# Patient Record
Sex: MALE | Race: BLACK OR AFRICAN AMERICAN | NOT HISPANIC OR LATINO | Employment: OTHER | URBAN - METROPOLITAN AREA
[De-identification: names, ages, dates, MRNs, and addresses within clinical notes are randomized per-mention and may not be internally consistent; named-entity substitution may affect disease eponyms.]

---

## 2019-09-06 RX ORDER — SOLIFENACIN SUCCINATE 5 MG/1
TABLET, FILM COATED ORAL
Qty: 30 TABLET | Refills: 0 | Status: SHIPPED | OUTPATIENT
Start: 2019-09-06 | End: 2020-05-11

## 2019-09-06 RX ORDER — AMLODIPINE BESYLATE 10 MG/1
TABLET ORAL
Qty: 30 TABLET | Refills: 0 | Status: SHIPPED | OUTPATIENT
Start: 2019-09-06 | End: 2020-05-11

## 2019-09-06 RX ORDER — MONTELUKAST SODIUM 10 MG/1
TABLET ORAL
Qty: 30 TABLET | Refills: 0 | Status: SHIPPED | OUTPATIENT
Start: 2019-09-06 | End: 2020-05-11

## 2019-10-02 RX ORDER — LORATADINE 10 MG/1
TABLET ORAL
Qty: 30 TABLET | OUTPATIENT
Start: 2019-10-02

## 2019-10-02 RX ORDER — OMEPRAZOLE 20 MG/1
CAPSULE, DELAYED RELEASE ORAL
Qty: 30 CAPSULE | OUTPATIENT
Start: 2019-10-02

## 2019-10-15 ENCOUNTER — OFFICE VISIT (OUTPATIENT)
Dept: FAMILY MEDICINE CLINIC | Facility: CLINIC | Age: 34
End: 2019-10-15

## 2019-10-15 VITALS
OXYGEN SATURATION: 96 % | SYSTOLIC BLOOD PRESSURE: 110 MMHG | HEART RATE: 77 BPM | DIASTOLIC BLOOD PRESSURE: 86 MMHG | TEMPERATURE: 97.3 F | HEIGHT: 66 IN | WEIGHT: 189.8 LBS | BODY MASS INDEX: 30.5 KG/M2

## 2019-10-15 DIAGNOSIS — Z23 NEED FOR IMMUNIZATION AGAINST INFLUENZA: ICD-10-CM

## 2019-10-15 DIAGNOSIS — I10 ESSENTIAL HYPERTENSION: ICD-10-CM

## 2019-10-15 DIAGNOSIS — F79 INTELLECTUAL DISABILITY: Primary | ICD-10-CM

## 2019-10-15 PROBLEM — J30.2 SEASONAL ALLERGIES: Status: ACTIVE | Noted: 2019-10-15

## 2019-10-15 PROBLEM — G40.909 SEIZURE DISORDER (HCC): Status: ACTIVE | Noted: 2019-10-15

## 2019-10-15 PROBLEM — E78.5 HYPERLIPIDEMIA: Status: ACTIVE | Noted: 2019-10-15

## 2019-10-15 PROBLEM — N18.30 CHRONIC RENAL DISEASE, STAGE 3, MODERATELY DECREASED GLOMERULAR FILTRATION RATE (GFR) BETWEEN 30-59 ML/MIN/1.73 SQUARE METER: Status: ACTIVE | Noted: 2019-10-15

## 2019-10-15 PROCEDURE — 99213 OFFICE O/P EST LOW 20 MIN: CPT | Performed by: INTERNAL MEDICINE

## 2019-10-15 PROCEDURE — 90686 IIV4 VACC NO PRSV 0.5 ML IM: CPT | Performed by: INTERNAL MEDICINE

## 2019-10-15 PROCEDURE — 90471 IMMUNIZATION ADMIN: CPT | Performed by: INTERNAL MEDICINE

## 2019-10-15 RX ORDER — QUETIAPINE FUMARATE 300 MG/1
300 TABLET, FILM COATED ORAL NIGHTLY
COMMUNITY
End: 2020-05-11

## 2019-10-15 RX ORDER — FLUTICASONE PROPIONATE 50 MCG
2 SPRAY, SUSPENSION (ML) NASAL DAILY
COMMUNITY
End: 2019-11-07 | Stop reason: SDUPTHER

## 2019-10-15 RX ORDER — AMMONIUM LACTATE 120 MG/G
CREAM TOPICAL AS NEEDED
COMMUNITY
End: 2020-08-24

## 2019-10-15 RX ORDER — FUROSEMIDE 40 MG/1
40 TABLET ORAL DAILY
COMMUNITY
End: 2021-09-28 | Stop reason: SDUPTHER

## 2019-10-15 RX ORDER — METOPROLOL TARTRATE 100 MG/1
100 TABLET ORAL 2 TIMES DAILY
COMMUNITY
End: 2020-05-11

## 2019-10-15 RX ORDER — BUDESONIDE 1 MG/2ML
1 INHALANT ORAL
COMMUNITY
End: 2020-07-20

## 2019-10-15 RX ORDER — DIVALPROEX SODIUM 500 MG/1
1000 TABLET, DELAYED RELEASE ORAL
COMMUNITY
End: 2021-09-12 | Stop reason: HOSPADM

## 2019-10-15 RX ORDER — OLANZAPINE 20 MG/1
20 TABLET ORAL 2 TIMES DAILY
COMMUNITY
End: 2020-01-27

## 2019-10-15 RX ORDER — HYDRALAZINE HYDROCHLORIDE 100 MG/1
100 TABLET, FILM COATED ORAL 3 TIMES DAILY
COMMUNITY
End: 2020-09-01 | Stop reason: SDUPTHER

## 2019-10-15 RX ORDER — OMEPRAZOLE 20 MG/1
20 CAPSULE, DELAYED RELEASE ORAL DAILY
COMMUNITY
End: 2020-05-11

## 2019-10-15 RX ORDER — ALBUTEROL SULFATE 90 UG/1
2 AEROSOL, METERED RESPIRATORY (INHALATION) EVERY 4 HOURS PRN
COMMUNITY
End: 2020-05-12

## 2019-10-15 RX ORDER — POLYETHYLENE GLYCOL 3350 17 G/17G
17 POWDER, FOR SOLUTION ORAL DAILY
COMMUNITY
End: 2020-06-15

## 2019-10-15 RX ORDER — ALLOPURINOL 100 MG/1
100 TABLET ORAL DAILY
COMMUNITY
End: 2020-08-03

## 2019-10-15 RX ORDER — CLONIDINE HYDROCHLORIDE 0.3 MG/1
0.3 TABLET ORAL 3 TIMES DAILY
COMMUNITY
End: 2020-08-03

## 2019-10-15 RX ORDER — LORATADINE 10 MG/1
10 TABLET ORAL DAILY
COMMUNITY
End: 2020-05-11

## 2019-10-15 RX ORDER — FERROUS SULFATE 325(65) MG
325 TABLET ORAL 2 TIMES DAILY
COMMUNITY
End: 2020-05-11

## 2019-10-15 NOTE — PROGRESS NOTES
Juanita Mccarthy is a 33 y.o. male.     Chief Complaint   Patient presents with   • Hypertension       History of Present Illness   Caregiver was present during the history-taking and subsequent discussion (and for part of the physical exam) with this patient.  Patient agrees to the presence of the individual during this visit.  Follow-up for hypertension.  Currently has been feeling well and asymptomatic without any headaches,vision changes, cough, chest pain, shortness of breath, swelling, focal neurologic deficit, memory loss or syncope.  Has been taking the medications regularly and adherent with the regimen, Denies medication side effects and no significant interval events.  Home blood pressure has been normal and no problems in the home.    Follow up for seizures.  Still no seizures for a long time.  Over 1 year seizure free and no problems with the medications.  Needs protocols for hypertension and seizures for the group home.    The following portions of the patient's history were reviewed and updated as appropriate: allergies, current medications, past family history, past medical history, past social history, past surgical history and problem list.    Past Medical History:   Diagnosis Date   • Asthma    • Constipation    • GERD (gastroesophageal reflux disease)    • Hypertension    • Hypothyroidism    • Mood disorder (CMS/HCC)    • Seizure disorder (CMS/HCC)        History reviewed. No pertinent surgical history.    History reviewed. No pertinent family history.    Social History     Socioeconomic History   • Marital status: Single     Spouse name: Not on file   • Number of children: Not on file   • Years of education: Not on file   • Highest education level: Not on file   Tobacco Use   • Smoking status: Never Smoker   • Smokeless tobacco: Never Used   Substance and Sexual Activity   • Alcohol use: No     Frequency: Never       Review of Systems   Constitutional: Negative for activity change,  appetite change, fatigue, fever, unexpected weight gain and unexpected weight loss.   HENT: Negative for nosebleeds, rhinorrhea, trouble swallowing and voice change.    Eyes: Negative for visual disturbance.   Respiratory: Negative for cough, chest tightness, shortness of breath and wheezing.    Cardiovascular: Negative for chest pain, palpitations and leg swelling.   Gastrointestinal: Negative for abdominal pain, blood in stool, constipation, diarrhea, nausea, vomiting, GERD and indigestion.   Genitourinary: Negative for dysuria, frequency and hematuria.   Musculoskeletal: Negative for arthralgias, back pain and myalgias.   Skin: Negative for rash and bruise.   Neurological: Negative for dizziness, tremors, weakness, light-headedness, numbness, headache and memory problem.   Hematological: Negative for adenopathy. Does not bruise/bleed easily.   Psychiatric/Behavioral: Negative for sleep disturbance and depressed mood. The patient is not nervous/anxious.        Objective   Vitals:    10/15/19 1335   BP: 110/86   Pulse: 77   Temp: 97.3 °F (36.3 °C)   SpO2: 96%     Body mass index is 30.63 kg/m².  Physical Exam   Constitutional: He is oriented to person, place, and time. He appears well-developed and well-nourished. No distress.   HENT:   Head: Normocephalic and atraumatic.   Right Ear: External ear normal.   Left Ear: External ear normal.   Nose: Nose normal.   Mouth/Throat: Oropharynx is clear and moist.   Eyes: Conjunctivae and EOM are normal. Pupils are equal, round, and reactive to light.   Right exotropia   Neck: Normal range of motion. Neck supple. No tracheal deviation present. No thyromegaly present.   Cardiovascular: Normal rate, regular rhythm, normal heart sounds and intact distal pulses. Exam reveals no gallop and no friction rub.   No murmur heard.  Pulmonary/Chest: Effort normal and breath sounds normal. No respiratory distress.   Abdominal: Soft. Bowel sounds are normal. He exhibits no mass. There is  no tenderness. There is no guarding.   Musculoskeletal: Normal range of motion. He exhibits no edema.   Lymphadenopathy:     He has no cervical adenopathy.   Neurological: He is alert and oriented to person, place, and time. He displays normal reflexes. He exhibits normal muscle tone.   Skin: Skin is warm and dry. Capillary refill takes less than 2 seconds. No rash noted. He is not diaphoretic.   Psychiatric: He has a normal mood and affect. His behavior is normal. Judgment and thought content normal.   Nursing note and vitals reviewed.      No results found for this or any previous visit (from the past 2016 hour(s)).  Assessment/Plan   Jim was seen today for hypertension.    Diagnoses and all orders for this visit:    Intellectual disability    Essential hypertension    Need for immunization against influenza  -     Fluarix/Fluzone/Afluria Quad>6 Months; Standing  -     Fluarix/Fluzone/Afluria Quad>6 Months    Old record obtained and reviewed.  Continue the current medications and flu shot today.  Follow up with nephrology as scheduled tomorrow.   Hypertension and seizure protocols were printed and given to staff of [facility].

## 2019-11-07 RX ORDER — FLUTICASONE PROPIONATE 50 MCG
SPRAY, SUSPENSION (ML) NASAL
Qty: 16 G | Refills: 1 | Status: SHIPPED | OUTPATIENT
Start: 2019-11-07 | End: 2020-02-25

## 2020-01-22 ENCOUNTER — OFFICE VISIT (OUTPATIENT)
Dept: FAMILY MEDICINE CLINIC | Facility: CLINIC | Age: 35
End: 2020-01-22

## 2020-01-22 VITALS
DIASTOLIC BLOOD PRESSURE: 86 MMHG | HEIGHT: 66 IN | WEIGHT: 196 LBS | OXYGEN SATURATION: 96 % | HEART RATE: 76 BPM | TEMPERATURE: 97.8 F | BODY MASS INDEX: 31.5 KG/M2 | SYSTOLIC BLOOD PRESSURE: 118 MMHG

## 2020-01-22 DIAGNOSIS — J45.20 MILD INTERMITTENT ASTHMA WITHOUT COMPLICATION: ICD-10-CM

## 2020-01-22 DIAGNOSIS — N18.30 CHRONIC RENAL DISEASE, STAGE 3, MODERATELY DECREASED GLOMERULAR FILTRATION RATE (GFR) BETWEEN 30-59 ML/MIN/1.73 SQUARE METER (HCC): ICD-10-CM

## 2020-01-22 DIAGNOSIS — Z00.00 ANNUAL PHYSICAL EXAM: Primary | ICD-10-CM

## 2020-01-22 DIAGNOSIS — F79 INTELLECTUAL DISABILITY: ICD-10-CM

## 2020-01-22 DIAGNOSIS — E78.2 MIXED HYPERLIPIDEMIA: ICD-10-CM

## 2020-01-22 DIAGNOSIS — G40.909 SEIZURE DISORDER (HCC): ICD-10-CM

## 2020-01-22 DIAGNOSIS — E03.9 ACQUIRED HYPOTHYROIDISM: ICD-10-CM

## 2020-01-22 DIAGNOSIS — K21.9 GASTROESOPHAGEAL REFLUX DISEASE WITHOUT ESOPHAGITIS: ICD-10-CM

## 2020-01-22 DIAGNOSIS — I10 ESSENTIAL HYPERTENSION: ICD-10-CM

## 2020-01-22 PROBLEM — J45.909 ASTHMA: Status: ACTIVE | Noted: 2020-01-22

## 2020-01-22 PROCEDURE — 99395 PREV VISIT EST AGE 18-39: CPT | Performed by: INTERNAL MEDICINE

## 2020-01-22 RX ORDER — CHOLECALCIFEROL (VITAMIN D3) 25 MCG
1000 TABLET ORAL EVERY OTHER DAY
COMMUNITY
Start: 2020-01-02 | End: 2020-08-03

## 2020-01-22 NOTE — PROGRESS NOTES
Chief Complaint   Patient presents with   • Annual Exam       HPI:  Jim Mccarthy, -1985, is a 34 y.o. male who presents for an annual physical.  Caregiver was present during the history-taking and subsequent discussion (and for part of the physical exam) with this patient.  Patient agrees to the presence of the individual during this visit.  Follow-up for hypertension.  Currently has been feeling well and asymptomatic without any headaches,vision changes, cough, chest pain, shortness of breath, swelling, focal neurologic deficit, memory loss or syncope.  Has been taking the medications regularly and adherent with the regimen, Denies medication side effects and no significant interval events.  Home blood pressure has been normal and no problems in the home.    Follow up for seizures.  Still no seizures for a long time.  Over 1 year seizure free and no problems with the medications.  State did  Recent Hospitalizations:  No hospitalization(s) within the last year..    Current Medical Providers:  Patient Care Team:  Mikael Vasquez MD as PCP - General (Internal Medicine)    Compared to one year ago, the patient feels his physical health is the same and his mental health is the same.    Depression Screen: Patient with intellectual disability and poor historian  PHQ-2/PHQ-9 Depression Screening 2020   Little interest or pleasure in doing things 0   Feeling down, depressed, or hopeless 0   Trouble falling or staying asleep, or sleeping too much 0   Feeling tired or having little energy 0   Poor appetite or overeating 0   Feeling bad about yourself - or that you are a failure or have let yourself or your family down 0   Trouble concentrating on things, such as reading the newspaper or watching television 0   Moving or speaking so slowly that other people could have noticed. Or the opposite - being so fidgety or restless that you have been moving around a lot more than usual 0   Thoughts that you would be  better off dead, or of hurting yourself in some way 0   Total Score 0       Past Medical/Family/Social History:  The following portions of the patient's history were reviewed and updated as appropriate: allergies, current medications, past family history, past medical history, past social history, past surgical history and problem list.    No Known Allergies      Current Outpatient Medications:   •  albuterol sulfate HFA (PROAIR HFA) 108 (90 Base) MCG/ACT inhaler, Inhale 2 puffs Every 4 (Four) Hours As Needed for Wheezing., Disp: , Rfl:   •  allopurinol (ZYLOPRIM) 100 MG tablet, Take 100 mg by mouth Daily., Disp: , Rfl:   •  amLODIPine (NORVASC) 10 MG tablet, TAKE ONE TABLET DAILY, Disp: 30 tablet, Rfl: 0  •  ammonium lactate (AMLACTIN) 12 % cream, Apply  topically to the appropriate area as directed As Needed for Dry Skin., Disp: , Rfl:   •  budesonide (PULMICORT) 1 MG/2ML nebulizer solution, Take 1 mg by nebulization Daily., Disp: , Rfl:   •  cloNIDine (CATAPRES) 0.3 MG tablet, Take 0.3 mg by mouth 3 (Three) Times a Day., Disp: , Rfl:   •  divalproex (DEPAKOTE) 500 MG DR tablet, Take 500 mg by mouth. 1 tab a.m. And 2 tabs p.m., Disp: , Rfl:   •  ferrous sulfate 325 (65 FE) MG tablet, Take 325 mg by mouth 2 (Two) Times a Day., Disp: , Rfl:   •  fluticasone (FLONASE) 50 MCG/ACT nasal spray, USE 2 SPRAYS IN EACH NOSTRIL DAILY, Disp: 16 g, Rfl: 1  •  furosemide (LASIX) 40 MG tablet, Take 40 mg by mouth Daily., Disp: , Rfl:   •  hydrALAZINE (APRESOLINE) 100 MG tablet, Take 100 mg by mouth 3 (Three) Times a Day., Disp: , Rfl:   •  LEVOTHYROXINE SODIUM PO, Take 0.075 mcg by mouth Daily., Disp: , Rfl:   •  loratadine (CLARITIN) 10 MG tablet, Take 10 mg by mouth Daily., Disp: , Rfl:   •  metoprolol tartrate (LOPRESSOR) 100 MG tablet, Take 100 mg by mouth 2 (Two) Times a Day., Disp: , Rfl:   •  montelukast (SINGULAIR) 10 MG tablet, TAKE ONE TABLET BY MOUTH AT BEDTIME, Disp: 30 tablet, Rfl: 0  •  O2 (OXYGEN), Inhale 2 L/min  Every Night., Disp: , Rfl:   •  OLANZapine (ZYPREXA) 20 MG tablet, Take 20 mg by mouth 2 (Two) Times a Day., Disp: , Rfl:   •  omeprazole (priLOSEC) 20 MG capsule, Take 20 mg by mouth Daily., Disp: , Rfl:   •  polyethylene glycol (MIRALAX) packet, Take 17 g by mouth Daily., Disp: , Rfl:   •  QUEtiapine (SEROquel) 300 MG tablet, Take 300 mg by mouth Every Night. 2 tab nightly, Disp: , Rfl:   •  solifenacin (VESICARE) 5 MG tablet, TAKE ONE TABLET BY MOUTH DAILY, Disp: 30 tablet, Rfl: 0  •  D 1000 25 MCG (1000 UT) tablet, Take 1,000 Units by mouth Every Other Day., Disp: , Rfl:     Current medication list contains no high risk medications.  No harmful drug interactions have been identified.     History reviewed. No pertinent family history.    Social History     Tobacco Use   • Smoking status: Never Smoker   • Smokeless tobacco: Never Used   Substance Use Topics   • Alcohol use: No     Frequency: Never       History reviewed. No pertinent surgical history.    Patient Active Problem List   Diagnosis   • Intellectual disability   • Seasonal allergies   • Hypertension   • Seizure disorder (CMS/[facility])   • Chronic renal disease, stage 3, moderately decreased glomerular filtration rate (GFR) between 30-59 mL/min/1.73 square meter (CMS/[facility])   • Hyperlipidemia   • Annual physical exam   • Hypothyroidism   • GERD (gastroesophageal reflux disease)   • Asthma       Review of Systems   Constitutional: Negative for activity change, appetite change, fatigue, fever, unexpected weight gain and unexpected weight loss.   HENT: Negative for nosebleeds, rhinorrhea, trouble swallowing and voice change.    Eyes: Negative for visual disturbance.   Respiratory: Negative for cough, chest tightness, shortness of breath and wheezing.    Cardiovascular: Negative for chest pain, palpitations and leg swelling.   Gastrointestinal: Negative for abdominal pain, blood in stool, constipation, diarrhea, nausea, vomiting, GERD and indigestion.  "  Genitourinary: Negative for dysuria, frequency and hematuria.   Musculoskeletal: Negative for arthralgias, back pain and myalgias.   Skin: Negative for rash and bruise.   Neurological: Negative for dizziness, tremors, weakness, light-headedness, numbness, headache and memory problem.   Hematological: Negative for adenopathy. Does not bruise/bleed easily.   Psychiatric/Behavioral: Negative for sleep disturbance and depressed mood. The patient is not nervous/anxious.        Objective     Vitals:    01/22/20 1305   BP: 118/86   BP Location: Left arm   Patient Position: Sitting   Cuff Size: Adult   Pulse: 76   Temp: 97.8 °F (36.6 °C)   SpO2: 96%   Weight: 88.9 kg (196 lb)   Height: 167.6 cm (65.98\")       Patient's Body mass index is 31.65 kg/m². BMI is above normal parameters. Recommendations include: exercise counseling and nutrition counseling.      No exam data present    Physical Exam   Constitutional: He is oriented to person, place, and time. He appears well-developed and well-nourished. No distress.   HENT:   Head: Normocephalic and atraumatic.   Right Ear: External ear normal.   Left Ear: External ear normal.   Nose: Nose normal.   Mouth/Throat: Oropharynx is clear and moist.   Right exotropia    Eyes: Pupils are equal, round, and reactive to light. Conjunctivae and EOM are normal.   Neck: Normal range of motion. Neck supple. No tracheal deviation present. No thyromegaly present.   Cardiovascular: Normal rate, regular rhythm, normal heart sounds and intact distal pulses. Exam reveals no gallop and no friction rub.   No murmur heard.  Pulmonary/Chest: Effort normal and breath sounds normal. No respiratory distress.   Abdominal: Soft. Bowel sounds are normal. He exhibits no mass. There is no tenderness. There is no guarding.   Musculoskeletal: Normal range of motion. He exhibits no edema.   Lymphadenopathy:     He has no cervical adenopathy.   Neurological: He is alert and oriented to person, place, and time. " He displays normal reflexes. He exhibits normal muscle tone.   Skin: Skin is warm and dry. Capillary refill takes less than 2 seconds. No rash noted. He is not diaphoretic.   Left hand with abrasion over the distal hand dorsum 1 inch x 1/2 inch   Psychiatric: He has a normal mood and affect. His behavior is normal. Judgment and thought content normal.   Nursing note and vitals reviewed.      Recent Lab Results:          Assessment/Plan   Age-appropriate Screening Schedule:  Refer to the list below for future screening recommendations based on patient's age, sex and/or medical conditions.      Health Maintenance   Topic Date Due   • TDAP/TD VACCINES (1 - Tdap) 12/01/1998   • PNEUMOCOCCAL VACCINE (19-64 HIGHEST RISK) (1 of 3 - PCV13) 12/01/2006   • LIPID PANEL  10/15/2019   • INFLUENZA VACCINE  Completed       Diagnoses and all orders for this visit:    1. Annual physical exam (Primary)    2. Essential hypertension  -     Comprehensive Metabolic Panel  -     Lipid Panel    3. Mixed hyperlipidemia  -     Comprehensive Metabolic Panel  -     Lipid Panel    4. Acquired hypothyroidism  -     TSH  -     T4, Free    5. Seizure disorder (CMS/HCC)  -     CBC & Differential    6. Chronic renal disease, stage 3, moderately decreased glomerular filtration rate (GFR) between 30-59 mL/min/1.73 square meter (CMS/HCC)  -     Comprehensive Metabolic Panel  -     CBC & Differential    7. Gastroesophageal reflux disease without esophagitis    8. Mild intermittent asthma without complication    9. Intellectual disability        Annual wellness visit reviewed with patient.  All past history, medications, social history, and problem list were reviewed.  Patient currently resides in group home and has POA for decision making.  Will check the labs as ordered above to evaluate the blood sugars, kidney, liver, cholesterol for screening.  Discussed flu shot recommended to get the influenza vaccine annually in the fall. Encouraged follow-up  with the eye doctor on annual basis.  Discussed weight and encouraged exercise as tolerated while following a healthy diet.       An After Visit Summary with all of these plans were given to the patient.        Follow Up:  Return in about 6 months (around 7/22/2020) for Next scheduled follow up.

## 2020-01-22 NOTE — PATIENT INSTRUCTIONS

## 2020-01-23 LAB
ALBUMIN SERPL-MCNC: 4.2 G/DL (ref 3.5–5.2)
ALBUMIN/GLOB SERPL: 2 G/DL
ALP SERPL-CCNC: 130 U/L (ref 39–117)
ALT SERPL-CCNC: 37 U/L (ref 1–41)
AST SERPL-CCNC: 31 U/L (ref 1–40)
BASOPHILS # BLD AUTO: 0.03 10*3/MM3 (ref 0–0.2)
BASOPHILS NFR BLD AUTO: 0.5 % (ref 0–1.5)
BILIRUB SERPL-MCNC: 0.2 MG/DL (ref 0.2–1.2)
BUN SERPL-MCNC: 23 MG/DL (ref 6–20)
BUN/CREAT SERPL: 8.6 (ref 7–25)
CALCIUM SERPL-MCNC: 9.6 MG/DL (ref 8.6–10.5)
CHLORIDE SERPL-SCNC: 98 MMOL/L (ref 98–107)
CHOLEST SERPL-MCNC: 192 MG/DL (ref 0–200)
CO2 SERPL-SCNC: 31.7 MMOL/L (ref 22–29)
CREAT SERPL-MCNC: 2.68 MG/DL (ref 0.76–1.27)
EOSINOPHIL # BLD AUTO: 0.02 10*3/MM3 (ref 0–0.4)
EOSINOPHIL NFR BLD AUTO: 0.3 % (ref 0.3–6.2)
ERYTHROCYTE [DISTWIDTH] IN BLOOD BY AUTOMATED COUNT: 16.8 % (ref 12.3–15.4)
GLOBULIN SER CALC-MCNC: 2.1 GM/DL
GLUCOSE SERPL-MCNC: 113 MG/DL (ref 65–99)
HCT VFR BLD AUTO: 39.6 % (ref 37.5–51)
HDLC SERPL-MCNC: 24 MG/DL (ref 40–60)
HGB BLD-MCNC: 12.6 G/DL (ref 13–17.7)
IMM GRANULOCYTES # BLD AUTO: 0.04 10*3/MM3 (ref 0–0.05)
IMM GRANULOCYTES NFR BLD AUTO: 0.6 % (ref 0–0.5)
LDLC SERPL CALC-MCNC: ABNORMAL MG/DL
LYMPHOCYTES # BLD AUTO: 3.01 10*3/MM3 (ref 0.7–3.1)
LYMPHOCYTES NFR BLD AUTO: 48.6 % (ref 19.6–45.3)
MCH RBC QN AUTO: 23.9 PG (ref 26.6–33)
MCHC RBC AUTO-ENTMCNC: 31.8 G/DL (ref 31.5–35.7)
MCV RBC AUTO: 75.1 FL (ref 79–97)
MONOCYTES # BLD AUTO: 0.74 10*3/MM3 (ref 0.1–0.9)
MONOCYTES NFR BLD AUTO: 12 % (ref 5–12)
NEUTROPHILS # BLD AUTO: 2.35 10*3/MM3 (ref 1.7–7)
NEUTROPHILS NFR BLD AUTO: 38 % (ref 42.7–76)
NRBC BLD AUTO-RTO: 0.2 /100 WBC (ref 0–0.2)
PLATELET # BLD AUTO: 197 10*3/MM3 (ref 140–450)
POTASSIUM SERPL-SCNC: 4.4 MMOL/L (ref 3.5–5.2)
PROT SERPL-MCNC: 6.3 G/DL (ref 6–8.5)
RBC # BLD AUTO: 5.27 10*6/MM3 (ref 4.14–5.8)
SODIUM SERPL-SCNC: 141 MMOL/L (ref 136–145)
T4 FREE SERPL-MCNC: 1.05 NG/DL (ref 0.93–1.7)
TRIGL SERPL-MCNC: 529 MG/DL (ref 0–150)
TSH SERPL DL<=0.005 MIU/L-ACNC: 1.24 UIU/ML (ref 0.27–4.2)
VLDLC SERPL CALC-MCNC: ABNORMAL MG/DL
WBC # BLD AUTO: 6.19 10*3/MM3 (ref 3.4–10.8)

## 2020-01-24 PROBLEM — K59.00 CONSTIPATION: Status: ACTIVE | Noted: 2020-01-24

## 2020-01-24 PROBLEM — E78.1 HYPERTRIGLYCERIDEMIA: Status: ACTIVE | Noted: 2020-01-24

## 2020-01-27 RX ORDER — OLANZAPINE 20 MG/1
TABLET ORAL
Qty: 60 TABLET | Refills: 0 | Status: SHIPPED | OUTPATIENT
Start: 2020-01-27 | End: 2020-05-11

## 2020-02-25 RX ORDER — FLUTICASONE PROPIONATE 50 MCG
SPRAY, SUSPENSION (ML) NASAL
Qty: 16 G | Refills: 1 | Status: SHIPPED | OUTPATIENT
Start: 2020-02-25 | End: 2020-06-08

## 2020-02-28 ENCOUNTER — TELEPHONE (OUTPATIENT)
Dept: FAMILY MEDICINE CLINIC | Facility: CLINIC | Age: 35
End: 2020-02-28

## 2020-05-11 RX ORDER — METOPROLOL TARTRATE 100 MG/1
TABLET ORAL
Qty: 120 TABLET | Refills: 11 | Status: SHIPPED | OUTPATIENT
Start: 2020-05-11 | End: 2020-09-25

## 2020-05-11 RX ORDER — LORATADINE 10 MG/1
TABLET ORAL
Qty: 30 TABLET | Refills: 4 | Status: SHIPPED | OUTPATIENT
Start: 2020-05-11 | End: 2020-09-25

## 2020-05-11 RX ORDER — FERROUS SULFATE 325(65) MG
TABLET ORAL
Qty: 60 TABLET | Refills: 8 | Status: SHIPPED | OUTPATIENT
Start: 2020-05-11 | End: 2020-09-25

## 2020-05-11 RX ORDER — MONTELUKAST SODIUM 10 MG/1
TABLET ORAL
Qty: 30 TABLET | Refills: 0 | Status: SHIPPED | OUTPATIENT
Start: 2020-05-11 | End: 2020-06-08

## 2020-05-11 RX ORDER — AMLODIPINE BESYLATE 10 MG/1
TABLET ORAL
Qty: 30 TABLET | Refills: 0 | Status: SHIPPED | OUTPATIENT
Start: 2020-05-11 | End: 2020-06-08

## 2020-05-11 RX ORDER — OMEPRAZOLE 20 MG/1
CAPSULE, DELAYED RELEASE ORAL
Qty: 30 CAPSULE | Refills: 8 | Status: SHIPPED | OUTPATIENT
Start: 2020-05-11 | End: 2020-09-25

## 2020-05-11 RX ORDER — OLANZAPINE 20 MG/1
TABLET ORAL
Qty: 60 TABLET | Refills: 0 | Status: SHIPPED | OUTPATIENT
Start: 2020-05-11 | End: 2020-06-08

## 2020-05-11 RX ORDER — SOLIFENACIN SUCCINATE 5 MG/1
TABLET, FILM COATED ORAL
Qty: 30 TABLET | Refills: 0 | Status: SHIPPED | OUTPATIENT
Start: 2020-05-11 | End: 2020-06-08

## 2020-05-11 RX ORDER — LEVOTHYROXINE SODIUM 0.07 MG/1
TABLET ORAL
Qty: 30 TABLET | Refills: 2 | Status: SHIPPED | OUTPATIENT
Start: 2020-05-11 | End: 2020-07-06

## 2020-05-11 RX ORDER — QUETIAPINE FUMARATE 300 MG/1
TABLET, FILM COATED ORAL
Qty: 60 TABLET | Refills: 2 | Status: SHIPPED | OUTPATIENT
Start: 2020-05-11 | End: 2020-07-06

## 2020-06-08 RX ORDER — SOLIFENACIN SUCCINATE 5 MG/1
TABLET, FILM COATED ORAL
Qty: 30 TABLET | Refills: 0 | Status: SHIPPED | OUTPATIENT
Start: 2020-06-08 | End: 2020-07-06

## 2020-06-08 RX ORDER — FLUTICASONE PROPIONATE 50 MCG
SPRAY, SUSPENSION (ML) NASAL
Qty: 16 G | Refills: 1 | Status: SHIPPED | OUTPATIENT
Start: 2020-06-08 | End: 2020-08-30

## 2020-06-08 RX ORDER — AMLODIPINE BESYLATE 10 MG/1
TABLET ORAL
Qty: 30 TABLET | Refills: 0 | Status: SHIPPED | OUTPATIENT
Start: 2020-06-08 | End: 2020-07-06

## 2020-06-08 RX ORDER — MONTELUKAST SODIUM 10 MG/1
TABLET ORAL
Qty: 30 TABLET | Refills: 0 | Status: SHIPPED | OUTPATIENT
Start: 2020-06-08 | End: 2020-07-06

## 2020-06-08 RX ORDER — OLANZAPINE 20 MG/1
TABLET ORAL
Qty: 60 TABLET | Refills: 0 | Status: SHIPPED | OUTPATIENT
Start: 2020-06-08 | End: 2020-07-06

## 2020-06-15 RX ORDER — POLYETHYLENE GLYCOL 3350 17 G/17G
POWDER, FOR SOLUTION ORAL
Qty: 510 G | Refills: 12 | Status: SHIPPED | OUTPATIENT
Start: 2020-06-15 | End: 2021-07-01

## 2020-07-06 RX ORDER — MONTELUKAST SODIUM 10 MG/1
TABLET ORAL
Qty: 30 TABLET | Refills: 0 | Status: SHIPPED | OUTPATIENT
Start: 2020-07-06 | End: 2020-08-03

## 2020-07-06 RX ORDER — OLANZAPINE 20 MG/1
TABLET ORAL
Qty: 60 TABLET | Refills: 0 | Status: SHIPPED | OUTPATIENT
Start: 2020-07-06 | End: 2020-08-03

## 2020-07-06 RX ORDER — QUETIAPINE FUMARATE 300 MG/1
TABLET, FILM COATED ORAL
Qty: 60 TABLET | Refills: 2 | Status: SHIPPED | OUTPATIENT
Start: 2020-07-06 | End: 2020-09-25

## 2020-07-06 RX ORDER — LEVOTHYROXINE SODIUM 0.07 MG/1
TABLET ORAL
Qty: 30 TABLET | Refills: 2 | Status: SHIPPED | OUTPATIENT
Start: 2020-07-06 | End: 2020-09-25

## 2020-07-06 RX ORDER — SOLIFENACIN SUCCINATE 5 MG/1
TABLET, FILM COATED ORAL
Qty: 30 TABLET | Refills: 0 | Status: SHIPPED | OUTPATIENT
Start: 2020-07-06 | End: 2020-08-03

## 2020-07-06 RX ORDER — AMLODIPINE BESYLATE 10 MG/1
TABLET ORAL
Qty: 30 TABLET | Refills: 0 | Status: SHIPPED | OUTPATIENT
Start: 2020-07-06 | End: 2020-08-03

## 2020-07-20 RX ORDER — BUDESONIDE 1 MG/2ML
INHALANT ORAL
Qty: 60 ML | Refills: 2 | Status: SHIPPED | OUTPATIENT
Start: 2020-07-20 | End: 2020-11-16

## 2020-07-29 ENCOUNTER — OFFICE VISIT (OUTPATIENT)
Dept: FAMILY MEDICINE CLINIC | Facility: CLINIC | Age: 35
End: 2020-07-29

## 2020-07-29 VITALS
DIASTOLIC BLOOD PRESSURE: 88 MMHG | WEIGHT: 203 LBS | OXYGEN SATURATION: 96 % | HEART RATE: 78 BPM | TEMPERATURE: 98.3 F | BODY MASS INDEX: 32.62 KG/M2 | HEIGHT: 66 IN | SYSTOLIC BLOOD PRESSURE: 124 MMHG

## 2020-07-29 DIAGNOSIS — N18.30 CHRONIC RENAL DISEASE, STAGE 3, MODERATELY DECREASED GLOMERULAR FILTRATION RATE (GFR) BETWEEN 30-59 ML/MIN/1.73 SQUARE METER (HCC): ICD-10-CM

## 2020-07-29 DIAGNOSIS — G40.909 SEIZURE DISORDER (HCC): ICD-10-CM

## 2020-07-29 DIAGNOSIS — I10 ESSENTIAL HYPERTENSION: Primary | ICD-10-CM

## 2020-07-29 DIAGNOSIS — E03.9 ACQUIRED HYPOTHYROIDISM: ICD-10-CM

## 2020-07-29 PROCEDURE — 99214 OFFICE O/P EST MOD 30 MIN: CPT | Performed by: INTERNAL MEDICINE

## 2020-07-29 NOTE — PROGRESS NOTES
Juanita Mccarthy is a 34 y.o. male.     Chief Complaint   Patient presents with   • Hypertension       History of Present Illness   Caregiver Arnold was present during the history-taking and subsequent discussion (and for part of the physical exam) with this patient.  Patient agrees to the presence of the individual during this visit.    Follow-up for hypertension.  Currently has been feeling well and asymptomatic without any headaches,vision changes, cough, chest pain, shortness of breath, swelling, focal neurologic deficit, or syncope.  Has been taking the medications regularly and adherent with the regimen of amlodipine 10 mg, clonidine 0.3 mg TID, furosemide 40 mg QD, metoprolol tartrate 100 mg BID and hydralazine 100 mg TID.  Denies medication side effects and no significant interval events.  Home blood pressure has been normal and no problems in the home.      Follow up for seizures.  Still no seizures for a long time.  Over 1 year seizure free and no problems with the medications.    Follow-up for thyroid.  Denies fatigue, weakness, constipation/diarrhea, hair/skin changes, weight gain/loss, depression/anxiety, rashes, palpitations, swelling, chest pain, shortness of breath or other issues.  Has been compliant with taking the medication with no recent changes.  Denies any difficulty with the current medication of levothyroxine 75 mcg daily.  Is due for labs.      The following portions of the patient's history were reviewed and updated as appropriate: allergies, current medications, past family history, past medical history, past social history, past surgical history and problem list.      Past Medical History:   Diagnosis Date   • Asthma    • Constipation    • GERD (gastroesophageal reflux disease)    • Hypertension    • Hypothyroidism    • Mood disorder (CMS/HCC)    • Seizure disorder (CMS/HCC)        History reviewed. No pertinent surgical history.    Family History   Problem Relation Age of Onset   •  Down syndrome Brother        Social History     Socioeconomic History   • Marital status: Single     Spouse name: Not on file   • Number of children: Not on file   • Years of education: Not on file   • Highest education level: Not on file   Tobacco Use   • Smoking status: Never Smoker   • Smokeless tobacco: Never Used   Substance and Sexual Activity   • Alcohol use: No     Frequency: Never       Current Outpatient Medications   Medication Sig Dispense Refill   • allopurinol (ZYLOPRIM) 100 MG tablet Take 100 mg by mouth Daily.     • amLODIPine (NORVASC) 10 MG tablet TAKE ONE TABLET BY MOUTH DAILY 30 tablet 0   • ammonium lactate (AMLACTIN) 12 % cream Apply  topically to the appropriate area as directed As Needed for Dry Skin.     • budesonide (PULMICORT) 1 MG/2ML nebulizer solution INHALE THE CONTENTS OF ONE VIAL VIA NEBULIZE DAILY 60 mL 2   • cloNIDine (CATAPRES) 0.3 MG tablet Take 0.3 mg by mouth 3 (Three) Times a Day.     • D 1000 25 MCG (1000 UT) tablet Take 1,000 Units by mouth Every Other Day.     • divalproex (DEPAKOTE) 500 MG DR tablet Take 500 mg by mouth. 1 tab a.m. And 2 tabs p.m.     • ferrous sulfate 325 (65 FE) MG tablet TAKE ONE TABLET TWICE A DAY MORNING AND EVENING 60 tablet 8   • fluticasone (FLONASE) 50 MCG/ACT nasal spray USE 2 SPRAYS IN EACH NOSTRIL DAILY 16 g 1   • furosemide (LASIX) 40 MG tablet Take 40 mg by mouth Daily.     • hydrALAZINE (APRESOLINE) 100 MG tablet Take 100 mg by mouth 3 (Three) Times a Day.     • levothyroxine (SYNTHROID, LEVOTHROID) 75 MCG tablet TAKE ONE TABLET BY MOUTH DAILY 30 tablet 2   • loratadine (CLARITIN) 10 MG tablet TAKE ONE TABLET DAILY 30 tablet 4   • metoprolol tartrate (LOPRESSOR) 100 MG tablet TAKE TWO TABLETS TWICE A DAY MORNING AND EVENING 120 tablet 11   • montelukast (SINGULAIR) 10 MG tablet TAKE ONE TABLET BY MOUTH AT BEDTIME 30 tablet 0   • O2 (OXYGEN) Inhale 2 L/min Every Night.     • OLANZapine (zyPREXA) 20 MG tablet TAKE ONE TABLET BY MOUTH TWICE A  "DAY MORNING AND EVENING 60 tablet 0   • omeprazole (priLOSEC) 20 MG capsule TAKE ONE CAPSULE DAILY 30 capsule 8   • polyethylene glycol (MIRALAX) 17 GM/SCOOP powder MIX 17 GRAMS (ONE CAPFUL) IN LIQUID AND DRINK DAILY 510 g 12   • PROAIR  (90 Base) MCG/ACT inhaler INHALE TWO PUFFS BY MOUTH EVERY 6 HOURS AS NEEDED AND 20 MINUTES BEFORE EXERCISE.. 8.5 g 11   • QUEtiapine (SEROquel) 300 MG tablet TAKE TWO TABLETS BY MOUTH AT BEDTIME 60 tablet 2   • solifenacin (VESICARE) 5 MG tablet TAKE ONE TABLET BY MOUTH DAILY 30 tablet 0     No current facility-administered medications for this visit.        Review of Systems   Constitutional: Negative for activity change, appetite change, fatigue, fever, unexpected weight gain and unexpected weight loss.   HENT: Negative for nosebleeds, rhinorrhea, trouble swallowing and voice change.         Right exotropia   Eyes: Negative for visual disturbance.   Respiratory: Negative for cough, chest tightness, shortness of breath and wheezing.    Cardiovascular: Negative for chest pain, palpitations and leg swelling.   Gastrointestinal: Negative for abdominal pain, blood in stool, constipation, diarrhea, nausea, vomiting, GERD and indigestion.   Genitourinary: Negative for dysuria, frequency and hematuria.   Musculoskeletal: Negative for arthralgias, back pain and myalgias.   Skin: Negative for rash and bruise.   Neurological: Negative for dizziness, tremors, weakness, light-headedness, numbness, headache and memory problem.   Hematological: Negative for adenopathy. Does not bruise/bleed easily.   Psychiatric/Behavioral: Negative for sleep disturbance and depressed mood. The patient is not nervous/anxious.        Objective   /88 (BP Location: Left arm, Patient Position: Sitting, Cuff Size: Large Adult)   Pulse 78   Temp 98.3 °F (36.8 °C) (Temporal)   Ht 167.6 cm (65.98\")   Wt 92.1 kg (203 lb)   SpO2 96%   BMI 32.78 kg/m²     Physical Exam   Constitutional: He is oriented to " person, place, and time. He appears well-developed and well-nourished. No distress.   HENT:   Head: Normocephalic and atraumatic.   Right Ear: External ear normal.   Left Ear: External ear normal.   Nose: Nose normal.   Mouth/Throat: Oropharynx is clear and moist.   Eyes: Pupils are equal, round, and reactive to light. Conjunctivae and EOM are normal.   Right exotropia   Neck: Normal range of motion. Neck supple. No tracheal deviation present. No thyromegaly present.   Cardiovascular: Normal rate, regular rhythm, normal heart sounds and intact distal pulses. Exam reveals no gallop and no friction rub.   No murmur heard.  Pulmonary/Chest: Effort normal and breath sounds normal. No respiratory distress.   Abdominal: Soft. Bowel sounds are normal. He exhibits no mass. There is no tenderness. There is no guarding.   Musculoskeletal: Normal range of motion. He exhibits no edema.   Lymphadenopathy:     He has no cervical adenopathy.   Neurological: He is alert and oriented to person, place, and time. He displays normal reflexes. He exhibits normal muscle tone.   Skin: Skin is warm and dry. Capillary refill takes less than 2 seconds. No rash noted. He is not diaphoretic.   Psychiatric: He has a normal mood and affect. His behavior is normal. Judgment and thought content normal.   Nursing note and vitals reviewed.      No results found for this or any previous visit (from the past 2016 hour(s)).  Assessment/Plan   Jim was seen today for hypertension.    Diagnoses and all orders for this visit:    Essential hypertension  -     CBC & Differential    Acquired hypothyroidism  -     TSH Rfx On Abnormal To Free T4    Chronic renal disease, stage 3, moderately decreased glomerular filtration rate (GFR) between 30-59 mL/min/1.73 square meter (CMS/HCC)  -     Comprehensive Metabolic Panel  -     CBC & Differential  -     PTH, Intact  -     Vitamin D 25 Hydroxy    Seizure disorder (CMS/HCC)  -     Valproic Acid Level,  Free    Hypertension has been fluctuating within the facility.  However, noted today that his blood pressure initially was high but then did come down and I believe that is likely secondary to him and his flexing of muscles while he is having his blood pressure checked.  Discussed with caregiver on proper blood pressure checking and try to get them as relaxed as possible when he has his pressure checked.  He is to follow-up with nephrology and we will go into the labs today as noted above.  Thyroid appears to be stable continue with his thyroid levels being checked today.  He has had some weight gain but is somewhat concerning but it may also be because he has been eating more of his extras within the group home.

## 2020-07-31 LAB
25(OH)D3+25(OH)D2 SERPL-MCNC: 33 NG/ML (ref 30–100)
ALBUMIN SERPL-MCNC: 4.5 G/DL (ref 3.5–5.2)
ALBUMIN/GLOB SERPL: 2 G/DL
ALP SERPL-CCNC: 177 U/L (ref 39–117)
ALT SERPL-CCNC: 30 U/L (ref 1–41)
AST SERPL-CCNC: 21 U/L (ref 1–40)
BASOPHILS # BLD AUTO: ABNORMAL 10*3/UL
BASOPHILS # BLD MANUAL: 0.06 10*3/MM3 (ref 0–0.2)
BASOPHILS NFR BLD MANUAL: 1 % (ref 0–1.5)
BILIRUB SERPL-MCNC: 0.2 MG/DL (ref 0–1.2)
BUN SERPL-MCNC: 19 MG/DL (ref 6–20)
BUN/CREAT SERPL: 9 (ref 7–25)
CALCIUM SERPL-MCNC: 9.9 MG/DL (ref 8.6–10.5)
CHLORIDE SERPL-SCNC: 102 MMOL/L (ref 98–107)
CO2 SERPL-SCNC: 25.5 MMOL/L (ref 22–29)
CREAT SERPL-MCNC: 2.11 MG/DL (ref 0.76–1.27)
DIFFERENTIAL COMMENT: NORMAL
EOSINOPHIL # BLD AUTO: ABNORMAL 10*3/UL
EOSINOPHIL NFR BLD AUTO: ABNORMAL %
ERYTHROCYTE [DISTWIDTH] IN BLOOD BY AUTOMATED COUNT: 15.8 % (ref 12.3–15.4)
GLOBULIN SER CALC-MCNC: 2.3 GM/DL
GLUCOSE SERPL-MCNC: 133 MG/DL (ref 65–99)
HCT VFR BLD AUTO: 44.5 % (ref 37.5–51)
HGB BLD-MCNC: 14 G/DL (ref 13–17.7)
LYMPHOCYTES # BLD AUTO: ABNORMAL 10*3/UL
LYMPHOCYTES # BLD MANUAL: 1.72 10*3/MM3 (ref 0.7–3.1)
LYMPHOCYTES NFR BLD AUTO: ABNORMAL %
LYMPHOCYTES NFR BLD MANUAL: 28 % (ref 19.6–45.3)
MCH RBC QN AUTO: 23.5 PG (ref 26.6–33)
MCHC RBC AUTO-ENTMCNC: 31.5 G/DL (ref 31.5–35.7)
MCV RBC AUTO: 74.7 FL (ref 79–97)
MONOCYTES # BLD MANUAL: 0.49 10*3/MM3 (ref 0.1–0.9)
MONOCYTES NFR BLD AUTO: ABNORMAL %
MONOCYTES NFR BLD MANUAL: 8 % (ref 5–12)
NEUTROPHILS # BLD MANUAL: 3.86 10*3/MM3 (ref 1.7–7)
NEUTROPHILS NFR BLD AUTO: ABNORMAL %
NEUTROPHILS NFR BLD MANUAL: 63 % (ref 42.7–76)
PLATELET # BLD AUTO: 209 10*3/MM3 (ref 140–450)
PLATELET BLD QL SMEAR: NORMAL
POTASSIUM SERPL-SCNC: 4.4 MMOL/L (ref 3.5–5.2)
PROT SERPL-MCNC: 6.8 G/DL (ref 6–8.5)
PTH-INTACT SERPL-MCNC: 74 PG/ML (ref 15–65)
RBC # BLD AUTO: 5.96 10*6/MM3 (ref 4.14–5.8)
RBC MORPH BLD: NORMAL
SODIUM SERPL-SCNC: 141 MMOL/L (ref 136–145)
TSH SERPL DL<=0.005 MIU/L-ACNC: 1.36 UIU/ML (ref 0.27–4.2)
VALPROATE FREE SERPL-MCNC: 11.1 UG/ML (ref 6–22)
WBC # BLD AUTO: 6.13 10*3/MM3 (ref 3.4–10.8)

## 2020-08-03 RX ORDER — ALLOPURINOL 100 MG/1
TABLET ORAL
Qty: 30 TABLET | Refills: 2 | Status: SHIPPED | OUTPATIENT
Start: 2020-08-03 | End: 2020-09-25

## 2020-08-03 RX ORDER — AMLODIPINE BESYLATE 10 MG/1
TABLET ORAL
Qty: 30 TABLET | Refills: 0 | Status: SHIPPED | OUTPATIENT
Start: 2020-08-03 | End: 2020-08-30

## 2020-08-03 RX ORDER — SOLIFENACIN SUCCINATE 5 MG/1
TABLET, FILM COATED ORAL
Qty: 30 TABLET | Refills: 0 | Status: SHIPPED | OUTPATIENT
Start: 2020-08-03 | End: 2020-08-30

## 2020-08-03 RX ORDER — MELATONIN
Qty: 15 TABLET | Refills: 3 | Status: SHIPPED | OUTPATIENT
Start: 2020-08-03 | End: 2021-09-05

## 2020-08-03 RX ORDER — CLONIDINE HYDROCHLORIDE 0.3 MG/1
TABLET ORAL
Qty: 90 TABLET | Refills: 1 | Status: SHIPPED | OUTPATIENT
Start: 2020-08-03 | End: 2020-08-30

## 2020-08-03 RX ORDER — OLANZAPINE 20 MG/1
TABLET ORAL
Qty: 60 TABLET | Refills: 0 | Status: SHIPPED | OUTPATIENT
Start: 2020-08-03 | End: 2020-08-30

## 2020-08-03 RX ORDER — MONTELUKAST SODIUM 10 MG/1
TABLET ORAL
Qty: 30 TABLET | Refills: 0 | Status: SHIPPED | OUTPATIENT
Start: 2020-08-03 | End: 2020-08-30

## 2020-08-24 RX ORDER — AMMONIUM LACTATE 12 G/100G
LOTION TOPICAL
Qty: 400 G | Refills: 11 | Status: SHIPPED | OUTPATIENT
Start: 2020-08-24 | End: 2021-09-05

## 2020-08-30 RX ORDER — SOLIFENACIN SUCCINATE 5 MG/1
TABLET, FILM COATED ORAL
Qty: 30 TABLET | Refills: 11 | Status: SHIPPED | OUTPATIENT
Start: 2020-08-30 | End: 2021-07-06

## 2020-08-30 RX ORDER — AMLODIPINE BESYLATE 10 MG/1
TABLET ORAL
Qty: 30 TABLET | Refills: 11 | Status: SHIPPED | OUTPATIENT
Start: 2020-08-30 | End: 2021-07-06

## 2020-08-30 RX ORDER — MONTELUKAST SODIUM 10 MG/1
TABLET ORAL
Qty: 30 TABLET | Refills: 11 | Status: SHIPPED | OUTPATIENT
Start: 2020-08-30 | End: 2021-07-06

## 2020-08-30 RX ORDER — FLUTICASONE PROPIONATE 50 MCG
SPRAY, SUSPENSION (ML) NASAL
Qty: 16 G | Refills: 11 | Status: SHIPPED | OUTPATIENT
Start: 2020-08-30 | End: 2021-07-06

## 2020-08-30 RX ORDER — OLANZAPINE 20 MG/1
TABLET ORAL
Qty: 60 TABLET | Refills: 11 | Status: SHIPPED | OUTPATIENT
Start: 2020-08-30 | End: 2021-07-06

## 2020-08-30 RX ORDER — CLONIDINE HYDROCHLORIDE 0.3 MG/1
TABLET ORAL
Qty: 90 TABLET | Refills: 11 | Status: SHIPPED | OUTPATIENT
Start: 2020-08-30 | End: 2021-08-02

## 2020-09-01 ENCOUNTER — TELEPHONE (OUTPATIENT)
Dept: FAMILY MEDICINE CLINIC | Facility: CLINIC | Age: 35
End: 2020-09-01

## 2020-09-01 RX ORDER — HYDRALAZINE HYDROCHLORIDE 100 MG/1
100 TABLET, FILM COATED ORAL 3 TIMES DAILY
Qty: 270 TABLET | Refills: 0 | Status: SHIPPED | OUTPATIENT
Start: 2020-09-01 | End: 2020-12-10

## 2020-09-01 NOTE — TELEPHONE ENCOUNTER
Pt is requesting a refill on the following medication.  Pharmacy Gen Packaging      hydrALAZINE (APRESOLINE) 100 MG tablet [[id number]]     Order Details   Dose: 100 mg Route: Oral Frequency: 3 Times Daily   Dispense Quantity: -- Refills: -- Fills remaining: --           Sig: Take 100 mg by mouth 3 (Three) Times a Day.

## 2020-09-25 RX ORDER — OMEPRAZOLE 20 MG/1
CAPSULE, DELAYED RELEASE ORAL
Qty: 186 CAPSULE | Refills: 1 | Status: SHIPPED | OUTPATIENT
Start: 2020-09-25 | End: 2020-11-18

## 2020-09-25 RX ORDER — METOPROLOL TARTRATE 100 MG/1
TABLET ORAL
Qty: 180 TABLET | Refills: 0 | Status: SHIPPED | OUTPATIENT
Start: 2020-09-25 | End: 2020-11-18

## 2020-09-25 RX ORDER — ALLOPURINOL 100 MG/1
TABLET ORAL
Qty: 30 TABLET | Refills: 2 | Status: SHIPPED | OUTPATIENT
Start: 2020-09-25 | End: 2020-12-17

## 2020-09-25 RX ORDER — QUETIAPINE FUMARATE 300 MG/1
TABLET, FILM COATED ORAL
Qty: 60 TABLET | Refills: 2 | Status: SHIPPED | OUTPATIENT
Start: 2020-09-25 | End: 2020-12-17

## 2020-09-25 RX ORDER — FERROUS SULFATE 325(65) MG
TABLET ORAL
Qty: 372 TABLET | Refills: 1 | Status: SHIPPED | OUTPATIENT
Start: 2020-09-25 | End: 2020-11-18

## 2020-09-25 RX ORDER — LEVOTHYROXINE SODIUM 0.07 MG/1
TABLET ORAL
Qty: 30 TABLET | Refills: 2 | Status: SHIPPED | OUTPATIENT
Start: 2020-09-25 | End: 2020-12-17

## 2020-09-25 RX ORDER — LORATADINE 10 MG/1
TABLET ORAL
Qty: 66 TABLET | Refills: 0 | Status: SHIPPED | OUTPATIENT
Start: 2020-09-25 | End: 2020-11-18

## 2020-10-21 ENCOUNTER — OFFICE VISIT (OUTPATIENT)
Dept: FAMILY MEDICINE CLINIC | Facility: CLINIC | Age: 35
End: 2020-10-21

## 2020-10-21 VITALS
OXYGEN SATURATION: 97 % | WEIGHT: 206 LBS | TEMPERATURE: 98.2 F | SYSTOLIC BLOOD PRESSURE: 138 MMHG | HEART RATE: 74 BPM | HEIGHT: 66 IN | BODY MASS INDEX: 33.11 KG/M2 | DIASTOLIC BLOOD PRESSURE: 88 MMHG

## 2020-10-21 DIAGNOSIS — J45.20 MILD INTERMITTENT ASTHMA WITHOUT COMPLICATION: ICD-10-CM

## 2020-10-21 DIAGNOSIS — G40.909 SEIZURE DISORDER (HCC): ICD-10-CM

## 2020-10-21 DIAGNOSIS — I10 ESSENTIAL HYPERTENSION: Primary | ICD-10-CM

## 2020-10-21 DIAGNOSIS — E03.9 ACQUIRED HYPOTHYROIDISM: ICD-10-CM

## 2020-10-21 PROCEDURE — 99213 OFFICE O/P EST LOW 20 MIN: CPT | Performed by: INTERNAL MEDICINE

## 2020-10-21 RX ORDER — NEBULIZER ACCESSORIES
1 KIT MISCELLANEOUS 4 TIMES DAILY PRN
Qty: 1 EACH | Refills: 0 | Status: SHIPPED | OUTPATIENT
Start: 2020-10-21 | End: 2021-09-05

## 2020-10-21 NOTE — PROGRESS NOTES
Juanita Mccarthy is a 34 y.o. male.     Chief Complaint   Patient presents with   • Hypertension   • Seizures     protocol       History of Present Illness   Follow-up for hypertension.  Currently has been feeling well and asymptomatic without any headaches,vision changes, cough, chest pain, shortness of breath, swelling, focal neurologic deficit, or syncope.  Has been taking the medications regularly and adherent with the regimen of amlodipine 10 mg, clonidine 0.3 mg TID, furosemide 40 mg QD, metoprolol tartrate 100 mg BID and hydralazine 100 mg TID.  Denies medication side effects and no significant interval events.  Home blood pressure has been normal and no problems in the home.       Follow up for seizures.  Still no seizures for a long time.  Over 1 year seizure free and no problems with the medications.     Follow-up for thyroid.  Denies fatigue, weakness, constipation/diarrhea, hair/skin changes, weight gain/loss, depression/anxiety, rashes, palpitations, swelling, chest pain, shortness of breath or other issues.  Has been compliant with taking the medication with no recent changes. Denies any difficulty with the current medication of levothyroxine 75 mcg daily.  labs in July were normal for the thyroid.    Was seen by nephrology last week and had the vitamin D increased to every day.  No other changes.    The following portions of the patient's history were reviewed and updated as appropriate: allergies, current medications, past family history, past medical history, past social history, past surgical history and problem list.    Depression Screen:  PHQ-2/PHQ-9 Depression Screening 1/22/2020   Little interest or pleasure in doing things 0   Feeling down, depressed, or hopeless 0   Trouble falling or staying asleep, or sleeping too much 0   Feeling tired or having little energy 0   Poor appetite or overeating 0   Feeling bad about yourself - or that you are a failure or have let yourself or your  family down 0   Trouble concentrating on things, such as reading the newspaper or watching television 0   Moving or speaking so slowly that other people could have noticed. Or the opposite - being so fidgety or restless that you have been moving around a lot more than usual 0   Thoughts that you would be better off dead, or of hurting yourself in some way 0   Total Score 0       Past Medical History:   Diagnosis Date   • Acquired intellectual disability    • Allergic rhinitis    • Asthma    • Chronic kidney disease (CKD)    • Chronic renal disease, stage 3, moderately decreased glomerular filtration rate between 30-59 mL/min/1.73 square meter    • Constipation    • Epilepsy, generalized, convulsive (CMS/HCC)    • Episode of altered consciousness    • Essential hypertension     History of Essential Hypertension    • GERD (gastroesophageal reflux disease)    • Hematuria    • Hyperkalemia    • Hyperlipidemia    • Hypertension    • Hypothyroidism    • Immunization due    • Metabolic encephalopathy    • Moderate intellectual disabilities    • Mood disorder (CMS/HCC)    • Nocturia    • Physical exam, annual    • Seizure (CMS/HCC)    • Seizure disorder (CMS/HCC)        History reviewed. No pertinent surgical history.    Family History   Problem Relation Age of Onset   • Down syndrome Brother    • No Known Problems Other        Social History     Socioeconomic History   • Marital status: Single     Spouse name: Not on file   • Number of children: Not on file   • Years of education: Not on file   • Highest education level: Not on file   Tobacco Use   • Smoking status: Never Smoker   • Smokeless tobacco: Never Used   Substance and Sexual Activity   • Alcohol use: No     Frequency: Never   • Drug use: Defer   Social History Narrative    ** Merged History Encounter **            Current Outpatient Medications   Medication Sig Dispense Refill   • allopurinol (ZYLOPRIM) 100 MG tablet TAKE ONE TABLET BY MOUTH DAILY 30 tablet 2   •  amLODIPine (NORVASC) 10 MG tablet TAKE ONE TABLET BY MOUTH DAILY 30 tablet 11   • ammonium lactate (LAC-HYDRIN) 12 % lotion APPLY TO FEET AND LEGS TWICE DAILY AS DIRECTED. 400 g 11   • budesonide (PULMICORT) 1 MG/2ML nebulizer solution INHALE THE CONTENTS OF ONE VIAL VIA NEBULIZE DAILY 60 mL 2   • cholecalciferol (VITAMIN D3) 25 MCG (1000 UT) tablet TAKE ONE TABLET BY MOUTH EVERY OTHER DAY (Patient taking differently: Daily.) 15 tablet 3   • cloNIDine (CATAPRES) 0.3 MG tablet TAKE ONE TABLET BY MOUTH THREE TIMES DAILY (MORNING, NOON, EVENING) 90 tablet 11   • divalproex (DEPAKOTE) 500 MG DR tablet Take 500 mg by mouth. 1 tab a.m. And 2 tabs p.m.     • ferrous sulfate 325 (65 FE) MG tablet TAKE ONE TABLET BY MOUTH TWICE A DAY MORNING AND EVENING 372 tablet 1   • fluticasone (FLONASE) 50 MCG/ACT nasal spray USE TWO SPRAYS IN EACH NOSTRIL DAILY 16 g 11   • furosemide (LASIX) 40 MG tablet Take 40 mg by mouth Daily.     • hydrALAZINE (APRESOLINE) 100 MG tablet Take 1 tablet by mouth 3 (Three) Times a Day. 270 tablet 0   • levothyroxine (SYNTHROID, LEVOTHROID) 75 MCG tablet TAKE ONE TABLET BY MOUTH DAILY 30 tablet 2   • loratadine (CLARITIN) 10 MG tablet TAKE ONE TABLET BY MOUTH DAILY 66 tablet 0   • metoprolol tartrate (LOPRESSOR) 100 MG tablet TAKE TWO TABLETS BY MOUTH TWICE A DAY MORNING AND EVENING 180 tablet 0   • montelukast (SINGULAIR) 10 MG tablet TAKE ONE TABLET BY MOUTH AT BEDTIME 30 tablet 11   • O2 (OXYGEN) Inhale 2 L/min Every Night.     • OLANZapine (zyPREXA) 20 MG tablet TAKE ONE TABLET BY MOUTH TWICE A DAY MORNING AND EVENING 60 tablet 11   • omeprazole (priLOSEC) 20 MG capsule TAKE ONE CAPSULE BY MOUTH DAILY 186 capsule 1   • polyethylene glycol (MIRALAX) 17 GM/SCOOP powder MIX 17 GRAMS (ONE CAPFUL) IN LIQUID AND DRINK DAILY 510 g 12   • PROAIR  (90 Base) MCG/ACT inhaler INHALE TWO PUFFS BY MOUTH EVERY 6 HOURS AS NEEDED AND 20 MINUTES BEFORE EXERCISE.. 8.5 g 11   • QUEtiapine (SEROquel) 300 MG  "tablet TAKE TWO TABLETS BY MOUTH AT BEDTIME 60 tablet 2   • solifenacin (VESICARE) 5 MG tablet TAKE ONE TABLET BY MOUTH DAILY 30 tablet 11   • Respiratory Therapy Supplies (Nebulizer/Tubing/Mouthpiece) kit 1 Product 4 (Four) Times a Day As Needed (for wheezing/short of breath). Tubing with adult size mask 1 each 0   • Spacer/Aero-Holding Chambers device 1 Device 4 (Four) Times a Day As Needed (for wheezing with the albuterol inhaler). 1 each 0     No current facility-administered medications for this visit.        Review of Systems   Constitutional: Negative for activity change, appetite change, fatigue, fever, unexpected weight gain and unexpected weight loss.   HENT: Negative for nosebleeds, rhinorrhea, trouble swallowing and voice change.    Eyes: Negative for visual disturbance.   Respiratory: Negative for cough, chest tightness, shortness of breath and wheezing.    Cardiovascular: Negative for chest pain, palpitations and leg swelling.   Gastrointestinal: Negative for abdominal pain, blood in stool, constipation, diarrhea, nausea, vomiting, GERD and indigestion.   Genitourinary: Negative for dysuria, frequency and hematuria.   Musculoskeletal: Negative for arthralgias, back pain and myalgias.   Skin: Negative for rash and bruise.   Neurological: Negative for dizziness, tremors, weakness, light-headedness, numbness, headache and memory problem.   Hematological: Negative for adenopathy. Does not bruise/bleed easily.   Psychiatric/Behavioral: Negative for sleep disturbance and depressed mood. The patient is not nervous/anxious.        Objective   /88 (BP Location: Right arm, Patient Position: Sitting, Cuff Size: Adult)   Pulse 74   Temp 98.2 °F (36.8 °C) (Temporal)   Ht 167.6 cm (65.98\")   Wt 93.4 kg (206 lb)   SpO2 97%   BMI 33.27 kg/m²     Physical Exam  Vitals signs and nursing note reviewed.   Constitutional:       General: He is not in acute distress.     Appearance: He is well-developed. He is " not diaphoretic.   HENT:      Head: Normocephalic and atraumatic.      Right Ear: External ear normal.      Left Ear: External ear normal.      Nose: Nose normal.   Eyes:      Conjunctiva/sclera: Conjunctivae normal.      Pupils: Pupils are equal, round, and reactive to light.      Comments: Right exotropia   Neck:      Musculoskeletal: Normal range of motion and neck supple.      Thyroid: No thyromegaly.      Trachea: No tracheal deviation.   Cardiovascular:      Rate and Rhythm: Normal rate and regular rhythm.      Heart sounds: Normal heart sounds. No murmur. No friction rub. No gallop.    Pulmonary:      Effort: Pulmonary effort is normal. No respiratory distress.      Breath sounds: Normal breath sounds.   Abdominal:      General: Bowel sounds are normal.      Palpations: Abdomen is soft. There is no mass.      Tenderness: There is no abdominal tenderness. There is no guarding.   Musculoskeletal: Normal range of motion.   Lymphadenopathy:      Cervical: No cervical adenopathy.   Skin:     General: Skin is warm and dry.      Capillary Refill: Capillary refill takes less than 2 seconds.      Findings: No rash.   Neurological:      Mental Status: He is alert and oriented to person, place, and time.      Motor: No abnormal muscle tone.      Deep Tendon Reflexes: Reflexes normal.   Psychiatric:         Behavior: Behavior normal.         Thought Content: Thought content normal.         Judgment: Judgment normal.      Comments: Downs characteristics and developmentally delayed.         No results found for this or any previous visit (from the past 2016 hour(s)).  Assessment/Plan   Diagnoses and all orders for this visit:    1. Essential hypertension (Primary)    2. Acquired hypothyroidism    3. Seizure disorder (CMS/HCC)    4. Mild intermittent asthma without complication  -     Respiratory Therapy Supplies (Nebulizer/Tubing/Mouthpiece) kit; 1 Product 4 (Four) Times a Day As Needed (for wheezing/short of breath).  Tubing with adult size mask  Dispense: 1 each; Refill: 0  -     Spacer/Aero-Holding Chambers device; 1 Device 4 (Four) Times a Day As Needed (for wheezing with the albuterol inhaler).  Dispense: 1 each; Refill: 0

## 2020-11-16 RX ORDER — BUDESONIDE 1 MG/2ML
INHALANT ORAL
Qty: 60 ML | Refills: 2 | Status: SHIPPED | OUTPATIENT
Start: 2020-11-16 | End: 2021-03-05

## 2020-11-18 RX ORDER — METOPROLOL TARTRATE 100 MG/1
TABLET ORAL
Qty: 120 TABLET | Refills: 11 | Status: SHIPPED | OUTPATIENT
Start: 2020-11-18 | End: 2021-09-05

## 2020-11-18 RX ORDER — OMEPRAZOLE 20 MG/1
CAPSULE, DELAYED RELEASE ORAL
Qty: 30 CAPSULE | Refills: 11 | Status: SHIPPED | OUTPATIENT
Start: 2020-11-18 | End: 2021-09-05

## 2020-11-18 RX ORDER — LORATADINE 10 MG/1
TABLET ORAL
Qty: 30 TABLET | Refills: 11 | Status: SHIPPED | OUTPATIENT
Start: 2020-11-18 | End: 2021-09-05

## 2020-11-18 RX ORDER — FERROUS SULFATE 325(65) MG
TABLET ORAL
Qty: 60 TABLET | Refills: 11 | Status: SHIPPED | OUTPATIENT
Start: 2020-11-18 | End: 2021-09-05

## 2020-11-24 ENCOUNTER — OFFICE VISIT (OUTPATIENT)
Dept: FAMILY MEDICINE CLINIC | Facility: CLINIC | Age: 35
End: 2020-11-24

## 2020-11-24 VITALS
OXYGEN SATURATION: 99 % | TEMPERATURE: 98.6 F | DIASTOLIC BLOOD PRESSURE: 80 MMHG | BODY MASS INDEX: 34.07 KG/M2 | SYSTOLIC BLOOD PRESSURE: 128 MMHG | HEART RATE: 84 BPM | WEIGHT: 212 LBS | HEIGHT: 66 IN

## 2020-11-24 DIAGNOSIS — H66.90 ACUTE OTITIS MEDIA, UNSPECIFIED OTITIS MEDIA TYPE: Primary | ICD-10-CM

## 2020-11-24 PROCEDURE — 99213 OFFICE O/P EST LOW 20 MIN: CPT | Performed by: NURSE PRACTITIONER

## 2020-11-24 RX ORDER — AMOXICILLIN 875 MG/1
875 TABLET, COATED ORAL 2 TIMES DAILY
Qty: 20 TABLET | Refills: 0 | Status: SHIPPED | OUTPATIENT
Start: 2020-11-24 | End: 2020-12-04

## 2020-11-24 NOTE — PROGRESS NOTES
Juanita Mccarthy is a 34 y.o. male.     Chief Complaint   Patient presents with   • Earache     This is my first time seeing this patient. History obtained from patient and caregiver.   Earache   There is pain in the left ear. This is a new problem. The current episode started in the past 7 days. There has been no fever. Pertinent negatives include no coughing, ear discharge, hearing loss, rhinorrhea or sore throat. He has tried acetaminophen for the symptoms. The treatment provided no relief.          The following portions of the patient's history were reviewed and updated as appropriate: allergies, current medications, past family history, past medical history, past social history, past surgical history and problem list.    Past Medical History:   Diagnosis Date   • Acquired intellectual disability    • Allergic rhinitis    • Asthma    • Chronic kidney disease (CKD)    • Chronic renal disease, stage 3, moderately decreased glomerular filtration rate between 30-59 mL/min/1.73 square meter    • Constipation    • Epilepsy, generalized, convulsive (CMS/HCC)    • Episode of altered consciousness    • Essential hypertension     History of Essential Hypertension    • GERD (gastroesophageal reflux disease)    • Hematuria    • Hyperkalemia    • Hyperlipidemia    • Hypertension    • Hypothyroidism    • Immunization due    • Metabolic encephalopathy    • Moderate intellectual disabilities    • Mood disorder (CMS/HCC)    • Nocturia    • Physical exam, annual    • Seizure (CMS/HCC)    • Seizure disorder (CMS/HCC)        No past surgical history on file.    Family History   Problem Relation Age of Onset   • Down syndrome Brother    • No Known Problems Other        Social History     Socioeconomic History   • Marital status: Single     Spouse name: Not on file   • Number of children: Not on file   • Years of education: Not on file   • Highest education level: Not on file   Tobacco Use   • Smoking status: Never  "Smoker   • Smokeless tobacco: Never Used   Substance and Sexual Activity   • Alcohol use: No     Frequency: Never   • Drug use: Defer   Social History Narrative    ** Merged History Encounter **            Review of Systems   HENT: Positive for ear pain. Negative for ear discharge, hearing loss, rhinorrhea and sore throat.    Respiratory: Negative for cough.        Objective   Vitals:    11/24/20 1119   BP: 128/80   BP Location: Right arm   Patient Position: Sitting   Pulse: 84   Temp: 98.6 °F (37 °C)   SpO2: 99%   Weight: 96.2 kg (212 lb)   Height: 167.6 cm (65.98\")      Body mass index is 34.23 kg/m².  Physical Exam  Vitals signs and nursing note reviewed.   Constitutional:       Appearance: Normal appearance.   HENT:      Right Ear: Tympanic membrane and ear canal normal.      Left Ear: Ear canal normal. Tympanic membrane is erythematous.   Cardiovascular:      Rate and Rhythm: Normal rate and regular rhythm.   Pulmonary:      Effort: Pulmonary effort is normal.      Breath sounds: Normal breath sounds.   Neurological:      Mental Status: He is alert.   Psychiatric:         Mood and Affect: Mood normal.           Assessment/Plan   Diagnoses and all orders for this visit:    1. Acute otitis media, unspecified otitis media type (Primary)  -     amoxicillin (AMOXIL) 875 MG tablet; Take 1 tablet by mouth 2 (Two) Times a Day for 10 days.  Dispense: 20 tablet; Refill: 0               "

## 2020-12-10 ENCOUNTER — OFFICE VISIT (OUTPATIENT)
Dept: FAMILY MEDICINE CLINIC | Facility: CLINIC | Age: 35
End: 2020-12-10

## 2020-12-10 VITALS
HEART RATE: 76 BPM | HEIGHT: 66 IN | OXYGEN SATURATION: 94 % | WEIGHT: 212 LBS | TEMPERATURE: 98.4 F | SYSTOLIC BLOOD PRESSURE: 116 MMHG | BODY MASS INDEX: 34.07 KG/M2 | DIASTOLIC BLOOD PRESSURE: 72 MMHG

## 2020-12-10 DIAGNOSIS — Z23 NEED FOR INFLUENZA VACCINATION: ICD-10-CM

## 2020-12-10 DIAGNOSIS — I10 ESSENTIAL HYPERTENSION: Primary | ICD-10-CM

## 2020-12-10 PROCEDURE — 99212 OFFICE O/P EST SF 10 MIN: CPT | Performed by: NURSE PRACTITIONER

## 2020-12-10 PROCEDURE — 90471 IMMUNIZATION ADMIN: CPT | Performed by: NURSE PRACTITIONER

## 2020-12-10 PROCEDURE — 90686 IIV4 VACC NO PRSV 0.5 ML IM: CPT | Performed by: NURSE PRACTITIONER

## 2020-12-10 RX ORDER — HYDRALAZINE HYDROCHLORIDE 25 MG/1
25 TABLET, FILM COATED ORAL 3 TIMES DAILY
COMMUNITY
Start: 2020-11-17 | End: 2021-09-05

## 2020-12-10 RX ORDER — NEBULIZER AND COMPRESSOR
EACH MISCELLANEOUS
COMMUNITY
Start: 2020-10-22 | End: 2021-09-05

## 2020-12-10 NOTE — PROGRESS NOTES
Juanita Mccarthy is a 35 y.o. male.     Chief Complaint   Patient presents with   • Hypertension     History obtained from patient and caregiver.   History of Present Illness     Hypertension: Patient here for follow-up of essential hypertension. Blood pressure is well controlled at home.He is exercising and is adherent to low salt diet. Home monitoring: The patient is checking blood pressure at home. Symptoms: none. Medications: The patient is adherent with their medication regimen. Medication(s): amlodipine, clonidine, furosemide, hydralazine and metoprolol . The patient is due for nothing at this time.    The following portions of the patient's history were reviewed and updated as appropriate: allergies, current medications, past family history, past medical history, past social history, past surgical history and problem list.    Past Medical History:   Diagnosis Date   • Acquired intellectual disability    • Allergic rhinitis    • Asthma    • Chronic kidney disease (CKD)    • Chronic renal disease, stage 3, moderately decreased glomerular filtration rate between 30-59 mL/min/1.73 square meter    • Constipation    • Epilepsy, generalized, convulsive (CMS/HCC)    • Episode of altered consciousness    • Essential hypertension     History of Essential Hypertension    • GERD (gastroesophageal reflux disease)    • Hematuria    • Hyperkalemia    • Hyperlipidemia    • Hypertension    • Hypothyroidism    • Immunization due    • Metabolic encephalopathy    • Moderate intellectual disabilities    • Mood disorder (CMS/HCC)    • Nocturia    • Physical exam, annual    • Seizure (CMS/HCC)    • Seizure disorder (CMS/HCC)        History reviewed. No pertinent surgical history.    Family History   Problem Relation Age of Onset   • Down syndrome Brother    • No Known Problems Other        Social History     Socioeconomic History   • Marital status: Single     Spouse name: Not on file   • Number of children: Not on  "file   • Years of education: Not on file   • Highest education level: Not on file   Tobacco Use   • Smoking status: Never Smoker   • Smokeless tobacco: Never Used   Substance and Sexual Activity   • Alcohol use: No     Frequency: Never   • Drug use: Defer   Social History Narrative    ** Merged History Encounter **            Review of Systems   Eyes: Negative for visual disturbance.   Cardiovascular: Negative for chest pain and leg swelling.       Objective   Vitals:    12/10/20 1113   BP: 116/72   Pulse: 76   Temp: 98.4 °F (36.9 °C)   SpO2: 94%   Weight: 96.2 kg (212 lb)   Height: 167.6 cm (65.99\")      Body mass index is 34.23 kg/m².  Physical Exam  Vitals signs and nursing note reviewed.   Constitutional:       General: He is not in acute distress.  Cardiovascular:      Rate and Rhythm: Normal rate and regular rhythm.      Heart sounds: Normal heart sounds.   Pulmonary:      Effort: Pulmonary effort is normal.      Breath sounds: Normal breath sounds.   Musculoskeletal:      Right lower leg: No edema.      Left lower leg: No edema.   Psychiatric:         Behavior: Behavior normal.           Assessment/Plan   Diagnoses and all orders for this visit:    1. Essential hypertension (Primary)  Comments:  - Continue current treatment regimen.     2. Need for influenza vaccination  -     Fluarix Quad >6 Months ([id number])               "

## 2020-12-17 RX ORDER — LEVOTHYROXINE SODIUM 0.07 MG/1
TABLET ORAL
Qty: 30 TABLET | Refills: 6 | Status: SHIPPED | OUTPATIENT
Start: 2020-12-17 | End: 2021-07-06

## 2020-12-17 RX ORDER — QUETIAPINE FUMARATE 300 MG/1
TABLET, FILM COATED ORAL
Qty: 60 TABLET | Refills: 6 | Status: SHIPPED | OUTPATIENT
Start: 2020-12-17 | End: 2021-07-06

## 2020-12-17 RX ORDER — ALLOPURINOL 100 MG/1
TABLET ORAL
Qty: 30 TABLET | Refills: 6 | Status: SHIPPED | OUTPATIENT
Start: 2020-12-17 | End: 2021-07-06

## 2021-01-27 ENCOUNTER — OFFICE VISIT (OUTPATIENT)
Dept: FAMILY MEDICINE CLINIC | Facility: CLINIC | Age: 36
End: 2021-01-27

## 2021-01-27 VITALS
HEIGHT: 66 IN | HEART RATE: 81 BPM | OXYGEN SATURATION: 98 % | BODY MASS INDEX: 34.55 KG/M2 | WEIGHT: 215 LBS | DIASTOLIC BLOOD PRESSURE: 80 MMHG | SYSTOLIC BLOOD PRESSURE: 128 MMHG | TEMPERATURE: 97.7 F

## 2021-01-27 DIAGNOSIS — E78.2 MIXED HYPERLIPIDEMIA: ICD-10-CM

## 2021-01-27 DIAGNOSIS — N18.30 STAGE 3 CHRONIC KIDNEY DISEASE, UNSPECIFIED WHETHER STAGE 3A OR 3B CKD (HCC): ICD-10-CM

## 2021-01-27 DIAGNOSIS — E78.1 HYPERTRIGLYCERIDEMIA: ICD-10-CM

## 2021-01-27 DIAGNOSIS — I10 ESSENTIAL HYPERTENSION: ICD-10-CM

## 2021-01-27 DIAGNOSIS — J45.20 MILD INTERMITTENT ASTHMA WITHOUT COMPLICATION: Primary | ICD-10-CM

## 2021-01-27 DIAGNOSIS — E03.9 ACQUIRED HYPOTHYROIDISM: ICD-10-CM

## 2021-01-27 PROCEDURE — 99395 PREV VISIT EST AGE 18-39: CPT | Performed by: INTERNAL MEDICINE

## 2021-01-27 RX ORDER — MINOXIDIL 2.5 MG/1
2.5 TABLET ORAL 2 TIMES DAILY
COMMUNITY
Start: 2021-01-25 | End: 2021-09-12 | Stop reason: HOSPADM

## 2021-01-27 NOTE — PROGRESS NOTES
Chief Complaint   Patient presents with   • Annual Exam   • Hypertension       HPI:  Arthur Mccarthy, -1985, is a 35 y.o. male who presents for an annual physical.  Arnold the caregiver was present during the history-taking and subsequent discussion (and for part of the physical exam) with this patient.  Patient agrees to the presence of the individual during this visit.    Follow-up for hypertension.  Currently has been feeling well and asymptomatic without any headaches,vision changes, cough, chest pain, shortness of breath, swelling, focal neurologic deficit, or syncope.  Has been taking the medications regularly and adherent with the regimen of amlodipine 10 mg, clonidine 0.3 mg TID, furosemide 40 mg QD, metoprolol tartrate 100 mg BID and hydralazine 100 mg TID.  Denies medication side effects and no significant interval events.  Home blood pressure has been normal and no problems in the home.       Follow up for seizures.  Still no seizures for a long time.  Over 1 year seizure free and no problems with the medications.     Follow-up for thyroid.  Denies fatigue, weakness, constipation/diarrhea, hair/skin changes, weight gain/loss, depression/anxiety, rashes, palpitations, swelling, chest pain, shortness of breath or other issues.  Has been compliant with taking the medication with no recent changes. Denies any difficulty with the current medication of levothyroxine 75 mcg daily.  labs in July were normal for the thyroid.  Recent Hospitalizations:  No hospitalization(s) within the last year..    Current Medical Providers:  Patient Care Team:  Mikael Vasquez MD as PCP - General (Internal Medicine)  Arthur Phillips MD as Consulting Physician (Nephrology)    Compared to one year ago, the patient feels his physical health is the same and his mental health is the same.    Depression Screen:  PHQ-2/PHQ-9 Depression Screening 2020   Little interest or pleasure in doing things 0   Feeling  down, depressed, or hopeless 0   Trouble falling or staying asleep, or sleeping too much 0   Feeling tired or having little energy 0   Poor appetite or overeating 0   Feeling bad about yourself - or that you are a failure or have let yourself or your family down 0   Trouble concentrating on things, such as reading the newspaper or watching television 0   Moving or speaking so slowly that other people could have noticed. Or the opposite - being so fidgety or restless that you have been moving around a lot more than usual 0   Thoughts that you would be better off dead, or of hurting yourself in some way 0   Total Score 0       Past Medical/Family/Social History:  The following portions of the patient's history were reviewed and updated as appropriate: allergies, current medications, past family history, past medical history, past social history, past surgical history and problem list.    No Known Allergies      Current Outpatient Medications:   •  allopurinol (ZYLOPRIM) 100 MG tablet, TAKE ONE TABLET BY MOUTH DAILY, Disp: 30 tablet, Rfl: 6  •  amLODIPine (NORVASC) 10 MG tablet, TAKE ONE TABLET BY MOUTH DAILY, Disp: 30 tablet, Rfl: 11  •  ammonium lactate (LAC-HYDRIN) 12 % lotion, APPLY TO FEET AND LEGS TWICE DAILY AS DIRECTED., Disp: 400 g, Rfl: 11  •  budesonide (PULMICORT) 1 MG/2ML nebulizer solution, INHALE THE CONTENTS OF ONE VIAL VIA NEBULIZE DAILY, Disp: 60 mL, Rfl: 2  •  cholecalciferol (VITAMIN D3) 25 MCG (1000 UT) tablet, TAKE ONE TABLET BY MOUTH EVERY OTHER DAY (Patient taking differently: Daily.), Disp: 15 tablet, Rfl: 3  •  cloNIDine (CATAPRES) 0.3 MG tablet, TAKE ONE TABLET BY MOUTH THREE TIMES DAILY (MORNING, NOON, EVENING), Disp: 90 tablet, Rfl: 11  •  divalproex (DEPAKOTE) 500 MG DR tablet, Take 500 mg by mouth. 1 tab a.m. And 2 tabs p.m., Disp: , Rfl:   •  ferrous sulfate 325 (65 FE) MG tablet, TAKE ONE TABLET BY MOUTH TWICE A DAY MORNING AND EVENING, Disp: 60 tablet, Rfl: 11  •  fluticasone (FLONASE)  50 MCG/ACT nasal spray, USE TWO SPRAYS IN EACH NOSTRIL DAILY, Disp: 16 g, Rfl: 11  •  furosemide (LASIX) 40 MG tablet, Take 40 mg by mouth Daily., Disp: , Rfl:   •  hydrALAZINE (APRESOLINE) 25 MG tablet, Take 25 mg by mouth 3 (Three) Times a Day., Disp: , Rfl:   •  levothyroxine (SYNTHROID, LEVOTHROID) 75 MCG tablet, TAKE ONE TABLET BY MOUTH DAILY, Disp: 30 tablet, Rfl: 6  •  loratadine (CLARITIN) 10 MG tablet, TAKE ONE TABLET BY MOUTH DAILY, Disp: 30 tablet, Rfl: 11  •  metoprolol tartrate (LOPRESSOR) 100 MG tablet, TAKE TWO TABLETS BY MOUTH TWICE A DAY MORNING AND EVENING, Disp: 120 tablet, Rfl: 11  •  montelukast (SINGULAIR) 10 MG tablet, TAKE ONE TABLET BY MOUTH AT BEDTIME, Disp: 30 tablet, Rfl: 11  •  Nebulizers (PulmoNeb LT) misc, USE AS DIRECTED FOUR TIMES DAILY AS NEEDED SHORTNESS OF BREATH, WHEEZE, Disp: , Rfl:   •  O2 (OXYGEN), Inhale 2 L/min Every Night., Disp: , Rfl:   •  OLANZapine (zyPREXA) 20 MG tablet, TAKE ONE TABLET BY MOUTH TWICE A DAY MORNING AND EVENING, Disp: 60 tablet, Rfl: 11  •  omeprazole (priLOSEC) 20 MG capsule, TAKE ONE CAPSULE BY MOUTH DAILY, Disp: 30 capsule, Rfl: 11  •  polyethylene glycol (MIRALAX) 17 GM/SCOOP powder, MIX 17 GRAMS (ONE CAPFUL) IN LIQUID AND DRINK DAILY, Disp: 510 g, Rfl: 12  •  PROAIR  (90 Base) MCG/ACT inhaler, INHALE TWO PUFFS BY MOUTH EVERY 6 HOURS AS NEEDED AND 20 MINUTES BEFORE EXERCISE.., Disp: 8.5 g, Rfl: 11  •  QUEtiapine (SEROquel) 300 MG tablet, TAKE TWO TABLETS BY MOUTH AT BEDTIME, Disp: 60 tablet, Rfl: 6  •  Respiratory Therapy Supplies (Nebulizer/Tubing/Mouthpiece) kit, 1 Product 4 (Four) Times a Day As Needed (for wheezing/short of breath). Tubing with adult size mask, Disp: 1 each, Rfl: 0  •  solifenacin (VESICARE) 5 MG tablet, TAKE ONE TABLET BY MOUTH DAILY, Disp: 30 tablet, Rfl: 11  •  Spacer/Aero-Holding Chambers device, 1 Device 4 (Four) Times a Day As Needed (for wheezing with the albuterol inhaler)., Disp: 1 each, Rfl: 0  •  minoxidil  (LONITEN) 2.5 MG tablet, Take 2.5 mg by mouth 2 (Two) Times a Day., Disp: , Rfl:     Current medication list contains no high risk medications.  No harmful drug interactions have been identified.     Family History   Problem Relation Age of Onset   • Down syndrome Brother    • No Known Problems Other        Social History     Tobacco Use   • Smoking status: Never Smoker   • Smokeless tobacco: Never Used   Substance Use Topics   • Alcohol use: No     Frequency: Never       History reviewed. No pertinent surgical history.    Patient Active Problem List   Diagnosis   • Intellectual disability   • Seasonal allergies   • Hypertension   • Seizure disorder (CMS/Formerly [facility])   • Chronic renal disease, stage 3, moderately decreased glomerular filtration rate (GFR) between 30-59 mL/min/1.73 square meter (CMS/Formerly [facility])   • Hyperlipidemia   • Annual physical exam   • Hypothyroidism   • GERD (gastroesophageal reflux disease)   • Asthma   • Constipation   • Hypertriglyceridemia       Review of Systems   Constitutional: Negative for activity change, appetite change, fatigue, fever, unexpected weight gain and unexpected weight loss.   HENT: Negative for nosebleeds, rhinorrhea, trouble swallowing and voice change.    Eyes: Negative for visual disturbance.   Respiratory: Negative for cough, chest tightness, shortness of breath and wheezing.    Cardiovascular: Negative for chest pain, palpitations and leg swelling.   Gastrointestinal: Negative for abdominal pain, blood in stool, constipation, diarrhea, nausea, vomiting, GERD and indigestion.   Genitourinary: Negative for dysuria, frequency and hematuria.   Musculoskeletal: Negative for arthralgias, back pain and myalgias.   Skin: Negative for rash and bruise.   Neurological: Negative for dizziness, tremors, weakness, light-headedness, numbness, headache and memory problem.   Hematological: Negative for adenopathy. Does not bruise/bleed easily.   Psychiatric/Behavioral: Negative for sleep  "disturbance and depressed mood. The patient is not nervous/anxious.        Objective     Vitals:    01/27/21 1414   BP: 128/80   BP Location: Right arm   Patient Position: Sitting   Cuff Size: Large Adult   Pulse: 81   Temp: 97.7 °F (36.5 °C)   TempSrc: Temporal   SpO2: 98%   Weight: 97.5 kg (215 lb)   Height: 167.6 cm (65.98\")       Patient's Body mass index is 34.72 kg/m². BMI is above normal parameters. Recommendations include: exercise counseling and nutrition counseling.      No exam data present    Physical Exam  Vitals signs and nursing note reviewed.   Constitutional:       General: He is not in acute distress.     Appearance: He is well-developed. He is not diaphoretic.      Comments: Down's syndrome characteristics and developmental delay.   HENT:      Head: Normocephalic and atraumatic.      Right Ear: External ear normal.      Left Ear: External ear normal.      Nose: Nose normal.   Eyes:      Conjunctiva/sclera: Conjunctivae normal.      Pupils: Pupils are equal, round, and reactive to light.      Comments: Right eye exotropia   Neck:      Musculoskeletal: Normal range of motion and neck supple.      Thyroid: No thyromegaly.      Trachea: No tracheal deviation.   Cardiovascular:      Rate and Rhythm: Normal rate and regular rhythm.      Heart sounds: Normal heart sounds. No murmur. No friction rub. No gallop.    Pulmonary:      Effort: Pulmonary effort is normal. No respiratory distress.      Breath sounds: Normal breath sounds.   Abdominal:      General: Bowel sounds are normal.      Palpations: Abdomen is soft. There is no mass.      Tenderness: There is no abdominal tenderness. There is no guarding.   Musculoskeletal: Normal range of motion.   Lymphadenopathy:      Cervical: No cervical adenopathy.   Skin:     General: Skin is warm and dry.      Capillary Refill: Capillary refill takes less than 2 seconds.      Findings: No rash.   Neurological:      Mental Status: He is alert and oriented to person, " place, and time.      Motor: No abnormal muscle tone.      Deep Tendon Reflexes: Reflexes normal.   Psychiatric:         Behavior: Behavior normal.         Thought Content: Thought content normal.         Judgment: Judgment normal.         Recent Lab Results:  Lab Results   Component Value Date     (H) 07/29/2020     Lab Results   Component Value Date    TRIG 529 (H) 01/22/2020    HDL 24 (L) 01/22/2020    VLDL CANCELED 01/22/2020       Assessment/Plan   Age-appropriate Screening Schedule:  Refer to the list below for future screening recommendations based on patient's age, sex and/or medical conditions.      Health Maintenance   Topic Date Due   • TDAP/TD VACCINES (1 - Tdap) 12/01/2006   • LIPID PANEL  01/22/2021   • INFLUENZA VACCINE  Completed       Diagnoses and all orders for this visit:    1. Mild intermittent asthma without complication (Primary)  -     Ambulatory Referral to Pulmonology    2. Essential hypertension  -     Comprehensive Metabolic Panel    3. Mixed hyperlipidemia  -     Comprehensive Metabolic Panel  -     Lipid Panel    4. Hypertriglyceridemia  -     Comprehensive Metabolic Panel  -     Lipid Panel    5. Acquired hypothyroidism  -     TSH  -     T4, Free    6. Stage 3 chronic kidney disease, unspecified whether stage 3a or 3b CKD (CMS/HCC)  -     Comprehensive Metabolic Panel    Annual wellness visit reviewed with patient.  All past history, medications, social history, and problem list were reviewed.  Will check the labs as ordered above to evaluate the blood sugars, kidney, liver, cholesterol for screening.  Discussed flu shot recommended to get the influenza vaccine annually in the fall.  Encouraged follow-up with the eye doctor on annual basis.  Discussed weight and encouraged exercise as tolerated while following a healthy diet.  Reviewed sexual health and safe sex practices.  Follow up with current specialists as needed.   Concern for possible shortness of breath and a upper  airway noise by the caregiver.  I will refer to pulmonology for further evaluation possible pulmonary function test if tolerated.  But I think that he may be having some upper airway issues.  He may end up having to have an ear nose throat evaluation.  An After Visit Summary with all of these plans were given to the patient.        Follow Up:  Return in about 6 months (around 7/27/2021) for Next scheduled follow up.

## 2021-01-28 DIAGNOSIS — E78.1 HYPERTRIGLYCERIDEMIA: Primary | ICD-10-CM

## 2021-01-28 DIAGNOSIS — E78.2 MIXED HYPERLIPIDEMIA: ICD-10-CM

## 2021-01-28 LAB
ALBUMIN SERPL-MCNC: 4.3 G/DL (ref 3.5–5.2)
ALBUMIN/GLOB SERPL: 1.7 G/DL
ALP SERPL-CCNC: 184 U/L (ref 39–117)
ALT SERPL-CCNC: 45 U/L (ref 1–41)
AST SERPL-CCNC: 29 U/L (ref 1–40)
BILIRUB SERPL-MCNC: 0.3 MG/DL (ref 0–1.2)
BUN SERPL-MCNC: 26 MG/DL (ref 6–20)
BUN/CREAT SERPL: 10.8 (ref 7–25)
CALCIUM SERPL-MCNC: 9.9 MG/DL (ref 8.6–10.5)
CHLORIDE SERPL-SCNC: 98 MMOL/L (ref 98–107)
CHOLEST SERPL-MCNC: 264 MG/DL (ref 0–200)
CO2 SERPL-SCNC: 31.3 MMOL/L (ref 22–29)
CREAT SERPL-MCNC: 2.41 MG/DL (ref 0.76–1.27)
GLOBULIN SER CALC-MCNC: 2.5 GM/DL
GLUCOSE SERPL-MCNC: 139 MG/DL (ref 65–99)
HDLC SERPL-MCNC: 23 MG/DL (ref 40–60)
LDLC SERPL CALC-MCNC: ABNORMAL MG/DL
POTASSIUM SERPL-SCNC: 4 MMOL/L (ref 3.5–5.2)
PROT SERPL-MCNC: 6.8 G/DL (ref 6–8.5)
SODIUM SERPL-SCNC: 141 MMOL/L (ref 136–145)
T4 FREE SERPL-MCNC: 0.96 NG/DL (ref 0.93–1.7)
TRIGL SERPL-MCNC: 979 MG/DL (ref 0–150)
TSH SERPL DL<=0.005 MIU/L-ACNC: 1.55 UIU/ML (ref 0.27–4.2)
VLDLC SERPL CALC-MCNC: ABNORMAL MG/DL

## 2021-01-28 RX ORDER — ICOSAPENT ETHYL 1000 MG/1
2 CAPSULE ORAL 2 TIMES DAILY WITH MEALS
Qty: 120 CAPSULE | Refills: 11 | Status: SHIPPED | OUTPATIENT
Start: 2021-01-28 | End: 2021-09-05

## 2021-01-29 ENCOUNTER — TELEPHONE (OUTPATIENT)
Dept: FAMILY MEDICINE CLINIC | Facility: CLINIC | Age: 36
End: 2021-01-29

## 2021-02-25 ENCOUNTER — TELEPHONE (OUTPATIENT)
Dept: FAMILY MEDICINE CLINIC | Facility: CLINIC | Age: 36
End: 2021-02-25

## 2021-02-25 NOTE — TELEPHONE ENCOUNTER
Caller: [facility]    Relationship to patient: Other    Best call back number: [phone]    Patient is needing: NEEDING PA FOR OLANZAPINE 20 MG DUE TO THE DIRECTIONS BEING 2 TIMES A DAY.

## 2021-03-08 RX ORDER — BUDESONIDE 1 MG/2ML
INHALANT ORAL
Qty: 60 ML | Refills: 5 | Status: SHIPPED | OUTPATIENT
Start: 2021-03-08 | End: 2021-09-05

## 2021-07-01 RX ORDER — POLYETHYLENE GLYCOL 3350 17 G/17G
POWDER ORAL
Qty: 510 G | Refills: 11 | Status: SHIPPED | OUTPATIENT
Start: 2021-07-01 | End: 2021-09-05

## 2021-07-06 RX ORDER — FLUTICASONE PROPIONATE 50 MCG
SPRAY, SUSPENSION (ML) NASAL
Qty: 16 G | Refills: 11 | Status: SHIPPED | OUTPATIENT
Start: 2021-07-06 | End: 2021-09-05

## 2021-07-06 RX ORDER — ALLOPURINOL 100 MG/1
TABLET ORAL
Qty: 30 TABLET | Refills: 6 | Status: SHIPPED | OUTPATIENT
Start: 2021-07-06 | End: 2021-09-05

## 2021-07-06 RX ORDER — LEVOTHYROXINE SODIUM 0.07 MG/1
TABLET ORAL
Qty: 30 TABLET | Refills: 6 | Status: SHIPPED | OUTPATIENT
Start: 2021-07-06 | End: 2021-09-05

## 2021-07-06 RX ORDER — QUETIAPINE FUMARATE 300 MG/1
TABLET, FILM COATED ORAL
Qty: 60 TABLET | Refills: 6 | Status: SHIPPED | OUTPATIENT
Start: 2021-07-06 | End: 2021-09-05

## 2021-07-06 RX ORDER — OLANZAPINE 20 MG/1
TABLET ORAL
Qty: 60 TABLET | Refills: 11 | Status: SHIPPED | OUTPATIENT
Start: 2021-07-06 | End: 2021-09-05

## 2021-07-06 RX ORDER — AMLODIPINE BESYLATE 10 MG/1
TABLET ORAL
Qty: 30 TABLET | Refills: 11 | Status: SHIPPED | OUTPATIENT
Start: 2021-07-06 | End: 2021-09-05

## 2021-07-06 RX ORDER — SOLIFENACIN SUCCINATE 5 MG/1
TABLET, FILM COATED ORAL
Qty: 30 TABLET | Refills: 11 | Status: SHIPPED | OUTPATIENT
Start: 2021-07-06 | End: 2021-09-05

## 2021-07-06 RX ORDER — MONTELUKAST SODIUM 10 MG/1
TABLET ORAL
Qty: 30 TABLET | Refills: 11 | Status: SHIPPED | OUTPATIENT
Start: 2021-07-06 | End: 2021-09-05

## 2021-07-20 ENCOUNTER — TELEPHONE (OUTPATIENT)
Dept: FAMILY MEDICINE CLINIC | Facility: CLINIC | Age: 36
End: 2021-07-20

## 2021-07-20 NOTE — TELEPHONE ENCOUNTER
Caller: MICHELLE PEARCE    Relationship: RESIDENTAL COORDINATOR    Best call back number: [phone]      Which medication are you concerned about: budesonide (PULMICORT) 1 MG/2ML nebulizer solution    Who prescribed you this medication: DR OLGUIN    What are your concerns: THEY ARE CALLING TO FIND OUT THE STATUS OF THE PRIOR AUTHORIZATION FOR THIS MEDICATION.  SHE STATES THE PATIENT IS OUT OF THE MEDICATION.

## 2021-07-27 ENCOUNTER — OFFICE VISIT (OUTPATIENT)
Dept: FAMILY MEDICINE CLINIC | Facility: CLINIC | Age: 36
End: 2021-07-27

## 2021-07-27 VITALS
WEIGHT: 212 LBS | DIASTOLIC BLOOD PRESSURE: 88 MMHG | BODY MASS INDEX: 34.07 KG/M2 | TEMPERATURE: 97.8 F | HEART RATE: 78 BPM | OXYGEN SATURATION: 94 % | SYSTOLIC BLOOD PRESSURE: 122 MMHG | HEIGHT: 66 IN

## 2021-07-27 DIAGNOSIS — I10 ESSENTIAL HYPERTENSION: Primary | ICD-10-CM

## 2021-07-27 PROCEDURE — 99213 OFFICE O/P EST LOW 20 MIN: CPT | Performed by: INTERNAL MEDICINE

## 2021-07-27 NOTE — PROGRESS NOTES
Juanita Mccarthy is a 35 y.o. male.     Chief Complaint   Patient presents with   • Hypertension       History of Present Illness   Arnold the caregiver was present during the history-taking and subsequent discussion (and for part of the physical exam) with this patient.  Patient agrees to the presence of the individual during this visit.     Follow-up for hypertension.  Currently has been feeling well and asymptomatic without any headaches,vision changes, cough, chest pain, shortness of breath, swelling, focal neurologic deficit, or syncope.  Has been taking the medications regularly and adherent with the regimen of amlodipine 10 mg, clonidine 0.3 mg TID, furosemide 40 mg QD, metoprolol tartrate 100 mg BID and hydralazine 100 mg TID.  Denies medication side effects and no significant interval events.  Home blood pressure has been normal and no problems in the home.       Follow up for seizures.  Still no seizures for a long time.  Over 1 year seizure free and no problems with the medications.     Follow-up for thyroid.  Denies fatigue, weakness, constipation/diarrhea, hair/skin changes, weight gain/loss, depression/anxiety, rashes, palpitations, swelling, chest pain, shortness of breath or other issues.  Has been compliant with taking the medication with no recent changes. Denies any difficulty with the current medication of levothyroxine 75 mcg daily. Last labs 1/27/21.    The following portions of the patient's history were reviewed and updated as appropriate: allergies, current medications, past family history, past medical history, past social history, past surgical history and problem list.    Depression Screen:  PHQ-2/PHQ-9 Depression Screening 1/22/2020   Little interest or pleasure in doing things 0   Feeling down, depressed, or hopeless 0   Trouble falling or staying asleep, or sleeping too much 0   Feeling tired or having little energy 0   Poor appetite or overeating 0   Feeling bad about  yourself - or that you are a failure or have let yourself or your family down 0   Trouble concentrating on things, such as reading the newspaper or watching television 0   Moving or speaking so slowly that other people could have noticed. Or the opposite - being so fidgety or restless that you have been moving around a lot more than usual 0   Thoughts that you would be better off dead, or of hurting yourself in some way 0   Total Score 0       Past Medical History:   Diagnosis Date   • Acquired intellectual disability    • Allergic rhinitis    • Asthma    • Chronic kidney disease (CKD)    • Chronic renal disease, stage 3, moderately decreased glomerular filtration rate between 30-59 mL/min/1.73 square meter (CMS/HCC)    • Constipation    • Epilepsy, generalized, convulsive (CMS/HCC)    • Episode of altered consciousness    • Essential hypertension     History of Essential Hypertension    • GERD (gastroesophageal reflux disease)    • Hematuria    • Hyperkalemia    • Hyperlipidemia    • Hypertension    • Hypothyroidism    • Immunization due    • Metabolic encephalopathy    • Moderate intellectual disabilities    • Mood disorder (CMS/HCC)    • Nocturia    • Physical exam, annual    • Seizure (CMS/HCC)    • Seizure disorder (CMS/HCC)        History reviewed. No pertinent surgical history.    Family History   Problem Relation Age of Onset   • Down syndrome Brother    • No Known Problems Other        Social History     Socioeconomic History   • Marital status: Single     Spouse name: Not on file   • Number of children: Not on file   • Years of education: Not on file   • Highest education level: Not on file   Tobacco Use   • Smoking status: Never Smoker   • Smokeless tobacco: Never Used   Substance and Sexual Activity   • Alcohol use: No   • Drug use: Defer       Current Outpatient Medications   Medication Sig Dispense Refill   • allopurinol (ZYLOPRIM) 100 MG tablet TAKE ONE TABLET BY MOUTH DAILY 30 tablet 6   • amLODIPine  (NORVASC) 10 MG tablet TAKE ONE TABLET BY MOUTH DAILY 30 tablet 11   • ammonium lactate (LAC-HYDRIN) 12 % lotion APPLY TO FEET AND LEGS TWICE DAILY AS DIRECTED. 400 g 11   • budesonide (PULMICORT) 1 MG/2ML nebulizer solution USE 1 VIAL PER NEBULIZER DAILY 60 mL 5   • cholecalciferol (VITAMIN D3) 25 MCG (1000 UT) tablet TAKE ONE TABLET BY MOUTH EVERY OTHER DAY (Patient taking differently: Daily.) 15 tablet 3   • cloNIDine (CATAPRES) 0.3 MG tablet TAKE ONE TABLET BY MOUTH THREE TIMES DAILY (MORNING, NOON, EVENING) 90 tablet 11   • divalproex (DEPAKOTE) 500 MG DR tablet Take 500 mg by mouth. 1 tab a.m. And 2 tabs p.m.     • ferrous sulfate 325 (65 FE) MG tablet TAKE ONE TABLET BY MOUTH TWICE A DAY MORNING AND EVENING 60 tablet 11   • fluticasone (FLONASE) 50 MCG/ACT nasal spray USE TWO SPRAYS IN EACH NOSTRIL DAILY 16 g 11   • furosemide (LASIX) 40 MG tablet Take 40 mg by mouth Daily.     • hydrALAZINE (APRESOLINE) 25 MG tablet Take 25 mg by mouth 3 (Three) Times a Day.     • icosapent ethyl (Vascepa) 1 g capsule capsule Take 2 g by mouth 2 (Two) Times a Day With Meals. 120 capsule 11   • levothyroxine (SYNTHROID, LEVOTHROID) 75 MCG tablet TAKE ONE TABLET BY MOUTH DAILY 30 tablet 6   • loratadine (CLARITIN) 10 MG tablet TAKE ONE TABLET BY MOUTH DAILY 30 tablet 11   • metoprolol tartrate (LOPRESSOR) 100 MG tablet TAKE TWO TABLETS BY MOUTH TWICE A DAY MORNING AND EVENING 120 tablet 11   • minoxidil (LONITEN) 2.5 MG tablet Take 2.5 mg by mouth 2 (Two) Times a Day.     • montelukast (SINGULAIR) 10 MG tablet TAKE ONE TABLET BY MOUTH AT BEDTIME 30 tablet 11   • Nebulizers (PulmoNeb LT) misc USE AS DIRECTED FOUR TIMES DAILY AS NEEDED SHORTNESS OF BREATH, WHEEZE     • O2 (OXYGEN) Inhale 2 L/min Every Night.     • OLANZapine (zyPREXA) 20 MG tablet TAKE ONE TABLET BY MOUTH TWICE A DAY MORNING AND EVENING 60 tablet 11   • omeprazole (priLOSEC) 20 MG capsule TAKE ONE CAPSULE BY MOUTH DAILY 30 capsule 11   • PEG 3350 17 GM/SCOOP  powder MIX 17 GRAMS (ONE CAPFUL) IN LIQUID AND DRINK DAILY 510 g 11   • PROAIR  (90 Base) MCG/ACT inhaler INHALE TWO PUFFS BY MOUTH EVERY 6 HOURS AS NEEDED AND 20 MINUTES BEFORE EXERCISE.. 8.5 g 11   • QUEtiapine (SEROquel) 300 MG tablet TAKE TWO TABLETS BY MOUTH AT BEDTIME 60 tablet 6   • Respiratory Therapy Supplies (Nebulizer/Tubing/Mouthpiece) kit 1 Product 4 (Four) Times a Day As Needed (for wheezing/short of breath). Tubing with adult size mask 1 each 0   • solifenacin (VESICARE) 5 MG tablet TAKE ONE TABLET BY MOUTH DAILY 30 tablet 11   • Spacer/Aero-Holding Chambers device 1 Device 4 (Four) Times a Day As Needed (for wheezing with the albuterol inhaler). 1 each 0     No current facility-administered medications for this visit.       Review of Systems   Constitutional: Negative for activity change, appetite change, fatigue, fever, unexpected weight gain and unexpected weight loss.   HENT: Negative for nosebleeds, rhinorrhea, trouble swallowing and voice change.    Eyes: Negative for visual disturbance.   Respiratory: Negative for cough, chest tightness, shortness of breath and wheezing.    Cardiovascular: Negative for chest pain, palpitations and leg swelling.   Gastrointestinal: Negative for abdominal pain, blood in stool, constipation, diarrhea, nausea, vomiting, GERD and indigestion.   Genitourinary: Negative for dysuria, frequency and hematuria.   Musculoskeletal: Negative for arthralgias, back pain and myalgias.   Skin: Negative for rash and bruise.   Neurological: Negative for dizziness, tremors, weakness, light-headedness, numbness, headache and memory problem.   Hematological: Negative for adenopathy. Does not bruise/bleed easily.   Psychiatric/Behavioral: Negative for sleep disturbance and depressed mood. The patient is not nervous/anxious.        Objective   /88 (BP Location: Left arm, Patient Position: Sitting, Cuff Size: Adult)   Pulse 78   Temp 97.8 °F (36.6 °C) (Temporal)   Ht  "167.6 cm (65.98\")   Wt 96.2 kg (212 lb)   SpO2 94%   BMI 34.23 kg/m²     Physical Exam  Vitals and nursing note reviewed.   Constitutional:       General: He is not in acute distress.     Appearance: He is well-developed. He is not diaphoretic.   HENT:      Head: Normocephalic and atraumatic.      Right Ear: External ear normal.      Left Ear: External ear normal.      Nose: Nose normal.   Eyes:      Conjunctiva/sclera: Conjunctivae normal.      Pupils: Pupils are equal, round, and reactive to light.      Comments: Left exotropia   Neck:      Thyroid: No thyromegaly.      Trachea: No tracheal deviation.   Cardiovascular:      Rate and Rhythm: Normal rate and regular rhythm.      Heart sounds: Normal heart sounds. No murmur heard.   No friction rub. No gallop.    Pulmonary:      Effort: Pulmonary effort is normal. No respiratory distress.      Breath sounds: Normal breath sounds.   Abdominal:      General: Bowel sounds are normal.      Palpations: Abdomen is soft. There is no mass.      Tenderness: There is no abdominal tenderness. There is no guarding.   Musculoskeletal:         General: Normal range of motion.      Cervical back: Normal range of motion and neck supple.   Lymphadenopathy:      Cervical: No cervical adenopathy.   Skin:     General: Skin is warm and dry.      Capillary Refill: Capillary refill takes less than 2 seconds.      Findings: No rash.   Neurological:      Mental Status: He is alert and oriented to person, place, and time.      Motor: No abnormal muscle tone.      Deep Tendon Reflexes: Reflexes normal.   Psychiatric:         Behavior: Behavior normal.         Thought Content: Thought content normal.         Judgment: Judgment normal.         No results found for this or any previous visit (from the past 2016 hour(s)).  Assessment/Plan   There are no diagnoses linked to this encounter.           · COVID-19 Precautions - Patient was compliant in wearing a mask. When I saw the patient, I used " appropriate personal protective equipment (PPE) including mask and eye shield (standard procedure).  Additionally, I used gown and gloves if indicated.  Hand hygiene was completed before and after seeing the patient.  · Dictated utilizing Dragon Dictation

## 2021-08-02 RX ORDER — CLONIDINE HYDROCHLORIDE 0.3 MG/1
TABLET ORAL
Qty: 90 TABLET | Refills: 11 | Status: SHIPPED | OUTPATIENT
Start: 2021-08-02 | End: 2021-09-05

## 2021-09-05 ENCOUNTER — HOSPITAL ENCOUNTER (INPATIENT)
Facility: HOSPITAL | Age: 36
LOS: 7 days | Discharge: HOME HEALTH | End: 2021-09-12
Attending: EMERGENCY MEDICINE | Admitting: INTERNAL MEDICINE

## 2021-09-05 ENCOUNTER — APPOINTMENT (OUTPATIENT)
Dept: GENERAL RADIOLOGY | Facility: HOSPITAL | Age: 36
End: 2021-09-05

## 2021-09-05 DIAGNOSIS — Z86.69 HISTORY OF SEIZURE DISORDER: ICD-10-CM

## 2021-09-05 DIAGNOSIS — N39.0 ACUTE URINARY TRACT INFECTION: Primary | ICD-10-CM

## 2021-09-05 DIAGNOSIS — R33.8 ACUTE URINARY RETENTION: ICD-10-CM

## 2021-09-05 DIAGNOSIS — F79 INTELLECTUAL DISABILITY: ICD-10-CM

## 2021-09-05 DIAGNOSIS — N17.9 ACUTE KIDNEY INJURY SUPERIMPOSED ON CHRONIC KIDNEY DISEASE (HCC): ICD-10-CM

## 2021-09-05 DIAGNOSIS — N18.9 ACUTE KIDNEY INJURY SUPERIMPOSED ON CHRONIC KIDNEY DISEASE (HCC): ICD-10-CM

## 2021-09-05 LAB
ALBUMIN SERPL-MCNC: 3.9 G/DL (ref 3.5–5.2)
ALBUMIN/GLOB SERPL: 1.1 G/DL
ALP SERPL-CCNC: 130 U/L (ref 39–117)
ALT SERPL W P-5'-P-CCNC: 33 U/L (ref 1–41)
ANION GAP SERPL CALCULATED.3IONS-SCNC: 17.2 MMOL/L (ref 5–15)
ANISOCYTOSIS BLD QL: ABNORMAL
AST SERPL-CCNC: 24 U/L (ref 1–40)
B PARAPERT DNA SPEC QL NAA+PROBE: NOT DETECTED
B PERT DNA SPEC QL NAA+PROBE: NOT DETECTED
BACTERIA UR QL AUTO: ABNORMAL /HPF
BILIRUB SERPL-MCNC: 0.4 MG/DL (ref 0–1.2)
BILIRUB UR QL STRIP: NEGATIVE
BUN SERPL-MCNC: 39 MG/DL (ref 6–20)
BUN/CREAT SERPL: 8.7 (ref 7–25)
C PNEUM DNA NPH QL NAA+NON-PROBE: NOT DETECTED
CALCIUM SPEC-SCNC: 9.4 MG/DL (ref 8.6–10.5)
CHLORIDE SERPL-SCNC: 98 MMOL/L (ref 98–107)
CLARITY UR: ABNORMAL
CO2 SERPL-SCNC: 26.8 MMOL/L (ref 22–29)
COLOR UR: YELLOW
CREAT SERPL-MCNC: 4.5 MG/DL (ref 0.76–1.27)
D-LACTATE SERPL-SCNC: 1.4 MMOL/L (ref 0.5–2)
DEPRECATED RDW RBC AUTO: 39.7 FL (ref 37–54)
ERYTHROCYTE [DISTWIDTH] IN BLOOD BY AUTOMATED COUNT: 16 % (ref 12.3–15.4)
FLUAV SUBTYP SPEC NAA+PROBE: NOT DETECTED
FLUBV RNA ISLT QL NAA+PROBE: NOT DETECTED
GFR SERPL CREATININE-BSD FRML MDRD: 18 ML/MIN/1.73
GLOBULIN UR ELPH-MCNC: 3.5 GM/DL
GLUCOSE SERPL-MCNC: 107 MG/DL (ref 65–99)
GLUCOSE UR STRIP-MCNC: NEGATIVE MG/DL
HADV DNA SPEC NAA+PROBE: NOT DETECTED
HCOV 229E RNA SPEC QL NAA+PROBE: NOT DETECTED
HCOV HKU1 RNA SPEC QL NAA+PROBE: NOT DETECTED
HCOV NL63 RNA SPEC QL NAA+PROBE: NOT DETECTED
HCOV OC43 RNA SPEC QL NAA+PROBE: NOT DETECTED
HCT VFR BLD AUTO: 35.2 % (ref 37.5–51)
HGB BLD-MCNC: 11.4 G/DL (ref 13–17.7)
HGB UR QL STRIP.AUTO: ABNORMAL
HMPV RNA NPH QL NAA+NON-PROBE: NOT DETECTED
HPIV1 RNA SPEC QL NAA+PROBE: NOT DETECTED
HPIV2 RNA SPEC QL NAA+PROBE: NOT DETECTED
HPIV3 RNA NPH QL NAA+PROBE: NOT DETECTED
HPIV4 P GENE NPH QL NAA+PROBE: NOT DETECTED
HYALINE CASTS UR QL AUTO: ABNORMAL /LPF
HYPOCHROMIA BLD QL: ABNORMAL
KETONES UR QL STRIP: NEGATIVE
LEUKOCYTE ESTERASE UR QL STRIP.AUTO: ABNORMAL
LYMPHOCYTES # BLD MANUAL: 4.28 10*3/MM3 (ref 0.7–3.1)
LYMPHOCYTES NFR BLD MANUAL: 30.3 % (ref 19.6–45.3)
LYMPHOCYTES NFR BLD MANUAL: 9.1 % (ref 5–12)
M PNEUMO IGG SER IA-ACNC: NOT DETECTED
MCH RBC QN AUTO: 22.9 PG (ref 26.6–33)
MCHC RBC AUTO-ENTMCNC: 32.4 G/DL (ref 31.5–35.7)
MCV RBC AUTO: 70.8 FL (ref 79–97)
MICROCYTES BLD QL: ABNORMAL
MONOCYTES # BLD AUTO: 1.28 10*3/MM3 (ref 0.1–0.9)
NEUTROPHILS # BLD AUTO: 8.56 10*3/MM3 (ref 1.7–7)
NEUTROPHILS NFR BLD MANUAL: 60.6 % (ref 42.7–76)
NITRITE UR QL STRIP: NEGATIVE
NRBC BLD AUTO-RTO: 0.1 /100 WBC (ref 0–0.2)
PH UR STRIP.AUTO: 6 [PH] (ref 5–8)
PLAT MORPH BLD: NORMAL
PLATELET # BLD AUTO: 209 10*3/MM3 (ref 140–450)
PMV BLD AUTO: 11 FL (ref 6–12)
POTASSIUM SERPL-SCNC: 4.4 MMOL/L (ref 3.5–5.2)
PROCALCITONIN SERPL-MCNC: 2.83 NG/ML (ref 0–0.25)
PROT SERPL-MCNC: 7.4 G/DL (ref 6–8.5)
PROT UR QL STRIP: ABNORMAL
RBC # BLD AUTO: 4.97 10*6/MM3 (ref 4.14–5.8)
RBC # UR: ABNORMAL /HPF
REF LAB TEST METHOD: ABNORMAL
RHINOVIRUS RNA SPEC NAA+PROBE: NOT DETECTED
RSV RNA NPH QL NAA+NON-PROBE: NOT DETECTED
SARS-COV-2 RNA NPH QL NAA+NON-PROBE: NOT DETECTED
SODIUM SERPL-SCNC: 142 MMOL/L (ref 136–145)
SP GR UR STRIP: 1.01 (ref 1–1.03)
SQUAMOUS #/AREA URNS HPF: ABNORMAL /HPF
UROBILINOGEN UR QL STRIP: ABNORMAL
VALPROATE SERPL-MCNC: 86 MCG/ML (ref 50–125)
WBC # BLD AUTO: 14.12 10*3/MM3 (ref 3.4–10.8)
WBC MORPH BLD: NORMAL
WBC UR QL AUTO: ABNORMAL /HPF

## 2021-09-05 PROCEDURE — 81001 URINALYSIS AUTO W/SCOPE: CPT | Performed by: EMERGENCY MEDICINE

## 2021-09-05 PROCEDURE — 25010000002 AZITHROMYCIN PER 500 MG: Performed by: EMERGENCY MEDICINE

## 2021-09-05 PROCEDURE — 0202U NFCT DS 22 TRGT SARS-COV-2: CPT | Performed by: EMERGENCY MEDICINE

## 2021-09-05 PROCEDURE — 84145 PROCALCITONIN (PCT): CPT | Performed by: EMERGENCY MEDICINE

## 2021-09-05 PROCEDURE — 87086 URINE CULTURE/COLONY COUNT: CPT | Performed by: EMERGENCY MEDICINE

## 2021-09-05 PROCEDURE — 87077 CULTURE AEROBIC IDENTIFY: CPT | Performed by: EMERGENCY MEDICINE

## 2021-09-05 PROCEDURE — 71045 X-RAY EXAM CHEST 1 VIEW: CPT

## 2021-09-05 PROCEDURE — 85007 BL SMEAR W/DIFF WBC COUNT: CPT | Performed by: EMERGENCY MEDICINE

## 2021-09-05 PROCEDURE — 94799 UNLISTED PULMONARY SVC/PX: CPT

## 2021-09-05 PROCEDURE — 94640 AIRWAY INHALATION TREATMENT: CPT

## 2021-09-05 PROCEDURE — 87147 CULTURE TYPE IMMUNOLOGIC: CPT | Performed by: EMERGENCY MEDICINE

## 2021-09-05 PROCEDURE — 36415 COLL VENOUS BLD VENIPUNCTURE: CPT

## 2021-09-05 PROCEDURE — 99285 EMERGENCY DEPT VISIT HI MDM: CPT

## 2021-09-05 PROCEDURE — 85025 COMPLETE CBC W/AUTO DIFF WBC: CPT | Performed by: EMERGENCY MEDICINE

## 2021-09-05 PROCEDURE — 87040 BLOOD CULTURE FOR BACTERIA: CPT | Performed by: EMERGENCY MEDICINE

## 2021-09-05 PROCEDURE — 87150 DNA/RNA AMPLIFIED PROBE: CPT | Performed by: EMERGENCY MEDICINE

## 2021-09-05 PROCEDURE — 25010000002 CEFTRIAXONE PER 250 MG: Performed by: EMERGENCY MEDICINE

## 2021-09-05 PROCEDURE — 80164 ASSAY DIPROPYLACETIC ACD TOT: CPT | Performed by: EMERGENCY MEDICINE

## 2021-09-05 PROCEDURE — 83605 ASSAY OF LACTIC ACID: CPT | Performed by: EMERGENCY MEDICINE

## 2021-09-05 PROCEDURE — 87186 SC STD MICRODIL/AGAR DIL: CPT | Performed by: EMERGENCY MEDICINE

## 2021-09-05 PROCEDURE — 80053 COMPREHEN METABOLIC PANEL: CPT | Performed by: EMERGENCY MEDICINE

## 2021-09-05 PROCEDURE — P9612 CATHETERIZE FOR URINE SPEC: HCPCS

## 2021-09-05 RX ORDER — POLYETHYLENE GLYCOL 3350 17 G/17G
17 POWDER, FOR SOLUTION ORAL DAILY
COMMUNITY
End: 2022-05-04

## 2021-09-05 RX ORDER — CLONIDINE HYDROCHLORIDE 0.3 MG/1
0.3 TABLET ORAL 3 TIMES DAILY
COMMUNITY
End: 2021-09-12 | Stop reason: HOSPADM

## 2021-09-05 RX ORDER — CLONIDINE HYDROCHLORIDE 0.1 MG/1
0.3 TABLET ORAL 3 TIMES DAILY
Status: DISCONTINUED | OUTPATIENT
Start: 2021-09-05 | End: 2021-09-10

## 2021-09-05 RX ORDER — METOPROLOL TARTRATE 50 MG/1
100 TABLET, FILM COATED ORAL 2 TIMES DAILY
Status: DISCONTINUED | OUTPATIENT
Start: 2021-09-05 | End: 2021-09-10

## 2021-09-05 RX ORDER — LEVOTHYROXINE SODIUM 0.07 MG/1
75 TABLET ORAL DAILY
COMMUNITY
End: 2022-01-11

## 2021-09-05 RX ORDER — IPRATROPIUM BROMIDE AND ALBUTEROL SULFATE 2.5; .5 MG/3ML; MG/3ML
3 SOLUTION RESPIRATORY (INHALATION) ONCE
Status: COMPLETED | OUTPATIENT
Start: 2021-09-05 | End: 2021-09-05

## 2021-09-05 RX ORDER — SODIUM CHLORIDE 0.9 % (FLUSH) 0.9 %
10 SYRINGE (ML) INJECTION EVERY 12 HOURS SCHEDULED
Status: DISCONTINUED | OUTPATIENT
Start: 2021-09-05 | End: 2021-09-12 | Stop reason: HOSPADM

## 2021-09-05 RX ORDER — IPRATROPIUM BROMIDE AND ALBUTEROL SULFATE 2.5; .5 MG/3ML; MG/3ML
3 SOLUTION RESPIRATORY (INHALATION) EVERY 4 HOURS PRN
Status: DISCONTINUED | OUTPATIENT
Start: 2021-09-05 | End: 2021-09-12 | Stop reason: HOSPADM

## 2021-09-05 RX ORDER — AMLODIPINE BESYLATE 10 MG/1
10 TABLET ORAL DAILY
Status: DISCONTINUED | OUTPATIENT
Start: 2021-09-06 | End: 2021-09-10

## 2021-09-05 RX ORDER — AMLODIPINE BESYLATE 10 MG/1
10 TABLET ORAL DAILY
COMMUNITY
End: 2021-09-12 | Stop reason: HOSPADM

## 2021-09-05 RX ORDER — ACETAMINOPHEN 650 MG/1
650 SUPPOSITORY RECTAL ONCE
Status: COMPLETED | OUTPATIENT
Start: 2021-09-05 | End: 2021-09-05

## 2021-09-05 RX ORDER — MINOXIDIL 2.5 MG/1
2.5 TABLET ORAL 2 TIMES DAILY
Status: DISCONTINUED | OUTPATIENT
Start: 2021-09-05 | End: 2021-09-07

## 2021-09-05 RX ORDER — FUROSEMIDE 40 MG/1
40 TABLET ORAL DAILY
Status: DISCONTINUED | OUTPATIENT
Start: 2021-09-06 | End: 2021-09-06

## 2021-09-05 RX ORDER — LORATADINE 10 MG/1
10 TABLET ORAL DAILY
COMMUNITY
End: 2021-09-24

## 2021-09-05 RX ORDER — METOPROLOL TARTRATE 100 MG/1
100 TABLET ORAL 2 TIMES DAILY
COMMUNITY
End: 2021-09-12 | Stop reason: HOSPADM

## 2021-09-05 RX ORDER — IBUPROFEN 800 MG/1
800 TABLET ORAL ONCE
Status: COMPLETED | OUTPATIENT
Start: 2021-09-05 | End: 2021-09-05

## 2021-09-05 RX ORDER — DIVALPROEX SODIUM 500 MG/1
500 TABLET, DELAYED RELEASE ORAL EVERY 12 HOURS SCHEDULED
Status: DISCONTINUED | OUTPATIENT
Start: 2021-09-05 | End: 2021-09-12 | Stop reason: HOSPADM

## 2021-09-05 RX ORDER — FLUTICASONE PROPIONATE 50 MCG
2 SPRAY, SUSPENSION (ML) NASAL DAILY
COMMUNITY
End: 2022-05-31

## 2021-09-05 RX ORDER — SODIUM CHLORIDE 0.9 % (FLUSH) 0.9 %
10 SYRINGE (ML) INJECTION AS NEEDED
Status: DISCONTINUED | OUTPATIENT
Start: 2021-09-05 | End: 2021-09-12 | Stop reason: HOSPADM

## 2021-09-05 RX ORDER — SODIUM CHLORIDE 9 MG/ML
125 INJECTION, SOLUTION INTRAVENOUS CONTINUOUS
Status: DISCONTINUED | OUTPATIENT
Start: 2021-09-05 | End: 2021-09-07

## 2021-09-05 RX ORDER — OLANZAPINE 10 MG/1
20 TABLET ORAL NIGHTLY
Status: DISCONTINUED | OUTPATIENT
Start: 2021-09-05 | End: 2021-09-12 | Stop reason: HOSPADM

## 2021-09-05 RX ORDER — ALLOPURINOL 100 MG/1
100 TABLET ORAL DAILY
COMMUNITY
End: 2021-09-29

## 2021-09-05 RX ORDER — PANTOPRAZOLE SODIUM 40 MG/1
40 TABLET, DELAYED RELEASE ORAL EVERY MORNING
Status: DISCONTINUED | OUTPATIENT
Start: 2021-09-06 | End: 2021-09-12 | Stop reason: HOSPADM

## 2021-09-05 RX ORDER — AMMONIUM LACTATE 120 MG/G
1 CREAM TOPICAL 2 TIMES DAILY
COMMUNITY
End: 2021-10-12

## 2021-09-05 RX ORDER — ALLOPURINOL 100 MG/1
100 TABLET ORAL DAILY
Status: DISCONTINUED | OUTPATIENT
Start: 2021-09-06 | End: 2021-09-12 | Stop reason: HOSPADM

## 2021-09-05 RX ORDER — ONDANSETRON 2 MG/ML
4 INJECTION INTRAMUSCULAR; INTRAVENOUS EVERY 6 HOURS PRN
Status: DISCONTINUED | OUTPATIENT
Start: 2021-09-05 | End: 2021-09-12 | Stop reason: HOSPADM

## 2021-09-05 RX ORDER — OMEPRAZOLE 20 MG/1
20 CAPSULE, DELAYED RELEASE ORAL DAILY
COMMUNITY
End: 2021-11-19

## 2021-09-05 RX ORDER — MONTELUKAST SODIUM 10 MG/1
10 TABLET ORAL
COMMUNITY
End: 2022-05-31

## 2021-09-05 RX ORDER — LEVOTHYROXINE SODIUM 0.07 MG/1
75 TABLET ORAL
Status: DISCONTINUED | OUTPATIENT
Start: 2021-09-06 | End: 2021-09-12 | Stop reason: HOSPADM

## 2021-09-05 RX ORDER — FERROUS SULFATE 325(65) MG
325 TABLET ORAL 2 TIMES DAILY
COMMUNITY
End: 2021-11-19

## 2021-09-05 RX ORDER — OLANZAPINE 20 MG/1
20 TABLET ORAL 2 TIMES DAILY
Status: ON HOLD | COMMUNITY
End: 2021-09-11 | Stop reason: SDUPTHER

## 2021-09-05 RX ADMIN — IPRATROPIUM BROMIDE AND ALBUTEROL SULFATE 3 ML: 2.5; .5 SOLUTION RESPIRATORY (INHALATION) at 15:46

## 2021-09-05 RX ADMIN — SODIUM CHLORIDE, PRESERVATIVE FREE 10 ML: 5 INJECTION INTRAVENOUS at 22:07

## 2021-09-05 RX ADMIN — IPRATROPIUM BROMIDE AND ALBUTEROL SULFATE 3 ML: 2.5; .5 SOLUTION RESPIRATORY (INHALATION) at 15:32

## 2021-09-05 RX ADMIN — AZITHROMYCIN DIHYDRATE 500 MG: 500 INJECTION, POWDER, LYOPHILIZED, FOR SOLUTION INTRAVENOUS at 16:24

## 2021-09-05 RX ADMIN — IBUPROFEN 800 MG: 800 TABLET, FILM COATED ORAL at 16:38

## 2021-09-05 RX ADMIN — DOXYCYCLINE 100 MG: 100 INJECTION, POWDER, LYOPHILIZED, FOR SOLUTION INTRAVENOUS at 22:27

## 2021-09-05 RX ADMIN — CEFTRIAXONE 1 G: 1 INJECTION, POWDER, FOR SOLUTION INTRAMUSCULAR; INTRAVENOUS at 15:21

## 2021-09-05 RX ADMIN — ACETAMINOPHEN 650 MG: 650 SUPPOSITORY RECTAL at 13:51

## 2021-09-05 RX ADMIN — SODIUM CHLORIDE 100 ML/HR: 9 INJECTION, SOLUTION INTRAVENOUS at 22:06

## 2021-09-05 RX ADMIN — SODIUM CHLORIDE 2721 ML: 9 INJECTION, SOLUTION INTRAVENOUS at 14:09

## 2021-09-05 RX ADMIN — MINOXIDIL 2.5 MG: 2.5 TABLET ORAL at 22:07

## 2021-09-05 RX ADMIN — CLONIDINE HYDROCHLORIDE 0.3 MG: 0.1 TABLET ORAL at 22:06

## 2021-09-05 RX ADMIN — OLANZAPINE 20 MG: 10 TABLET ORAL at 22:06

## 2021-09-05 RX ADMIN — METOPROLOL TARTRATE 100 MG: 50 TABLET, FILM COATED ORAL at 22:07

## 2021-09-05 NOTE — PROGRESS NOTES
Clinical Pharmacy Services: Medication History    Arthur Mccarthy is a 35 y.o. male presenting to [facility] for Acute urinary tract infection [N39.0]    He  has a past medical history of Acquired intellectual disability, Allergic rhinitis, Asthma, Chronic kidney disease (CKD), Chronic renal disease, stage 3, moderately decreased glomerular filtration rate between 30-59 mL/min/1.73 square meter (CMS/HCC), Constipation, Epilepsy, generalized, convulsive (CMS/HCC), Episode of altered consciousness, Essential hypertension, GERD (gastroesophageal reflux disease), Hematuria, Hyperkalemia, Hyperlipidemia, Hypertension, Hypothyroidism, Immunization due, Metabolic encephalopathy, Moderate intellectual disabilities, Mood disorder (CMS/HCC), Nocturia, Physical exam, annual, Seizure (CMS/HCC), and Seizure disorder (CMS/HCC).    Allergies as of 09/05/2021   • (No Known Allergies)       Medication information was obtained from: Medication list provided by [facility]   Pharmacy and Phone Number: [facility] - [place], [place] - [address] [facility] - [phone]  - [phone] FX        Prior to Admission Medications     Prescriptions Last Dose Informant Patient Reported? Taking?    allopurinol (ZYLOPRIM) 100 MG tablet  Nursing Home Yes Yes    Take 100 mg by mouth Daily.    amLODIPine (NORVASC) 10 MG tablet  Nursing Home Yes Yes    Take 10 mg by mouth Daily.    ammonium lactate (AMLACTIN) 12 % cream  Nursing Home Yes Yes    Apply 1 application topically to the appropriate area as directed 2 (two) times a day. Apply to legs and feet as directed    cloNIDine (CATAPRES) 0.3 MG tablet  Nursing Home Yes Yes    Take 0.3 mg by mouth 3 (Three) Times a Day.    ferrous sulfate 325 (65 FE) MG tablet  Nursing Home Yes Yes    Take 325 mg by mouth 2 (two) times a day.    fluticasone (FLONASE) 50 MCG/ACT nasal spray  Nursing Home Yes Yes    2 sprays into the nostril(s) as directed by provider Daily.     levothyroxine (SYNTHROID, LEVOTHROID) 75 MCG tablet  Nursing Home Yes Yes    Take 75 mcg by mouth Daily.    loratadine (CLARITIN) 10 MG tablet  Nursing Home Yes Yes    Take 10 mg by mouth Daily.    metoprolol tartrate (LOPRESSOR) 100 MG tablet  Nursing Home Yes Yes    Take 100 mg by mouth 2 (Two) Times a Day.    montelukast (SINGULAIR) 10 MG tablet  Nursing Home Yes Yes    Take 10 mg by mouth every night at bedtime.    OLANZapine (zyPREXA) 20 MG tablet  Nursing Home Yes Yes    Take 20 mg by mouth 2 (two) times a day.    omeprazole (priLOSEC) 20 MG capsule  Nursing Home Yes Yes    Take 20 mg by mouth Daily.    polyethylene glycol (MiraLax) 17 GM/SCOOP powder  Nursing Home Yes Yes    Take 17 g by mouth Daily.    divalproex (DEPAKOTE) 500 MG DR tablet  Nursing Home Yes No    Take 1,000 mg by mouth every night at bedtime. 1 tab a.m. And 2 tabs p.m.     furosemide (LASIX) 40 MG tablet  Nursing Home Yes No    Take 40 mg by mouth Daily.    minoxidil (LONITEN) 2.5 MG tablet  Nursing Home Yes No    Take 2.5 mg by mouth 2 (Two) Times a Day.            Medication notes:     This medication list is complete to the best of my knowledge as of 9/5/2021    Please call if questions.    Edu Fish [facility]  9/5/2021 15:58 EDT

## 2021-09-05 NOTE — ED NOTES
.Pt masked, RN wearing n95 mask, goggles, faceshield, hair covering, gown, and gloves during encounter.       Nino Beatty, RN  09/05/21 6076

## 2021-09-05 NOTE — ED TRIAGE NOTES
Pt has fever to 103.2. he has been lethargic.  He is soa.  His resp are 30.  He has had duo neb, 500 ml ns    Patient was placed in face mask during first look triage.  Patient was wearing a face mask throughout encounter.  I wore personal protective equipment throughout the encounter.  Hand hygiene was performed before and after patient encounter.

## 2021-09-05 NOTE — ED PROVIDER NOTES
EMERGENCY DEPARTMENT ENCOUNTER    Room Number:  26/26  Date of encounter:  9/5/2021  PCP: Mikael Vasquez MD  Historian: EMS and patient records      HPI:  Chief Complaint: Fever, lethargy  A complete HPI/ROS/PMH/PSH/SH/FH are unobtainable due to: Intellectual disability    Context: Arthur Mccarthy is a 35 y.o. male who presents to the ED with fever to 103.2, shortness of breath and increased lethargy.  He has a history of seizure disorder and intellectual disability and is unable to give any history.      The patient was placed in a mask in triage, hand hygiene was performed before and after my interaction with the patient.  I wore a mask, safety glasses and gloves during my entire interaction with the patient.    PAST MEDICAL HISTORY  Active Ambulatory Problems     Diagnosis Date Noted   • Intellectual disability 10/15/2019   • Seasonal allergies 10/15/2019   • Hypertension 10/15/2019   • Seizure disorder (CMS/HCC) 10/15/2019   • Chronic renal disease, stage 3, moderately decreased glomerular filtration rate (GFR) between 30-59 mL/min/1.73 square meter (CMS/HCC) 10/15/2019   • Hyperlipidemia 10/15/2019   • Annual physical exam 01/22/2020   • Hypothyroidism 01/22/2020   • GERD (gastroesophageal reflux disease) 01/22/2020   • Asthma 01/22/2020   • Constipation 01/24/2020   • Hypertriglyceridemia 01/24/2020     Resolved Ambulatory Problems     Diagnosis Date Noted   • No Resolved Ambulatory Problems     Past Medical History:   Diagnosis Date   • Acquired intellectual disability    • Allergic rhinitis    • Chronic kidney disease (CKD)    • Chronic renal disease, stage 3, moderately decreased glomerular filtration rate between 30-59 mL/min/1.73 square meter (CMS/HCC)    • Epilepsy, generalized, convulsive (CMS/HCC)    • Episode of altered consciousness    • Essential hypertension    • Hematuria    • Hyperkalemia    • Immunization due    • Metabolic encephalopathy    • Moderate intellectual disabilities    • Mood  disorder (CMS/HCC)    • Nocturia    • Physical exam, annual    • Seizure (CMS/HCC)          PAST SURGICAL HISTORY  History reviewed. No pertinent surgical history.      FAMILY HISTORY  Family History   Problem Relation Age of Onset   • Down syndrome Brother    • No Known Problems Other          SOCIAL HISTORY  Social History     Socioeconomic History   • Marital status: Single     Spouse name: Not on file   • Number of children: Not on file   • Years of education: Not on file   • Highest education level: Not on file   Tobacco Use   • Smoking status: Never Smoker   • Smokeless tobacco: Never Used   Substance and Sexual Activity   • Alcohol use: No   • Drug use: Defer         ALLERGIES  Patient has no known allergies.        REVIEW OF SYSTEMS  Review of Systems   Unable to perform ROS: Patient nonverbal       PHYSICAL EXAM    I have reviewed the triage vital signs and nursing notes.    ED Triage Vitals [09/05/21 1258]   Temp Heart Rate Resp BP SpO2   (!) 103.2 °F (39.6 °C) 115 27 117/54 100 %      Temp src Heart Rate Source Patient Position BP Location FiO2 (%)   Oral Monitor -- -- --       Physical Exam   Constitutional: Pt. is awake and alert.  He will smile and answer yes to all of my questions.  He is in no distress.   HENT: Normocephalic and atraumatic. Oropharynx moist/nonerythematous.  Neck: Normal range of motion. Neck supple. No JVD present.   Cardiovascular: Tachycardic rate, regular rhythm and normal heart sounds. Exam reveals no gallop and no friction rub.   No murmur heard.  Pulmonary/Chest: Effort normal and breath sounds normal. No stridor. No respiratory distress. No wheezes, no rales.  He has a few scattered rhonchi.  Abdominal: Soft. Bowel sounds are normal.  Mild distension. There is no tenderness. There is no rebound and no guarding.   Musculoskeletal: Normal range of motion. No edema, tenderness or deformity.   Neurological: Pt. is awake and alert.  He smiles and follows simple commands.  Skin:  Skin is warm and dry. No rash noted. Pt. is not diaphoretic. No erythema.   Psychiatric: Unable to assess.  Nursing note and vitals reviewed.        LAB RESULTS  Recent Results (from the past 24 hour(s))   Respiratory Panel PCR w/COVID-19(SARS-CoV-2) ALDA/ANI/NISREEN/PAD/COR/MAD/LIANNA In-House, NP Swab in UTM/VTM, 3-4 HR TAT - Swab, Nasopharynx    Collection Time: 09/05/21  1:39 PM    Specimen: Nasopharynx; Swab   Result Value Ref Range    ADENOVIRUS, PCR Not Detected Not Detected    Coronavirus 229E Not Detected Not Detected    Coronavirus HKU1 Not Detected Not Detected    Coronavirus NL63 Not Detected Not Detected    Coronavirus OC43 Not Detected Not Detected    COVID19 Not Detected Not Detected - Ref. Range    Human Metapneumovirus Not Detected Not Detected    Human Rhinovirus/Enterovirus Not Detected Not Detected    Influenza A PCR Not Detected Not Detected    Influenza B PCR Not Detected Not Detected    Parainfluenza Virus 1 Not Detected Not Detected    Parainfluenza Virus 2 Not Detected Not Detected    Parainfluenza Virus 3 Not Detected Not Detected    Parainfluenza Virus 4 Not Detected Not Detected    RSV, PCR Not Detected Not Detected    Bordetella pertussis pcr Not Detected Not Detected    Bordetella parapertussis PCR Not Detected Not Detected    Chlamydophila pneumoniae PCR Not Detected Not Detected    Mycoplasma pneumo by PCR Not Detected Not Detected   Comprehensive Metabolic Panel    Collection Time: 09/05/21  1:46 PM    Specimen: Blood   Result Value Ref Range    Glucose 107 (H) 65 - 99 mg/dL    BUN 39 (H) 6 - 20 mg/dL    Creatinine 4.50 (H) 0.76 - 1.27 mg/dL    Sodium 142 136 - 145 mmol/L    Potassium 4.4 3.5 - 5.2 mmol/L    Chloride 98 98 - 107 mmol/L    CO2 26.8 22.0 - 29.0 mmol/L    Calcium 9.4 8.6 - 10.5 mg/dL    Total Protein 7.4 6.0 - 8.5 g/dL    Albumin 3.90 3.50 - 5.20 g/dL    ALT (SGPT) 33 1 - 41 U/L    AST (SGOT) 24 1 - 40 U/L    Alkaline Phosphatase 130 (H) 39 - 117 U/L    Total Bilirubin 0.4 0.0  - 1.2 mg/dL    eGFR  African Amer 18 (L) >60 mL/min/1.73    Globulin 3.5 gm/dL    A/G Ratio 1.1 g/dL    BUN/Creatinine Ratio 8.7 7.0 - 25.0    Anion Gap 17.2 (H) 5.0 - 15.0 mmol/L   Procalcitonin    Collection Time: 09/05/21  1:46 PM    Specimen: Blood   Result Value Ref Range    Procalcitonin 2.83 (H) 0.00 - 0.25 ng/mL   Lactic Acid, Plasma    Collection Time: 09/05/21  1:46 PM    Specimen: Blood   Result Value Ref Range    Lactate 1.4 0.5 - 2.0 mmol/L   Valproic Acid Level, Total    Collection Time: 09/05/21  1:46 PM    Specimen: Blood   Result Value Ref Range    Valproic Acid 86.0 50.0 - 125.0 mcg/mL   CBC Auto Differential    Collection Time: 09/05/21  1:46 PM    Specimen: Blood   Result Value Ref Range    WBC 14.12 (H) 3.40 - 10.80 10*3/mm3    RBC 4.97 4.14 - 5.80 10*6/mm3    Hemoglobin 11.4 (L) 13.0 - 17.7 g/dL    Hematocrit 35.2 (L) 37.5 - 51.0 %    MCV 70.8 (L) 79.0 - 97.0 fL    MCH 22.9 (L) 26.6 - 33.0 pg    MCHC 32.4 31.5 - 35.7 g/dL    RDW 16.0 (H) 12.3 - 15.4 %    RDW-SD 39.7 37.0 - 54.0 fl    MPV 11.0 6.0 - 12.0 fL    Platelets 209 140 - 450 10*3/mm3    nRBC 0.1 0.0 - 0.2 /100 WBC   Manual Differential    Collection Time: 09/05/21  1:46 PM    Specimen: Blood   Result Value Ref Range    Neutrophil % 60.6 42.7 - 76.0 %    Lymphocyte % 30.3 19.6 - 45.3 %    Monocyte % 9.1 5.0 - 12.0 %    Neutrophils Absolute 8.56 (H) 1.70 - 7.00 10*3/mm3    Lymphocytes Absolute 4.28 (H) 0.70 - 3.10 10*3/mm3    Monocytes Absolute 1.28 (H) 0.10 - 0.90 10*3/mm3    Anisocytosis Slight/1+ None Seen    Hypochromia Slight/1+ None Seen    Microcytes Slight/1+ None Seen    WBC Morphology Normal Normal    Platelet Morphology Normal Normal   Urinalysis With Culture If Indicated - Urine, Catheter In/Out    Collection Time: 09/05/21  2:07 PM    Specimen: Urine, Catheter In/Out   Result Value Ref Range    Color, UA Yellow Yellow, Straw    Appearance, UA Cloudy (A) Clear    pH, UA 6.0 5.0 - 8.0    Specific Gravity, UA 1.010 1.005 - 1.030     Glucose, UA Negative Negative    Ketones, UA Negative Negative    Bilirubin, UA Negative Negative    Blood, UA Moderate (2+) (A) Negative    Protein,  mg/dL (2+) (A) Negative    Leuk Esterase, UA Moderate (2+) (A) Negative    Nitrite, UA Negative Negative    Urobilinogen, UA 1.0 E.U./dL 0.2 - 1.0 E.U./dL   Urinalysis, Microscopic Only - Urine, Catheter In/Out    Collection Time: 09/05/21  2:07 PM    Specimen: Urine, Catheter In/Out   Result Value Ref Range    RBC, UA 13-20 (A) None Seen, 0-2 /HPF    WBC, UA Too Numerous to Count (A) None Seen, 0-2 /HPF    Bacteria, UA 4+ (A) None Seen /HPF    Squamous Epithelial Cells, UA 0-2 None Seen, 0-2 /HPF    Hyaline Casts, UA 0-2 None Seen /LPF    Methodology Automated Microscopy        Ordered the above labs and independently reviewed the results.        RADIOLOGY  XR Chest AP    Result Date: 9/5/2021  PORTABLE CHEST 09/05/2021 AT 2:03 PM  CLINICAL HISTORY: Cough. Fever. COVID 19 evaluation.  The lungs are poorly inflated. There is ill-defined increased density within both hemithoraces that is likely due to the poor lung expansion in conjunction with the patient's large size. No definite acute infiltrates are identified. There are no pleural effusions. The heart is mildly enlarged.  This report was finalized on 9/5/2021 2:40 PM by Dr. Donn Hassan M.D.        I ordered the above noted radiological studies. Reviewed by me and discussed with radiologist.  See dictation for official radiology interpretation.      PROCEDURES    Procedures      MEDICATIONS GIVEN IN ER    Medications   sodium chloride 0.9 % flush 10 mL (has no administration in time range)   AZITHROMYCIN 500 MG/250 ML 0.9% NS IVPB (vial-mate) (500 mg Intravenous New Bag 9/5/21 1624)   sodium chloride 0.9 % bolus 2,721 mL (0 mL Intravenous Stopped 9/5/21 1545)   acetaminophen (TYLENOL) suppository 650 mg (650 mg Rectal Given 9/5/21 1351)   cefTRIAXone (ROCEPHIN) 1 g in sodium chloride 0.9 % 100 mL  IVPB-VTB (0 g Intravenous Stopped 9/5/21 1605)   ipratropium-albuterol (DUO-NEB) nebulizer solution 3 mL (3 mL Nebulization Given 9/5/21 1532)   ipratropium-albuterol (DUO-NEB) nebulizer solution 3 mL (3 mL Nebulization Given 9/5/21 1546)   ibuprofen (ADVIL,MOTRIN) tablet 800 mg (800 mg Oral Given 9/5/21 1638)         PROGRESS, DATA ANALYSIS, CONSULTS, AND MEDICAL DECISION MAKING    Any/all labs have been independently reviewed by me.  Any/all radiology studies have been reviewed by me and discussed with radiologist dictating the report.   EKG's independently viewed and interpreted by me.  Discussion below represents my analysis of pertinent findings related to patient's condition, differential diagnosis, treatment plan and final disposition.      ED Course as of Sep 05 1705   Sun Sep 05, 2021   1433 CBC shows leukocytosis with a white count of 14.2.  Mild anemia with a hemoglobin of 11.  Platelets are normal at 209.  Differential pending.    [WC]   1433 Catheterized urinalysis shows 4+ bacteria and too numerous to count white blood cells.  Moderate leukocyte Estrace is present.  IV Rocephin has been ordered.    [WC]   1447 Normal.   Lactate: 1.4 [WC]   1447 Chest x-ray shows no definite acute infiltrates.  See dictated report for official interpretation.    [WC]   1505 BUN(!): 39 [WC]   1505 Markedly elevated over baseline.  His nurse reports he had approximately 800 cc of urine in his bladder when she catheterized for urinalysis.   Creatinine(!): 4.50 [WC]   1509 Procalcitonin(!): 2.83 [WC]   1516 Nurse reports wheezing, patient takes breathing treatments at facility-DuoNeb    [WC]   1524 Case discussed with Dr. Jorgensen ([facility])-accepts patient for admission to a telemetry bed.    [WC]   1542 Reevaluation-the patient does have expiratory wheezing.  I have ordered an additional DuoNeb.  Azithromycin will be added to his regimen to cover for community-acquired pneumonia.  Sepsis bolus discontinued due to history of CKD  (he received approximately 1300 cc).    [WC]   1629 COVID19: Not Detected [WC]      ED Course User Index  [WC] Lyle Colby MD       AS OF 17:05 EDT VITALS:    BP - 161/92  HR - (!) 126  TEMP - (!) 103.3 °F (39.6 °C) (Oral)  02 SATS - 94%        DIAGNOSIS  Final diagnoses:   Acute urinary tract infection   Acute kidney injury superimposed on chronic kidney disease (CMS/HCC)   Acute urinary retention   Intellectual disability   History of seizure disorder         DISPOSITION  Admitted-telemetry           Lyle Colby MD  09/05/21 1525       Lyle Colby MD  09/05/21 1705

## 2021-09-06 ENCOUNTER — APPOINTMENT (OUTPATIENT)
Dept: GENERAL RADIOLOGY | Facility: HOSPITAL | Age: 36
End: 2021-09-06

## 2021-09-06 LAB
ALBUMIN SERPL-MCNC: 3.9 G/DL (ref 3.5–5.2)
ALBUMIN/GLOB SERPL: 1 G/DL
ALP SERPL-CCNC: 124 U/L (ref 39–117)
ALT SERPL W P-5'-P-CCNC: 38 U/L (ref 1–41)
ANION GAP SERPL CALCULATED.3IONS-SCNC: 14.4 MMOL/L (ref 5–15)
AST SERPL-CCNC: 27 U/L (ref 1–40)
BACTERIA BLD CULT: ABNORMAL
BILIRUB SERPL-MCNC: 0.3 MG/DL (ref 0–1.2)
BOTTLE TYPE: ABNORMAL
BUN SERPL-MCNC: 41 MG/DL (ref 6–20)
BUN/CREAT SERPL: 10.5 (ref 7–25)
CALCIUM SPEC-SCNC: 10.1 MG/DL (ref 8.6–10.5)
CHLORIDE SERPL-SCNC: 103 MMOL/L (ref 98–107)
CO2 SERPL-SCNC: 25.6 MMOL/L (ref 22–29)
CREAT SERPL-MCNC: 3.91 MG/DL (ref 0.76–1.27)
DEPRECATED RDW RBC AUTO: 40 FL (ref 37–54)
ERYTHROCYTE [DISTWIDTH] IN BLOOD BY AUTOMATED COUNT: 15.7 % (ref 12.3–15.4)
GFR SERPL CREATININE-BSD FRML MDRD: 22 ML/MIN/1.73
GIANT PLATELETS: ABNORMAL
GLOBULIN UR ELPH-MCNC: 4 GM/DL
GLUCOSE BLDC GLUCOMTR-MCNC: 107 MG/DL (ref 70–130)
GLUCOSE SERPL-MCNC: 138 MG/DL (ref 65–99)
HCT VFR BLD AUTO: 36.3 % (ref 37.5–51)
HGB BLD-MCNC: 11.4 G/DL (ref 13–17.7)
LYMPHOCYTES # BLD MANUAL: 1.1 10*3/MM3 (ref 0.7–3.1)
LYMPHOCYTES NFR BLD MANUAL: 4.2 % (ref 5–12)
LYMPHOCYTES NFR BLD MANUAL: 7.3 % (ref 19.6–45.3)
MCH RBC QN AUTO: 22.9 PG (ref 26.6–33)
MCHC RBC AUTO-ENTMCNC: 31.4 G/DL (ref 31.5–35.7)
MCV RBC AUTO: 72.9 FL (ref 79–97)
MONOCYTES # BLD AUTO: 0.63 10*3/MM3 (ref 0.1–0.9)
NEUTROPHILS # BLD AUTO: 13.31 10*3/MM3 (ref 1.7–7)
NEUTROPHILS NFR BLD MANUAL: 88.5 % (ref 42.7–76)
PLATELET # BLD AUTO: 211 10*3/MM3 (ref 140–450)
PMV BLD AUTO: 12.1 FL (ref 6–12)
POTASSIUM SERPL-SCNC: 4.6 MMOL/L (ref 3.5–5.2)
PROT SERPL-MCNC: 7.9 G/DL (ref 6–8.5)
RBC # BLD AUTO: 4.98 10*6/MM3 (ref 4.14–5.8)
RBC MORPH BLD: NORMAL
SODIUM SERPL-SCNC: 143 MMOL/L (ref 136–145)
WBC # BLD AUTO: 15.04 10*3/MM3 (ref 3.4–10.8)
WBC MORPH BLD: NORMAL

## 2021-09-06 PROCEDURE — 25010000002 METHYLPREDNISOLONE PER 125 MG: Performed by: NURSE PRACTITIONER

## 2021-09-06 PROCEDURE — 25010000002 DIPHENHYDRAMINE PER 50 MG

## 2021-09-06 PROCEDURE — 85025 COMPLETE CBC W/AUTO DIFF WBC: CPT | Performed by: INTERNAL MEDICINE

## 2021-09-06 PROCEDURE — 94799 UNLISTED PULMONARY SVC/PX: CPT

## 2021-09-06 PROCEDURE — 82962 GLUCOSE BLOOD TEST: CPT

## 2021-09-06 PROCEDURE — 94640 AIRWAY INHALATION TREATMENT: CPT

## 2021-09-06 PROCEDURE — 25010000002 CEFTRIAXONE PER 250 MG: Performed by: INTERNAL MEDICINE

## 2021-09-06 PROCEDURE — 71045 X-RAY EXAM CHEST 1 VIEW: CPT

## 2021-09-06 PROCEDURE — 85007 BL SMEAR W/DIFF WBC COUNT: CPT | Performed by: INTERNAL MEDICINE

## 2021-09-06 PROCEDURE — 92610 EVALUATE SWALLOWING FUNCTION: CPT | Performed by: SPEECH-LANGUAGE PATHOLOGIST

## 2021-09-06 PROCEDURE — 80053 COMPREHEN METABOLIC PANEL: CPT | Performed by: INTERNAL MEDICINE

## 2021-09-06 RX ORDER — SODIUM CHLORIDE FOR INHALATION 0.9 %
3 VIAL, NEBULIZER (ML) INHALATION
Status: DISCONTINUED | OUTPATIENT
Start: 2021-09-06 | End: 2021-09-06

## 2021-09-06 RX ORDER — METHYLPREDNISOLONE SODIUM SUCCINATE 125 MG/2ML
125 INJECTION, POWDER, LYOPHILIZED, FOR SOLUTION INTRAMUSCULAR; INTRAVENOUS ONCE
Status: COMPLETED | OUTPATIENT
Start: 2021-09-06 | End: 2021-09-06

## 2021-09-06 RX ORDER — DIPHENHYDRAMINE HYDROCHLORIDE 50 MG/ML
25 INJECTION INTRAMUSCULAR; INTRAVENOUS ONCE
Status: COMPLETED | OUTPATIENT
Start: 2021-09-06 | End: 2021-09-06

## 2021-09-06 RX ORDER — ACETAMINOPHEN 650 MG/1
650 SUPPOSITORY RECTAL EVERY 4 HOURS PRN
Status: DISCONTINUED | OUTPATIENT
Start: 2021-09-06 | End: 2021-09-12 | Stop reason: HOSPADM

## 2021-09-06 RX ORDER — DIPHENHYDRAMINE HYDROCHLORIDE 50 MG/ML
INJECTION INTRAMUSCULAR; INTRAVENOUS
Status: COMPLETED
Start: 2021-09-06 | End: 2021-09-06

## 2021-09-06 RX ORDER — TAMSULOSIN HYDROCHLORIDE 0.4 MG/1
0.4 CAPSULE ORAL DAILY
Status: DISCONTINUED | OUTPATIENT
Start: 2021-09-06 | End: 2021-09-08

## 2021-09-06 RX ADMIN — DIVALPROEX SODIUM 500 MG: 500 TABLET, DELAYED RELEASE ORAL at 20:07

## 2021-09-06 RX ADMIN — DIVALPROEX SODIUM 500 MG: 500 TABLET, DELAYED RELEASE ORAL at 00:04

## 2021-09-06 RX ADMIN — CLONIDINE HYDROCHLORIDE 0.3 MG: 0.1 TABLET ORAL at 20:06

## 2021-09-06 RX ADMIN — SODIUM CHLORIDE 1000 ML: 9 INJECTION, SOLUTION INTRAVENOUS at 00:52

## 2021-09-06 RX ADMIN — SODIUM CHLORIDE, PRESERVATIVE FREE 10 ML: 5 INJECTION INTRAVENOUS at 20:07

## 2021-09-06 RX ADMIN — AMLODIPINE BESYLATE 10 MG: 10 TABLET ORAL at 08:44

## 2021-09-06 RX ADMIN — METOPROLOL TARTRATE 100 MG: 50 TABLET, FILM COATED ORAL at 20:07

## 2021-09-06 RX ADMIN — MINOXIDIL 2.5 MG: 2.5 TABLET ORAL at 20:06

## 2021-09-06 RX ADMIN — ACETAMINOPHEN 650 MG: 650 SUPPOSITORY RECTAL at 00:51

## 2021-09-06 RX ADMIN — CLONIDINE HYDROCHLORIDE 0.3 MG: 0.1 TABLET ORAL at 15:50

## 2021-09-06 RX ADMIN — OLANZAPINE 20 MG: 10 TABLET ORAL at 20:06

## 2021-09-06 RX ADMIN — ALLOPURINOL 100 MG: 100 TABLET ORAL at 08:44

## 2021-09-06 RX ADMIN — MINOXIDIL 2.5 MG: 2.5 TABLET ORAL at 08:44

## 2021-09-06 RX ADMIN — DIVALPROEX SODIUM 500 MG: 500 TABLET, DELAYED RELEASE ORAL at 08:44

## 2021-09-06 RX ADMIN — METHYLPREDNISOLONE SODIUM SUCCINATE 125 MG: 125 INJECTION, POWDER, FOR SOLUTION INTRAMUSCULAR; INTRAVENOUS at 00:43

## 2021-09-06 RX ADMIN — PANTOPRAZOLE SODIUM 40 MG: 40 TABLET, DELAYED RELEASE ORAL at 06:44

## 2021-09-06 RX ADMIN — DIPHENHYDRAMINE HYDROCHLORIDE 25 MG: 50 INJECTION, SOLUTION INTRAMUSCULAR; INTRAVENOUS at 00:43

## 2021-09-06 RX ADMIN — ISODIUM CHLORIDE 3 ML: 0.03 SOLUTION RESPIRATORY (INHALATION) at 00:40

## 2021-09-06 RX ADMIN — RACEPINEPHRINE HYDROCHLORIDE 0.5 ML: 11.25 SOLUTION RESPIRATORY (INHALATION) at 00:40

## 2021-09-06 RX ADMIN — SODIUM CHLORIDE, PRESERVATIVE FREE 10 ML: 5 INJECTION INTRAVENOUS at 08:44

## 2021-09-06 RX ADMIN — METOPROLOL TARTRATE 100 MG: 50 TABLET, FILM COATED ORAL at 08:44

## 2021-09-06 RX ADMIN — DIPHENHYDRAMINE HYDROCHLORIDE 25 MG: 50 INJECTION INTRAMUSCULAR; INTRAVENOUS at 00:43

## 2021-09-06 RX ADMIN — IPRATROPIUM BROMIDE AND ALBUTEROL SULFATE 3 ML: 2.5; .5 SOLUTION RESPIRATORY (INHALATION) at 00:13

## 2021-09-06 RX ADMIN — TAMSULOSIN HYDROCHLORIDE 0.4 MG: 0.4 CAPSULE ORAL at 18:16

## 2021-09-06 RX ADMIN — CLONIDINE HYDROCHLORIDE 0.3 MG: 0.1 TABLET ORAL at 08:44

## 2021-09-06 RX ADMIN — LEVOTHYROXINE SODIUM 75 MCG: 0.07 TABLET ORAL at 06:44

## 2021-09-06 RX ADMIN — CEFTRIAXONE 1 G: 1 INJECTION, POWDER, FOR SOLUTION INTRAMUSCULAR; INTRAVENOUS at 14:27

## 2021-09-06 NOTE — THERAPY EVALUATION
Acute Care - Speech Language Pathology   Swallow Initial Evaluation [facility]     Patient Name: Arthur Mccarthy  : 1985  MRN: [id number]  Today's Date: 2021               Admit Date: 2021    Visit Dx:     ICD-10-CM ICD-9-CM   1. Acute urinary tract infection  N39.0 599.0   2. Acute kidney injury superimposed on chronic kidney disease (CMS/HCC)  N17.9 866.00    N18.9 585.9   3. Acute urinary retention  R33.8 788.29   4. Intellectual disability  F79 319   5. History of seizure disorder  Z86.69 V12.49     Patient Active Problem List   Diagnosis   • Intellectual disability   • Seasonal allergies   • Hypertension   • Seizure disorder (CMS/HCC)   • Chronic renal disease, stage 3, moderately decreased glomerular filtration rate (GFR) between 30-59 mL/min/1.73 square meter (CMS/HCC)   • Hyperlipidemia   • Annual physical exam   • Hypothyroidism   • GERD (gastroesophageal reflux disease)   • Asthma   • Constipation   • Hypertriglyceridemia   • Acute urinary tract infection   • JORDEN (acute kidney injury) (CMS/HCC)     Past Medical History:   Diagnosis Date   • Acquired intellectual disability    • Allergic rhinitis    • Asthma    • Chronic kidney disease (CKD)    • Chronic renal disease, stage 3, moderately decreased glomerular filtration rate between 30-59 mL/min/1.73 square meter (CMS/HCC)    • Constipation    • Epilepsy, generalized, convulsive (CMS/HCC)    • Episode of altered consciousness    • Essential hypertension     History of Essential Hypertension    • GERD (gastroesophageal reflux disease)    • Hematuria    • Hyperkalemia    • Hyperlipidemia    • Hypertension    • Hypothyroidism    • Immunization due    • Metabolic encephalopathy    • Moderate intellectual disabilities    • Mood disorder (CMS/HCC)    • Nocturia    • Physical exam, annual    • Seizure (CMS/HCC)    • Seizure disorder (CMS/HCC)      History reviewed. No pertinent surgical history.    SLP Recommendation and Plan  SLP Swallowing  Diagnosis: oral dysphagia, mild-moderate  SLP Diet Recommendation: puree, thin liquids  Recommended Precautions and Strategies: upright posture during/after eating, small bites of food and sips of liquid  SLP Rec. for Method of Medication Administration: meds whole, with pudding or applesauce, as tolerated, meds crushed     Monitor for Signs of Aspiration: yes, notify SLP if any concerns     Swallow Criteria for Skilled Therapeutic Interventions Met: demonstrates skilled criteria  Anticipated Discharge Disposition (SLP): long term acute care facility  Rehab Potential/Prognosis, Swallowing: good, to achieve stated therapy goals  Therapy Frequency (Swallow): PRN  Predicted Duration Therapy Intervention (Days): until discharge                         Plan of Care Reviewed With: patient  Outcome Summary: Clinical swallow evaluation completed recommend puree with thin liquids, meds whole or crushed with puree         SWALLOW EVALUATION (last 72 hours)      SLP Adult Swallow Evaluation     Row Name 09/06/21 1200                   Rehab Evaluation    Document Type  evaluation  -KA        Subjective Information  no complaints  -KA        Patient Observations  alert;cooperative  -KA        Care Plan Review  evaluation/treatment results reviewed  -KA        Patient Effort  good  -KA        Symptoms Noted During/After Treatment  none  -KA           General Information    Patient Profile Reviewed  yes  -KA        Pertinent History Of Current Problem  SLP consult due to patient choking on breakfast this AM, hx of intellectual delay admitted with dyspena, fevere, fatigue and UTI  -KA        Current Method of Nutrition  regular textures;thin liquids  -KA        Precautions/Limitations, Vision  WFL;for purposes of eval  -KA        Prior Level of Function-Communication  cognitive-linguistic impairment  -KA        Prior Level of Function-Swallowing  other (see comments) RN reports baseline diet unknown   -KA        Plans/Goals  Discussed with  patient  -        Barriers to Rehab  cognitive status  -KA        Patient's Goals for Discharge  patient could not state  -KA           Pain    Additional Documentation  Pain Scale: Numbers Pre/Post-Treatment (Group)  -KA           Pain Scale: Numbers Pre/Post-Treatment    Pretreatment Pain Rating  0/10 - no pain  -KA        Posttreatment Pain Rating  0/10 - no pain  -KA           Oral Motor Structure and Function    Dentition Assessment  edentulous  -KA        Secretion Management  WNL/WFL  -KA        Mucosal Quality  moist, healthy  -KA           Oral Musculature and Cranial Nerve Assessment    Oral Motor General Assessment  generalized oral motor weakness  -KA        Oral Motor, Comment  Forward lingual protrusion, r  -KA           Clinical Swallow Eval    Clinical Swallow Evaluation Summary  Patient demonstrated no overt s/s of pen/asp with thins and puree and mechanical soft trials. Patient demonstrated reduced mastication, non rotary mastication with no labial closure during mastication.   -KA           Clinical Impression    SLP Swallowing Diagnosis  oral dysphagia;mild-moderate  -KA        Functional Impact  risk of aspiration/pneumonia  -KA        Rehab Potential/Prognosis, Swallowing  good, to achieve stated therapy goals  -        Swallow Criteria for Skilled Therapeutic Interventions Met  demonstrates skilled criteria  -KA           Recommendations    Therapy Frequency (Swallow)  PRN  -KA        Predicted Duration Therapy Intervention (Days)  until discharge  -KA        SLP Diet Recommendation  puree;thin liquids  -KA        Recommended Precautions and Strategies  upright posture during/after eating;small bites of food and sips of liquid  -KA        Oral Care Recommendations  Oral Care BID/PRN  -KA        SLP Rec. for Method of Medication Administration  meds whole;with pudding or applesauce;as tolerated;meds crushed  -KA        Monitor for Signs of Aspiration  yes;notify SLP if any  concerns  -KA        Anticipated Discharge Disposition (SLP)  long term acute care [facility]  -WILLY           Swallow Goals (SLP)    Oral Nutrition/Hydration Goal Selection (SLP)  oral nutrition/hydration, SLP goal 1  -KA           Oral Nutrition/Hydration Goal 1 (SLP)    Oral Nutrition/Hydration Goal 1, SLP  Patient will tolerate least restrictive diet without overt s/s of pen/asp   -KA        Time Frame (Oral Nutrition/Hydration Goal 1, SLP)  short term goal (STG);by discharge  -WILLY          User Key  (r) = Recorded By, (t) = Taken By, (c) = Cosigned By    Initials Name Effective Dates    Sajan Nagel MA,[facility]-SLP 06/16/21 -           EDUCATION  The patient has been educated in the following areas:   Dysphagia (Swallowing Impairment).       SLP GOALS     Row Name 09/06/21 1200             Oral Nutrition/Hydration Goal 1 (SLP)    Oral Nutrition/Hydration Goal 1, SLP  Patient will tolerate least restrictive diet without overt s/s of pen/asp   -KA      Time Frame (Oral Nutrition/Hydration Goal 1, SLP)  short term goal (STG);by discharge  -WILLY        User Key  (r) = Recorded By, (t) = Taken By, (c) = Cosigned By    Initials Name Provider Type    Sajan Nagel MA,[facility]-SLP Speech and Language Pathologist           SLP Outcome Measures (last 72 hours)      SLP Outcome Measures     Row Name 09/06/21 1200             SLP Outcome Measures    Outcome Measure Used?  Adult NOMS  -KA         Adult FCM Scores    FCM Chosen  Swallowing  -WILLY      Swallowing FCM Score  4  -KA        User Key  (r) = Recorded By, (t) = Taken By, (c) = Cosigned By    Initials Name Effective Dates    Sajan Nagel MA,[facility]-SLP 06/16/21 -            Time Calculation:   Time Calculation- SLP     Row Name 09/06/21 1223             Time Calculation- SLP    SLP Start Time  1030  -KA      SLP Received On  09/06/21  -KA         Untimed Charges    SLP Eval/Re-eval   ST Eval Oral Pharyng Swallow - 72437  -WILLY      [id number] Eval Oral Pharyng Swallow Minutes   45  -KA         Total Minutes    Untimed Charges Total Minutes  45  -KA       Total Minutes  45  -KA        User Key  (r) = Recorded By, (t) = Taken By, (c) = Cosigned By    Initials Name Provider Type    Sajan Nagel MA,[facility]-SLP Speech and Language Pathologist          Therapy Charges for Today     Code Description Service Date Service Provider Modifiers Qty    [id number] HC ST EVAL ORAL PHARYNG SWALLOW 3 9/6/2021 Sajan Montague MA,[facility]-SLP GN 1               Sajan Montague MA,IRAJ-SLP  9/6/2021

## 2021-09-06 NOTE — PROGRESS NOTES
"DAILY PROGRESS NOTE  [facility]    Patient Identification:  Name: Arthur Mccarthy  Age: 35 y.o.  Sex: male  :  1985  MRN: [id number]         Primary Care Physician: Mikael Vasquez MD    Subjective:  Interval History:He is confused    Objective:    Scheduled Meds:allopurinol, 100 mg, Oral, Daily  amLODIPine, 10 mg, Oral, Daily  cefTRIAXone, 1 g, Intravenous, Q24H  cloNIDine, 0.3 mg, Oral, TID  divalproex, 500 mg, Oral, Q12H  [START ON 2021] influenza vaccine, 0.5 mL, Intramuscular, Once  levothyroxine, 75 mcg, Oral, Q AM  metoprolol tartrate, 100 mg, Oral, BID  minoxidil, 2.5 mg, Oral, BID  OLANZapine, 20 mg, Oral, Nightly  pantoprazole, 40 mg, Oral, QAM  sodium chloride, 10 mL, Intravenous, Q12H      Continuous Infusions:sodium chloride, 100 mL/hr, Last Rate: 100 mL/hr (21 2206)        Vital signs in last 24 hours:  Temp:  [98 °F (36.7 °C)-103.3 °F (39.6 °C)] 98 °F (36.7 °C)  Heart Rate:  [] 87  Resp:  [20-45] 22  BP: (113-161)/(63-95) 113/82    Intake/Output:    Intake/Output Summary (Last 24 hours) at 2021 1343  Last data filed at 2021 0052  Gross per 24 hour   Intake 2650 ml   Output 800 ml   Net 1850 ml       Exam:  /82 (BP Location: Left arm, Patient Position: Lying)   Pulse 87   Temp 98 °F (36.7 °C) (Oral)   Resp 22   Ht 182.9 cm (72\")   Wt 93.8 kg (206 lb 14.4 oz)   SpO2 96%   BMI 28.06 kg/m²     General Appearance:    confused, no distress   Head:    Normocephalic, without obvious abnormality, atraumatic   Eyes:       Throat:   Lips, tongue, gums normal   Neck:   Supple, symmetrical, trachea midline, no JVD   Lungs:     Clear to auscultation bilaterally, respirations unlabored   Chest Wall:    No tenderness or deformity    Heart:    Regular rate and rhythm, S1 and S2 normal, no murmur,no  Rub or gallop   Abdomen:     Soft, nontender, bowel sounds active, no masses, no organomegaly    Extremities:   Extremities normal, atraumatic, no cyanosis " or edema   Pulses:      Skin:   Skin is warm and dry,  no rashes or palpable lesions   Neurologic:   Weak and confused      Lab Results (last 72 hours)     Procedure Component Value Units Date/Time    Urine Culture - Urine, Urine, Catheter In/Out [[id number]]  (Abnormal) Collected: 09/05/21 1407    Specimen: Urine, Catheter In/Out Updated: 09/06/21 1157     Urine Culture >100,000 CFU/mL Gram Negative Bacilli    Blood Culture ID, PCR - Blood, Arm, Left [[id number]] Collected: 09/05/21 1408    Specimen: Blood from Arm, Left Updated: 09/06/21 1152    Blood Culture - Blood, Arm, Left [[id number]]  (Abnormal) Collected: 09/05/21 1408    Specimen: Blood from Arm, Left Updated: 09/06/21 1152     Blood Culture Abnormal Stain     Gram Stain Aerobic Bottle Gram positive cocci in clusters    Manual Differential [[id number]]  (Abnormal) Collected: 09/06/21 0644    Specimen: Blood Updated: 09/06/21 0904     Neutrophil % 88.5 %      Lymphocyte % 7.3 %      Monocyte % 4.2 %      Neutrophils Absolute 13.31 10*3/mm3      Lymphocytes Absolute 1.10 10*3/mm3      Monocytes Absolute 0.63 10*3/mm3      RBC Morphology Normal     WBC Morphology Normal     Giant Platelets Mod/2+    CBC Auto Differential [[id number]]  (Abnormal) Collected: 09/06/21 0644    Specimen: Blood Updated: 09/06/21 0904     WBC 15.04 10*3/mm3      RBC 4.98 10*6/mm3      Hemoglobin 11.4 g/dL      Hematocrit 36.3 %      MCV 72.9 fL      MCH 22.9 pg      MCHC 31.4 g/dL      RDW 15.7 %      RDW-SD 40.0 fl      MPV 12.1 fL      Platelets 211 10*3/mm3     Comprehensive Metabolic Panel [[id number]]  (Abnormal) Collected: 09/06/21 0802    Specimen: Blood Updated: 09/06/21 0849     Glucose 138 mg/dL      BUN 41 mg/dL      Creatinine 3.91 mg/dL      Sodium 143 mmol/L      Potassium 4.6 mmol/L      Chloride 103 mmol/L      CO2 25.6 mmol/L      Calcium 10.1 mg/dL      Total Protein 7.9 g/dL      Albumin 3.90 g/dL      ALT (SGPT) 38 U/L      AST (SGOT) 27 U/L      Alkaline Phosphatase  124 U/L      Total Bilirubin 0.3 mg/dL      eGFR  African Amer 22 mL/min/1.73      Globulin 4.0 gm/dL      A/G Ratio 1.0 g/dL      BUN/Creatinine Ratio 10.5     Anion Gap 14.4 mmol/L     Narrative:      GFR Normal >60  Chronic Kidney Disease <60  Kidney Failure <15      POC Glucose Once [[id number]]  (Normal) Collected: 09/06/21 0042    Specimen: Blood Updated: 09/06/21 0044     Glucose 107 mg/dL      Comment: Meter: [id number] : [id number] Anna Orellana RN       Respiratory Panel PCR w/COVID-19(SARS-CoV-2) ALDA/ANI/NISREEN/PAD/COR/MAD/LIANNA In-House, NP Swab in UTM/VTM, 3-4 HR TAT - Swab, Nasopharynx [[id number]]  (Normal) Collected: 09/05/21 1339    Specimen: Swab from Nasopharynx Updated: 09/05/21 1625     ADENOVIRUS, PCR Not Detected     Coronavirus 229E Not Detected     Coronavirus HKU1 Not Detected     Coronavirus NL63 Not Detected     Coronavirus OC43 Not Detected     COVID19 Not Detected     Human Metapneumovirus Not Detected     Human Rhinovirus/Enterovirus Not Detected     Influenza A PCR Not Detected     Influenza B PCR Not Detected     Parainfluenza Virus 1 Not Detected     Parainfluenza Virus 2 Not Detected     Parainfluenza Virus 3 Not Detected     Parainfluenza Virus 4 Not Detected     RSV, PCR Not Detected     Bordetella pertussis pcr Not Detected     Bordetella parapertussis PCR Not Detected     Chlamydophila pneumoniae PCR Not Detected     Mycoplasma pneumo by PCR Not Detected    Narrative:      In the setting of a positive respiratory panel with a viral infection PLUS a negative procalcitonin without other underlying concern for bacterial infection, consider observing off antibiotics or discontinuation of antibiotics and continue supportive care. If the respiratory panel is positive for atypical bacterial infection (Bordetella pertussis, Chlamydophila pneumoniae, or Mycoplasma pneumoniae), consider antibiotic de-escalation to target atypical bacterial infection.    Procalcitonin [[id number]]   "(Abnormal) Collected: 09/05/21 1346    Specimen: Blood Updated: 09/05/21 1509     Procalcitonin 2.83 ng/mL     Narrative:      As a Marker for Sepsis (Non-Neonates):     1. <0.5 ng/mL represents a low risk of severe sepsis and/or septic shock.  2. >2 ng/mL represents a high risk of severe sepsis and/or septic shock.    As a Marker for Lower Respiratory Tract Infections that require antibiotic therapy:  PCT on Admission     Antibiotic Therapy             6-12 Hrs later  >0.5                          Strongly Recommended            >0.25 - <0.5             Recommended  0.1 - 0.25                  Discouraged                       Remeasure/reassess PCT  <0.1                         Strongly Discouraged         Remeasure/reassess PCT      As 28 day mortality risk marker: \"Change in Procalcitonin Result\" (>80% or <=80%) if Day 0 (or Day 1) and Day 4 values are available. Refer to [email or web address]    Change in PCT <=80 %   A decrease of PCT levels below or equal to 80% defines a positive change in PCT test result representing a higher risk for 28-day all-cause mortality of patients diagnosed with severe sepsis or septic shock.    Change in PCT >80 %   A decrease of PCT levels of more than 80% defines a negative change in PCT result representing a lower risk for 28-day all-cause mortality of patients diagnosed with severe sepsis or septic shock.              Results may be falsely decreased if patient taking Biotin.     Valproic Acid Level, Total [[id number]]  (Normal) Collected: 09/05/21 1346    Specimen: Blood Updated: 09/05/21 1506     Valproic Acid 86.0 mcg/mL     Narrative:      Therapeutic Ranges for Valproic Acid    Epilepsy:       mcg/ml  Bipolar/Katalina  up to 125 mcg/ml      Comprehensive Metabolic Panel [[id number]]  (Abnormal) Collected: 09/05/21 1346    Specimen: Blood Updated: 09/05/21 1504     Glucose 107 mg/dL      BUN 39 mg/dL      Creatinine 4.50 mg/dL      Sodium 142 mmol/L      " Potassium 4.4 mmol/L      Chloride 98 mmol/L      CO2 26.8 mmol/L      Calcium 9.4 mg/dL      Total Protein 7.4 g/dL      Albumin 3.90 g/dL      ALT (SGPT) 33 U/L      AST (SGOT) 24 U/L      Alkaline Phosphatase 130 U/L      Total Bilirubin 0.4 mg/dL      eGFR  African Amer 18 mL/min/1.73      Globulin 3.5 gm/dL      A/G Ratio 1.1 g/dL      BUN/Creatinine Ratio 8.7     Anion Gap 17.2 mmol/L     Narrative:      GFR Normal >60  Chronic Kidney Disease <60  Kidney Failure <15      Manual Differential [[id number]]  (Abnormal) Collected: 09/05/21 1346    Specimen: Blood Updated: 09/05/21 1456     Neutrophil % 60.6 %      Lymphocyte % 30.3 %      Monocyte % 9.1 %      Neutrophils Absolute 8.56 10*3/mm3      Lymphocytes Absolute 4.28 10*3/mm3      Monocytes Absolute 1.28 10*3/mm3      Anisocytosis Slight/1+     Hypochromia Slight/1+     Microcytes Slight/1+     WBC Morphology Normal     Platelet Morphology Normal    Lactic Acid, Plasma [[id number]]  (Normal) Collected: 09/05/21 1346    Specimen: Blood Updated: 09/05/21 1444     Lactate 1.4 mmol/L     Urinalysis, Microscopic Only - Urine, Catheter In/Out [[id number]]  (Abnormal) Collected: 09/05/21 1407    Specimen: Urine, Catheter In/Out Updated: 09/05/21 1426     RBC, UA 13-20 /HPF      WBC, UA Too Numerous to Count /HPF      Bacteria, UA 4+ /HPF      Squamous Epithelial Cells, UA 0-2 /HPF      Hyaline Casts, UA 0-2 /LPF      Methodology Automated Microscopy    CBC & Differential [[id number]]  (Abnormal) Collected: 09/05/21 1346    Specimen: Blood Updated: 09/05/21 1426    Narrative:      The following orders were created for panel order CBC & Differential.  Procedure                               Abnormality         Status                     ---------                               -----------         ------                     CBC Auto Differential[[id number]]        Abnormal            Final result                 Please view results for these tests on the individual orders.     CBC Auto Differential [[id number]]  (Abnormal) Collected: 09/05/21 1346    Specimen: Blood Updated: 09/05/21 1426     WBC 14.12 10*3/mm3      RBC 4.97 10*6/mm3      Hemoglobin 11.4 g/dL      Hematocrit 35.2 %      MCV 70.8 fL      MCH 22.9 pg      MCHC 32.4 g/dL      RDW 16.0 %      RDW-SD 39.7 fl      MPV 11.0 fL      Platelets 209 10*3/mm3      nRBC 0.1 /100 WBC     Urinalysis With Culture If Indicated - Urine, Catheter In/Out [[id number]]  (Abnormal) Collected: 09/05/21 1407    Specimen: Urine, Catheter In/Out Updated: 09/05/21 1425     Color, UA Yellow     Appearance, UA Cloudy     pH, UA 6.0     Specific Gravity, UA 1.010     Glucose, UA Negative     Ketones, UA Negative     Bilirubin, UA Negative     Blood, UA Moderate (2+)     Protein,  mg/dL (2+)     Leuk Esterase, UA Moderate (2+)     Nitrite, UA Negative     Urobilinogen, UA 1.0 E.U./dL    Blood Culture - Blood, Hand, Right [[id number]] Collected: 09/05/21 1346    Specimen: Blood from Hand, Right Updated: 09/05/21 1418        Data Review:  Results from last 7 days   Lab Units 09/06/21  0802 09/05/21  1346 09/05/21  1346   SODIUM mmol/L 143  --  142   POTASSIUM mmol/L 4.6  --  4.4   CHLORIDE mmol/L 103  --  98   CO2 mmol/L 25.6  --  26.8   BUN mg/dL 41*  --  39*   CREATININE mg/dL 3.91*  --  4.50*   GLUCOSE mg/dL 138*   < > 107*   CALCIUM mg/dL 10.1  --  9.4    < > = values in this interval not displayed.     Results from last 7 days   Lab Units 09/06/21  0644 09/05/21  1346   WBC 10*3/mm3 15.04* 14.12*   HEMOGLOBIN g/dL 11.4* 11.4*   HEMATOCRIT % 36.3* 35.2*   PLATELETS 10*3/mm3 211 209             No results found for: TROPONINT      Results from last 7 days   Lab Units 09/06/21  0802 09/05/21  1346   ALK PHOS U/L 124* 130*   BILIRUBIN mg/dL 0.3 0.4   ALT (SGPT) U/L 38 33   AST (SGOT) U/L 27 24             Glucose   Date/Time Value Ref Range Status   09/06/2021 0042 107 70 - 130 mg/dL Final     Comment:     Meter: [id number] : [id number] Anna  Reyna YOON           Past Medical History:   Diagnosis Date   • Acquired intellectual disability    • Allergic rhinitis    • Asthma    • Chronic kidney disease (CKD)    • Chronic renal disease, stage 3, moderately decreased glomerular filtration rate between 30-59 mL/min/1.73 square meter (CMS/HCC)    • Constipation    • Epilepsy, generalized, convulsive (CMS/[facility])    • Episode of altered consciousness    • Essential hypertension     History of Essential Hypertension    • GERD (gastroesophageal reflux disease)    • Hematuria    • Hyperkalemia    • Hyperlipidemia    • Hypertension    • Hypothyroidism    • Immunization due    • Metabolic encephalopathy    • Moderate intellectual disabilities    • Mood disorder (CMS/[facility])    • Nocturia    • Physical exam, annual    • Seizure (CMS/[facility])    • Seizure disorder (CMS/[facility])        Assessment:  Active Hospital Problems    Diagnosis  POA   • **Acute urinary tract infection [N39.0]  Yes   • JORDEN (acute kidney injury) (CMS/[facility]) [N17.9]  Yes   • Asthma [J45.909]  Yes   • GERD (gastroesophageal reflux disease) [K21.9]  Yes   • Hypothyroidism [E03.9]  Yes   • Chronic renal disease, stage 3, moderately decreased glomerular filtration rate (GFR) between 30-59 mL/min/1.73 square meter (CMS/[facility]) [N18.30]  Yes   • Hyperlipidemia [E78.5]  Yes   • Hypertension [I10]  Yes   • Intellectual disability [F79]  Yes   • Seizure disorder (CMS/[facility]) [G40.909]  Yes      Resolved Hospital Problems   No resolved problems to display.       Plan:  Continue with antibiotics and await cultures.  Follow lab.    Rafi Hoover MD  9/6/2021  13:43 EDT

## 2021-09-06 NOTE — CODE DOCUMENTATION
Patient Name:  Arthur Mccarthy  YOB: 1985  MRN:  [id number]  Admit Date:  9/5/2021    Visit Diagnoses:     ICD-10-CM ICD-9-CM   1. Acute urinary tract infection  N39.0 599.0   2. Acute kidney injury superimposed on chronic kidney disease (CMS/HCC)  N17.9 866.00    N18.9 585.9   3. Acute urinary retention  R33.8 788.29   4. Intellectual disability  F79 319   5. History of seizure disorder  Z86.69 V12.49       Reason For Rapid:   Resp distress    RN Communicated With:  Dr Nobles, Salome Ely APRN, Sari Izaguirre primary RN    Rapid Outcome:  meds as charted, breathing much beter @ completion of rapid    Communication From Rapid Team: Sari YOON to monitor    Most Recent Vital Signs  Temp:  [98.1 °F (36.7 °C)-103.3 °F (39.6 °C)] 98.5 °F (36.9 °C)  Heart Rate:  [] 129  Resp:  [20-45] 26  BP: (115-161)/(54-95) 150/93  SpO2:  [91 %-100 %] 94 %  on  Flow (L/min):  [2] 2;   Device (Oxygen Therapy): nasal cannula    Labs:  Results from last 7 days   Lab Units 09/05/21  1339   COVID19  Not Detected     Glucose   Date/Time Value Ref Range Status   09/06/2021 0042 107 70 - 130 mg/dL Final     Comment:     Meter: [id number] : [id number] Anna Orellana RN     No results found for: SITE, ALLENTEST, PHART, JUP1ZSV, PO2ART, NDS2IVS, BASEEXCESS, J2LKMYAC, HGBBG, HCTABG, OXYHEMOGLOBI, METHHGBN, CARBOXYHGB, CO2CT, BAROMETRIC, MODALITY, FIO2  Results from last 7 days   Lab Units 09/05/21  1346   WBC 10*3/mm3 14.12*   HEMOGLOBIN g/dL 11.4*   PLATELETS 10*3/mm3 209     Results from last 7 days   Lab Units 09/05/21  1346   SODIUM mmol/L 142   POTASSIUM mmol/L 4.4   CHLORIDE mmol/L 98   CO2 mmol/L 26.8   BUN mg/dL 39*   CREATININE mg/dL 4.50*   GLUCOSE mg/dL 107*   ALBUMIN g/dL 3.90   BILIRUBIN mg/dL 0.4   ALK PHOS U/L 130*   AST (SGOT) U/L 24   ALT (SGPT) U/L 33   Estimated Creatinine Clearance: 30.5 mL/min (A) (by C-G formula based on SCr of 4.5 mg/dL (H)).      Results from last 7 days   Lab Units  09/05/21  1346   PROCALCITONIN ng/mL 2.83*   LACTATE mmol/L 1.4     No results found for: STREPPNEUAG, LEGANTIGENUR    Results from last 7 days   Lab Units 09/05/21  1339   ADENOVIRUS DETECTION BY PCR  Not Detected   CORONAVIRUS 229E  Not Detected   CORONAVIRUS HKU1  Not Detected   CORONAVIRUS NL63  Not Detected   CORONAVIRUS OC43  Not Detected   HUMAN METAPNEUMOVIRUS  Not Detected   HUMAN RHINOVIRUS/ENTEROVIRUS  Not Detected   INFLUENZA B PCR  Not Detected   PARAINFLUENZA 1  Not Detected   PARAINFLUENZA VIRUS 2  Not Detected   PARAINFLUENZA VIRUS 3  Not Detected   PARAINFLUENZA VIRUS 4  Not Detected   BORDETELLA PERTUSSIS PCR  Not Detected   CHLAMYDOPHILA PNEUMONIAE PCR  Not Detected   MYCOPLAMA PNEUMO PCR  Not Detected   INFLUENZA A PCR  Not Detected   RSV, PCR  Not Detected       NIH Stroke Scale:                                                         Please refer to full rapid documentation on summary page under Index / Code Timeline

## 2021-09-06 NOTE — PROGRESS NOTES
Stat paged on patient regarding audible wheezing and respiratory distress.  Patient was receiving a Duoneb for his symptoms at the time and the nurse was calling to ask for additional orders.  Stat chest x-ray, racemic epinephrine, and IV Solumedrol were ordered.  Paged again soon after by an ICU nurse who was there due to a rapid response that was called on the patient.  The ICU nurse states she spoke with the intensivist on call who ordered Bendaryl.  She states the patient had swelling of the neck and the intensivist thought this might be due to an allergic reaction to the antibiotics he recently received for UTI.  Patient received Doxycyline at 2227, will discontinue.    CHERI Chapa  [facility]

## 2021-09-06 NOTE — H&P
Patient Name:  Arthur Mccarthy  YOB: 1985  MRN:  [id number]  Admit Date:  9/5/2021  Patient Care Team:  Mikael Vasquez MD as PCP - General (Internal Medicine)  Arthur Phillips MD as Consulting Physician (Nephrology)      Subjective   History Present Illness     Chief Complaint   Patient presents with   • Shortness of Breath   • Fever   • Fatigue            History of Present Illness   35-year-old male, with history of intellectual disability, chronic kidney disease, epilepsy, GERD, hyperlipidemia, hypertension, hypothyroidism, mood disorder, presented to the hospital, with main complaints of shortness of breath, fever and fatigue.  Patient's temperature was checked in the emergency room, it was found to be above 103.  Patient was wheezing at the time of my evaluation the emergency room.  Chest x-ray is suspicious for pneumonia.  Findings clinically upon urinalysis also consistent with UTI.  Patient was given IV antibiotics in the emergency room.  Patient will be admitted to hospitalist service for further work-up of symptoms.      Review of Systems   Unobtainable because of intellectual disability.      Personal History     Past Medical History:   Diagnosis Date   • Acquired intellectual disability    • Allergic rhinitis    • Asthma    • Chronic kidney disease (CKD)    • Chronic renal disease, stage 3, moderately decreased glomerular filtration rate between 30-59 mL/min/1.73 square meter (CMS/HCC)    • Constipation    • Epilepsy, generalized, convulsive (CMS/HCC)    • Episode of altered consciousness    • Essential hypertension     History of Essential Hypertension    • GERD (gastroesophageal reflux disease)    • Hematuria    • Hyperkalemia    • Hyperlipidemia    • Hypertension    • Hypothyroidism    • Immunization due    • Metabolic encephalopathy    • Moderate intellectual disabilities    • Mood disorder (CMS/HCC)    • Nocturia    • Physical exam, annual    • Seizure  (CMS/[facility])    • Seizure disorder (CMS/[facility])      History reviewed. No pertinent surgical history.  Family History   Problem Relation Age of Onset   • Down syndrome Brother    • No Known Problems Other      Social History     Tobacco Use   • Smoking status: Never Smoker   • Smokeless tobacco: Never Used   Substance Use Topics   • Alcohol use: No   • Drug use: Defer     No current facility-administered medications on file prior to encounter.     Current Outpatient Medications on File Prior to Encounter   Medication Sig Dispense Refill   • allopurinol (ZYLOPRIM) 100 MG tablet Take 100 mg by mouth Daily.     • amLODIPine (NORVASC) 10 MG tablet Take 10 mg by mouth Daily.     • ammonium lactate (AMLACTIN) 12 % cream Apply 1 application topically to the appropriate area as directed 2 (two) times a day. Apply to legs and feet as directed     • cloNIDine (CATAPRES) 0.3 MG tablet Take 0.3 mg by mouth 3 (Three) Times a Day.     • ferrous sulfate 325 (65 FE) MG tablet Take 325 mg by mouth 2 (two) times a day.     • fluticasone (FLONASE) 50 MCG/ACT nasal spray 2 sprays into the nostril(s) as directed by provider Daily.     • levothyroxine (SYNTHROID, LEVOTHROID) 75 MCG tablet Take 75 mcg by mouth Daily.     • loratadine (CLARITIN) 10 MG tablet Take 10 mg by mouth Daily.     • metoprolol tartrate (LOPRESSOR) 100 MG tablet Take 100 mg by mouth 2 (Two) Times a Day.     • montelukast (SINGULAIR) 10 MG tablet Take 10 mg by mouth every night at bedtime.     • OLANZapine (zyPREXA) 20 MG tablet Take 20 mg by mouth 2 (two) times a day.     • omeprazole (priLOSEC) 20 MG capsule Take 20 mg by mouth Daily.     • polyethylene glycol (MiraLax) 17 GM/SCOOP powder Take 17 g by mouth Daily.     • divalproex (DEPAKOTE) 500 MG DR tablet Take 1,000 mg by mouth every night at bedtime. 1 tab a.m. And 2 tabs p.m.      • furosemide (LASIX) 40 MG tablet Take 40 mg by mouth Daily.     • minoxidil (LONITEN) 2.5 MG tablet Take 2.5 mg by mouth 2 (Two) Times a  Day.     • [DISCONTINUED] allopurinol (ZYLOPRIM) 100 MG tablet TAKE ONE TABLET BY MOUTH DAILY 30 tablet 6   • [DISCONTINUED] amLODIPine (NORVASC) 10 MG tablet TAKE ONE TABLET BY MOUTH DAILY 30 tablet 11   • [DISCONTINUED] ammonium lactate (LAC-HYDRIN) 12 % lotion APPLY TO FEET AND LEGS TWICE DAILY AS DIRECTED. 400 g 11   • [DISCONTINUED] budesonide (PULMICORT) 1 MG/2ML nebulizer solution USE 1 VIAL PER NEBULIZER DAILY 60 mL 5   • [DISCONTINUED] cholecalciferol (VITAMIN D3) 25 MCG (1000 UT) tablet TAKE ONE TABLET BY MOUTH EVERY OTHER DAY (Patient taking differently: Daily.) 15 tablet 3   • [DISCONTINUED] cloNIDine (CATAPRES) 0.3 MG tablet TAKE ONE TABLET BY MOUTH THREE TIMES DAILY (MORNING, NOON, EVENING) 90 tablet 11   • [DISCONTINUED] ferrous sulfate 325 (65 FE) MG tablet TAKE ONE TABLET BY MOUTH TWICE A DAY MORNING AND EVENING 60 tablet 11   • [DISCONTINUED] fluticasone (FLONASE) 50 MCG/ACT nasal spray USE TWO SPRAYS IN EACH NOSTRIL DAILY 16 g 11   • [DISCONTINUED] hydrALAZINE (APRESOLINE) 25 MG tablet Take 25 mg by mouth 3 (Three) Times a Day.     • [DISCONTINUED] icosapent ethyl (Vascepa) 1 g capsule capsule Take 2 g by mouth 2 (Two) Times a Day With Meals. 120 capsule 11   • [DISCONTINUED] levothyroxine (SYNTHROID, LEVOTHROID) 75 MCG tablet TAKE ONE TABLET BY MOUTH DAILY 30 tablet 6   • [DISCONTINUED] loratadine (CLARITIN) 10 MG tablet TAKE ONE TABLET BY MOUTH DAILY 30 tablet 11   • [DISCONTINUED] metoprolol tartrate (LOPRESSOR) 100 MG tablet TAKE TWO TABLETS BY MOUTH TWICE A DAY MORNING AND EVENING 120 tablet 11   • [DISCONTINUED] montelukast (SINGULAIR) 10 MG tablet TAKE ONE TABLET BY MOUTH AT BEDTIME 30 tablet 11   • [DISCONTINUED] Nebulizers (PulmoNeb LT) misc USE AS DIRECTED FOUR TIMES DAILY AS NEEDED SHORTNESS OF BREATH, WHEEZE     • [DISCONTINUED] O2 (OXYGEN) Inhale 2 L/min Every Night.     • [DISCONTINUED] OLANZapine (zyPREXA) 20 MG tablet TAKE ONE TABLET BY MOUTH TWICE A DAY MORNING AND EVENING 60  tablet 11   • [DISCONTINUED] omeprazole (priLOSEC) 20 MG capsule TAKE ONE CAPSULE BY MOUTH DAILY 30 capsule 11   • [DISCONTINUED] PEG 3350 17 GM/SCOOP powder MIX 17 GRAMS (ONE CAPFUL) IN LIQUID AND DRINK DAILY 510 g 11   • [DISCONTINUED] PROAIR  (90 Base) MCG/ACT inhaler INHALE TWO PUFFS BY MOUTH EVERY 6 HOURS AS NEEDED AND 20 MINUTES BEFORE EXERCISE.. 8.5 g 11   • [DISCONTINUED] QUEtiapine (SEROquel) 300 MG tablet TAKE TWO TABLETS BY MOUTH AT BEDTIME 60 tablet 6   • [DISCONTINUED] Respiratory Therapy Supplies (Nebulizer/Tubing/Mouthpiece) kit 1 Product 4 (Four) Times a Day As Needed (for wheezing/short of breath). Tubing with adult size mask 1 each 0   • [DISCONTINUED] solifenacin (VESICARE) 5 MG tablet TAKE ONE TABLET BY MOUTH DAILY 30 tablet 11   • [DISCONTINUED] Spacer/Aero-Holding Chambers device 1 Device 4 (Four) Times a Day As Needed (for wheezing with the albuterol inhaler). 1 each 0     No Known Allergies    Objective    Objective     Vital Signs  Temp:  [98.1 °F (36.7 °C)-103.3 °F (39.6 °C)] 98.1 °F (36.7 °C)  Heart Rate:  [] 102  Resp:  [20-36] 20  BP: (115-161)/(54-93) 150/93  SpO2:  [91 %-100 %] 96 %  on  Flow (L/min):  [2] 2;   Device (Oxygen Therapy): nasal cannula  Body mass index is 27.59 kg/m².    Physical Exam  General, awake, has baseline intellectual disability, appears sick on chronic basis.  Head and ENT, normocephalic and atraumatic.  Lungs, symmetric expansion, bilateral wheezing.  Heart, regular rate and rhythm.  Abdomen, soft and nontender.  Extremities, no clubbing or cyanosis.  Neuro, unable to fully evaluate  Skin: Warm and no rash.  Psych, unable to fully evaluate.  Has baseline intellectual disability.  Musculoskeletal, joint examination is grossly normal.     Results Review:  I reviewed the patient's new clinical results.  I reviewed the patient's new imaging results and agree with the interpretation.  I reviewed the patient's other test results and agree with the  interpretation  I personally viewed and interpreted the patient's EKG/Telemetry data  Discussed with ED provider.    Lab Results (last 24 hours)     Procedure Component Value Units Date/Time    Respiratory Panel PCR w/COVID-19(SARS-CoV-2) ALDA/ANI/NISREEN/PAD/COR/MAD/LIANNA In-House, NP Swab in UTM/VTM, 3-4 HR TAT - Swab, Nasopharynx [[id number]]  (Normal) Collected: 09/05/21 1339    Specimen: Swab from Nasopharynx Updated: 09/05/21 1625     ADENOVIRUS, PCR Not Detected     Coronavirus 229E Not Detected     Coronavirus HKU1 Not Detected     Coronavirus NL63 Not Detected     Coronavirus OC43 Not Detected     COVID19 Not Detected     Human Metapneumovirus Not Detected     Human Rhinovirus/Enterovirus Not Detected     Influenza A PCR Not Detected     Influenza B PCR Not Detected     Parainfluenza Virus 1 Not Detected     Parainfluenza Virus 2 Not Detected     Parainfluenza Virus 3 Not Detected     Parainfluenza Virus 4 Not Detected     RSV, PCR Not Detected     Bordetella pertussis pcr Not Detected     Bordetella parapertussis PCR Not Detected     Chlamydophila pneumoniae PCR Not Detected     Mycoplasma pneumo by PCR Not Detected    Narrative:      In the setting of a positive respiratory panel with a viral infection PLUS a negative procalcitonin without other underlying concern for bacterial infection, consider observing off antibiotics or discontinuation of antibiotics and continue supportive care. If the respiratory panel is positive for atypical bacterial infection (Bordetella pertussis, Chlamydophila pneumoniae, or Mycoplasma pneumoniae), consider antibiotic de-escalation to target atypical bacterial infection.    CBC & Differential [[id number]]  (Abnormal) Collected: 09/05/21 1346    Specimen: Blood Updated: 09/05/21 1426    Narrative:      The following orders were created for panel order CBC & Differential.  Procedure                               Abnormality         Status                     ---------                    "            -----------         ------                     CBC Auto Differential[[id number]]        Abnormal            Final result                 Please view results for these tests on the individual orders.    Comprehensive Metabolic Panel [[id number]]  (Abnormal) Collected: 09/05/21 1346    Specimen: Blood Updated: 09/05/21 1504     Glucose 107 mg/dL      BUN 39 mg/dL      Creatinine 4.50 mg/dL      Sodium 142 mmol/L      Potassium 4.4 mmol/L      Chloride 98 mmol/L      CO2 26.8 mmol/L      Calcium 9.4 mg/dL      Total Protein 7.4 g/dL      Albumin 3.90 g/dL      ALT (SGPT) 33 U/L      AST (SGOT) 24 U/L      Alkaline Phosphatase 130 U/L      Total Bilirubin 0.4 mg/dL      eGFR  African Amer 18 mL/min/1.73      Globulin 3.5 gm/dL      A/G Ratio 1.1 g/dL      BUN/Creatinine Ratio 8.7     Anion Gap 17.2 mmol/L     Narrative:      GFR Normal >60  Chronic Kidney Disease <60  Kidney Failure <15      Blood Culture - Blood, Hand, Right [[id number]] Collected: 09/05/21 1346    Specimen: Blood from Hand, Right Updated: 09/05/21 1418    Procalcitonin [[id number]]  (Abnormal) Collected: 09/05/21 1346    Specimen: Blood Updated: 09/05/21 1509     Procalcitonin 2.83 ng/mL     Narrative:      As a Marker for Sepsis (Non-Neonates):     1. <0.5 ng/mL represents a low risk of severe sepsis and/or septic shock.  2. >2 ng/mL represents a high risk of severe sepsis and/or septic shock.    As a Marker for Lower Respiratory Tract Infections that require antibiotic therapy:  PCT on Admission     Antibiotic Therapy             6-12 Hrs later  >0.5                          Strongly Recommended            >0.25 - <0.5             Recommended  0.1 - 0.25                  Discouraged                       Remeasure/reassess PCT  <0.1                         Strongly Discouraged         Remeasure/reassess PCT      As 28 day mortality risk marker: \"Change in Procalcitonin Result\" (>80% or <=80%) if Day 0 (or Day 1) and Day 4 values are " available. Refer to http://www.[facility]-pct-calculator.com/    Change in PCT <=80 %   A decrease of PCT levels below or equal to 80% defines a positive change in PCT test result representing a higher risk for 28-day all-cause mortality of patients diagnosed with severe sepsis or septic shock.    Change in PCT >80 %   A decrease of PCT levels of more than 80% defines a negative change in PCT result representing a lower risk for 28-day all-cause mortality of patients diagnosed with severe sepsis or septic shock.              Results may be falsely decreased if patient taking Biotin.     Lactic Acid, Plasma [[id number]]  (Normal) Collected: 09/05/21 1346    Specimen: Blood Updated: 09/05/21 1444     Lactate 1.4 mmol/L     Valproic Acid Level, Total [[id number]]  (Normal) Collected: 09/05/21 1346    Specimen: Blood Updated: 09/05/21 1506     Valproic Acid 86.0 mcg/mL     Narrative:      Therapeutic Ranges for Valproic Acid    Epilepsy:       mcg/ml  Bipolar/Katalina  up to 125 mcg/ml      CBC Auto Differential [[id number]]  (Abnormal) Collected: 09/05/21 1346    Specimen: Blood Updated: 09/05/21 1426     WBC 14.12 10*3/mm3      RBC 4.97 10*6/mm3      Hemoglobin 11.4 g/dL      Hematocrit 35.2 %      MCV 70.8 fL      MCH 22.9 pg      MCHC 32.4 g/dL      RDW 16.0 %      RDW-SD 39.7 fl      MPV 11.0 fL      Platelets 209 10*3/mm3      nRBC 0.1 /100 WBC     Manual Differential [[id number]]  (Abnormal) Collected: 09/05/21 1346    Specimen: Blood Updated: 09/05/21 1456     Neutrophil % 60.6 %      Lymphocyte % 30.3 %      Monocyte % 9.1 %      Neutrophils Absolute 8.56 10*3/mm3      Lymphocytes Absolute 4.28 10*3/mm3      Monocytes Absolute 1.28 10*3/mm3      Anisocytosis Slight/1+     Hypochromia Slight/1+     Microcytes Slight/1+     WBC Morphology Normal     Platelet Morphology Normal    Urinalysis With Culture If Indicated - Urine, Catheter In/Out [[id number]]  (Abnormal) Collected: 09/05/21 1407    Specimen: Urine, Catheter  In/Out Updated: 09/05/21 1425     Color, UA Yellow     Appearance, UA Cloudy     pH, UA 6.0     Specific Gravity, UA 1.010     Glucose, UA Negative     Ketones, UA Negative     Bilirubin, UA Negative     Blood, UA Moderate (2+)     Protein,  mg/dL (2+)     Leuk Esterase, UA Moderate (2+)     Nitrite, UA Negative     Urobilinogen, UA 1.0 E.U./dL    Urinalysis, Microscopic Only - Urine, Catheter In/Out [[id number]]  (Abnormal) Collected: 09/05/21 1407    Specimen: Urine, Catheter In/Out Updated: 09/05/21 1426     RBC, UA 13-20 /HPF      WBC, UA Too Numerous to Count /HPF      Bacteria, UA 4+ /HPF      Squamous Epithelial Cells, UA 0-2 /HPF      Hyaline Casts, UA 0-2 /LPF      Methodology Automated Microscopy    Urine Culture - Urine, Urine, Catheter In/Out [[id number]] Collected: 09/05/21 1407    Specimen: Urine, Catheter In/Out Updated: 09/05/21 1426    Blood Culture - Blood, Arm, Left [[id number]] Collected: 09/05/21 1408    Specimen: Blood from Arm, Left Updated: 09/05/21 1415          Imaging Results (Last 24 Hours)     Procedure Component Value Units Date/Time    XR Chest AP [[id number]] Collected: 09/05/21 1439     Updated: 09/05/21 1444    Narrative:      PORTABLE CHEST 09/05/2021 AT 2:03 PM     CLINICAL HISTORY: Cough. Fever. COVID 19 evaluation.     The lungs are poorly inflated. There is ill-defined increased density  within both hemithoraces that is likely due to the poor lung expansion  in conjunction with the patient's large size. No definite acute  infiltrates are identified. There are no pleural effusions. The heart is  mildly enlarged.     This report was finalized on 9/5/2021 2:40 PM by Dr. Donn Hassan M.D.                 No orders to display        Assessment/Plan     Active Hospital Problems    Diagnosis  POA   • **Acute urinary tract infection [N39.0]  Yes   • JORDEN (acute kidney injury) (CMS/HCC) [N17.9]  Yes   • Asthma [J45.909]  Yes   • GERD (gastroesophageal reflux disease) [K21.9]  Yes   •  Hypothyroidism [E03.9]  Yes   • Chronic renal disease, stage 3, moderately decreased glomerular filtration rate (GFR) between 30-59 mL/min/1.73 square meter (CMS/HCC) [N18.30]  Yes   • Hyperlipidemia [E78.5]  Yes   • Hypertension [I10]  Yes   • Intellectual disability [F79]  Yes   • Seizure disorder (CMS/[facility]) [G40.909]  Yes      Resolved Hospital Problems   No resolved problems to display.     Assessment and plan:  1.  Sepsis due to UTI and underlying pneumonia.  Start patient on IV Rocephin and doxycycline.  Follow blood and urine cultures which have been collected in the emergency room.    2.  Acute kidney injury, initiate IV fluids.  Avoid nephrotoxic medications.  Consult nephrology.    3.  Essential hypertension, resume home antihypertensive medications.    4.  Hypothyroidism, continue Synthroid.    5.  Gastroesophageal reflux disease, continue Protonix.    6.  Intellectual disability, supportive management and resume home medications.    7.  CODE STATUS is full code.  Further plans based hospital course.    Madi Jorgensen MD  [facility]  09/05/21  21:28 EDT

## 2021-09-06 NOTE — CONSULTS
Referring Provider: Dr Jorgensen   Reason for Consultation: JORDEN CKD3    Subjective     Chief complaint   Chief Complaint   Patient presents with   • Shortness of Breath   • Fever   • Fatigue       History of present illness:  34 yo AAM with congenital cognitive impairment, CKD stage 3, HTN, seizure disorder who presented from nursing home with dyspnea, fever, fatigue.  T-max 103.2.  UA suggestive of UTI.  Questionable PNA on CXR.  Found to have JORDEN with Cr 4.5.  Baseline Cr low to mid 2's, saw Bianca Brantley NP in our office recently.  Cr was 2.4 on 1/27/21.  He is nonverbal.  Takes lasix.  No mention of CHF in chart nor echos on file.  With IVF, Cr down slightly 3.9 today.  He has been started on rocephin & azithromycin.          Past Medical History:   Diagnosis Date   • Acquired intellectual disability    • Allergic rhinitis    • Asthma    • Chronic kidney disease (CKD)    • Chronic renal disease, stage 3, moderately decreased glomerular filtration rate between 30-59 mL/min/1.73 square meter (CMS/HCC)    • Constipation    • Epilepsy, generalized, convulsive (CMS/HCC)    • Episode of altered consciousness    • Essential hypertension     History of Essential Hypertension    • GERD (gastroesophageal reflux disease)    • Hematuria    • Hyperkalemia    • Hyperlipidemia    • Hypertension    • Hypothyroidism    • Immunization due    • Metabolic encephalopathy    • Moderate intellectual disabilities    • Mood disorder (CMS/HCC)    • Nocturia    • Physical exam, annual    • Seizure (CMS/HCC)    • Seizure disorder (CMS/HCC)      History reviewed. No pertinent surgical history.  Family History   Problem Relation Age of Onset   • Down syndrome Brother    • No Known Problems Other        Social History     Tobacco Use   • Smoking status: Never Smoker   • Smokeless tobacco: Never Used   Substance Use Topics   • Alcohol use: No   • Drug use: Defer     Medications Prior to Admission   Medication Sig Dispense Refill Last Dose   •  "allopurinol (ZYLOPRIM) 100 MG tablet Take 100 mg by mouth Daily.      • amLODIPine (NORVASC) 10 MG tablet Take 10 mg by mouth Daily.      • ammonium lactate (AMLACTIN) 12 % cream Apply 1 application topically to the appropriate area as directed 2 (two) times a day. Apply to legs and feet as directed      • cloNIDine (CATAPRES) 0.3 MG tablet Take 0.3 mg by mouth 3 (Three) Times a Day.      • ferrous sulfate 325 (65 FE) MG tablet Take 325 mg by mouth 2 (two) times a day.      • fluticasone (FLONASE) 50 MCG/ACT nasal spray 2 sprays into the nostril(s) as directed by provider Daily.      • levothyroxine (SYNTHROID, LEVOTHROID) 75 MCG tablet Take 75 mcg by mouth Daily.      • loratadine (CLARITIN) 10 MG tablet Take 10 mg by mouth Daily.      • metoprolol tartrate (LOPRESSOR) 100 MG tablet Take 100 mg by mouth 2 (Two) Times a Day.      • montelukast (SINGULAIR) 10 MG tablet Take 10 mg by mouth every night at bedtime.      • OLANZapine (zyPREXA) 20 MG tablet Take 20 mg by mouth 2 (two) times a day.      • omeprazole (priLOSEC) 20 MG capsule Take 20 mg by mouth Daily.      • polyethylene glycol (MiraLax) 17 GM/SCOOP powder Take 17 g by mouth Daily.      • divalproex (DEPAKOTE) 500 MG DR tablet Take 1,000 mg by mouth every night at bedtime. 1 tab a.m. And 2 tabs p.m.       • furosemide (LASIX) 40 MG tablet Take 40 mg by mouth Daily.      • minoxidil (LONITEN) 2.5 MG tablet Take 2.5 mg by mouth 2 (Two) Times a Day.        Allergies:  Patient has no known allergies.    Review of Systems  Unable to obtain as nonverbal     Objective     Vital Signs  Temp:  [98 °F (36.7 °C)-103.3 °F (39.6 °C)] 98 °F (36.7 °C)  Heart Rate:  [] 107  Resp:  [20-45] 24  BP: (115-161)/(54-95) 129/86    Flowsheet Rows      First Filed Value   Admission Height  167.6 cm (66\") Documented at 09/05/2021 1337   Admission Weight  90.7 kg (200 lb) Documented at 09/05/2021 1337           No intake/output data recorded.  I/O last 3 completed shifts:  In: " 3150 [I.V.:500; IV Piggyback:2650]  Out: 800 [Urine:800]    Intake/Output Summary (Last 24 hours) at 9/6/2021 0742  Last data filed at 9/6/2021 0052  Gross per 24 hour   Intake 3150 ml   Output 800 ml   Net 2350 ml       Physical Exam:  GEN - obese pleasant AAM w cognitive impairment, nonverbal, no distress  HEENT NC/AT OP clear  Neck supple no JVD  Lungs coarse BS bilat  CV RRR, no M/G  abd soft NT/ND +BS   no suprapubic fullness/TTP  vasc no pedal/ankle edema, 2+ radial pulses  MS no joint warmth or erythema  Derm normal turgor, no rash  Neuro nonverbal  Results Review:  Results for orders placed or performed during the hospital encounter of 09/05/21   Respiratory Panel PCR w/COVID-19(SARS-CoV-2) ALDA/ANI/NISREEN/PAD/COR/MAD/LIANNA In-House, NP Swab in UTM/VTM, 3-4 HR TAT - Swab, Nasopharynx    Specimen: Nasopharynx; Swab   Result Value Ref Range    ADENOVIRUS, PCR Not Detected Not Detected    Coronavirus 229E Not Detected Not Detected    Coronavirus HKU1 Not Detected Not Detected    Coronavirus NL63 Not Detected Not Detected    Coronavirus OC43 Not Detected Not Detected    COVID19 Not Detected Not Detected - Ref. Range    Human Metapneumovirus Not Detected Not Detected    Human Rhinovirus/Enterovirus Not Detected Not Detected    Influenza A PCR Not Detected Not Detected    Influenza B PCR Not Detected Not Detected    Parainfluenza Virus 1 Not Detected Not Detected    Parainfluenza Virus 2 Not Detected Not Detected    Parainfluenza Virus 3 Not Detected Not Detected    Parainfluenza Virus 4 Not Detected Not Detected    RSV, PCR Not Detected Not Detected    Bordetella pertussis pcr Not Detected Not Detected    Bordetella parapertussis PCR Not Detected Not Detected    Chlamydophila pneumoniae PCR Not Detected Not Detected    Mycoplasma pneumo by PCR Not Detected Not Detected   Comprehensive Metabolic Panel    Specimen: Blood   Result Value Ref Range    Glucose 107 (H) 65 - 99 mg/dL    BUN 39 (H) 6 - 20 mg/dL    Creatinine  4.50 (H) 0.76 - 1.27 mg/dL    Sodium 142 136 - 145 mmol/L    Potassium 4.4 3.5 - 5.2 mmol/L    Chloride 98 98 - 107 mmol/L    CO2 26.8 22.0 - 29.0 mmol/L    Calcium 9.4 8.6 - 10.5 mg/dL    Total Protein 7.4 6.0 - 8.5 g/dL    Albumin 3.90 3.50 - 5.20 g/dL    ALT (SGPT) 33 1 - 41 U/L    AST (SGOT) 24 1 - 40 U/L    Alkaline Phosphatase 130 (H) 39 - 117 U/L    Total Bilirubin 0.4 0.0 - 1.2 mg/dL    eGFR  African Amer 18 (L) >60 mL/min/1.73    Globulin 3.5 gm/dL    A/G Ratio 1.1 g/dL    BUN/Creatinine Ratio 8.7 7.0 - 25.0    Anion Gap 17.2 (H) 5.0 - 15.0 mmol/L   Urinalysis With Culture If Indicated - Urine, Catheter In/Out    Specimen: Urine, Catheter In/Out   Result Value Ref Range    Color, UA Yellow Yellow, Straw    Appearance, UA Cloudy (A) Clear    pH, UA 6.0 5.0 - 8.0    Specific Gravity, UA 1.010 1.005 - 1.030    Glucose, UA Negative Negative    Ketones, UA Negative Negative    Bilirubin, UA Negative Negative    Blood, UA Moderate (2+) (A) Negative    Protein,  mg/dL (2+) (A) Negative    Leuk Esterase, UA Moderate (2+) (A) Negative    Nitrite, UA Negative Negative    Urobilinogen, UA 1.0 E.U./dL 0.2 - 1.0 E.U./dL   Procalcitonin    Specimen: Blood   Result Value Ref Range    Procalcitonin 2.83 (H) 0.00 - 0.25 ng/mL   Lactic Acid, Plasma    Specimen: Blood   Result Value Ref Range    Lactate 1.4 0.5 - 2.0 mmol/L   Valproic Acid Level, Total    Specimen: Blood   Result Value Ref Range    Valproic Acid 86.0 50.0 - 125.0 mcg/mL   CBC Auto Differential    Specimen: Blood   Result Value Ref Range    WBC 14.12 (H) 3.40 - 10.80 10*3/mm3    RBC 4.97 4.14 - 5.80 10*6/mm3    Hemoglobin 11.4 (L) 13.0 - 17.7 g/dL    Hematocrit 35.2 (L) 37.5 - 51.0 %    MCV 70.8 (L) 79.0 - 97.0 fL    MCH 22.9 (L) 26.6 - 33.0 pg    MCHC 32.4 31.5 - 35.7 g/dL    RDW 16.0 (H) 12.3 - 15.4 %    RDW-SD 39.7 37.0 - 54.0 fl    MPV 11.0 6.0 - 12.0 fL    Platelets 209 140 - 450 10*3/mm3    nRBC 0.1 0.0 - 0.2 /100 WBC   Manual Differential     Specimen: Blood   Result Value Ref Range    Neutrophil % 60.6 42.7 - 76.0 %    Lymphocyte % 30.3 19.6 - 45.3 %    Monocyte % 9.1 5.0 - 12.0 %    Neutrophils Absolute 8.56 (H) 1.70 - 7.00 10*3/mm3    Lymphocytes Absolute 4.28 (H) 0.70 - 3.10 10*3/mm3    Monocytes Absolute 1.28 (H) 0.10 - 0.90 10*3/mm3    Anisocytosis Slight/1+ None Seen    Hypochromia Slight/1+ None Seen    Microcytes Slight/1+ None Seen    WBC Morphology Normal Normal    Platelet Morphology Normal Normal   Urinalysis, Microscopic Only - Urine, Catheter In/Out    Specimen: Urine, Catheter In/Out   Result Value Ref Range    RBC, UA 13-20 (A) None Seen, 0-2 /HPF    WBC, UA Too Numerous to Count (A) None Seen, 0-2 /HPF    Bacteria, UA 4+ (A) None Seen /HPF    Squamous Epithelial Cells, UA 0-2 None Seen, 0-2 /HPF    Hyaline Casts, UA 0-2 None Seen /LPF    Methodology Automated Microscopy    POC Glucose Once    Specimen: Blood   Result Value Ref Range    Glucose 107 70 - 130 mg/dL     Imaging Results (Last 72 Hours)     Procedure Component Value Units Date/Time    XR Chest 1 View [[id number]] Collected: 09/06/21 0105     Updated: 09/06/21 0110    Narrative:      SINGLE VIEW OF THE CHEST     HISTORY: Respiratory distress.     COMPARISON: 09/05/2021     FINDINGS:  Cardiomegaly is present. No pneumothorax or pleural effusion is seen.  There is a hazy appearance to both hemithoraces, which is nonspecific.  Some of it may be artifactual and related to the patient's body habitus  and portable technique. Edema or infiltrate is not excluded.       Impression:      Overall hazy appearance to the hemithoraces bilaterally. Similar  findings were present on earlier study.     This report was finalized on 9/6/2021 1:07 AM by Dr. Mami Ch M.D.       XR Chest AP [[id number]] Collected: 09/05/21 1439     Updated: 09/05/21 1444    Narrative:      PORTABLE CHEST 09/05/2021 AT 2:03 PM     CLINICAL HISTORY: Cough. Fever. COVID 19 evaluation.     The lungs are poorly  inflated. There is ill-defined increased density  within both hemithoraces that is likely due to the poor lung expansion  in conjunction with the patient's large size. No definite acute  infiltrates are identified. There are no pleural effusions. The heart is  mildly enlarged.     This report was finalized on 9/5/2021 2:40 PM by Dr. Donn Hassan M.D.               allopurinol, 100 mg, Oral, Daily  amLODIPine, 10 mg, Oral, Daily  cefTRIAXone, 1 g, Intravenous, Q24H  cloNIDine, 0.3 mg, Oral, TID  divalproex, 500 mg, Oral, Q12H  [START ON 9/7/2021] influenza vaccine, 0.5 mL, Intramuscular, Once  levothyroxine, 75 mcg, Oral, Q AM  metoprolol tartrate, 100 mg, Oral, BID  minoxidil, 2.5 mg, Oral, BID  OLANZapine, 20 mg, Oral, Nightly  pantoprazole, 40 mg, Oral, QAM  sodium chloride, 10 mL, Intravenous, Q12H      sodium chloride, 100 mL/hr, Last Rate: 100 mL/hr (09/05/21 2206)        Assessment/Plan   Non olig JORDEN - likely prerenal azotemia in assoc with UTI and diuretic use; notably given dose of motrin yesterday PM; Cr improved 4.5 to 3.9 with IVF; K/HCo3 WNL    CKD stage 3 - BL Cr low to mid 2's, was 2.4 in Jan 2021  Sepsis, UTI +/- PNA - UA & CXR noted; Cx pending; on rocephin & azithromycin  HTN - BP adequate, on norvasc, high dose clonidine, metop + minoxidil  Cognitive dysfunction    Plan  - cont IVF current rate   - DC lasix  - approx PVR (after incontinent void) just now was adequate 250 cc    Addendum: RN called later re: bladder scan ~ 900 cc, was straight cathed.  Will start flomax and do bladder scan q8h, straight cath PRN.    Thank you Dr Jorgensen for involving me in pt's care.        Acute urinary tract infection    Intellectual disability    Hypertension    Seizure disorder (CMS/HCC)    Chronic renal disease, stage 3, moderately decreased glomerular filtration rate (GFR) between 30-59 mL/min/1.73 square meter (CMS/HCC)    Hyperlipidemia    Hypothyroidism    GERD (gastroesophageal reflux disease)    Asthma     JORDEN (acute kidney injury) (CMS/[facility])        I discussed the patient's findings and my recommendations with patient    Lenard Ursula Stokes MD  09/06/21  07:42 EDT      Much of this encounter note is an electronic transcription/translation of spoken language to printed text. The electronic translation of spoken language may permit erroneous, or at times, nonsensical words or phrases to be inadvertently transcribed; Although I have reviewed the note for such errors, some may still exist

## 2021-09-06 NOTE — PLAN OF CARE
Goal Outcome Evaluation:  Plan of Care Reviewed With: patient           Outcome Summary: Clinical swallow evaluation completed recommend puree with thin liquids, meds whole or crushed with puree

## 2021-09-07 LAB
ALBUMIN SERPL-MCNC: 4 G/DL (ref 3.5–5.2)
ANION GAP SERPL CALCULATED.3IONS-SCNC: 15.4 MMOL/L (ref 5–15)
ANISOCYTOSIS BLD QL: ABNORMAL
BACTERIA SPEC AEROBE CULT: ABNORMAL
BUN SERPL-MCNC: 48 MG/DL (ref 6–20)
BUN/CREAT SERPL: 16.9 (ref 7–25)
CALCIUM SPEC-SCNC: 10.2 MG/DL (ref 8.6–10.5)
CHLORIDE SERPL-SCNC: 100 MMOL/L (ref 98–107)
CO2 SERPL-SCNC: 22.6 MMOL/L (ref 22–29)
CREAT SERPL-MCNC: 2.84 MG/DL (ref 0.76–1.27)
DEPRECATED RDW RBC AUTO: 39.1 FL (ref 37–54)
ERYTHROCYTE [DISTWIDTH] IN BLOOD BY AUTOMATED COUNT: 15.5 % (ref 12.3–15.4)
GFR SERPL CREATININE-BSD FRML MDRD: 31 ML/MIN/1.73
GLUCOSE SERPL-MCNC: 124 MG/DL (ref 65–99)
HCT VFR BLD AUTO: 32.8 % (ref 37.5–51)
HGB BLD-MCNC: 10.9 G/DL (ref 13–17.7)
LYMPHOCYTES # BLD MANUAL: 0.75 10*3/MM3 (ref 0.7–3.1)
LYMPHOCYTES NFR BLD MANUAL: 4 % (ref 19.6–45.3)
LYMPHOCYTES NFR BLD MANUAL: 7 % (ref 5–12)
MCH RBC QN AUTO: 23.5 PG (ref 26.6–33)
MCHC RBC AUTO-ENTMCNC: 33.2 G/DL (ref 31.5–35.7)
MCV RBC AUTO: 70.7 FL (ref 79–97)
MICROCYTES BLD QL: ABNORMAL
MONOCYTES # BLD AUTO: 1.31 10*3/MM3 (ref 0.1–0.9)
NEUTROPHILS # BLD AUTO: 16.71 10*3/MM3 (ref 1.7–7)
NEUTROPHILS NFR BLD MANUAL: 89 % (ref 42.7–76)
NRBC BLD AUTO-RTO: 0.2 /100 WBC (ref 0–0.2)
PHOSPHATE SERPL-MCNC: 4.2 MG/DL (ref 2.5–4.5)
PLAT MORPH BLD: NORMAL
PLATELET # BLD AUTO: 218 10*3/MM3 (ref 140–450)
PMV BLD AUTO: 12.1 FL (ref 6–12)
POTASSIUM SERPL-SCNC: 4.5 MMOL/L (ref 3.5–5.2)
RBC # BLD AUTO: 4.64 10*6/MM3 (ref 4.14–5.8)
SODIUM SERPL-SCNC: 138 MMOL/L (ref 136–145)
WBC # BLD AUTO: 18.77 10*3/MM3 (ref 3.4–10.8)
WBC MORPH BLD: NORMAL

## 2021-09-07 PROCEDURE — 80069 RENAL FUNCTION PANEL: CPT | Performed by: INTERNAL MEDICINE

## 2021-09-07 PROCEDURE — 94799 UNLISTED PULMONARY SVC/PX: CPT

## 2021-09-07 PROCEDURE — 25010000002 CEFTRIAXONE PER 250 MG: Performed by: INTERNAL MEDICINE

## 2021-09-07 PROCEDURE — 25010000002 ONDANSETRON PER 1 MG: Performed by: NURSE PRACTITIONER

## 2021-09-07 PROCEDURE — 85007 BL SMEAR W/DIFF WBC COUNT: CPT | Performed by: INTERNAL MEDICINE

## 2021-09-07 PROCEDURE — 85025 COMPLETE CBC W/AUTO DIFF WBC: CPT | Performed by: INTERNAL MEDICINE

## 2021-09-07 RX ORDER — ACETAMINOPHEN 325 MG/1
650 TABLET ORAL EVERY 4 HOURS PRN
Status: DISCONTINUED | OUTPATIENT
Start: 2021-09-07 | End: 2021-09-12 | Stop reason: HOSPADM

## 2021-09-07 RX ADMIN — PANTOPRAZOLE SODIUM 40 MG: 40 TABLET, DELAYED RELEASE ORAL at 05:45

## 2021-09-07 RX ADMIN — ALLOPURINOL 100 MG: 100 TABLET ORAL at 09:13

## 2021-09-07 RX ADMIN — SODIUM CHLORIDE, PRESERVATIVE FREE 10 ML: 5 INJECTION INTRAVENOUS at 20:20

## 2021-09-07 RX ADMIN — SODIUM CHLORIDE, PRESERVATIVE FREE 10 ML: 5 INJECTION INTRAVENOUS at 09:14

## 2021-09-07 RX ADMIN — ONDANSETRON 4 MG: 2 INJECTION INTRAMUSCULAR; INTRAVENOUS at 17:18

## 2021-09-07 RX ADMIN — CLONIDINE HYDROCHLORIDE 0.3 MG: 0.1 TABLET ORAL at 20:19

## 2021-09-07 RX ADMIN — SODIUM CHLORIDE 125 ML/HR: 9 INJECTION, SOLUTION INTRAVENOUS at 12:05

## 2021-09-07 RX ADMIN — IPRATROPIUM BROMIDE AND ALBUTEROL SULFATE 3 ML: 2.5; .5 SOLUTION RESPIRATORY (INHALATION) at 15:05

## 2021-09-07 RX ADMIN — LEVOTHYROXINE SODIUM 75 MCG: 0.07 TABLET ORAL at 05:44

## 2021-09-07 RX ADMIN — DIVALPROEX SODIUM 500 MG: 500 TABLET, DELAYED RELEASE ORAL at 09:13

## 2021-09-07 RX ADMIN — CLONIDINE HYDROCHLORIDE 0.3 MG: 0.1 TABLET ORAL at 09:13

## 2021-09-07 RX ADMIN — DIVALPROEX SODIUM 500 MG: 500 TABLET, DELAYED RELEASE ORAL at 20:19

## 2021-09-07 RX ADMIN — ACETAMINOPHEN 650 MG: 325 TABLET, FILM COATED ORAL at 20:28

## 2021-09-07 RX ADMIN — METOPROLOL TARTRATE 100 MG: 50 TABLET, FILM COATED ORAL at 20:19

## 2021-09-07 RX ADMIN — AMLODIPINE BESYLATE 10 MG: 10 TABLET ORAL at 09:13

## 2021-09-07 RX ADMIN — CLONIDINE HYDROCHLORIDE 0.3 MG: 0.1 TABLET ORAL at 14:50

## 2021-09-07 RX ADMIN — SODIUM CHLORIDE 100 ML/HR: 9 INJECTION, SOLUTION INTRAVENOUS at 00:58

## 2021-09-07 RX ADMIN — METOPROLOL TARTRATE 100 MG: 50 TABLET, FILM COATED ORAL at 09:13

## 2021-09-07 RX ADMIN — OLANZAPINE 20 MG: 10 TABLET ORAL at 20:19

## 2021-09-07 RX ADMIN — TAMSULOSIN HYDROCHLORIDE 0.4 MG: 0.4 CAPSULE ORAL at 09:13

## 2021-09-07 RX ADMIN — ACETAMINOPHEN 650 MG: 325 TABLET, FILM COATED ORAL at 09:31

## 2021-09-07 RX ADMIN — CEFTRIAXONE 1 G: 1 INJECTION, POWDER, FOR SOLUTION INTRAMUSCULAR; INTRAVENOUS at 14:50

## 2021-09-07 NOTE — PROGRESS NOTES
[facility] Progress Note      Patient Name: Arthur Mccarthy  : 1985  MRN: [id number]  Primary Care Physician:  Mikael Vasquez MD  Date of admission: 2021    Subjective     Interval History:   Follow up JORDEN. Complains of stomach pain. Large bm early this am. Tolerating diet.  LImited by cognitive deficit.     Review of Systems:   As noted above    Objective     Vitals:   Temp:  [97.7 °F (36.5 °C)-98.1 °F (36.7 °C)] 98.1 °F (36.7 °C)  Heart Rate:  [84-91] 91  Resp:  [16-20] 16  BP: (113-145)/(81-98) 145/98  Flow (L/min):  [4] 4    Intake/Output Summary (Last 24 hours) at 2021 1455  Last data filed at 2021 1450  Gross per 24 hour   Intake 3065 ml   Output 2150 ml   Net 915 ml       Physical Exam:    General Appearance: alert, does answer yes no questions. Difficult to understand speech.   Skin: warm and dry  HEENT: oral mucosa moist, nonicteric sclera  Neck: supple, no JVD  Lungs:Clear to auscultation, no wheezing.   Heart: RRR, normal S1 and S2  Abdomen: + bs, firm, distended, tender lower quadrants. No rebound or guarding.   : external catheter  Extremities:1+ lower ext edema.       Scheduled Meds:     allopurinol, 100 mg, Oral, Daily  amLODIPine, 10 mg, Oral, Daily  cefTRIAXone, 1 g, Intravenous, Q24H  cloNIDine, 0.3 mg, Oral, TID  divalproex, 500 mg, Oral, Q12H  influenza vaccine, 0.5 mL, Intramuscular, Once  levothyroxine, 75 mcg, Oral, Q AM  metoprolol tartrate, 100 mg, Oral, BID  OLANZapine, 20 mg, Oral, Nightly  pantoprazole, 40 mg, Oral, QAM  sodium chloride, 10 mL, Intravenous, Q12H  tamsulosin, 0.4 mg, Oral, Daily      IV Meds:   sodium chloride, 125 mL/hr, Last Rate: Stopped (21 1450)        Results Reviewed:   I have personally reviewed the results from the time of this admission to 2021 14:55 EDT     Results from last 7 days   Lab Units 21  1001 21  0802 21  1346   SODIUM mmol/L 138 143 142   POTASSIUM mmol/L 4.5 4.6 4.4    CHLORIDE mmol/L 100 103 98   CO2 mmol/L 22.6 25.6 26.8   BUN mg/dL 48* 41* 39*   CREATININE mg/dL 2.84* 3.91* 4.50*   CALCIUM mg/dL 10.2 10.1 9.4   BILIRUBIN mg/dL  --  0.3 0.4   ALK PHOS U/L  --  124* 130*   ALT (SGPT) U/L  --  38 33   AST (SGOT) U/L  --  27 24   GLUCOSE mg/dL 124* 138* 107*       Estimated Creatinine Clearance: 44 mL/min (A) (by C-G formula based on SCr of 2.84 mg/dL (H)).    Results from last 7 days   Lab Units 09/07/21  1001   PHOSPHORUS mg/dL 4.2             Results from last 7 days   Lab Units 09/07/21  0612 09/06/21  0644 09/05/21  1346   WBC 10*3/mm3 18.77* 15.04* 14.12*   HEMOGLOBIN g/dL 10.9* 11.4* 11.4*   PLATELETS 10*3/mm3 218 211 209             Assessment / Plan     ASSESSMENT:  1. JORDEN, nonoliguric. Likely due to hypovolemia and urinary retention.  SP intermittent cath.  Now volume replete. Waste products improving.   2. Cognitive impairment .  3. Leukocytosis. Abdominal pain, distension. Klebsiella in urine, staph in blood coag negative, Likely contaminant.  Check CT abdomen. On rocephin.   4. Anemia.   5. HTN controlled. DC minoxidil.  Start to taper back clonidine if BP controlled as this acts centrally.   PLAN:  1. DC IVF  2. CT abdomen, pelvis.   3. Dc minoxidil  4.  Start to reduce clonidine.       Fernanda Verma MD  09/07/21  14:55 EDT    [facility]  [phone]

## 2021-09-07 NOTE — CASE MANAGEMENT/SOCIAL WORK
Discharge Planning Assessment  [facility]     Patient Name: Arthur Mccarthy  MRN: [id number]  Today's Date: 9/7/2021    Admit Date: 9/5/2021    Discharge Needs Assessment     Row Name 09/07/21 1332       Living Environment    Lives With  facility resident    Name(s) of Who Lives With Patient  group home    Current Living Arrangements  other (see comments)    Primary Care Provided by  other (see comments) caregiver, Chinyere Rocha [phone]    Provides Primary Care For  no one, unable/limited ability to care for self    Family Caregiver if Needed  none    Able to Return to Prior Arrangements  yes       Resource/Environmental Concerns    Resource/Environmental Concerns  none    Transportation Concerns  car, none       Transition Planning    Patient/Family Anticipates Transition to  other (see comments) back to group home    Patient/Family Anticipated Services at Transition  none    Transportation Anticipated  health plan transportation       Discharge Needs Assessment    Readmission Within the Last 30 Days  no previous admission in last 30 days    Anticipated Changes Related to Illness  none        Discharge Plan     Row Name 09/07/21 7270       Plan    Plan  return to group home pending approval of guardian    Patient/Family in Agreement with Plan  yes    Plan Comments  EPIC notes reviewed.  Patient is from a a group home that is run by [facility].  Called and spoke with patient's caregiver, Chinyere Rocha ( [phone]).  She has been caring for him for over 10 years.  At baseline, the patient requires assistance with hygiene and dressing, but is able to feed himself and ambulates independently without an assist device.  She has requested that [facility] keep her updated and if there is a major change in the patient's level of functioning, she will review it with the patient's  and guardian to see if other plans need to be made.  Patient's guardian is Matthew Uriostegui ( office- [phone], fax-  [phone]).  Message left to confirm that patient will return to group home at [place].  Awaiting call back. Mercedez Mata RN        Continued Care and Services - Admitted Since 9/5/2021    Coordination has not been started for this encounter.         Demographic Summary     Row Name 09/07/21 1332       General Information    Admission Type  inpatient    Arrived From  emergency department    Referral Source  admission list    Reason for Consult  discharge planning    Preferred Language  English        Functional Status     Row Name 09/07/21 1332       Functional Status    Usual Activity Tolerance  moderate    Current Activity Tolerance  moderate       Functional Status, IADL    Medications  completely dependent    Meal Preparation  completely dependent    Housekeeping  completely dependent    Laundry  completely dependent    Shopping  completely dependent    IADL Comments  lives in a group home        Psychosocial    No documentation.       Abuse/Neglect    No documentation.       Legal    No documentation.       Substance Abuse    No documentation.       Patient Forms    No documentation.           Mercedez Mata RN

## 2021-09-07 NOTE — PLAN OF CARE
Goal Outcome Evaluation:  Plan of Care Reviewed With: patient        Progress: improving  Outcome Summary: VSS. WBC up to 18.77; urine culture + klebsiella. Remains on Rocephin daily. C/o generalized pain, PRN Tylenol given x1, + relief. Wheezing noted this afternoon, PRN RT tx x1. Abd distention noted, orders for CT abd/pelvis. + BM on previous shift. Bladder scan increase to Q 6hrs. Scan at noon 119ml. Creat improving, IVFs d/c'd. CTM closely.

## 2021-09-07 NOTE — PROGRESS NOTES
"DAILY PROGRESS NOTE  [facility]    Patient Identification:  Name: Arthur Mccarthy  Age: 35 y.o.  Sex: male  :  1985  MRN: [id number]         Primary Care Physician: Mikael Vasquez MD    Subjective:  Interval History:He is confused. More alert today.    Objective:    Scheduled Meds:allopurinol, 100 mg, Oral, Daily  amLODIPine, 10 mg, Oral, Daily  cefTRIAXone, 1 g, Intravenous, Q24H  cloNIDine, 0.3 mg, Oral, TID  divalproex, 500 mg, Oral, Q12H  influenza vaccine, 0.5 mL, Intramuscular, Once  levothyroxine, 75 mcg, Oral, Q AM  metoprolol tartrate, 100 mg, Oral, BID  OLANZapine, 20 mg, Oral, Nightly  pantoprazole, 40 mg, Oral, QAM  sodium chloride, 10 mL, Intravenous, Q12H  tamsulosin, 0.4 mg, Oral, Daily      Continuous Infusions:sodium chloride, 125 mL/hr, Last Rate: 125 mL/hr (21 1205)        Vital signs in last 24 hours:  Temp:  [97.7 °F (36.5 °C)-98 °F (36.7 °C)] 98 °F (36.7 °C)  Heart Rate:  [84-91] 91  Resp:  [16-22] 16  BP: (113-135)/(81-87) 135/87    Intake/Output:    Intake/Output Summary (Last 24 hours) at 2021 1208  Last data filed at 2021 1205  Gross per 24 hour   Intake 2965 ml   Output 2150 ml   Net 815 ml       Exam:  /87 (BP Location: Right arm, Patient Position: Lying)   Pulse 91   Temp 98 °F (36.7 °C) (Oral)   Resp 16   Ht 182.9 cm (72\")   Wt 93.8 kg (206 lb 14.4 oz)   SpO2 93%   BMI 28.06 kg/m²     General Appearance:    confused, no distress   Head:    Normocephalic, without obvious abnormality, atraumatic   Eyes:       Throat:   Lips, tongue, gums normal   Neck:   Supple, symmetrical, trachea midline, no JVD   Lungs:     Clear to auscultation bilaterally, respirations unlabored   Chest Wall:    No tenderness or deformity    Heart:    Regular rate and rhythm, S1 and S2 normal, no murmur,no  Rub or gallop   Abdomen:     Soft, nontender, bowel sounds active, no masses, no organomegaly    Extremities:   Extremities normal, atraumatic, no cyanosis " or edema   Pulses:      Skin:   Skin is warm and dry,  no rashes or palpable lesions   Neurologic:   Weak and confused      Lab Results (last 72 hours)     Procedure Component Value Units Date/Time    Urine Culture - Urine, Urine, Catheter In/Out [[id number]]  (Abnormal) Collected: 09/05/21 1407    Specimen: Urine, Catheter In/Out Updated: 09/06/21 1157     Urine Culture >100,000 CFU/mL Gram Negative Bacilli    Blood Culture ID, PCR - Blood, Arm, Left [[id number]] Collected: 09/05/21 1408    Specimen: Blood from Arm, Left Updated: 09/06/21 1152    Blood Culture - Blood, Arm, Left [[id number]]  (Abnormal) Collected: 09/05/21 1408    Specimen: Blood from Arm, Left Updated: 09/06/21 1152     Blood Culture Abnormal Stain     Gram Stain Aerobic Bottle Gram positive cocci in clusters    Manual Differential [[id number]]  (Abnormal) Collected: 09/06/21 0644    Specimen: Blood Updated: 09/06/21 0904     Neutrophil % 88.5 %      Lymphocyte % 7.3 %      Monocyte % 4.2 %      Neutrophils Absolute 13.31 10*3/mm3      Lymphocytes Absolute 1.10 10*3/mm3      Monocytes Absolute 0.63 10*3/mm3      RBC Morphology Normal     WBC Morphology Normal     Giant Platelets Mod/2+    CBC Auto Differential [[id number]]  (Abnormal) Collected: 09/06/21 0644    Specimen: Blood Updated: 09/06/21 0904     WBC 15.04 10*3/mm3      RBC 4.98 10*6/mm3      Hemoglobin 11.4 g/dL      Hematocrit 36.3 %      MCV 72.9 fL      MCH 22.9 pg      MCHC 31.4 g/dL      RDW 15.7 %      RDW-SD 40.0 fl      MPV 12.1 fL      Platelets 211 10*3/mm3     Comprehensive Metabolic Panel [[id number]]  (Abnormal) Collected: 09/06/21 0802    Specimen: Blood Updated: 09/06/21 0849     Glucose 138 mg/dL      BUN 41 mg/dL      Creatinine 3.91 mg/dL      Sodium 143 mmol/L      Potassium 4.6 mmol/L      Chloride 103 mmol/L      CO2 25.6 mmol/L      Calcium 10.1 mg/dL      Total Protein 7.9 g/dL      Albumin 3.90 g/dL      ALT (SGPT) 38 U/L      AST (SGOT) 27 U/L      Alkaline Phosphatase  124 U/L      Total Bilirubin 0.3 mg/dL      eGFR  African Amer 22 mL/min/1.73      Globulin 4.0 gm/dL      A/G Ratio 1.0 g/dL      BUN/Creatinine Ratio 10.5     Anion Gap 14.4 mmol/L     Narrative:      GFR Normal >60  Chronic Kidney Disease <60  Kidney Failure <15      POC Glucose Once [[id number]]  (Normal) Collected: 09/06/21 0042    Specimen: Blood Updated: 09/06/21 0044     Glucose 107 mg/dL      Comment: Meter: [id number] : [id number] Anna Orellana RN       Respiratory Panel PCR w/COVID-19(SARS-CoV-2) ALDA/ANI/NISREEN/PAD/COR/MAD/LIANNA In-House, NP Swab in UTM/VTM, 3-4 HR TAT - Swab, Nasopharynx [[id number]]  (Normal) Collected: 09/05/21 1339    Specimen: Swab from Nasopharynx Updated: 09/05/21 1625     ADENOVIRUS, PCR Not Detected     Coronavirus 229E Not Detected     Coronavirus HKU1 Not Detected     Coronavirus NL63 Not Detected     Coronavirus OC43 Not Detected     COVID19 Not Detected     Human Metapneumovirus Not Detected     Human Rhinovirus/Enterovirus Not Detected     Influenza A PCR Not Detected     Influenza B PCR Not Detected     Parainfluenza Virus 1 Not Detected     Parainfluenza Virus 2 Not Detected     Parainfluenza Virus 3 Not Detected     Parainfluenza Virus 4 Not Detected     RSV, PCR Not Detected     Bordetella pertussis pcr Not Detected     Bordetella parapertussis PCR Not Detected     Chlamydophila pneumoniae PCR Not Detected     Mycoplasma pneumo by PCR Not Detected    Narrative:      In the setting of a positive respiratory panel with a viral infection PLUS a negative procalcitonin without other underlying concern for bacterial infection, consider observing off antibiotics or discontinuation of antibiotics and continue supportive care. If the respiratory panel is positive for atypical bacterial infection (Bordetella pertussis, Chlamydophila pneumoniae, or Mycoplasma pneumoniae), consider antibiotic de-escalation to target atypical bacterial infection.    Procalcitonin [[id number]]   "(Abnormal) Collected: 09/05/21 1346    Specimen: Blood Updated: 09/05/21 1509     Procalcitonin 2.83 ng/mL     Narrative:      As a Marker for Sepsis (Non-Neonates):     1. <0.5 ng/mL represents a low risk of severe sepsis and/or septic shock.  2. >2 ng/mL represents a high risk of severe sepsis and/or septic shock.    As a Marker for Lower Respiratory Tract Infections that require antibiotic therapy:  PCT on Admission     Antibiotic Therapy             6-12 Hrs later  >0.5                          Strongly Recommended            >0.25 - <0.5             Recommended  0.1 - 0.25                  Discouraged                       Remeasure/reassess PCT  <0.1                         Strongly Discouraged         Remeasure/reassess PCT      As 28 day mortality risk marker: \"Change in Procalcitonin Result\" (>80% or <=80%) if Day 0 (or Day 1) and Day 4 values are available. Refer to http://www."[email or web address]/    Change in PCT <=80 %   A decrease of PCT levels below or equal to 80% defines a positive change in PCT test result representing a higher risk for 28-day all-cause mortality of patients diagnosed with severe sepsis or septic shock.    Change in PCT >80 %   A decrease of PCT levels of more than 80% defines a negative change in PCT result representing a lower risk for 28-day all-cause mortality of patients diagnosed with severe sepsis or septic shock.              Results may be falsely decreased if patient taking Biotin.     Valproic Acid Level, Total [[id number]]  (Normal) Collected: 09/05/21 1346    Specimen: Blood Updated: 09/05/21 1506     Valproic Acid 86.0 mcg/mL     Narrative:      Therapeutic Ranges for Valproic Acid    Epilepsy:       mcg/ml  Bipolar/Katalina  up to 125 mcg/ml      Comprehensive Metabolic Panel [[id number]]  (Abnormal) Collected: 09/05/21 1346    Specimen: Blood Updated: 09/05/21 1504     Glucose 107 mg/dL      BUN 39 mg/dL      Creatinine 4.50 mg/dL      Sodium 142 mmol/L      " Potassium 4.4 mmol/L      Chloride 98 mmol/L      CO2 26.8 mmol/L      Calcium 9.4 mg/dL      Total Protein 7.4 g/dL      Albumin 3.90 g/dL      ALT (SGPT) 33 U/L      AST (SGOT) 24 U/L      Alkaline Phosphatase 130 U/L      Total Bilirubin 0.4 mg/dL      eGFR  African Amer 18 mL/min/1.73      Globulin 3.5 gm/dL      A/G Ratio 1.1 g/dL      BUN/Creatinine Ratio 8.7     Anion Gap 17.2 mmol/L     Narrative:      GFR Normal >60  Chronic Kidney Disease <60  Kidney Failure <15      Manual Differential [[id number]]  (Abnormal) Collected: 09/05/21 1346    Specimen: Blood Updated: 09/05/21 1456     Neutrophil % 60.6 %      Lymphocyte % 30.3 %      Monocyte % 9.1 %      Neutrophils Absolute 8.56 10*3/mm3      Lymphocytes Absolute 4.28 10*3/mm3      Monocytes Absolute 1.28 10*3/mm3      Anisocytosis Slight/1+     Hypochromia Slight/1+     Microcytes Slight/1+     WBC Morphology Normal     Platelet Morphology Normal    Lactic Acid, Plasma [[id number]]  (Normal) Collected: 09/05/21 1346    Specimen: Blood Updated: 09/05/21 1444     Lactate 1.4 mmol/L     Urinalysis, Microscopic Only - Urine, Catheter In/Out [[id number]]  (Abnormal) Collected: 09/05/21 1407    Specimen: Urine, Catheter In/Out Updated: 09/05/21 1426     RBC, UA 13-20 /HPF      WBC, UA Too Numerous to Count /HPF      Bacteria, UA 4+ /HPF      Squamous Epithelial Cells, UA 0-2 /HPF      Hyaline Casts, UA 0-2 /LPF      Methodology Automated Microscopy    CBC & Differential [[id number]]  (Abnormal) Collected: 09/05/21 1346    Specimen: Blood Updated: 09/05/21 1426    Narrative:      The following orders were created for panel order CBC & Differential.  Procedure                               Abnormality         Status                     ---------                               -----------         ------                     CBC Auto Differential[[id number]]        Abnormal            Final result                 Please view results for these tests on the individual orders.     CBC Auto Differential [[id number]]  (Abnormal) Collected: 09/05/21 1346    Specimen: Blood Updated: 09/05/21 1426     WBC 14.12 10*3/mm3      RBC 4.97 10*6/mm3      Hemoglobin 11.4 g/dL      Hematocrit 35.2 %      MCV 70.8 fL      MCH 22.9 pg      MCHC 32.4 g/dL      RDW 16.0 %      RDW-SD 39.7 fl      MPV 11.0 fL      Platelets 209 10*3/mm3      nRBC 0.1 /100 WBC     Urinalysis With Culture If Indicated - Urine, Catheter In/Out [[id number]]  (Abnormal) Collected: 09/05/21 1407    Specimen: Urine, Catheter In/Out Updated: 09/05/21 1425     Color, UA Yellow     Appearance, UA Cloudy     pH, UA 6.0     Specific Gravity, UA 1.010     Glucose, UA Negative     Ketones, UA Negative     Bilirubin, UA Negative     Blood, UA Moderate (2+)     Protein,  mg/dL (2+)     Leuk Esterase, UA Moderate (2+)     Nitrite, UA Negative     Urobilinogen, UA 1.0 E.U./dL    Blood Culture - Blood, Hand, Right [[id number]] Collected: 09/05/21 1346    Specimen: Blood from Hand, Right Updated: 09/05/21 1418        Data Review:  Results from last 7 days   Lab Units 09/07/21  1001 09/06/21  0802 09/06/21  0802 09/05/21  1346 09/05/21  1346   SODIUM mmol/L 138  --  143  --  142   POTASSIUM mmol/L 4.5  --  4.6  --  4.4   CHLORIDE mmol/L 100  --  103  --  98   CO2 mmol/L 22.6  --  25.6  --  26.8   BUN mg/dL 48*  --  41*  --  39*   CREATININE mg/dL 2.84*  --  3.91*  --  4.50*   GLUCOSE mg/dL 124*   < > 138*   < > 107*   CALCIUM mg/dL 10.2  --  10.1  --  9.4    < > = values in this interval not displayed.     Results from last 7 days   Lab Units 09/07/21  0612 09/06/21  0644 09/05/21  1346   WBC 10*3/mm3 18.77* 15.04* 14.12*   HEMOGLOBIN g/dL 10.9* 11.4* 11.4*   HEMATOCRIT % 32.8* 36.3* 35.2*   PLATELETS 10*3/mm3 218 211 209             No results found for: TROPONINT      Results from last 7 days   Lab Units 09/06/21  0802 09/05/21  1346   ALK PHOS U/L 124* 130*   BILIRUBIN mg/dL 0.3 0.4   ALT (SGPT) U/L 38 33   AST (SGOT) U/L 27 24              Glucose   Date/Time Value Ref Range Status   09/06/2021 0042 107 70 - 130 mg/dL Final     Comment:     Meter: [id number] : [id number] Anna Orellana RN           Past Medical History:   Diagnosis Date   • Acquired intellectual disability    • Allergic rhinitis    • Asthma    • Chronic kidney disease (CKD)    • Chronic renal disease, stage 3, moderately decreased glomerular filtration rate between 30-59 mL/min/1.73 square meter (CMS/HCC)    • Constipation    • Epilepsy, generalized, convulsive (CMS/HCC)    • Episode of altered consciousness    • Essential hypertension     History of Essential Hypertension    • GERD (gastroesophageal reflux disease)    • Hematuria    • Hyperkalemia    • Hyperlipidemia    • Hypertension    • Hypothyroidism    • Immunization due    • Metabolic encephalopathy    • Moderate intellectual disabilities    • Mood disorder (CMS/[facility])    • Nocturia    • Physical exam, annual    • Seizure (CMS/HCC)    • Seizure disorder (CMS/HCC)        Assessment:  Active Hospital Problems    Diagnosis  POA   • **Acute urinary tract infection [N39.0]  Yes   • JORDEN (acute kidney injury) (CMS/[facility]) [N17.9]  Yes   • Asthma [J45.909]  Yes   • GERD (gastroesophageal reflux disease) [K21.9]  Yes   • Hypothyroidism [E03.9]  Yes   • Chronic renal disease, stage 3, moderately decreased glomerular filtration rate (GFR) between 30-59 mL/min/1.73 square meter (CMS/HCC) [N18.30]  Yes   • Hyperlipidemia [E78.5]  Yes   • Hypertension [I10]  Yes   • Intellectual disability [F79]  Yes   • Seizure disorder (CMS/[facility]) [G40.909]  Yes      Resolved Hospital Problems   No resolved problems to display.       Plan:  Continue with antibiotics.  Follow lab.    Rafi Hoover MD  9/7/2021  12:08 EDT

## 2021-09-08 ENCOUNTER — APPOINTMENT (OUTPATIENT)
Dept: GENERAL RADIOLOGY | Facility: HOSPITAL | Age: 36
End: 2021-09-08

## 2021-09-08 ENCOUNTER — APPOINTMENT (OUTPATIENT)
Dept: CT IMAGING | Facility: HOSPITAL | Age: 36
End: 2021-09-08

## 2021-09-08 PROBLEM — R33.9 URINE RETENTION: Status: ACTIVE | Noted: 2021-09-08

## 2021-09-08 LAB
ALBUMIN SERPL-MCNC: 3.5 G/DL (ref 3.5–5.2)
ALBUMIN/GLOB SERPL: 1 G/DL
ALP SERPL-CCNC: 116 U/L (ref 39–117)
ALT SERPL W P-5'-P-CCNC: 48 U/L (ref 1–41)
ANION GAP SERPL CALCULATED.3IONS-SCNC: 12 MMOL/L (ref 5–15)
AST SERPL-CCNC: 27 U/L (ref 1–40)
BACTERIA SPEC AEROBE CULT: ABNORMAL
BILIRUB SERPL-MCNC: 0.2 MG/DL (ref 0–1.2)
BUN SERPL-MCNC: 53 MG/DL (ref 6–20)
BUN/CREAT SERPL: 19 (ref 7–25)
CALCIUM SPEC-SCNC: 9.4 MG/DL (ref 8.6–10.5)
CHLORIDE SERPL-SCNC: 104 MMOL/L (ref 98–107)
CO2 SERPL-SCNC: 25 MMOL/L (ref 22–29)
CREAT SERPL-MCNC: 2.79 MG/DL (ref 0.76–1.27)
DEPRECATED RDW RBC AUTO: 39.8 FL (ref 37–54)
ERYTHROCYTE [DISTWIDTH] IN BLOOD BY AUTOMATED COUNT: 16 % (ref 12.3–15.4)
FERRITIN SERPL-MCNC: 2236 NG/ML (ref 30–400)
GFR SERPL CREATININE-BSD FRML MDRD: 32 ML/MIN/1.73
GLOBULIN UR ELPH-MCNC: 3.6 GM/DL
GLUCOSE SERPL-MCNC: 141 MG/DL (ref 65–99)
GRAM STN SPEC: ABNORMAL
HCT VFR BLD AUTO: 35.3 % (ref 37.5–51)
HGB BLD-MCNC: 11.4 G/DL (ref 13–17.7)
IRON 24H UR-MRATE: 87 MCG/DL (ref 59–158)
IRON SATN MFR SERPL: 37 % (ref 20–50)
ISOLATED FROM: ABNORMAL
MCH RBC QN AUTO: 22.9 PG (ref 26.6–33)
MCHC RBC AUTO-ENTMCNC: 32.3 G/DL (ref 31.5–35.7)
MCV RBC AUTO: 71 FL (ref 79–97)
PLATELET # BLD AUTO: 270 10*3/MM3 (ref 140–450)
PMV BLD AUTO: 11.1 FL (ref 6–12)
POTASSIUM SERPL-SCNC: 3.8 MMOL/L (ref 3.5–5.2)
PROT SERPL-MCNC: 7.1 G/DL (ref 6–8.5)
RBC # BLD AUTO: 4.97 10*6/MM3 (ref 4.14–5.8)
SODIUM SERPL-SCNC: 141 MMOL/L (ref 136–145)
TIBC SERPL-MCNC: 232 MCG/DL (ref 298–536)
TRANSFERRIN SERPL-MCNC: 156 MG/DL (ref 200–360)
WBC # BLD AUTO: 11.26 10*3/MM3 (ref 3.4–10.8)

## 2021-09-08 PROCEDURE — 82728 ASSAY OF FERRITIN: CPT | Performed by: INTERNAL MEDICINE

## 2021-09-08 PROCEDURE — 80053 COMPREHEN METABOLIC PANEL: CPT | Performed by: INTERNAL MEDICINE

## 2021-09-08 PROCEDURE — 74176 CT ABD & PELVIS W/O CONTRAST: CPT

## 2021-09-08 PROCEDURE — 83540 ASSAY OF IRON: CPT | Performed by: INTERNAL MEDICINE

## 2021-09-08 PROCEDURE — 74018 RADEX ABDOMEN 1 VIEW: CPT

## 2021-09-08 PROCEDURE — 25010000002 CEFTRIAXONE PER 250 MG: Performed by: INTERNAL MEDICINE

## 2021-09-08 PROCEDURE — 85027 COMPLETE CBC AUTOMATED: CPT | Performed by: INTERNAL MEDICINE

## 2021-09-08 PROCEDURE — 84466 ASSAY OF TRANSFERRIN: CPT | Performed by: INTERNAL MEDICINE

## 2021-09-08 RX ORDER — TAMSULOSIN HYDROCHLORIDE 0.4 MG/1
0.8 CAPSULE ORAL DAILY
Status: DISCONTINUED | OUTPATIENT
Start: 2021-09-09 | End: 2021-09-09

## 2021-09-08 RX ADMIN — DIVALPROEX SODIUM 500 MG: 500 TABLET, DELAYED RELEASE ORAL at 09:07

## 2021-09-08 RX ADMIN — CEFTRIAXONE 1 G: 1 INJECTION, POWDER, FOR SOLUTION INTRAMUSCULAR; INTRAVENOUS at 15:58

## 2021-09-08 RX ADMIN — DIVALPROEX SODIUM 500 MG: 500 TABLET, DELAYED RELEASE ORAL at 20:30

## 2021-09-08 RX ADMIN — OLANZAPINE 20 MG: 10 TABLET ORAL at 20:30

## 2021-09-08 RX ADMIN — ALLOPURINOL 100 MG: 100 TABLET ORAL at 09:06

## 2021-09-08 RX ADMIN — LEVOTHYROXINE SODIUM 75 MCG: 0.07 TABLET ORAL at 06:38

## 2021-09-08 RX ADMIN — AMLODIPINE BESYLATE 10 MG: 10 TABLET ORAL at 09:06

## 2021-09-08 RX ADMIN — SODIUM CHLORIDE, PRESERVATIVE FREE 10 ML: 5 INJECTION INTRAVENOUS at 09:07

## 2021-09-08 RX ADMIN — PANTOPRAZOLE SODIUM 40 MG: 40 TABLET, DELAYED RELEASE ORAL at 06:38

## 2021-09-08 RX ADMIN — METOPROLOL TARTRATE 100 MG: 50 TABLET, FILM COATED ORAL at 20:30

## 2021-09-08 RX ADMIN — METOPROLOL TARTRATE 100 MG: 50 TABLET, FILM COATED ORAL at 09:06

## 2021-09-08 RX ADMIN — TAMSULOSIN HYDROCHLORIDE 0.4 MG: 0.4 CAPSULE ORAL at 09:07

## 2021-09-08 RX ADMIN — SODIUM CHLORIDE, PRESERVATIVE FREE 10 ML: 5 INJECTION INTRAVENOUS at 20:30

## 2021-09-08 RX ADMIN — CLONIDINE HYDROCHLORIDE 0.3 MG: 0.1 TABLET ORAL at 15:59

## 2021-09-08 RX ADMIN — CLONIDINE HYDROCHLORIDE 0.3 MG: 0.1 TABLET ORAL at 09:07

## 2021-09-08 RX ADMIN — CLONIDINE HYDROCHLORIDE 0.3 MG: 0.1 TABLET ORAL at 20:30

## 2021-09-08 NOTE — CASE MANAGEMENT/SOCIAL WORK
Continued Stay Note  [facility]     Patient Name: Arthur Mccarthy  MRN: [id number]  Today's Date: 9/8/2021    Admit Date: 9/5/2021    Discharge Plan     Row Name 09/08/21 1745       Plan    Plan  Follow up with the state guardianship worker to confirm patient's return to the group home.    Plan Comments  Voicemail message was left for the patient’s guardianship worker Matthew Uriostegui [phone] to confirm patient’s return to [facility].  For assistance after hours, on the weekend, holiday or for emergency call [facility] Crisis Line at [phone].  Spoke to the  of [facility]- Florentino Izaguirre [phone] who states that they are expecting the patient to return and Chinyere Rocha [phone] is the  at [facility].  CCP will follow up with the patient’s guardianship worker to confirm patient’s return.  SANDOVAL Rangel        Discharge Codes    No documentation.       Expected Discharge Date and Time     Expected Discharge Date Expected Discharge Time    Sep 10, 2021             SANDOVAL Bodwen

## 2021-09-08 NOTE — CONSULTS
FIRST UROLOGY CONSULT      Patient Identification:  NAME:  Arthur Mccarthy  Age:  35 y.o.   Sex:  male   :  1985   MRN:  [id number]       Chief complaint: None for me    History of present illness: 35-year-old gentleman with intellectual disability who is unable to give me any history.  He is unable to void so catheter was placed.  High postvoid residual but it looks like his post void bladder scan yesterday was 120 cc.  Patient can not give any history of what his baseline symptoms are.      Past medical history:  Past Medical History:   Diagnosis Date   • Acquired intellectual disability    • Allergic rhinitis    • Asthma    • Chronic kidney disease (CKD)    • Chronic renal disease, stage 3, moderately decreased glomerular filtration rate between 30-59 mL/min/1.73 square meter (CMS/HCC)    • Constipation    • Epilepsy, generalized, convulsive (CMS/HCC)    • Episode of altered consciousness    • Essential hypertension     History of Essential Hypertension    • GERD (gastroesophageal reflux disease)    • Hematuria    • Hyperkalemia    • Hyperlipidemia    • Hypertension    • Hypothyroidism    • Immunization due    • Metabolic encephalopathy    • Moderate intellectual disabilities    • Mood disorder (CMS/HCC)    • Nocturia    • Physical exam, annual    • Seizure (CMS/HCC)    • Seizure disorder (CMS/HCC)        Past surgical history:  History reviewed. No pertinent surgical history.    Allergies:  Patient has no known allergies.    Home medications:  Medications Prior to Admission   Medication Sig Dispense Refill Last Dose   • allopurinol (ZYLOPRIM) 100 MG tablet Take 100 mg by mouth Daily.      • amLODIPine (NORVASC) 10 MG tablet Take 10 mg by mouth Daily.      • ammonium lactate (AMLACTIN) 12 % cream Apply 1 application topically to the appropriate area as directed 2 (two) times a day. Apply to legs and feet as directed      • cloNIDine (CATAPRES) 0.3 MG tablet Take 0.3 mg by mouth 3 (Three) Times a  Day.      • ferrous sulfate 325 (65 FE) MG tablet Take 325 mg by mouth 2 (two) times a day.      • fluticasone (FLONASE) 50 MCG/ACT nasal spray 2 sprays into the nostril(s) as directed by provider Daily.      • levothyroxine (SYNTHROID, LEVOTHROID) 75 MCG tablet Take 75 mcg by mouth Daily.      • loratadine (CLARITIN) 10 MG tablet Take 10 mg by mouth Daily.      • metoprolol tartrate (LOPRESSOR) 100 MG tablet Take 100 mg by mouth 2 (Two) Times a Day.      • montelukast (SINGULAIR) 10 MG tablet Take 10 mg by mouth every night at bedtime.      • OLANZapine (zyPREXA) 20 MG tablet Take 20 mg by mouth 2 (two) times a day.      • omeprazole (priLOSEC) 20 MG capsule Take 20 mg by mouth Daily.      • polyethylene glycol (MiraLax) 17 GM/SCOOP powder Take 17 g by mouth Daily.      • divalproex (DEPAKOTE) 500 MG DR tablet Take 1,000 mg by mouth every night at bedtime. 1 tab a.m. And 2 tabs p.m.       • furosemide (LASIX) 40 MG tablet Take 40 mg by mouth Daily.      • minoxidil (LONITEN) 2.5 MG tablet Take 2.5 mg by mouth 2 (Two) Times a Day.           Hospital medications:  allopurinol, 100 mg, Oral, Daily  amLODIPine, 10 mg, Oral, Daily  cefTRIAXone, 1 g, Intravenous, Q24H  cloNIDine, 0.3 mg, Oral, TID  divalproex, 500 mg, Oral, Q12H  influenza vaccine, 0.5 mL, Intramuscular, Once  levothyroxine, 75 mcg, Oral, Q AM  metoprolol tartrate, 100 mg, Oral, BID  OLANZapine, 20 mg, Oral, Nightly  pantoprazole, 40 mg, Oral, QAM  sodium chloride, 10 mL, Intravenous, Q12H  [START ON 9/9/2021] tamsulosin, 0.8 mg, Oral, Daily         •  acetaminophen  •  acetaminophen  •  ipratropium-albuterol  •  ondansetron  •  [COMPLETED] Insert peripheral IV **AND** sodium chloride  •  sodium chloride    Family history:  Family History   Problem Relation Age of Onset   • Down syndrome Brother    • No Known Problems Other        Social history:  Social History     Tobacco Use   • Smoking status: Never Smoker   • Smokeless tobacco: Never Used    Substance Use Topics   • Alcohol use: No   • Drug use: Defer       Review of systems:    Negative 12-system ROS except for the following: Not elicitable      Objective:  TMax 24 hours:   Temp (24hrs), Av.4 °F (36.9 °C), Min:97.8 °F (36.6 °C), Max:98.9 °F (37.2 °C)      Vitals Ranges:   Temp:  [97.8 °F (36.6 °C)-98.9 °F (37.2 °C)] 98.6 °F (37 °C)  Heart Rate:  [] 81  Resp:  [16-18] 16  BP: (106-156)/() 125/77    Intake/Output Last 3 shifts:  I/O last 3 completed shifts:  In: 3785 [P.O.:1440; I.V.:2245; IV Piggyback:100]  Out:  [Urine:]     Physical Exam:       General Appearance:   Somnolent but in no acute distress   Head:    Normocephalic, without obvious abnormality, atraumatic   Eyes:          PERRL, conjunctivae and corneas clear   Ears:    Normal external inspection   Throat:   No oral lesions, oral mucosa moist tongue everted out   Neck:   Supple, no LAD, trachea midline   Back:     No CVA tenderness   Lungs:     Respirations unlabored, symmetric excursion    Heart:    RRR, intact peripheral pulses   Abdomen:    No bladder distention   :   Penis normal Morocho catheter present.  Testes appear to be normal.   JANIE:  Extremities:  Deferred.  No edema, no deformity   Skin:   No bleeding, bruising or rashes   Neuro/Psych:   Orientation intact, mood/affect pleasant, no focal findings       Results review:   I reviewed the patient's new clinical results.    Data review:  Lab Results (last 24 hours)     Procedure Component Value Units Date/Time    Blood Culture - Blood, Hand, Right [[id number]] Collected: 21 1346    Specimen: Blood from Hand, Right Updated: 21 1430     Blood Culture No growth at 3 days    Ferritin [[id number]]  (Abnormal) Collected: 21 1214    Specimen: Blood Updated: 21 1322     Ferritin 2,236.00 ng/mL     Narrative:      Results may be falsely decreased if patient taking Biotin.      Comprehensive Metabolic Panel [[id number]]  (Abnormal) Collected:  09/08/21 1214    Specimen: Blood Updated: 09/08/21 1307     Glucose 141 mg/dL      BUN 53 mg/dL      Creatinine 2.79 mg/dL      Sodium 141 mmol/L      Potassium 3.8 mmol/L      Chloride 104 mmol/L      CO2 25.0 mmol/L      Calcium 9.4 mg/dL      Total Protein 7.1 g/dL      Albumin 3.50 g/dL      ALT (SGPT) 48 U/L      AST (SGOT) 27 U/L      Alkaline Phosphatase 116 U/L      Total Bilirubin 0.2 mg/dL      eGFR  African Amer 32 mL/min/1.73      Globulin 3.6 gm/dL      A/G Ratio 1.0 g/dL      BUN/Creatinine Ratio 19.0     Anion Gap 12.0 mmol/L     Narrative:      GFR Normal >60  Chronic Kidney Disease <60  Kidney Failure <15      Iron Profile [[id number]]  (Abnormal) Collected: 09/08/21 1214    Specimen: Blood Updated: 09/08/21 1257     Iron 87 mcg/dL      Iron Saturation 37 %      Transferrin 156 mg/dL      TIBC 232 mcg/dL     CBC (No Diff) [[id number]]  (Abnormal) Collected: 09/08/21 1214    Specimen: Blood Updated: 09/08/21 1251     WBC 11.26 10*3/mm3      RBC 4.97 10*6/mm3      Hemoglobin 11.4 g/dL      Hematocrit 35.3 %      MCV 71.0 fL      MCH 22.9 pg      MCHC 32.3 g/dL      RDW 16.0 %      RDW-SD 39.8 fl      MPV 11.1 fL      Platelets 270 10*3/mm3     Blood Culture - Blood, Arm, Left [[id number]]  (Abnormal) Collected: 09/05/21 1408    Specimen: Blood from Arm, Left Updated: 09/08/21 0723     Blood Culture Staphylococcus, coagulase negative     Comment: Probable contaminant requires clinical correlation, susceptibility not performed unless requested by physician.          Isolated from Aerobic Bottle     Gram Stain Aerobic Bottle Gram positive cocci in clusters           Imaging:  Imaging Results (Last 24 Hours)     Procedure Component Value Units Date/Time    XR Abdomen KUB [[id number]] Collected: 09/08/21 1333     Updated: 09/08/21 1338    Narrative:      XR ABDOMEN KUB-     INDICATIONS: Ileus, follow-up     TECHNIQUE: Supine views of the abdomen     COMPARISON:  image from CT from 09/08/2021      FINDINGS:      The bowel gas pattern is nonspecific, with gaseous distention of the  stomach and colon. No supine evidence for free intraperitoneal gas. No  definite nephrolithiasis. Follow-up can be obtained as indications  persist.             Impression:         As described.     This report was finalized on 9/8/2021 1:35 PM by Dr. Rock Giraldo M.D.       CT Abdomen Pelvis Without Contrast [[id number]] Collected: 09/08/21 0855     Updated: 09/08/21 0855    Narrative:      CT ABDOMEN AND PELVIS WITHOUT CONTRAST     HISTORY: Acute abdominal pain, UTI.     TECHNIQUE: Axial CT images of the abdomen and pelvis were obtained  without administration of intravenous contrast. The patient was not  given oral contrast. Coronal and sagittal reformats were obtained.     COMPARISON: None     FINDINGS: Diffuse low attenuation of liver, most consistent with  steatosis. Focal fatty sparing is seen adjacent to the gallbladder  fossa. No intrahepatic biliary dilatation. The gallbladder is normal.  The spleen is normal. The pancreas is normal without ductal dilatation.  Bilateral adrenal glands are normal. Both kidneys are normal in size and  attenuation. No renal calculi or hydronephrosis. The urinary bladder is  markedly distended with mild diffuse wall thickening of the stomach is  present that may suggest gastritis. The small bowel loops demonstrate  normal caliber. The colon is mildly distended with air and formed stool.  There are small retroperitoneal lymph nodes at the level of the renal  hilum that are favored to be reactive.       Impression:      1. The urinary bladder being moderately distended, there is mild wall  thickening present and this may suggest cystitis. Correlation with exam  and urinary catheterization may be helpful.  2. Mild distention of the colon containing formed stool and air, likely  representing a colonic ileus.  3. Diffuse hepatic steatosis.     Radiation dose reduction techniques were  utilized, including automated  exposure control and exposure modulation based on body size.                   Assessment:       Acute urinary tract infection    Intellectual disability    Hypertension    Seizure disorder (CMS/[facility])    Chronic renal disease, stage 3, moderately decreased glomerular filtration rate (GFR) between 30-59 mL/min/1.73 square meter (CMS/[facility])    Hyperlipidemia    Hypothyroidism    GERD (gastroesophageal reflux disease)    Asthma    JORDEN (acute kidney injury) (CMS/[facility])    Urine retention    Elevated postvoid residual now with Morocho catheter    Plan:     We will leave his catheter in until his sensorium clears a little bit and his white count is normalized and then will give another voiding trial.  I do not think he needs to be on Flomax or any other medications outside of that.    Venkatesh Rocha MD  09/08/21  18:31 EDT

## 2021-09-08 NOTE — PROGRESS NOTES
[facility] Progress Note      Patient Name: Arthur Mccarthy  : 1985  MRN: [id number]  Primary Care Physician:  Mikael Vasquez MD  Date of admission: 2021    Subjective     Interval History:   Follow up JORDEN. Denies stomach pain today. Bowels moved.  Still with high PVR despite voiding.  CT with bladder distension and mild colonic ileus yesterday.      Review of Systems:   As noted above    Objective     Vitals:   Temp:  [97.8 °F (36.6 °C)-98.9 °F (37.2 °C)] 97.8 °F (36.6 °C)  Heart Rate:  [] 81  Resp:  [16-18] 16  BP: (106-156)/() 120/82    Intake/Output Summary (Last 24 hours) at 2021 1235  Last data filed at 2021 0620  Gross per 24 hour   Intake 820 ml   Output 900 ml   Net -80 ml       Physical Exam:    General Appearance: alert, does answer yes no questions. Difficult to understand speech.   Skin: warm and dry  HEENT: oral mucosa moist, nonicteric sclera  Neck: supple, no JVD  Lungs:Clear to auscultation, no wheezing.   Heart: RRR, normal S1 and S2  Abdomen: + bs,softer, less distended, not tender lower quadrants. No rebound or guarding.   Extremities:1+ lower ext edema.       Scheduled Meds:     allopurinol, 100 mg, Oral, Daily  amLODIPine, 10 mg, Oral, Daily  cefTRIAXone, 1 g, Intravenous, Q24H  cloNIDine, 0.3 mg, Oral, TID  divalproex, 500 mg, Oral, Q12H  influenza vaccine, 0.5 mL, Intramuscular, Once  levothyroxine, 75 mcg, Oral, Q AM  metoprolol tartrate, 100 mg, Oral, BID  OLANZapine, 20 mg, Oral, Nightly  pantoprazole, 40 mg, Oral, QAM  sodium chloride, 10 mL, Intravenous, Q12H  tamsulosin, 0.4 mg, Oral, Daily      IV Meds:        Results Reviewed:   I have personally reviewed the results from the time of this admission to 2021 12:35 EDT     Results from last 7 days   Lab Units 21  1001 21  0802 21  1346   SODIUM mmol/L 138 143 142   POTASSIUM mmol/L 4.5 4.6 4.4   CHLORIDE mmol/L 100 103 98   CO2 mmol/L 22.6 25.6 26.8    BUN mg/dL 48* 41* 39*   CREATININE mg/dL 2.84* 3.91* 4.50*   CALCIUM mg/dL 10.2 10.1 9.4   BILIRUBIN mg/dL  --  0.3 0.4   ALK PHOS U/L  --  124* 130*   ALT (SGPT) U/L  --  38 33   AST (SGOT) U/L  --  27 24   GLUCOSE mg/dL 124* 138* 107*       Estimated Creatinine Clearance: 44.2 mL/min (A) (by C-G formula based on SCr of 2.84 mg/dL (H)).    Results from last 7 days   Lab Units 09/07/21  1001   PHOSPHORUS mg/dL 4.2             Results from last 7 days   Lab Units 09/07/21  0612 09/06/21  0644 09/05/21  1346   WBC 10*3/mm3 18.77* 15.04* 14.12*   HEMOGLOBIN g/dL 10.9* 11.4* 11.4*   PLATELETS 10*3/mm3 218 211 209             Assessment / Plan     ASSESSMENT:  1. JORDEN, nonoliguric. Likely due to hypovolemia and urinary retention.  SP intermittent cath.  Now volume replete. Waste products pending.   2. Cognitive impairment .  3. Leukocytosis. CBC pending  Abdominal pain, distension improved. Bowels moved. Bladder distended and mild colonic ileus on CT yesterday. Klebsiella in urine, staph in blood coag negative, Likely contaminant.   On rocephin.   4. Anemia.   5. HTN controlled. DC minoxidil.  Start to taper back clonidine if BP controlled as this acts centrally.   PLAN:  1. KUB to follow up ileus  2. Morocho to be place.  3. Dr. Hoover consulting .  4. Increase flomax to 0.8 mg.  5. Follow up labs just drawn .  Fernanda Verma MD  09/08/21  12:35 EDT    [facility]  [phone]

## 2021-09-08 NOTE — PROGRESS NOTES
"DAILY PROGRESS NOTE  [facility]    Patient Identification:  Name: Arthur Mccarthy  Age: 35 y.o.  Sex: male  :  1985  MRN: [id number]         Primary Care Physician: Mikael Vasquez MD    Subjective:  Interval History:He is confused. More alert today.  Some urine retention.    Objective:    Scheduled Meds:allopurinol, 100 mg, Oral, Daily  amLODIPine, 10 mg, Oral, Daily  cefTRIAXone, 1 g, Intravenous, Q24H  cloNIDine, 0.3 mg, Oral, TID  divalproex, 500 mg, Oral, Q12H  influenza vaccine, 0.5 mL, Intramuscular, Once  levothyroxine, 75 mcg, Oral, Q AM  metoprolol tartrate, 100 mg, Oral, BID  OLANZapine, 20 mg, Oral, Nightly  pantoprazole, 40 mg, Oral, QAM  sodium chloride, 10 mL, Intravenous, Q12H  tamsulosin, 0.4 mg, Oral, Daily      Continuous Infusions:     Vital signs in last 24 hours:  Temp:  [97.8 °F (36.6 °C)-98.9 °F (37.2 °C)] 97.8 °F (36.6 °C)  Heart Rate:  [] 81  Resp:  [16-18] 16  BP: (106-156)/() 120/82    Intake/Output:    Intake/Output Summary (Last 24 hours) at 2021 1116  Last data filed at 2021 0620  Gross per 24 hour   Intake 1795 ml   Output 900 ml   Net 895 ml       Exam:  /82 (BP Location: Right arm, Patient Position: Lying)   Pulse 81   Temp 97.8 °F (36.6 °C) (Oral)   Resp 16   Ht 182.9 cm (72\")   Wt 94.7 kg (208 lb 11.2 oz)   SpO2 94%   BMI 28.30 kg/m²     General Appearance:    confused, no distress   Head:    Normocephalic, without obvious abnormality, atraumatic   Eyes:       Throat:   Lips, tongue, gums normal   Neck:   Supple, symmetrical, trachea midline, no JVD   Lungs:     Clear to auscultation bilaterally, respirations unlabored   Chest Wall:    No tenderness or deformity    Heart:    Regular rate and rhythm, S1 and S2 normal, no murmur,no  Rub or gallop   Abdomen:     Soft, nontender, bowel sounds active, no masses, no organomegaly    Extremities:   Extremities normal, atraumatic, no cyanosis or edema   Pulses:      Skin:   Skin " is warm and dry,  no rashes or palpable lesions   Neurologic:   Weak and confused      Lab Results (last 72 hours)       Procedure Component Value Units Date/Time    Urine Culture - Urine, Urine, Catheter In/Out [[id number]]  (Abnormal) Collected: 09/05/21 1407    Specimen: Urine, Catheter In/Out Updated: 09/06/21 1157     Urine Culture >100,000 CFU/mL Gram Negative Bacilli    Blood Culture ID, PCR - Blood, Arm, Left [[id number]] Collected: 09/05/21 1408    Specimen: Blood from Arm, Left Updated: 09/06/21 1152    Blood Culture - Blood, Arm, Left [[id number]]  (Abnormal) Collected: 09/05/21 1408    Specimen: Blood from Arm, Left Updated: 09/06/21 1152     Blood Culture Abnormal Stain     Gram Stain Aerobic Bottle Gram positive cocci in clusters    Manual Differential [[id number]]  (Abnormal) Collected: 09/06/21 0644    Specimen: Blood Updated: 09/06/21 0904     Neutrophil % 88.5 %      Lymphocyte % 7.3 %      Monocyte % 4.2 %      Neutrophils Absolute 13.31 10*3/mm3      Lymphocytes Absolute 1.10 10*3/mm3      Monocytes Absolute 0.63 10*3/mm3      RBC Morphology Normal     WBC Morphology Normal     Giant Platelets Mod/2+    CBC Auto Differential [[id number]]  (Abnormal) Collected: 09/06/21 0644    Specimen: Blood Updated: 09/06/21 0904     WBC 15.04 10*3/mm3      RBC 4.98 10*6/mm3      Hemoglobin 11.4 g/dL      Hematocrit 36.3 %      MCV 72.9 fL      MCH 22.9 pg      MCHC 31.4 g/dL      RDW 15.7 %      RDW-SD 40.0 fl      MPV 12.1 fL      Platelets 211 10*3/mm3     Comprehensive Metabolic Panel [[id number]]  (Abnormal) Collected: 09/06/21 0802    Specimen: Blood Updated: 09/06/21 0849     Glucose 138 mg/dL      BUN 41 mg/dL      Creatinine 3.91 mg/dL      Sodium 143 mmol/L      Potassium 4.6 mmol/L      Chloride 103 mmol/L      CO2 25.6 mmol/L      Calcium 10.1 mg/dL      Total Protein 7.9 g/dL      Albumin 3.90 g/dL      ALT (SGPT) 38 U/L      AST (SGOT) 27 U/L      Alkaline Phosphatase 124 U/L      Total Bilirubin 0.3  mg/dL      eGFR  African Amer 22 mL/min/1.73      Globulin 4.0 gm/dL      A/G Ratio 1.0 g/dL      BUN/Creatinine Ratio 10.5     Anion Gap 14.4 mmol/L     Narrative:      GFR Normal >60  Chronic Kidney Disease <60  Kidney Failure <15      POC Glucose Once [[id number]]  (Normal) Collected: 09/06/21 0042    Specimen: Blood Updated: 09/06/21 0044     Glucose 107 mg/dL      Comment: Meter: [id number] : [id number] Anna Orellana RN       Respiratory Panel PCR w/COVID-19(SARS-CoV-2) ALDA/ANI/NISREEN/PAD/COR/MAD/LIANNA In-House, NP Swab in UTM/VTM, 3-4 HR TAT - Swab, Nasopharynx [[id number]]  (Normal) Collected: 09/05/21 1339    Specimen: Swab from Nasopharynx Updated: 09/05/21 1625     ADENOVIRUS, PCR Not Detected     Coronavirus 229E Not Detected     Coronavirus HKU1 Not Detected     Coronavirus NL63 Not Detected     Coronavirus OC43 Not Detected     COVID19 Not Detected     Human Metapneumovirus Not Detected     Human Rhinovirus/Enterovirus Not Detected     Influenza A PCR Not Detected     Influenza B PCR Not Detected     Parainfluenza Virus 1 Not Detected     Parainfluenza Virus 2 Not Detected     Parainfluenza Virus 3 Not Detected     Parainfluenza Virus 4 Not Detected     RSV, PCR Not Detected     Bordetella pertussis pcr Not Detected     Bordetella parapertussis PCR Not Detected     Chlamydophila pneumoniae PCR Not Detected     Mycoplasma pneumo by PCR Not Detected    Narrative:      In the setting of a positive respiratory panel with a viral infection PLUS a negative procalcitonin without other underlying concern for bacterial infection, consider observing off antibiotics or discontinuation of antibiotics and continue supportive care. If the respiratory panel is positive for atypical bacterial infection (Bordetella pertussis, Chlamydophila pneumoniae, or Mycoplasma pneumoniae), consider antibiotic de-escalation to target atypical bacterial infection.    Procalcitonin [[id number]]  (Abnormal) Collected: 09/05/21 0546     "Specimen: Blood Updated: 09/05/21 1509     Procalcitonin 2.83 ng/mL     Narrative:      As a Marker for Sepsis (Non-Neonates):     1. <0.5 ng/mL represents a low risk of severe sepsis and/or septic shock.  2. >2 ng/mL represents a high risk of severe sepsis and/or septic shock.    As a Marker for Lower Respiratory Tract Infections that require antibiotic therapy:  PCT on Admission     Antibiotic Therapy             6-12 Hrs later  >0.5                          Strongly Recommended            >0.25 - <0.5             Recommended  0.1 - 0.25                  Discouraged                       Remeasure/reassess PCT  <0.1                         Strongly Discouraged         Remeasure/reassess PCT      As 28 day mortality risk marker: \"Change in Procalcitonin Result\" (>80% or <=80%) if Day 0 (or Day 1) and Day 4 values are available. Refer to http://www.[employer]/    Change in PCT <=80 %   A decrease of PCT levels below or equal to 80% defines a positive change in PCT test result representing a higher risk for 28-day all-cause mortality of patients diagnosed with severe sepsis or septic shock.    Change in PCT >80 %   A decrease of PCT levels of more than 80% defines a negative change in PCT result representing a lower risk for 28-day all-cause mortality of patients diagnosed with severe sepsis or septic shock.              Results may be falsely decreased if patient taking Biotin.     Valproic Acid Level, Total [[id number]]  (Normal) Collected: 09/05/21 1346    Specimen: Blood Updated: 09/05/21 1506     Valproic Acid 86.0 mcg/mL     Narrative:      Therapeutic Ranges for Valproic Acid    Epilepsy:       mcg/ml  Bipolar/Katalina  up to 125 mcg/ml      Comprehensive Metabolic Panel [[id number]]  (Abnormal) Collected: 09/05/21 1346    Specimen: Blood Updated: 09/05/21 1504     Glucose 107 mg/dL      BUN 39 mg/dL      Creatinine 4.50 mg/dL      Sodium 142 mmol/L      Potassium 4.4 mmol/L      Chloride 98 " mmol/L      CO2 26.8 mmol/L      Calcium 9.4 mg/dL      Total Protein 7.4 g/dL      Albumin 3.90 g/dL      ALT (SGPT) 33 U/L      AST (SGOT) 24 U/L      Alkaline Phosphatase 130 U/L      Total Bilirubin 0.4 mg/dL      eGFR  African Amer 18 mL/min/1.73      Globulin 3.5 gm/dL      A/G Ratio 1.1 g/dL      BUN/Creatinine Ratio 8.7     Anion Gap 17.2 mmol/L     Narrative:      GFR Normal >60  Chronic Kidney Disease <60  Kidney Failure <15      Manual Differential [[id number]]  (Abnormal) Collected: 09/05/21 1346    Specimen: Blood Updated: 09/05/21 1456     Neutrophil % 60.6 %      Lymphocyte % 30.3 %      Monocyte % 9.1 %      Neutrophils Absolute 8.56 10*3/mm3      Lymphocytes Absolute 4.28 10*3/mm3      Monocytes Absolute 1.28 10*3/mm3      Anisocytosis Slight/1+     Hypochromia Slight/1+     Microcytes Slight/1+     WBC Morphology Normal     Platelet Morphology Normal    Lactic Acid, Plasma [[id number]]  (Normal) Collected: 09/05/21 1346    Specimen: Blood Updated: 09/05/21 1444     Lactate 1.4 mmol/L     Urinalysis, Microscopic Only - Urine, Catheter In/Out [[id number]]  (Abnormal) Collected: 09/05/21 1407    Specimen: Urine, Catheter In/Out Updated: 09/05/21 1426     RBC, UA 13-20 /HPF      WBC, UA Too Numerous to Count /HPF      Bacteria, UA 4+ /HPF      Squamous Epithelial Cells, UA 0-2 /HPF      Hyaline Casts, UA 0-2 /LPF      Methodology Automated Microscopy    CBC & Differential [[id number]]  (Abnormal) Collected: 09/05/21 1346    Specimen: Blood Updated: 09/05/21 1426    Narrative:      The following orders were created for panel order CBC & Differential.  Procedure                               Abnormality         Status                     ---------                               -----------         ------                     CBC Auto Differential[[id number]]        Abnormal            Final result                 Please view results for these tests on the individual orders.    CBC Auto Differential [[id number]]   (Abnormal) Collected: 09/05/21 1346    Specimen: Blood Updated: 09/05/21 1426     WBC 14.12 10*3/mm3      RBC 4.97 10*6/mm3      Hemoglobin 11.4 g/dL      Hematocrit 35.2 %      MCV 70.8 fL      MCH 22.9 pg      MCHC 32.4 g/dL      RDW 16.0 %      RDW-SD 39.7 fl      MPV 11.0 fL      Platelets 209 10*3/mm3      nRBC 0.1 /100 WBC     Urinalysis With Culture If Indicated - Urine, Catheter In/Out [[id number]]  (Abnormal) Collected: 09/05/21 1407    Specimen: Urine, Catheter In/Out Updated: 09/05/21 1425     Color, UA Yellow     Appearance, UA Cloudy     pH, UA 6.0     Specific Gravity, UA 1.010     Glucose, UA Negative     Ketones, UA Negative     Bilirubin, UA Negative     Blood, UA Moderate (2+)     Protein,  mg/dL (2+)     Leuk Esterase, UA Moderate (2+)     Nitrite, UA Negative     Urobilinogen, UA 1.0 E.U./dL    Blood Culture - Blood, Hand, Right [[id number]] Collected: 09/05/21 1346    Specimen: Blood from Hand, Right Updated: 09/05/21 1418          Data Review:  Results from last 7 days   Lab Units 09/07/21  1001 09/06/21  0802 09/06/21  0802 09/05/21  1346 09/05/21  1346   SODIUM mmol/L 138  --  143  --  142   POTASSIUM mmol/L 4.5  --  4.6  --  4.4   CHLORIDE mmol/L 100  --  103  --  98   CO2 mmol/L 22.6  --  25.6  --  26.8   BUN mg/dL 48*  --  41*  --  39*   CREATININE mg/dL 2.84*  --  3.91*  --  4.50*   GLUCOSE mg/dL 124*   < > 138*   < > 107*   CALCIUM mg/dL 10.2  --  10.1  --  9.4    < > = values in this interval not displayed.     Results from last 7 days   Lab Units 09/07/21  0612 09/06/21  0644 09/05/21  1346   WBC 10*3/mm3 18.77* 15.04* 14.12*   HEMOGLOBIN g/dL 10.9* 11.4* 11.4*   HEMATOCRIT % 32.8* 36.3* 35.2*   PLATELETS 10*3/mm3 218 211 209             No results found for: TROPONINT      Results from last 7 days   Lab Units 09/06/21  0802 09/05/21  1346   ALK PHOS U/L 124* 130*   BILIRUBIN mg/dL 0.3 0.4   ALT (SGPT) U/L 38 33   AST (SGOT) U/L 27 24             Glucose   Date/Time Value Ref Range  Status   09/06/2021 0042 107 70 - 130 mg/dL Final     Comment:     Meter: [id number] : [id number] Anna Orellana RN           Past Medical History:   Diagnosis Date    Acquired intellectual disability     Allergic rhinitis     Asthma     Chronic kidney disease (CKD)     Chronic renal disease, stage 3, moderately decreased glomerular filtration rate between 30-59 mL/min/1.73 square meter (CMS/HCC)     Constipation     Epilepsy, generalized, convulsive (CMS/[facility])     Episode of altered consciousness     Essential hypertension     History of Essential Hypertension     GERD (gastroesophageal reflux disease)     Hematuria     Hyperkalemia     Hyperlipidemia     Hypertension     Hypothyroidism     Immunization due     Metabolic encephalopathy     Moderate intellectual disabilities     Mood disorder (CMS/[facility])     Nocturia     Physical exam, annual     Seizure (CMS/[facility])     Seizure disorder (CMS/[facility])        Assessment:  Active Hospital Problems    Diagnosis  POA    **Acute urinary tract infection [N39.0]  Yes    Urine retention [R33.9]  Unknown    JORDEN (acute kidney injury) (CMS/[facility]) [N17.9]  Yes    Asthma [J45.909]  Yes    GERD (gastroesophageal reflux disease) [K21.9]  Yes    Hypothyroidism [E03.9]  Yes    Chronic renal disease, stage 3, moderately decreased glomerular filtration rate (GFR) between 30-59 mL/min/1.73 square meter (CMS/HCC) [N18.30]  Yes    Hyperlipidemia [E78.5]  Yes    Hypertension [I10]  Yes    Intellectual disability [F79]  Yes    Seizure disorder (CMS/[facility]) [G40.909]  Yes      Resolved Hospital Problems   No resolved problems to display.   Sepsis present on admission    Plan:  Continue with antibiotics.  Follow lab.  Probably needs chronic Morocho  For retention.  Ask urology to see.    Rafi Hoover MD  9/8/2021  11:16 EDT

## 2021-09-09 PROBLEM — N18.32 STAGE 3B CHRONIC KIDNEY DISEASE: Status: ACTIVE | Noted: 2019-10-15

## 2021-09-09 PROBLEM — A41.9 SEPSIS WITHOUT ACUTE ORGAN DYSFUNCTION: Status: ACTIVE | Noted: 2021-09-09

## 2021-09-09 LAB
ALBUMIN SERPL-MCNC: 3.3 G/DL (ref 3.5–5.2)
ANION GAP SERPL CALCULATED.3IONS-SCNC: 13.4 MMOL/L (ref 5–15)
BUN SERPL-MCNC: 50 MG/DL (ref 6–20)
BUN/CREAT SERPL: 21.1 (ref 7–25)
CALCIUM SPEC-SCNC: 9.4 MG/DL (ref 8.6–10.5)
CHLORIDE SERPL-SCNC: 108 MMOL/L (ref 98–107)
CO2 SERPL-SCNC: 23.6 MMOL/L (ref 22–29)
CREAT SERPL-MCNC: 2.37 MG/DL (ref 0.76–1.27)
DEPRECATED RDW RBC AUTO: 42.4 FL (ref 37–54)
ERYTHROCYTE [DISTWIDTH] IN BLOOD BY AUTOMATED COUNT: 16.1 % (ref 12.3–15.4)
GFR SERPL CREATININE-BSD FRML MDRD: 39 ML/MIN/1.73
GLUCOSE SERPL-MCNC: 86 MG/DL (ref 65–99)
HCT VFR BLD AUTO: 37 % (ref 37.5–51)
HGB BLD-MCNC: 11.2 G/DL (ref 13–17.7)
MCH RBC QN AUTO: 22.3 PG (ref 26.6–33)
MCHC RBC AUTO-ENTMCNC: 30.3 G/DL (ref 31.5–35.7)
MCV RBC AUTO: 73.7 FL (ref 79–97)
PHOSPHATE SERPL-MCNC: 3.4 MG/DL (ref 2.5–4.5)
PLATELET # BLD AUTO: 239 10*3/MM3 (ref 140–450)
PMV BLD AUTO: 12 FL (ref 6–12)
POTASSIUM SERPL-SCNC: 4.4 MMOL/L (ref 3.5–5.2)
RBC # BLD AUTO: 5.02 10*6/MM3 (ref 4.14–5.8)
SODIUM SERPL-SCNC: 145 MMOL/L (ref 136–145)
WBC # BLD AUTO: 10.45 10*3/MM3 (ref 3.4–10.8)

## 2021-09-09 PROCEDURE — 85027 COMPLETE CBC AUTOMATED: CPT | Performed by: INTERNAL MEDICINE

## 2021-09-09 PROCEDURE — 25010000002 CEFTRIAXONE PER 250 MG: Performed by: HOSPITALIST

## 2021-09-09 PROCEDURE — 80069 RENAL FUNCTION PANEL: CPT | Performed by: INTERNAL MEDICINE

## 2021-09-09 RX ORDER — CEPHALEXIN 500 MG/1
500 CAPSULE ORAL EVERY 6 HOURS SCHEDULED
Status: DISCONTINUED | OUTPATIENT
Start: 2021-09-10 | End: 2021-09-12 | Stop reason: HOSPADM

## 2021-09-09 RX ORDER — HYDRALAZINE HYDROCHLORIDE 25 MG/1
25 TABLET, FILM COATED ORAL EVERY 8 HOURS SCHEDULED
Status: DISCONTINUED | OUTPATIENT
Start: 2021-09-09 | End: 2021-09-10

## 2021-09-09 RX ADMIN — DIVALPROEX SODIUM 500 MG: 500 TABLET, DELAYED RELEASE ORAL at 09:13

## 2021-09-09 RX ADMIN — LEVOTHYROXINE SODIUM 75 MCG: 0.07 TABLET ORAL at 06:44

## 2021-09-09 RX ADMIN — METOPROLOL TARTRATE 100 MG: 50 TABLET, FILM COATED ORAL at 09:13

## 2021-09-09 RX ADMIN — PANTOPRAZOLE SODIUM 40 MG: 40 TABLET, DELAYED RELEASE ORAL at 06:44

## 2021-09-09 RX ADMIN — OLANZAPINE 20 MG: 10 TABLET ORAL at 20:37

## 2021-09-09 RX ADMIN — TAMSULOSIN HYDROCHLORIDE 0.8 MG: 0.4 CAPSULE ORAL at 09:13

## 2021-09-09 RX ADMIN — METOPROLOL TARTRATE 100 MG: 50 TABLET, FILM COATED ORAL at 20:20

## 2021-09-09 RX ADMIN — AMLODIPINE BESYLATE 10 MG: 10 TABLET ORAL at 09:13

## 2021-09-09 RX ADMIN — SODIUM CHLORIDE, PRESERVATIVE FREE 10 ML: 5 INJECTION INTRAVENOUS at 09:13

## 2021-09-09 RX ADMIN — CLONIDINE HYDROCHLORIDE 0.3 MG: 0.1 TABLET ORAL at 16:37

## 2021-09-09 RX ADMIN — ALLOPURINOL 100 MG: 100 TABLET ORAL at 09:13

## 2021-09-09 RX ADMIN — CLONIDINE HYDROCHLORIDE 0.3 MG: 0.1 TABLET ORAL at 20:21

## 2021-09-09 RX ADMIN — DIVALPROEX SODIUM 500 MG: 500 TABLET, DELAYED RELEASE ORAL at 20:21

## 2021-09-09 RX ADMIN — CEFTRIAXONE 1 G: 1 INJECTION, POWDER, FOR SOLUTION INTRAMUSCULAR; INTRAVENOUS at 14:16

## 2021-09-09 RX ADMIN — CLONIDINE HYDROCHLORIDE 0.3 MG: 0.1 TABLET ORAL at 09:13

## 2021-09-09 RX ADMIN — SODIUM CHLORIDE, PRESERVATIVE FREE 10 ML: 5 INJECTION INTRAVENOUS at 20:23

## 2021-09-09 RX ADMIN — HYDRALAZINE HYDROCHLORIDE 25 MG: 25 TABLET, FILM COATED ORAL at 14:16

## 2021-09-09 NOTE — PROGRESS NOTES
[facility] Progress Note    09/09/21 18:31 EDT        About the same this was yesterday.  Denies any pain but he was sleeping pretty heavily when I came in.    Plan to give him a voiding trial tomorrow and will monitor his residuals.  He may keep some high residuals and that is acceptable for now.

## 2021-09-09 NOTE — PROGRESS NOTES
Subjective   Awake answers questions.  Feels okay.     Review of Systems  Objective   Vital Signs  Temp:  [98.1 °F (36.7 °C)-98.6 °F (37 °C)] 98.1 °F (36.7 °C)  Heart Rate:  [69-83] 69  Resp:  [16-18] 16  BP: (117-169)/() 169/104  SpO2:  [94 %-97 %] 97 %  on   ;   Device (Oxygen Therapy): room air  Body mass index is 29.74 kg/m².  Physical Exam  Vitals and nursing note reviewed.   Constitutional:       General: He is not in acute distress.     Appearance: Normal appearance. He is well-developed. He is obese.   HENT:      Mouth/Throat:      Mouth: Mucous membranes are dry.   Neck:      Vascular: No JVD.      Trachea: No tracheal deviation.   Cardiovascular:      Rate and Rhythm: Normal rate and regular rhythm.      Heart sounds: No murmur heard.     Pulmonary:      Effort: Pulmonary effort is normal. No respiratory distress.      Breath sounds: Normal breath sounds.   Abdominal:      General: Bowel sounds are normal. There is no distension.      Palpations: Abdomen is soft.      Tenderness: There is no abdominal tenderness.   Genitourinary:     Comments: +FC  Skin:     General: Skin is warm and dry.   Neurological:      Mental Status: He is alert and oriented to person, place, and time. Mental status is at baseline.   Psychiatric:         Behavior: Behavior normal. Behavior is cooperative.       Results Review     I reviewed the patient's new clinical results.  Results from last 7 days   Lab Units 09/09/21 0428 09/08/21  1214 09/07/21  0612 09/06/21  0644   WBC 10*3/mm3 10.45 11.26* 18.77* 15.04*   HEMOGLOBIN g/dL 11.2* 11.4* 10.9* 11.4*   PLATELETS 10*3/mm3 239 270 218 211     Results from last 7 days   Lab Units 09/09/21  0428 09/08/21  1214 09/07/21  1001 09/06/21  0802   SODIUM mmol/L 145 141 138 143   POTASSIUM mmol/L 4.4 3.8 4.5 4.6   CHLORIDE mmol/L 108* 104 100 103   CO2 mmol/L 23.6 25.0 22.6 25.6   BUN mg/dL 50* 53* 48* 41*   CREATININE mg/dL 2.37* 2.79* 2.84* 3.91*   GLUCOSE mg/dL 86 141* 124* 138*    Estimated Creatinine Clearance: 54.2 mL/min (A) (by C-G formula based on SCr of 2.37 mg/dL (H)).  Results from last 7 days   Lab Units 09/09/21  0428 09/08/21  1214 09/07/21  1001 09/06/21  0802 09/05/21  1346 09/05/21  1346   ALBUMIN g/dL 3.30* 3.50 4.00 3.90   < > 3.90   BILIRUBIN mg/dL  --  0.2  --  0.3  --  0.4   ALK PHOS U/L  --  116  --  124*  --  130*   AST (SGOT) U/L  --  27  --  27  --  24   ALT (SGPT) U/L  --  48*  --  38  --  33    < > = values in this interval not displayed.     Results from last 7 days   Lab Units 09/09/21  0428 09/08/21  1214 09/07/21  1001 09/06/21  0802   CALCIUM mg/dL 9.4 9.4 10.2 10.1   ALBUMIN g/dL 3.30* 3.50 4.00 3.90   PHOSPHORUS mg/dL 3.4  --  4.2  --      Results from last 7 days   Lab Units 09/05/21  1346   PROCALCITONIN ng/mL 2.83*   LACTATE mmol/L 1.4     COVID19   Date Value Ref Range Status   09/05/2021 Not Detected Not Detected - Ref. Range Final     No results found for: HGBA1C, POCGLU    XR Abdomen KUB  Narrative: XR ABDOMEN KUB-     INDICATIONS: Ileus, follow-up     TECHNIQUE: Supine views of the abdomen     COMPARISON:  image from CT from 09/08/2021     FINDINGS:      The bowel gas pattern is nonspecific, with gaseous distention of the  stomach and colon. No supine evidence for free intraperitoneal gas. No  definite nephrolithiasis. Follow-up can be obtained as indications  persist.           Impression:    As described.     This report was finalized on 9/8/2021 1:35 PM by Dr. Rock Giraldo M.D.     CT Abdomen Pelvis Without Contrast  Narrative: CT ABDOMEN AND PELVIS WITHOUT CONTRAST     HISTORY: Acute abdominal pain, UTI.     TECHNIQUE: Axial CT images of the abdomen and pelvis were obtained  without administration of intravenous contrast. The patient was not  given oral contrast. Coronal and sagittal reformats were obtained.     COMPARISON: None     FINDINGS: Diffuse low attenuation of liver, most consistent with  steatosis. Focal fatty sparing is seen  adjacent to the gallbladder  fossa. No intrahepatic biliary dilatation. The gallbladder is normal.  The spleen is normal. The pancreas is normal without ductal dilatation.  Bilateral adrenal glands are normal. Both kidneys are normal in size and  attenuation. No renal calculi or hydronephrosis. The urinary bladder is  markedly distended with mild diffuse wall thickening of the stomach is  present that may suggest gastritis. The small bowel loops demonstrate  normal caliber. The colon is mildly distended with air and formed stool.  There are small retroperitoneal lymph nodes at the level of the renal  hilum that are favored to be reactive.     Impression: 1. The urinary bladder being moderately distended, there is mild wall  thickening present and this may suggest cystitis. Correlation with exam  and urinary catheterization may be helpful.  2. Mild distention of the colon containing formed stool and air, likely  representing a colonic ileus.  3. Diffuse hepatic steatosis.     Radiation dose reduction techniques were utilized, including automated  exposure control and exposure modulation based on body size.          Scheduled Medications  allopurinol, 100 mg, Oral, Daily  amLODIPine, 10 mg, Oral, Daily  cefTRIAXone, 1 g, Intravenous, Q24H  cloNIDine, 0.3 mg, Oral, TID  divalproex, 500 mg, Oral, Q12H  influenza vaccine, 0.5 mL, Intramuscular, Once  levothyroxine, 75 mcg, Oral, Q AM  metoprolol tartrate, 100 mg, Oral, BID  OLANZapine, 20 mg, Oral, Nightly  pantoprazole, 40 mg, Oral, QAM  sodium chloride, 10 mL, Intravenous, Q12H  tamsulosin, 0.8 mg, Oral, Daily    Infusions   Diet  Diet Pureed; Thin; Cardiac  Assessment/Plan   Active Hospital Problems    Diagnosis  POA   • **Acute urinary tract infection [N39.0]  Yes   • Sepsis without acute organ dysfunction (present on admission) [A41.9]  Yes   • Urine retention [R33.9]  Yes   • JORDEN (acute kidney injury) (CMS/HCC) [N17.9]  Yes   • Asthma [J45.909]  Yes   • Stage 3b  chronic kidney disease (CMS/[facility]) [N18.32]  Yes   • Hyperlipidemia [E78.5]  Yes   • Hypertension [I10]  Yes   • Intellectual disability [F79]  Yes   • Seizure disorder (CMS/[facility]) [G40.909]  Yes      Resolved Hospital Problems   No resolved problems to display.       35 y.o. male admitted with Acute urinary tract infection likely due to urinary retention.  Acute kidney injury present on admission likely also due to retention (not sepsis) and hypovolemia.    Patient seems to be doing better.  He did have evidence of sepsis on admission due to urinary tract infection.  He has completed 5 days of IV Rocephin.  WBC has normalized and creatinine returning close to baseline.  CT scan yesterday still with evidence of cystitis.  Will change to oral cephalexin tomorrow and complete 10 days of treatment.    KUB unremarkable    Appreciate nephrology assistance.  Minoxidil has been discontinued.  Tapering off clonidine.  Morocho catheter remains and urology planning voiding trial.  Flomax has been increased.      · SCDs for DVT prophylaxis.  · Full code.  · Discussed with patient and care team on multidisciplinary rounds.  · Anticipate return to [facility] in 1-2 days.      Blaine Troy MD  [facility]  09/09/21  10:08 EDT

## 2021-09-09 NOTE — CASE MANAGEMENT/SOCIAL WORK
Continued Stay Note  [facility]     Patient Name: Arthur Mccarthy  MRN: [id number]  Today's Date: 9/9/2021    Admit Date: 9/5/2021    Discharge Plan     Row Name 09/09/21 1624       Plan    Plan  Plans are to return group home @ [facility].  Pt can return with estrada cath/ will need HH// referral to  HH    Plan Comments  [facility] spoke with  Chinyere Rocha, and they will be able to provide estrada care, requested HH.        Discharge Codes    No documentation.       Expected Discharge Date and Time     Expected Discharge Date Expected Discharge Time    Sep 12, 2021             Fernanda Hackett RN

## 2021-09-09 NOTE — PLAN OF CARE
Goal Outcome Evaluation:  Plan of Care Reviewed With: patient        Progress: improving  Outcome Summary: VSS, BP doing well, still retaining urine, renal ordered f/c anchored without issue immediately got out 650 ml (Bladder scan 346) Good appetite, wbc decreasing. No c/o pain today.

## 2021-09-09 NOTE — DISCHARGE PLACEMENT REQUEST
"Raleigh Briones (35 y.o. Male)     Date of Birth Social Security Number Address Home Phone MRN    1985  [id number] [facility]  [place] [address] [phone] [id number]    Muslim Marital Status          Unknown Single       Admission Date Admission Type Admitting Provider Attending Provider Department, Room/Bed    9/5/21 Emergency Madi Jorgensen MD Ray, Jonathan, MD [address], [address]    Discharge Date Discharge Disposition Discharge Destination                       Attending Provider: Blaine Troy MD    Allergies: Doxycycline    Isolation: None   Infection: None   Code Status: CPR    Ht: 182.9 cm (72\")   Wt: 99.5 kg (219 lb 4.8 oz)    Admission Cmt: None   Principal Problem: Acute urinary tract infection [N39.0]                 Active Insurance as of 9/5/2021     Primary Coverage     Payor Plan Insurance Group Employer/Plan Group    [place]      Payor Plan Address Payor Plan Phone Number Payor Plan Fax Number Effective Dates    [address] [phone]  10/15/2019 - None Entered    [facility] [id number]       Subscriber Name Subscriber Birth Date Member ID       RALEIGH BRIONES 1985 [id number]                 Emergency Contacts      (Rel.) Home Phone Work Phone Mobile Phone    Matthew Uriostegui (Guardian) -- [phone] --    Aj () Chinyere [phone] -- --    Dmitriy () Florentino (Other) [phone] -- --              "

## 2021-09-09 NOTE — PROGRESS NOTES
[facility] Progress Note      Patient Name: Arthur Mccarthy  : 1985  MRN: [id number]  Primary Care Physician:  Mikael Vasquez MD  Date of admission: 2021    Subjective     Interval History:   Follow up JORDEN. Denies stomach pain today. Bowels moving. Morocho placed.  eval noted. Eating well.     Review of Systems:   As noted above    Objective     Vitals:   Temp:  [98.1 °F (36.7 °C)-98.6 °F (37 °C)] 98.1 °F (36.7 °C)  Heart Rate:  [69-83] 69  Resp:  [16-18] 16  BP: (117-169)/() 169/104    Intake/Output Summary (Last 24 hours) at 2021 1247  Last data filed at 2021 1130  Gross per 24 hour   Intake 814 ml   Output 3100 ml   Net -2286 ml       Physical Exam:    General Appearance: alert, does answer yes no questions. Difficult to understand speech.   Skin: warm and dry  HEENT: oral mucosa moist, nonicteric sclera  Neck: supple, no JVD  Lungs:Clear to auscultation, no wheezing.   Heart: RRR, normal S1 and S2  Abdomen: + bs,softer, less distended, not tender lower quadrants. No rebound or guarding.   Extremities:1+ lower ext edema.       Scheduled Meds:     allopurinol, 100 mg, Oral, Daily  amLODIPine, 10 mg, Oral, Daily  cefTRIAXone, 1 g, Intravenous, Q24H  [START ON 9/10/2021] cephalexin, 500 mg, Oral, Q6H  cloNIDine, 0.3 mg, Oral, TID  divalproex, 500 mg, Oral, Q12H  influenza vaccine, 0.5 mL, Intramuscular, Once  levothyroxine, 75 mcg, Oral, Q AM  metoprolol tartrate, 100 mg, Oral, BID  OLANZapine, 20 mg, Oral, Nightly  pantoprazole, 40 mg, Oral, QAM  sodium chloride, 10 mL, Intravenous, Q12H  tamsulosin, 0.8 mg, Oral, Daily      IV Meds:        Results Reviewed:   I have personally reviewed the results from the time of this admission to 2021 12:47 EDT     Results from last 7 days   Lab Units 21  0428 21  1214 21  1001 21  0802 21  0802 21  1346 21  1346   SODIUM mmol/L 145 141 138   < > 143   < > 142   POTASSIUM  mmol/L 4.4 3.8 4.5   < > 4.6   < > 4.4   CHLORIDE mmol/L 108* 104 100   < > 103   < > 98   CO2 mmol/L 23.6 25.0 22.6   < > 25.6   < > 26.8   BUN mg/dL 50* 53* 48*   < > 41*   < > 39*   CREATININE mg/dL 2.37* 2.79* 2.84*   < > 3.91*   < > 4.50*   CALCIUM mg/dL 9.4 9.4 10.2   < > 10.1   < > 9.4   BILIRUBIN mg/dL  --  0.2  --   --  0.3  --  0.4   ALK PHOS U/L  --  116  --   --  124*  --  130*   ALT (SGPT) U/L  --  48*  --   --  38  --  33   AST (SGOT) U/L  --  27  --   --  27  --  24   GLUCOSE mg/dL 86 141* 124*   < > 138*   < > 107*    < > = values in this interval not displayed.       Estimated Creatinine Clearance: 54.2 mL/min (A) (by C-G formula based on SCr of 2.37 mg/dL (H)).    Results from last 7 days   Lab Units 09/09/21  0428 09/07/21  1001   PHOSPHORUS mg/dL 3.4 4.2             Results from last 7 days   Lab Units 09/09/21  0428 09/08/21  1214 09/07/21  0612 09/06/21  0644 09/05/21  1346   WBC 10*3/mm3 10.45 11.26* 18.77* 15.04* 14.12*   HEMOGLOBIN g/dL 11.2* 11.4* 10.9* 11.4* 11.4*   PLATELETS 10*3/mm3 239 270 218 211 209             Assessment / Plan     ASSESSMENT:  1. JORDEN, nonoliguric. Waste products improving . Likely due to hypovolemia and urinary retention.  SP intermittent cath.  Morocho in place.   says no need for flomax.  Voiding trial when stronger . Volume replete.  2. Cognitive impairment .  3. Leukocytosis. Improving .  Abdominal pain, distension improved. . Bladder distended and mild colonic ileus on CT yesterday. Klebsiella in urine, staph in blood coag negative, Likely contaminant.   On rocephin.   4. Anemia. Hg stable .  5. HTN not controlled. DC minoxidil.  Start to taper back clonidine if BP controlled as this acts centrally. Add some hydralazine.   PLAN:  1. DC flomax per Dr. Rocha  note.  2. Hydralazine 25 mg tid .  Fernanda Verma MD  09/09/21  12:47 EDT    [facility]  [phone]

## 2021-09-09 NOTE — PLAN OF CARE
Problem: Adult Inpatient Plan of Care  Goal: Plan of Care Review  Outcome: Ongoing, Progressing  Flowsheets (Taken 9/9/2021 0137)  Progress: no change  Plan of Care Reviewed With: patient  Outcome Summary: resting on and off w eyes closed, no distress noted.  f/c continues.  will cont to monitor   Goal Outcome Evaluation:  Plan of Care Reviewed With: patient        Progress: no change  Outcome Summary: resting on and off w eyes closed, no distress noted.  f/c continues.  will cont to monitor

## 2021-09-10 ENCOUNTER — TELEPHONE (OUTPATIENT)
Dept: FAMILY MEDICINE CLINIC | Facility: CLINIC | Age: 36
End: 2021-09-10

## 2021-09-10 LAB
ANION GAP SERPL CALCULATED.3IONS-SCNC: 9.6 MMOL/L (ref 5–15)
BACTERIA SPEC AEROBE CULT: NORMAL
BUN SERPL-MCNC: 40 MG/DL (ref 6–20)
BUN/CREAT SERPL: 19.9 (ref 7–25)
CALCIUM SPEC-SCNC: 9.8 MG/DL (ref 8.6–10.5)
CHLORIDE SERPL-SCNC: 108 MMOL/L (ref 98–107)
CO2 SERPL-SCNC: 27.4 MMOL/L (ref 22–29)
CREAT SERPL-MCNC: 2.01 MG/DL (ref 0.76–1.27)
GFR SERPL CREATININE-BSD FRML MDRD: 47 ML/MIN/1.73
GLUCOSE SERPL-MCNC: 85 MG/DL (ref 65–99)
POTASSIUM SERPL-SCNC: 4.4 MMOL/L (ref 3.5–5.2)
SODIUM SERPL-SCNC: 145 MMOL/L (ref 136–145)

## 2021-09-10 PROCEDURE — 92526 ORAL FUNCTION THERAPY: CPT

## 2021-09-10 PROCEDURE — 80048 BASIC METABOLIC PNL TOTAL CA: CPT | Performed by: HOSPITALIST

## 2021-09-10 RX ORDER — MINOXIDIL 2.5 MG/1
2.5 TABLET ORAL EVERY 12 HOURS SCHEDULED
Status: DISCONTINUED | OUTPATIENT
Start: 2021-09-10 | End: 2021-09-11

## 2021-09-10 RX ORDER — CLONIDINE 0.3 MG/24H
1 PATCH, EXTENDED RELEASE TRANSDERMAL WEEKLY
Status: DISCONTINUED | OUTPATIENT
Start: 2021-09-10 | End: 2021-09-12 | Stop reason: HOSPADM

## 2021-09-10 RX ORDER — CARVEDILOL 25 MG/1
25 TABLET ORAL 2 TIMES DAILY WITH MEALS
Status: DISCONTINUED | OUTPATIENT
Start: 2021-09-10 | End: 2021-09-12 | Stop reason: HOSPADM

## 2021-09-10 RX ORDER — AMLODIPINE BESYLATE 5 MG/1
5 TABLET ORAL DAILY
Status: DISCONTINUED | OUTPATIENT
Start: 2021-09-10 | End: 2021-09-11

## 2021-09-10 RX ADMIN — CARVEDILOL 25 MG: 25 TABLET, FILM COATED ORAL at 17:40

## 2021-09-10 RX ADMIN — MINOXIDIL 2.5 MG: 2.5 TABLET ORAL at 20:19

## 2021-09-10 RX ADMIN — CLONIDINE 1 PATCH: 0.3 PATCH TRANSDERMAL at 09:17

## 2021-09-10 RX ADMIN — HYDRALAZINE HYDROCHLORIDE 25 MG: 25 TABLET, FILM COATED ORAL at 06:00

## 2021-09-10 RX ADMIN — DIVALPROEX SODIUM 500 MG: 500 TABLET, DELAYED RELEASE ORAL at 20:19

## 2021-09-10 RX ADMIN — ALLOPURINOL 100 MG: 100 TABLET ORAL at 09:17

## 2021-09-10 RX ADMIN — CEPHALEXIN 500 MG: 500 CAPSULE ORAL at 14:15

## 2021-09-10 RX ADMIN — CARVEDILOL 25 MG: 25 TABLET, FILM COATED ORAL at 09:18

## 2021-09-10 RX ADMIN — MINOXIDIL 2.5 MG: 2.5 TABLET ORAL at 09:18

## 2021-09-10 RX ADMIN — DIVALPROEX SODIUM 500 MG: 500 TABLET, DELAYED RELEASE ORAL at 09:17

## 2021-09-10 RX ADMIN — CEPHALEXIN 500 MG: 500 CAPSULE ORAL at 06:01

## 2021-09-10 RX ADMIN — PANTOPRAZOLE SODIUM 40 MG: 40 TABLET, DELAYED RELEASE ORAL at 05:59

## 2021-09-10 RX ADMIN — CEPHALEXIN 500 MG: 500 CAPSULE ORAL at 00:00

## 2021-09-10 RX ADMIN — AMLODIPINE BESYLATE 5 MG: 10 TABLET ORAL at 09:17

## 2021-09-10 RX ADMIN — CEPHALEXIN 500 MG: 500 CAPSULE ORAL at 17:40

## 2021-09-10 RX ADMIN — HYDRALAZINE HYDROCHLORIDE 25 MG: 25 TABLET, FILM COATED ORAL at 00:00

## 2021-09-10 RX ADMIN — LEVOTHYROXINE SODIUM 75 MCG: 0.07 TABLET ORAL at 05:59

## 2021-09-10 RX ADMIN — SODIUM CHLORIDE, PRESERVATIVE FREE 10 ML: 5 INJECTION INTRAVENOUS at 09:18

## 2021-09-10 RX ADMIN — OLANZAPINE 20 MG: 10 TABLET ORAL at 20:19

## 2021-09-10 NOTE — PLAN OF CARE
Problem: Adult Inpatient Plan of Care  Goal: Plan of Care Review  Outcome: Ongoing, Progressing  Flowsheets (Taken 9/10/2021 0332)  Progress: no change  Plan of Care Reviewed With: patient  Outcome Summary: resting on and off w eyes closed, no distress noted, f/c remains till am then order to remove it and do voiding trials.  will monitor   Goal Outcome Evaluation:  Plan of Care Reviewed With: patient        Progress: no change  Outcome Summary: resting on and off w eyes closed, no distress noted, f/c remains till am then order to remove it and do voiding trials.  will monitor

## 2021-09-10 NOTE — PLAN OF CARE
Goal Outcome Evaluation:  Plan of Care Reviewed With: patient           Outcome Summary: Swallow re-evaluated. Pt's swallow is mistimed and audible with thin with high suspicion of silent penetration/aspiration. Inconsistent wheezes noted after trials of thin. No overt coughing. Timing slightly better with trials of NTL, HTL, and pureed. Recommend pureed and NTL; meds w/ NTL or pureed; upright for meals and 30 min after; slow rate; small bites/sips; assist with feeding. ST to schedule VFSS to further assess.

## 2021-09-10 NOTE — PROGRESS NOTES
"  [facility] Progress Note    Chief Complaint: None    No complaints to me.  He has voided since his Morocho was removed this morning on several occasions and he had a post void residual today of about 160 cc.  He does not seem to be any distress and he states he has no pelvic pain.    Review of Systems:    Review of systems could not be obtained due to  Unreliable          Vital Signs  /98   Pulse 63   Temp 98.7 °F (37.1 °C) (Oral)   Resp 20   Ht 182.9 cm (72\")   Wt 100 kg (220 lb 9.6 oz)   SpO2 100%   BMI 29.92 kg/m²     Physical Exam:     General Appearance:    Alert, cooperative, NAD   HEENT:    No trauma, pupils reactive, hearing intact   Back:     No CVA tenderness   Lungs:     Respirations unlabored, no wheezing    Heart:    RRR, intact peripheral pulses   Abdomen:     Soft, NDNT, no masses, no guarding   :   Penis normal testes normal   Extremities:   No edema, no deformity   Lymphatic:   No neck or groin LAD   Skin:   No bleeding, bruising or rashes   Neuro/Psych:   Orientation intact, mood/affect pleasant, no focal findings        Results Review:     I reviewed the patient's new clinical results.  Lab Results (last 24 hours)     Procedure Component Value Units Date/Time    Blood Culture - Blood, Hand, Right [[id number]] Collected: 09/05/21 1346    Specimen: Blood from Hand, Right Updated: 09/10/21 1430     Blood Culture No growth at 5 days    Basic Metabolic Panel [[id number]]  (Abnormal) Collected: 09/10/21 0625    Specimen: Blood Updated: 09/10/21 0719     Glucose 85 mg/dL      BUN 40 mg/dL      Creatinine 2.01 mg/dL      Sodium 145 mmol/L      Potassium 4.4 mmol/L      Chloride 108 mmol/L      CO2 27.4 mmol/L      Calcium 9.8 mg/dL      eGFR  African Amer 47 mL/min/1.73      BUN/Creatinine Ratio 19.9     Anion Gap 9.6 mmol/L     Narrative:      GFR Normal >60  Chronic Kidney Disease <60  Kidney Failure <15          Imaging Results (Last 24 Hours)     ** No results found for the last 24 " hours. **          Medication Review:   I have personally reviewed    Current Facility-Administered Medications:   •  acetaminophen (TYLENOL) suppository 650 mg, 650 mg, Rectal, Q4H PRN, Albert Crooks MD, 650 mg at 09/06/21 0051  •  acetaminophen (TYLENOL) tablet 650 mg, 650 mg, Oral, Q4H PRN, Rafi Hoover MD, 650 mg at 09/07/21 2028  •  allopurinol (ZYLOPRIM) tablet 100 mg, 100 mg, Oral, Daily, Madi Jorgensen MD, 100 mg at 09/10/21 0917  •  amLODIPine (NORVASC) tablet 5 mg, 5 mg, Oral, Daily, Lenard Stokes MD, 5 mg at 09/10/21 0917  •  carvedilol (COREG) tablet 25 mg, 25 mg, Oral, BID With Meals, Lenard Stokes MD, 25 mg at 09/10/21 1740  •  cephalexin (KEFLEX) capsule 500 mg, 500 mg, Oral, Q6H, Blaine Troy MD, 500 mg at 09/10/21 1740  •  cloNIDine (CATAPRES-TTS) 0.3 MG/24HR patch 1 patch, 1 patch, Transdermal, Weekly, Lenard Stokes MD, 1 patch at 09/10/21 0917  •  divalproex (DEPAKOTE) DR tablet 500 mg, 500 mg, Oral, Q12H, Madi Jorgensen MD, 500 mg at 09/10/21 0917  •  influenza vac split quad (FLUZONE,FLUARIX,AFLURIA,FLULAVAL) injection 0.5 mL, 0.5 mL, Intramuscular, Once, Madi Jorgensen MD  •  ipratropium-albuterol (DUO-NEB) nebulizer solution 3 mL, 3 mL, Nebulization, Q4H PRN, Alissa Marinelli APRN, 3 mL at 09/07/21 1505  •  levothyroxine (SYNTHROID, LEVOTHROID) tablet 75 mcg, 75 mcg, Oral, Q AM, Madi Jorgensen MD, 75 mcg at 09/10/21 0559  •  minoxidil (LONITEN) tablet 2.5 mg, 2.5 mg, Oral, Q12H, Lenard Stokes MD, 2.5 mg at 09/10/21 0918  •  OLANZapine (zyPREXA) tablet 20 mg, 20 mg, Oral, Nightly, Madi Jorgensen MD, 20 mg at 09/09/21 2037  •  ondansetron (ZOFRAN) injection 4 mg, 4 mg, Intravenous, Q6H PRN, Alissa Marinelli APRN, 4 mg at 09/07/21 1718  •  pantoprazole (PROTONIX) EC tablet 40 mg, 40 mg, Oral, QAM, Madi Jorgensen MD, 40 mg at 09/10/21 4220  •  [COMPLETED] Insert peripheral IV, , , Once **AND** sodium chloride  0.9 % flush 10 mL, 10 mL, Intravenous, PRN, Lyle Colby MD  •  sodium chloride 0.9 % flush 10 mL, 10 mL, Intravenous, Q12H, Madi Jorgensen MD, 10 mL at 09/10/21 0918  •  sodium chloride 0.9 % flush 10 mL, 10 mL, Intravenous, PRN, Madi Jorgensen MD    Allergies:    Doxycycline    Assessment:    Active Problems:  Patient Active Problem List   Diagnosis   • Intellectual disability   • Seasonal allergies   • Hypertension   • Seizure disorder (CMS/HCC)   • Stage 3b chronic kidney disease (CMS/HCC)   • Hyperlipidemia   • Annual physical exam   • Hypothyroidism   • GERD (gastroesophageal reflux disease)   • Asthma   • Constipation   • Hypertriglyceridemia   • Acute urinary tract infection   • JORDEN (acute kidney injury) (CMS/HCC)   • Urine retention   • Sepsis without acute organ dysfunction (present on admission)       Urinary retention now resolved    Plan:    Call if needed.  If he flips back into retention he can go to rehab with his catheter and that can be removed at a later date.      Venkatesh Rocha MD    9/10/2021  18:57 EDT

## 2021-09-10 NOTE — PROGRESS NOTES
Subjective   Awake answers questions.  Feels okay. Has not voided since catheter removed.    Review of Systems  Objective   Vital Signs  Temp:  [98.1 °F (36.7 °C)-98.7 °F (37.1 °C)] 98.7 °F (37.1 °C)  Heart Rate:  [55-94] 62  Resp:  [18-20] 20  BP: (136-171)/() 148/103  SpO2:  [93 %-100 %] 100 %  on   ;   Device (Oxygen Therapy): room air  Body mass index is 29.92 kg/m².  Physical Exam  Vitals and nursing note reviewed.   Constitutional:       General: He is not in acute distress.     Appearance: Normal appearance. He is well-developed. He is obese.   HENT:      Mouth/Throat:      Mouth: Mucous membranes are dry.   Neck:      Vascular: No JVD.      Trachea: No tracheal deviation.   Cardiovascular:      Rate and Rhythm: Normal rate and regular rhythm.      Heart sounds: No murmur heard.     Pulmonary:      Effort: Pulmonary effort is normal. No respiratory distress.      Breath sounds: Normal breath sounds.   Abdominal:      General: Bowel sounds are normal. There is no distension.      Palpations: Abdomen is soft.      Tenderness: There is no abdominal tenderness.   Genitourinary:     Comments: +FC  Skin:     General: Skin is warm and dry.   Neurological:      Mental Status: He is alert and oriented to person, place, and time. Mental status is at baseline.   Psychiatric:         Behavior: Behavior normal. Behavior is cooperative.       Results Review     I reviewed the patient's new clinical results.  Results from last 7 days   Lab Units 09/09/21 0428 09/08/21  1214 09/07/21  0612 09/06/21  0644   WBC 10*3/mm3 10.45 11.26* 18.77* 15.04*   HEMOGLOBIN g/dL 11.2* 11.4* 10.9* 11.4*   PLATELETS 10*3/mm3 239 270 218 211     Results from last 7 days   Lab Units 09/10/21  0625 09/09/21  0428 09/08/21  1214 09/07/21  1001   SODIUM mmol/L 145 145 141 138   POTASSIUM mmol/L 4.4 4.4 3.8 4.5   CHLORIDE mmol/L 108* 108* 104 100   CO2 mmol/L 27.4 23.6 25.0 22.6   BUN mg/dL 40* 50* 53* 48*   CREATININE mg/dL 2.01* 2.37*  2.79* 2.84*   GLUCOSE mg/dL 85 86 141* 124*   Estimated Creatinine Clearance: 64 mL/min (A) (by C-G formula based on SCr of 2.01 mg/dL (H)).  Results from last 7 days   Lab Units 09/09/21  0428 09/08/21  1214 09/07/21  1001 09/06/21  0802 09/05/21  1346 09/05/21  1346   ALBUMIN g/dL 3.30* 3.50 4.00 3.90   < > 3.90   BILIRUBIN mg/dL  --  0.2  --  0.3  --  0.4   ALK PHOS U/L  --  116  --  124*  --  130*   AST (SGOT) U/L  --  27  --  27  --  24   ALT (SGPT) U/L  --  48*  --  38  --  33    < > = values in this interval not displayed.     Results from last 7 days   Lab Units 09/10/21  0625 09/09/21 0428 09/08/21 1214 09/07/21  1001 09/06/21  0802 09/06/21  0802   CALCIUM mg/dL 9.8 9.4 9.4 10.2   < > 10.1   ALBUMIN g/dL  --  3.30* 3.50 4.00  --  3.90   PHOSPHORUS mg/dL  --  3.4  --  4.2  --   --     < > = values in this interval not displayed.     Results from last 7 days   Lab Units 09/05/21  1346   PROCALCITONIN ng/mL 2.83*   LACTATE mmol/L 1.4     COVID19   Date Value Ref Range Status   09/05/2021 Not Detected Not Detected - Ref. Range Final     No results found for: HGBA1C, POCGLU    CT Abdomen Pelvis Without Contrast  Narrative: CT ABDOMEN AND PELVIS WITHOUT CONTRAST     HISTORY: Acute abdominal pain, UTI.     TECHNIQUE: Axial CT images of the abdomen and pelvis were obtained  without administration of intravenous contrast. The patient was not  given oral contrast. Coronal and sagittal reformats were obtained.     COMPARISON: None     FINDINGS: Diffuse low attenuation of liver, most consistent with  steatosis. Focal fatty sparing is seen adjacent to the gallbladder  fossa. No intrahepatic biliary dilatation. The gallbladder is normal.  The spleen is normal. The pancreas is normal without ductal dilatation.  Bilateral adrenal glands are normal. Both kidneys are normal in size and  attenuation. No renal calculi or hydronephrosis. The urinary bladder is  markedly distended with mild diffuse wall thickening of the  stomach is  present that may suggest gastritis. The small bowel loops demonstrate  normal caliber. The colon is mildly distended with air and formed stool.  There are small retroperitoneal lymph nodes at the level of the renal  hilum that are favored to be reactive.     Impression: 1. Despite the urinary bladder being moderately distended, there is mild  wall thickening present and this may suggest cystitis. Correlation with  exam and urinary catheterization may be helpful.  2. Mild distention of the colon containing formed stool and air, likely  representing a colonic ileus.  3. Diffuse hepatic steatosis.     Radiation dose reduction techniques were utilized, including automated  exposure control and exposure modulation based on body size.     This report was finalized on 9/9/2021 12:53 PM by Dr. Eugenio Enriquez M.D.       Scheduled Medications  allopurinol, 100 mg, Oral, Daily  amLODIPine, 5 mg, Oral, Daily  carvedilol, 25 mg, Oral, BID With Meals  cephalexin, 500 mg, Oral, Q6H  cloNIDine, 1 patch, Transdermal, Weekly  divalproex, 500 mg, Oral, Q12H  influenza vaccine, 0.5 mL, Intramuscular, Once  levothyroxine, 75 mcg, Oral, Q AM  minoxidil, 2.5 mg, Oral, Q12H  OLANZapine, 20 mg, Oral, Nightly  pantoprazole, 40 mg, Oral, QAM  sodium chloride, 10 mL, Intravenous, Q12H    Infusions   Diet  Diet Pureed; Thin; Cardiac  Assessment/Plan   Active Hospital Problems    Diagnosis  POA   • **Acute urinary tract infection [N39.0]  Yes   • Sepsis without acute organ dysfunction (present on admission) [A41.9]  Yes   • Urine retention [R33.9]  Yes   • JORDEN (acute kidney injury) (CMS/HCC) [N17.9]  Yes   • Asthma [J45.909]  Yes   • Stage 3b chronic kidney disease (CMS/HCC) [N18.32]  Yes   • Hyperlipidemia [E78.5]  Yes   • Hypertension [I10]  Yes   • Intellectual disability [F79]  Yes   • Seizure disorder (CMS/HCC) [G40.909]  Yes      Resolved Hospital Problems   No resolved problems to display.       35 y.o. male admitted with  Acute urinary tract infection likely due to urinary retention.  Acute kidney injury present on admission likely also due to retention (not sepsis) and hypovolemia.    Morocho catheter discontinued by urology this morning. Has not yet voided. May need catheter replaced prior to discharge to group home. Discussed with Dr. Stokes from nephrology. BP severely uncontrolled. Several medication adjustments made today by nephrology. He prefers to monitor response at least until tomorrow.    On cephalexin for UTI.       · SCDs for DVT prophylaxis.  · Full code.  · Discussed with patient and care team on multidisciplinary rounds.  · Anticipate return to [facility] tomorrow.      Blaine Troy MD  [facility]  09/10/21  14:57 EDT

## 2021-09-10 NOTE — CASE MANAGEMENT/SOCIAL WORK
Continued Stay Note  [facility]     Patient Name: Arthur Mccarthy  MRN: [id number]  Today's Date: 9/10/2021    Admit Date: 9/5/2021    Discharge Plan     Row Name 09/10/21 1746       Plan    Plan  Plans are to group home [facility].  spoke with guardian Matthew Eloyzabrina ( [phone]) to update on dc plan.  He would like to know when pt is dc'd        Discharge Codes    No documentation.       Expected Discharge Date and Time     Expected Discharge Date Expected Discharge Time    Sep 12, 2021             Fernanda Hackett RN

## 2021-09-10 NOTE — PROGRESS NOTES
"   LOS: 5 days    Patient Care Team:  Mikael Vasquez MD as PCP - General (Internal Medicine)  Arthur Phillips MD as Consulting Physician (Nephrology)    Chief Complaint:    Chief Complaint   Patient presents with   • Shortness of Breath   • Fever   • Fatigue     Follow UP JORDEN  Subjective     Interval History:   I/O 574/3700  BP worse 171/107 this AM  Morocho just removed    Objective     Vital Signs  Temp:  [98.1 °F (36.7 °C)-98.5 °F (36.9 °C)] 98.4 °F (36.9 °C)  Heart Rate:  [55-94] 94  Resp:  [14-20] 20  BP: (136-171)/() 171/107    Flowsheet Rows      First Filed Value   Admission Height  167.6 cm (66\") Documented at 09/05/2021 1337   Admission Weight  90.7 kg (200 lb) Documented at 09/05/2021 1337          No intake/output data recorded.  I/O last 3 completed shifts:  In: 574 [P.O.:474; IV Piggyback:100]  Out: 4600 [Urine:4600]    Intake/Output Summary (Last 24 hours) at 9/10/2021 0816  Last data filed at 9/10/2021 0600  Gross per 24 hour   Intake 574 ml   Output 3700 ml   Net -3126 ml       Physical Exam:  General Appearance: nonverbal, no distress  Neck supple no JVD  Lungs CTA bilat no rales  CV RRR no m/g  abd soft NT/ND  vasc 1+ BLE pedal/ankle edema     Results Review:    Results from last 7 days   Lab Units 09/10/21  0625 09/09/21  0428 09/08/21  1214 09/07/21  1001 09/06/21  0802 09/05/21  1346 09/05/21  1346   SODIUM mmol/L 145 145 141   < > 143   < > 142   POTASSIUM mmol/L 4.4 4.4 3.8   < > 4.6   < > 4.4   CHLORIDE mmol/L 108* 108* 104   < > 103   < > 98   CO2 mmol/L 27.4 23.6 25.0   < > 25.6   < > 26.8   BUN mg/dL 40* 50* 53*   < > 41*   < > 39*   CREATININE mg/dL 2.01* 2.37* 2.79*   < > 3.91*   < > 4.50*   CALCIUM mg/dL 9.8 9.4 9.4   < > 10.1   < > 9.4   BILIRUBIN mg/dL  --   --  0.2  --  0.3  --  0.4   ALK PHOS U/L  --   --  116  --  124*  --  130*   ALT (SGPT) U/L  --   --  48*  --  38  --  33   AST (SGOT) U/L  --   --  27  --  27  --  24   GLUCOSE mg/dL 85 86 141*   < > 138* "   < > 107*    < > = values in this interval not displayed.       Estimated Creatinine Clearance: 64 mL/min (A) (by C-G formula based on SCr of 2.01 mg/dL (H)).    Results from last 7 days   Lab Units 09/09/21  0428 09/07/21  1001   PHOSPHORUS mg/dL 3.4 4.2             Results from last 7 days   Lab Units 09/09/21  0428 09/08/21  1214 09/07/21  0612 09/06/21  0644 09/05/21  1346   WBC 10*3/mm3 10.45 11.26* 18.77* 15.04* 14.12*   HEMOGLOBIN g/dL 11.2* 11.4* 10.9* 11.4* 11.4*   PLATELETS 10*3/mm3 239 270 218 211 209               Imaging Results (Last 24 Hours)     Procedure Component Value Units Date/Time    CT Abdomen Pelvis Without Contrast [[id number]] Collected: 09/08/21 0855     Updated: 09/09/21 1256    Narrative:      CT ABDOMEN AND PELVIS WITHOUT CONTRAST     HISTORY: Acute abdominal pain, UTI.     TECHNIQUE: Axial CT images of the abdomen and pelvis were obtained  without administration of intravenous contrast. The patient was not  given oral contrast. Coronal and sagittal reformats were obtained.     COMPARISON: None     FINDINGS: Diffuse low attenuation of liver, most consistent with  steatosis. Focal fatty sparing is seen adjacent to the gallbladder  fossa. No intrahepatic biliary dilatation. The gallbladder is normal.  The spleen is normal. The pancreas is normal without ductal dilatation.  Bilateral adrenal glands are normal. Both kidneys are normal in size and  attenuation. No renal calculi or hydronephrosis. The urinary bladder is  markedly distended with mild diffuse wall thickening of the stomach is  present that may suggest gastritis. The small bowel loops demonstrate  normal caliber. The colon is mildly distended with air and formed stool.  There are small retroperitoneal lymph nodes at the level of the renal  hilum that are favored to be reactive.       Impression:      1. Despite the urinary bladder being moderately distended, there is mild  wall thickening present and this may suggest cystitis.  Correlation with  exam and urinary catheterization may be helpful.  2. Mild distention of the colon containing formed stool and air, likely  representing a colonic ileus.  3. Diffuse hepatic steatosis.     Radiation dose reduction techniques were utilized, including automated  exposure control and exposure modulation based on body size.     This report was finalized on 9/9/2021 12:53 PM by Dr. Eugenio Enriquez M.D.           allopurinol, 100 mg, Oral, Daily  amLODIPine, 10 mg, Oral, Daily  cephalexin, 500 mg, Oral, Q6H  cloNIDine, 0.3 mg, Oral, TID  divalproex, 500 mg, Oral, Q12H  hydrALAZINE, 25 mg, Oral, Q8H  influenza vaccine, 0.5 mL, Intramuscular, Once  levothyroxine, 75 mcg, Oral, Q AM  metoprolol tartrate, 100 mg, Oral, BID  OLANZapine, 20 mg, Oral, Nightly  pantoprazole, 40 mg, Oral, QAM  sodium chloride, 10 mL, Intravenous, Q12H           Medication Review:   Current Facility-Administered Medications   Medication Dose Route Frequency Provider Last Rate Last Admin   • acetaminophen (TYLENOL) suppository 650 mg  650 mg Rectal Q4H PRN Albert Crooks MD   650 mg at 09/06/21 0051   • acetaminophen (TYLENOL) tablet 650 mg  650 mg Oral Q4H PRN Rafi Hoover MD   650 mg at 09/07/21 2028   • allopurinol (ZYLOPRIM) tablet 100 mg  100 mg Oral Daily Madi Jorgensen MD   100 mg at 09/09/21 0913   • amLODIPine (NORVASC) tablet 10 mg  10 mg Oral Daily Madi Jorgensen MD   10 mg at 09/09/21 0913   • cephalexin (KEFLEX) capsule 500 mg  500 mg Oral Q6H Blaine Troy MD   500 mg at 09/10/21 0601   • cloNIDine (CATAPRES) tablet 0.3 mg  0.3 mg Oral TID Madi Jorgensen MD   0.3 mg at 09/09/21 2021   • divalproex (DEPAKOTE) DR tablet 500 mg  500 mg Oral Q12H Madi Jorgensen MD   500 mg at 09/09/21 2021   • hydrALAZINE (APRESOLINE) tablet 25 mg  25 mg Oral Q8H Fernanda Verma MD   25 mg at 09/10/21 0600   • influenza vac split quad (FLUZONE,FLUARIX,AFLURIA,FLULAVAL) injection 0.5 mL  0.5 mL Intramuscular  Once Madi Jorgensen MD       • ipratropium-albuterol (DUO-NEB) nebulizer solution 3 mL  3 mL Nebulization Q4H PRN Alissa Marinelli, APRN   3 mL at 09/07/21 1505   • levothyroxine (SYNTHROID, LEVOTHROID) tablet 75 mcg  75 mcg Oral Q AM Madi Jorgensen MD   75 mcg at 09/10/21 0559   • metoprolol tartrate (LOPRESSOR) tablet 100 mg  100 mg Oral BID Madi Jorgensen MD   100 mg at 09/09/21 2020   • OLANZapine (zyPREXA) tablet 20 mg  20 mg Oral Nightly Madi Jorgensen MD   20 mg at 09/09/21 2037   • ondansetron (ZOFRAN) injection 4 mg  4 mg Intravenous Q6H PRN Alissa Marinelli, APRN   4 mg at 09/07/21 1718   • pantoprazole (PROTONIX) EC tablet 40 mg  40 mg Oral QAM Madi Jorgensen MD   40 mg at 09/10/21 0559   • sodium chloride 0.9 % flush 10 mL  10 mL Intravenous PRN Lyle Colby MD       • sodium chloride 0.9 % flush 10 mL  10 mL Intravenous Q12H Madi Jorgensen MD   10 mL at 09/09/21 2023   • sodium chloride 0.9 % flush 10 mL  10 mL Intravenous PRN Madi Jorgensen MD           Assessment/Plan   1. JORDEN, nonoliguric. Resolved, Cr down to 2.0 (peak 4.5) . Likely due to hypovolemia and urinary retention.  Euvolemic now.  Morocho in place.  2. CKD stage 3 - BL Cr low to mid 2's, was 2.4 in Jan 2021  3. UTI - rocephin -> keflex per Dr Troy   4. Cognitive dysfunction  5. HTN uncontrolled, worse off minoxidil (hydralazine added).  170/100 this AM.  Combination norvasc + minoxidil not ideal given mild edema but need to resume latter, and will try changing lopressor to coreg and PO clonidine to patch  6. Urinary retention - UROL following; no need for flomax    Plan  - VT today per UROL  - DC hydralazine and resume minoxidil 2.5 BID  - change lopressor to coreg 25 BID  - change clonidine to patch  - dec norvasc 5mg since resuming minoxidil and has edema        Acute urinary tract infection    Intellectual disability    Hypertension    Seizure disorder (CMS/HCC)    Stage 3b chronic kidney disease (CMS/HCC)     Hyperlipidemia    Asthma    JORDEN (acute kidney injury) (CMS/HCC)    Urine retention    Sepsis without acute organ dysfunction (present on admission)              Lenard Stokes MD  09/10/21  08:16 EDT

## 2021-09-10 NOTE — TELEPHONE ENCOUNTER
Ok for hub to relay message:    Called and lmtcb. Called to see if a verbal order would be ok and wanted to let them know that nursing for cath would be ok.    If they are requesting something else, please find out what they are needing. If they need a written order or something signed.

## 2021-09-10 NOTE — TELEPHONE ENCOUNTER
HOME HEALTH CALLED AND THE PATIENT IS NEEDING NURSING ORDERS FOR HIS CATHETER   WHEN HE GETS DISCHARGED LATER TODAY       PLEASE ADVISE :   [facility] [phone]

## 2021-09-10 NOTE — THERAPY RE-EVALUATION
Acute Care - Speech Language Pathology   Swallow Re-Evaluation [facility]     Patient Name: Arthur Mccarthy  : 1985  MRN: [id number]  Today's Date: 9/10/2021               Admit Date: 2021    Visit Dx:     ICD-10-CM ICD-9-CM   1. Acute urinary tract infection  N39.0 599.0   2. Acute kidney injury superimposed on chronic kidney disease (CMS/HCC)  N17.9 866.00    N18.9 585.9   3. Acute urinary retention  R33.8 788.29   4. Intellectual disability  F79 319   5. History of seizure disorder  Z86.69 V12.49     Patient Active Problem List   Diagnosis   • Intellectual disability   • Seasonal allergies   • Hypertension   • Seizure disorder (CMS/HCC)   • Stage 3b chronic kidney disease (CMS/HCC)   • Hyperlipidemia   • Annual physical exam   • Hypothyroidism   • GERD (gastroesophageal reflux disease)   • Asthma   • Constipation   • Hypertriglyceridemia   • Acute urinary tract infection   • JORDEN (acute kidney injury) (CMS/HCC)   • Urine retention   • Sepsis without acute organ dysfunction (present on admission)     Past Medical History:   Diagnosis Date   • Acquired intellectual disability    • Allergic rhinitis    • Asthma    • Chronic kidney disease (CKD)    • Chronic renal disease, stage 3, moderately decreased glomerular filtration rate between 30-59 mL/min/1.73 square meter (CMS/HCC)    • Constipation    • Epilepsy, generalized, convulsive (CMS/HCC)    • Episode of altered consciousness    • Essential hypertension     History of Essential Hypertension    • GERD (gastroesophageal reflux disease)    • Hematuria    • Hyperkalemia    • Hyperlipidemia    • Hypertension    • Hypothyroidism    • Immunization due    • Metabolic encephalopathy    • Moderate intellectual disabilities    • Mood disorder (CMS/HCC)    • Nocturia    • Physical exam, annual    • Seizure (CMS/HCC)    • Seizure disorder (CMS/HCC)      History reviewed. No pertinent surgical history.    SLP Recommendation and Plan  SLP Swallowing Diagnosis:  oral dysphagia  SLP Diet Recommendation: nectar thick liquids, puree  Recommended Precautions and Strategies: upright posture during/after eating, small bites of food and sips of liquid, assist with feeding, 1:1 supervision  SLP Rec. for Method of Medication Administration: meds whole, meds crushed, with thick liquids, with pudding or applesauce, as tolerated     Monitor for Signs of Aspiration: yes  Recommended Diagnostics: V[facility]SS ([facility] [place])  Swallow Criteria for Skilled Therapeutic Interventions Met: demonstrates skilled criteria  Anticipated Discharge Disposition (SLP): unknown  Rehab Potential/Prognosis, Swallowing: good, to achieve stated therapy goals  Therapy Frequency (Swallow): PRN  Predicted Duration Therapy Intervention (Days): until discharge                         Plan of Care Reviewed With: patient  Outcome Summary: Swallow re-evaluated. Pt's swallow is mistimed and audible with thin with high suspicion of silent penetration/aspiration. Inconsistent wheezes noted after trials of thin. No overt coughing. Timing slightly better with trials of NTL, HTL, and pureed. Recommend pureed and NTL; meds w/ NTL or pureed; upright for meals and 30 min after; slow rate; small bites/sips; assist with feeding. ST to schedule VFSS to further assess.    Patient was not wearing a face mask during this therapy encounter. Therapist used appropriate personal protective equipment including mask, eye protection and gloves.  Mask used was standard procedure mask. Appropriate PPE was worn during the entire therapy session. Hand hygiene was completed before and after therapy session. Patient is not in enhanced droplet precautions.        SWALLOW EVALUATION (last 72 hours)      SLP Adult Swallow Evaluation     Row Name 09/10/21 1500                   Rehab Evaluation    Document Type  re-evaluation  -AW        Subjective Information  no complaints  -AW        Patient Observations  alert;cooperative;agree to therapy  -AW        Care  Plan Review  evaluation/treatment results reviewed;care plan/treatment goals reviewed;risks/benefits reviewed;current/potential barriers reviewed;patient/other agree to care plan  -AW        Patient Effort  good  -AW        Symptoms Noted During/After Treatment  none  -AW           General Information    Patient Profile Reviewed  yes  -AW        Pertinent History Of Current Problem  Pt admitted with fever, dyspena, fatigue, and UTI; h/o intellectual delay  -AW        Current Method of Nutrition  pureed;thin liquids  -AW        Precautions/Limitations, Vision  WFL;for purposes of eval  -AW        Precautions/Limitations, Hearing  WFL;for purposes of eval  -AW        Prior Level of Function-Communication  cognitive-linguistic impairment  -AW        Prior Level of Function-Swallowing  unknown  -AW        Plans/Goals Discussed with  patient;agreed upon  -AW        Barriers to Rehab  cognitive status  -AW        Patient's Goals for Discharge  patient did not state  -AW           Oral Musculature and Cranial Nerve Assessment    Oral Motor, Comment  forward tongue protrusion  -AW           General Eating/Swallowing Observations    Eating/Swallowing Skills  fed by SLP  -AW        Positioning During Eating  upright in bed  -AW        Utensils Used  spoon;cup;straw  -AW        Consistencies Trialed  thin liquids;nectar/syrup-thick liquids;honey-thick liquids;pureed  -AW           Clinical Swallow Eval    Clinical Swallow Evaluation Summary  Swallow re-evaluated. Pt's swallow is mistimed and audible with thin with high suspicion of silent penetration/aspiration. Inconsistent wheezes noted after trials of thin. No overt coughing. Timing slightly better with trials of NTL, HTL, and pureed. Recommend pureed and NTL; meds w/ NTL or pureed; upright for meals and 30 min after; slow rate; small bites/sips; assist with feeding. ST to schedule VFSS to further assess.    -AW           Clinical Impression    SLP Swallowing Diagnosis   oral dysphagia  -AW        Functional Impact  risk of aspiration/pneumonia  -AW        Rehab Potential/Prognosis, Swallowing  good, to achieve stated therapy goals  -AW        Swallow Criteria for Skilled Therapeutic Interventions Met  demonstrates skilled criteria  -AW           Recommendations    Therapy Frequency (Swallow)  PRN  -AW        Predicted Duration Therapy Intervention (Days)  until discharge  -AW        SLP Diet Recommendation  nectar thick liquids;puree  -AW        Recommended Diagnostics  VFSS ([facility])  -AW        Recommended Precautions and Strategies  upright posture during/after eating;small bites of food and sips of liquid;assist with feeding;1:1 supervision  -AW        Oral Care Recommendations  Oral Care before breakfast, after meals and PRN  -AW        SLP Rec. for Method of Medication Administration  meds whole;meds crushed;with thick liquids;with pudding or applesauce;as tolerated  -AW        Monitor for Signs of Aspiration  yes  -AW        Anticipated Discharge Disposition (SLP)  unknown  -AW          User Key  (r) = Recorded By, (t) = Taken By, (c) = Cosigned By    Initials Name Effective Dates    Isha Denney MS CCC-SLP 06/16/21 -           EDUCATION  The patient has been educated in the following areas:   Dysphagia (Swallowing Impairment) Oral Care/Hydration Modified Diet Instruction.            SLP Outcome Measures (last 72 hours)      SLP Outcome Measures     Row Name 09/10/21 1600             SLP Outcome Measures    Outcome Measure Used?  Adult NOMS  -AW         Adult FCM Scores    FCM Chosen  Swallowing  -AW      Swallowing FCM Score  4  -AW        User Key  (r) = Recorded By, (t) = Taken By, (c) = Cosigned By    Initials Name Effective Dates    Isha Denney MS CCC-SLP 06/16/21 -            Time Calculation:   Time Calculation- SLP     Row Name 09/10/21 1617             Time Calculation- SLP    SLP Start Time  1400  -AW      SLP Received On  09/10/21  -AW        User Key  (r) =  Recorded By, (t) = Taken By, (c) = Cosigned By    Initials Name Provider Type    AW Isha Abraham, MS CCC-SLP Speech and Language Pathologist          Therapy Charges for Today     Code Description Service Date Service Provider Modifiers Qty    [id number]  ST TREATMENT SWALLOW 4 9/10/2021 Isha Abraham MS CCC-SLP GN 1               Isha Abraham MS CCC-SLP  9/10/2021

## 2021-09-11 ENCOUNTER — APPOINTMENT (OUTPATIENT)
Dept: GENERAL RADIOLOGY | Facility: HOSPITAL | Age: 36
End: 2021-09-11

## 2021-09-11 LAB
ALBUMIN SERPL-MCNC: 3.8 G/DL (ref 3.5–5.2)
ANION GAP SERPL CALCULATED.3IONS-SCNC: 14.3 MMOL/L (ref 5–15)
BASOPHILS # BLD AUTO: 0.05 10*3/MM3 (ref 0–0.2)
BASOPHILS NFR BLD AUTO: 0.5 % (ref 0–1.5)
BUN SERPL-MCNC: 35 MG/DL (ref 6–20)
BUN/CREAT SERPL: 17.9 (ref 7–25)
CALCIUM SPEC-SCNC: 10.1 MG/DL (ref 8.6–10.5)
CHLORIDE SERPL-SCNC: 106 MMOL/L (ref 98–107)
CO2 SERPL-SCNC: 24.7 MMOL/L (ref 22–29)
CREAT SERPL-MCNC: 1.96 MG/DL (ref 0.76–1.27)
DEPRECATED RDW RBC AUTO: 40.5 FL (ref 37–54)
EOSINOPHIL # BLD AUTO: 0.06 10*3/MM3 (ref 0–0.4)
EOSINOPHIL NFR BLD AUTO: 0.6 % (ref 0.3–6.2)
ERYTHROCYTE [DISTWIDTH] IN BLOOD BY AUTOMATED COUNT: 16.3 % (ref 12.3–15.4)
GFR SERPL CREATININE-BSD FRML MDRD: 48 ML/MIN/1.73
GLUCOSE SERPL-MCNC: 105 MG/DL (ref 65–99)
HCT VFR BLD AUTO: 36.4 % (ref 37.5–51)
HGB BLD-MCNC: 11.6 G/DL (ref 13–17.7)
LYMPHOCYTES # BLD AUTO: 2.47 10*3/MM3 (ref 0.7–3.1)
LYMPHOCYTES NFR BLD AUTO: 24.6 % (ref 19.6–45.3)
MCH RBC QN AUTO: 22.9 PG (ref 26.6–33)
MCHC RBC AUTO-ENTMCNC: 31.9 G/DL (ref 31.5–35.7)
MCV RBC AUTO: 71.8 FL (ref 79–97)
MONOCYTES # BLD AUTO: 0.56 10*3/MM3 (ref 0.1–0.9)
MONOCYTES NFR BLD AUTO: 5.6 % (ref 5–12)
NEUTROPHILS NFR BLD AUTO: 6.17 10*3/MM3 (ref 1.7–7)
NEUTROPHILS NFR BLD AUTO: 61.2 % (ref 42.7–76)
PHOSPHATE SERPL-MCNC: 4.4 MG/DL (ref 2.5–4.5)
PLATELET # BLD AUTO: 258 10*3/MM3 (ref 140–450)
PMV BLD AUTO: 11.7 FL (ref 6–12)
POTASSIUM SERPL-SCNC: 4.5 MMOL/L (ref 3.5–5.2)
RBC # BLD AUTO: 5.07 10*6/MM3 (ref 4.14–5.8)
SODIUM SERPL-SCNC: 145 MMOL/L (ref 136–145)
WBC # BLD AUTO: 10.06 10*3/MM3 (ref 3.4–10.8)

## 2021-09-11 PROCEDURE — 85025 COMPLETE CBC W/AUTO DIFF WBC: CPT | Performed by: INTERNAL MEDICINE

## 2021-09-11 PROCEDURE — 74230 X-RAY XM SWLNG FUNCJ C+: CPT

## 2021-09-11 PROCEDURE — 92611 MOTION FLUOROSCOPY/SWALLOW: CPT

## 2021-09-11 PROCEDURE — 80069 RENAL FUNCTION PANEL: CPT | Performed by: INTERNAL MEDICINE

## 2021-09-11 RX ORDER — CARVEDILOL 25 MG/1
25 TABLET ORAL 2 TIMES DAILY WITH MEALS
Qty: 60 TABLET | Refills: 0 | Status: SHIPPED | OUTPATIENT
Start: 2021-09-11 | End: 2021-10-06 | Stop reason: SDUPTHER

## 2021-09-11 RX ORDER — DIVALPROEX SODIUM 500 MG/1
500 TABLET, DELAYED RELEASE ORAL 2 TIMES DAILY
Qty: 180 TABLET | Refills: 0 | Status: SHIPPED | OUTPATIENT
Start: 2021-09-11 | End: 2022-02-16 | Stop reason: ALTCHOICE

## 2021-09-11 RX ORDER — MINOXIDIL 2.5 MG/1
5 TABLET ORAL EVERY 12 HOURS SCHEDULED
Status: DISCONTINUED | OUTPATIENT
Start: 2021-09-11 | End: 2021-09-12 | Stop reason: HOSPADM

## 2021-09-11 RX ORDER — AMLODIPINE BESYLATE 5 MG/1
5 TABLET ORAL DAILY
Qty: 90 TABLET | Refills: 0 | Status: SHIPPED | OUTPATIENT
Start: 2021-09-12 | End: 2021-10-06

## 2021-09-11 RX ORDER — MINOXIDIL 10 MG/1
5 TABLET ORAL EVERY 12 HOURS SCHEDULED
Qty: 30 TABLET | Refills: 0 | Status: SHIPPED | OUTPATIENT
Start: 2021-09-11 | End: 2021-10-07

## 2021-09-11 RX ORDER — AMLODIPINE BESYLATE 5 MG/1
5 TABLET ORAL DAILY
Status: DISCONTINUED | OUTPATIENT
Start: 2021-09-12 | End: 2021-09-12 | Stop reason: HOSPADM

## 2021-09-11 RX ORDER — AMLODIPINE BESYLATE 5 MG/1
5 TABLET ORAL ONCE
Status: COMPLETED | OUTPATIENT
Start: 2021-09-11 | End: 2021-09-11

## 2021-09-11 RX ORDER — AMLODIPINE BESYLATE 10 MG/1
10 TABLET ORAL DAILY
Status: DISCONTINUED | OUTPATIENT
Start: 2021-09-12 | End: 2021-09-11

## 2021-09-11 RX ORDER — CLONIDINE 0.3 MG/24H
1 PATCH, EXTENDED RELEASE TRANSDERMAL WEEKLY
Qty: 4 PATCH | Refills: 0 | Status: SHIPPED | OUTPATIENT
Start: 2021-09-17 | End: 2021-09-27

## 2021-09-11 RX ORDER — CEPHALEXIN 500 MG/1
500 CAPSULE ORAL EVERY 6 HOURS SCHEDULED
Qty: 13 CAPSULE | Refills: 0 | Status: SHIPPED | OUTPATIENT
Start: 2021-09-11 | End: 2021-09-16

## 2021-09-11 RX ORDER — OLANZAPINE 20 MG/1
20 TABLET ORAL NIGHTLY
Qty: 30 TABLET | Refills: 0 | Status: SHIPPED | OUTPATIENT
Start: 2021-09-11 | End: 2021-10-06

## 2021-09-11 RX ADMIN — CARVEDILOL 25 MG: 25 TABLET, FILM COATED ORAL at 08:10

## 2021-09-11 RX ADMIN — DIVALPROEX SODIUM 500 MG: 500 TABLET, DELAYED RELEASE ORAL at 08:10

## 2021-09-11 RX ADMIN — CARVEDILOL 25 MG: 25 TABLET, FILM COATED ORAL at 17:34

## 2021-09-11 RX ADMIN — MINOXIDIL 5 MG: 2.5 TABLET ORAL at 23:53

## 2021-09-11 RX ADMIN — OLANZAPINE 20 MG: 10 TABLET ORAL at 23:53

## 2021-09-11 RX ADMIN — AMLODIPINE BESYLATE 5 MG: 5 TABLET ORAL at 11:45

## 2021-09-11 RX ADMIN — BARIUM SULFATE 50 ML: 400 SUSPENSION ORAL at 09:55

## 2021-09-11 RX ADMIN — AMLODIPINE BESYLATE 5 MG: 10 TABLET ORAL at 08:10

## 2021-09-11 RX ADMIN — CEPHALEXIN 500 MG: 500 CAPSULE ORAL at 11:48

## 2021-09-11 RX ADMIN — BARIUM SULFATE 55 ML: 0.81 POWDER, FOR SUSPENSION ORAL at 09:55

## 2021-09-11 RX ADMIN — CEPHALEXIN 500 MG: 500 CAPSULE ORAL at 00:58

## 2021-09-11 RX ADMIN — CEPHALEXIN 500 MG: 500 CAPSULE ORAL at 23:52

## 2021-09-11 RX ADMIN — CEPHALEXIN 500 MG: 500 CAPSULE ORAL at 06:30

## 2021-09-11 RX ADMIN — PANTOPRAZOLE SODIUM 40 MG: 40 TABLET, DELAYED RELEASE ORAL at 06:30

## 2021-09-11 RX ADMIN — LEVOTHYROXINE SODIUM 75 MCG: 0.07 TABLET ORAL at 06:30

## 2021-09-11 RX ADMIN — BARIUM SULFATE 183 ML: 960 POWDER, FOR SUSPENSION ORAL at 09:55

## 2021-09-11 RX ADMIN — MINOXIDIL 2.5 MG: 2.5 TABLET ORAL at 08:10

## 2021-09-11 RX ADMIN — CEPHALEXIN 500 MG: 500 CAPSULE ORAL at 17:34

## 2021-09-11 RX ADMIN — DIVALPROEX SODIUM 500 MG: 500 TABLET, DELAYED RELEASE ORAL at 23:53

## 2021-09-11 RX ADMIN — SODIUM CHLORIDE, PRESERVATIVE FREE 10 ML: 5 INJECTION INTRAVENOUS at 08:11

## 2021-09-11 RX ADMIN — ALLOPURINOL 100 MG: 100 TABLET ORAL at 08:10

## 2021-09-11 RX ADMIN — BARIUM SULFATE 1 TEASPOON(S): 0.6 CREAM ORAL at 09:55

## 2021-09-11 NOTE — MBS/VFSS/FEES
Acute Care - Speech Language Pathology   Swallow Re-Evaluation [facility]     Patient Name: Arthur Mccarthy  : 1985  MRN: [id number]  Today's Date: 2021               Admit Date: 2021    Visit Dx:     ICD-10-CM ICD-9-CM   1. Acute urinary tract infection  N39.0 599.0   2. Acute kidney injury superimposed on chronic kidney disease (CMS/HCC)  N17.9 866.00    N18.9 585.9   3. Acute urinary retention  R33.8 788.29   4. Intellectual disability  F79 319   5. History of seizure disorder  Z86.69 V12.49     Patient Active Problem List   Diagnosis   • Intellectual disability   • Seasonal allergies   • Hypertension   • Seizure disorder (CMS/HCC)   • Stage 3b chronic kidney disease (CMS/HCC)   • Hyperlipidemia   • Annual physical exam   • Hypothyroidism   • GERD (gastroesophageal reflux disease)   • Asthma   • Constipation   • Hypertriglyceridemia   • Acute urinary tract infection   • JORDEN (acute kidney injury) (CMS/HCC)   • Urine retention   • Sepsis without acute organ dysfunction (present on admission)     Past Medical History:   Diagnosis Date   • Acquired intellectual disability    • Allergic rhinitis    • Asthma    • Chronic kidney disease (CKD)    • Chronic renal disease, stage 3, moderately decreased glomerular filtration rate between 30-59 mL/min/1.73 square meter (CMS/HCC)    • Constipation    • Epilepsy, generalized, convulsive (CMS/HCC)    • Episode of altered consciousness    • Essential hypertension     History of Essential Hypertension    • GERD (gastroesophageal reflux disease)    • Hematuria    • Hyperkalemia    • Hyperlipidemia    • Hypertension    • Hypothyroidism    • Immunization due    • Metabolic encephalopathy    • Moderate intellectual disabilities    • Mood disorder (CMS/HCC)    • Nocturia    • Physical exam, annual    • Seizure (CMS/HCC)    • Seizure disorder (CMS/HCC)      History reviewed. No pertinent surgical history.  Patient was not wearing a face mask during this therapy  encounter. Therapist used appropriate personal protective equipment including mask, eye protection and gloves.  Mask used was standard procedure mask. Appropriate PPE was worn during the entire therapy session. Hand hygiene was completed before and after therapy session. Patient is not in enhanced droplet precautions.             SLP Recommendation and Plan  SLP Swallowing Diagnosis: mild, oral dysphagia, pharyngeal dysphagia, esophageal dysphagia  SLP Diet Recommendation: regular textures, thin liquids  Recommended Precautions and Strategies: upright posture during/after eating, small bites of food and sips of liquid, 3 second prep, reflux precautions, assist with feeding  SLP Rec. for Method of Medication Administration: as tolerated     Monitor for Signs of Aspiration: yes     Swallow Criteria for Skilled Therapeutic Interventions Met: demonstrates skilled criteria     Rehab Potential/Prognosis, Swallowing: good, to achieve stated therapy goals  Therapy Frequency (Swallow): PRN  Predicted Duration Therapy Intervention (Days): until discharge                         Plan of Care Reviewed With: patient  Outcome Summary: VFSS completed.  Recommend a regular diet with thin liquids and meds as tolerated.  Swallow precatuions include sit up at 90 degrees, small bites and sips, okay to use straws, and follow reflux precautions.  Will monitor for diet tolerance.  If overt s/s of aspiration noted, recommend downgrade to mechanical soft with no mixed consistencies and thin liquids.         SWALLOW EVALUATION (last 72 hours)      SLP Adult Swallow Evaluation     Row Name 09/11/21 1100 09/10/21 1500                Rehab Evaluation    Document Type  evaluation  -NR  re-evaluation  -AW       Subjective Information  no complaints  -NR  no complaints  -AW       Patient Observations  alert;cooperative;agree to therapy  -NR  alert;cooperative;agree to therapy  -AW       Care Plan Review  evaluation/treatment results reviewed;care  plan/treatment goals reviewed;patient/other agree to care plan  -NR  evaluation/treatment results reviewed;care plan/treatment goals reviewed;risks/benefits reviewed;current/potential barriers reviewed;patient/other agree to care plan  -AW       Patient Effort  good  -NR  good  -AW       Symptoms Noted During/After Treatment  none  -NR  none  -AW          General Information    Patient Profile Reviewed  yes  -NR  yes  -AW       Pertinent History Of Current Problem  Admitted wtih fever, dyspnea, fatigue and UTI.  HIstory of intellectual delay.  -NR  Pt admitted with fever, dyspena, fatigue, and UTI; h/o intellectual delay  -AW       Current Method of Nutrition  pureed;nectar/syrup-thick liquids  -NR  pureed;thin liquids  -AW       Precautions/Limitations, Vision  WFL;for purposes of eval  -NR  WFL;for purposes of eval  -AW       Precautions/Limitations, Hearing  WFL;for purposes of eval  -NR  WFL;for purposes of eval  -AW       Prior Level of Function-Communication  cognitive-linguistic impairment  -NR  cognitive-linguistic impairment  -AW       Prior Level of Function-Swallowing  safe, efficient swallowing in all situations  -NR  unknown  -AW       Plans/Goals Discussed with  patient;agreed upon  -NR  patient;agreed upon  -AW       Barriers to Rehab  cognitive status  -NR  cognitive status  -AW       Patient's Goals for Discharge  patient did not state  -NR  patient did not state  -AW          Pain Scale: Numbers Pre/Post-Treatment    Pretreatment Pain Rating  0/10 - no pain  -NR  --       Posttreatment Pain Rating  0/10 - no pain  -NR  --          Oral Motor Structure and Function    Dentition Assessment  missing teeth  -NR  --       Secretion Management  WNL/WFL  -NR  --       Mucosal Quality  moist, healthy  -NR  --          Oral Musculature and Cranial Nerve Assessment    Oral Motor General Assessment  generalized oral motor weakness  -NR  --       Oral Motor, Comment  -- mouth open at rest with tongue forward   -NR  forward tongue protrusion  -AW          General Eating/Swallowing Observations    Eating/Swallowing Skills  --  fed by SLP  -AW       Positioning During Eating  --  upright in bed  -AW       Utensils Used  --  spoon;cup;straw  -AW       Consistencies Trialed  --  thin liquids;nectar/syrup-thick liquids;honey-thick liquids;pureed  -AW          Respiratory    Respiratory Status  WFL  -NR  --          Clinical Swallow Eval    Clinical Swallow Evaluation Summary  --  Swallow re-evaluated. Pt's swallow is mistimed and audible with thin with high suspicion of silent penetration/aspiration. Inconsistent wheezes noted after trials of thin. No overt coughing. Timing slightly better with trials of NTL, HTL, and pureed. Recommend pureed and NTL; meds w/ NTL or pureed; upright for meals and 30 min after; slow rate; small bites/sips; assist with feeding. ST to schedule VFSS to further assess.    -AW          MBS/VFSS    Utensils Used  spoon;cup;straw  -NR  --          MBS/VFSS Interpretation    VFSS Summary  The patient presents with a mouth open posture and tongue protrusion at rest.  Pt had reduced labial seal around spoon cup.  Mouth somewhat open during mastication.  The oral phase of the swallow noted for adequate mastication with semisold and solid textures given pt's history.  Posterior lingual weakness noted with posterior spillage with most consistencies to the vallecula.  Bolus consolidation and transfer appear adequate as well with minimal lingual residuals after the primary swallow.  The patient appeared to mostly clear pooling during the primary swallow. The patient's pharyngeal swallow reflex is fairly timely with functional laryngeal elevation and closure.  Some mistiming noted with audible swallows in the absence of any observable penetration or aspiration.  Wheezing noted at times too without observable symptoms.  The patient had trace to mild residuals on the tongue base and in the vallecula with most  consistencies and min to moderate residuals with puree.  The pharyngeal residuals mostly cleared with cues or during subsequent swallows.  The patient was able to follow commands well to achieve dry swallows on command throughout the test.  The patient had possible trace penetration during the swallow x 1 with juice from peaches.  Difficult to tell if the material was penetrated or located in the aryepiglottic folds.  If the patient did slightly penetration the material cleared during follow up swallows.  Some retrograde movement located within the upper esophagus with slight status during the study.  Pt was on a regular diet with thin liquids prior to admit.  Feel pt is safe to return to regular/thin diet with superivision and assistance with feeding.  If choking noted, recommend downgrade to a mechanical soft diet with no mixed consistencies and thin liquids.  Pt was challenged with large cup sips of thin liquid as well as single and successive straw sips of thin liquid and no penetration/aspiration was observed.  Recommend regular diet with thin liquids and meds as tolerated.  Swallow precautions include sit up at 90 degrees, slow rate, small bites and sips, okay to use straws, and follow reflux precautions.    -NR  --          Esophageal Phase    Esophageal Phase  esophageal retention with retrograde flow below PES;see radiology report for further details  -NR  --          SLP Communication to Radiology    Severity Level of Dysphagia  mild dysphagia;oral dysfunction;pharyngeal dysfunction;suspected esophageal dysfunction  -NR  --       Consistencies Aspirated/Penetrated  -- possible transient silent penetration with juice from peache  -NR  --       Summary Statement  Habitual mouth open posture with anterior tongue placement.  Grossly functional mastication, bolus consolidation and transfer with some pooling and oral residuals.  Possible transient penetration x 1 with juice from peaches vs material in  aryepiglottic folds.  Trace to mild residuals after the primary swallow on the tongue base and in the vallecula which mostly clear with cues or subsequent swallows.  Some possible retrograde movement noted within the upper esophagus with mild residuals.  Pt was on a regular diet with thin liquids prior to admit.  Recommend resume regular diet with thin liquids and meds as tolerated.  Swallow precautions include sit up at 90 degrees, small bites and sips, okay to use straws, and follow reflux precautions.  -NR  --          Clinical Impression    SLP Swallowing Diagnosis  mild;oral dysphagia;pharyngeal dysphagia;esophageal dysphagia  -NR  oral dysphagia  -AW       Functional Impact  risk of aspiration/pneumonia  -NR  risk of aspiration/pneumonia  -AW       Rehab Potential/Prognosis, Swallowing  good, to achieve stated therapy goals  -NR  good, to achieve stated therapy goals  -AW       Swallow Criteria for Skilled Therapeutic Interventions Met  demonstrates skilled criteria  -NR  demonstrates skilled criteria  -AW          Recommendations    Therapy Frequency (Swallow)  PRN  -NR  PRN  -AW       Predicted Duration Therapy Intervention (Days)  until discharge  -NR  until discharge  -AW       SLP Diet Recommendation  regular textures;thin liquids  -NR  nectar thick liquids;puree  -AW       Recommended Diagnostics  --  VFSS (MBS)  -AW       Recommended Precautions and Strategies  upright posture during/after eating;small bites of food and sips of liquid;3 second prep;reflux precautions;assist with feeding  -NR  upright posture during/after eating;small bites of food and sips of liquid;assist with feeding;1:1 supervision  -AW       Oral Care Recommendations  Oral Care before breakfast, after meals and PRN  -NR  Oral Care before breakfast, after meals and PRN  -AW       SLP Rec. for Method of Medication Administration  as tolerated  -NR  meds whole;meds crushed;with thick liquids;with pudding or applesauce;as tolerated  -AW        Monitor for Signs of Aspiration  yes  -NR  yes  -AW       Anticipated Discharge Disposition (SLP)  --  unknown  -AW          Oral Nutrition/Hydration Goal 1 (SLP)    Progress/Outcomes (Oral Nutrition/Hydration Goal 1, SLP)  continuing progress toward goal  -NR  --         User Key  (r) = Recorded By, (t) = Taken By, (c) = Cosigned By    Initials Name Effective Dates    NR Quyen Maurer MA,CCC-SLP 06/16/21 -     Isha Denney, MS [facility]SLP 06/16/21 -           EDUCATION  The patient has been educated in the following areas:   Dysphagia (Swallowing Impairment).       SLP GOALS     Row Name 09/11/21 1100             Oral Nutrition/Hydration Goal 1 (SLP)    Progress/Outcomes (Oral Nutrition/Hydration Goal 1, SLP)  continuing progress toward goal  -NR        User Key  (r) = Recorded By, (t) = Taken By, (c) = Cosigned By    Initials Name Provider Type    NR Quyen Maurer MA,CCC-SLP Speech and Language Pathologist           SLP Outcome Measures (last 72 hours)      SLP Outcome Measures     Row Name 09/11/21 1200 09/10/21 1600          SLP Outcome Measures    Outcome Measure Used?  --  Adult NOMS  -AW        Adult FCM Scores    FCM Chosen  --  Swallowing  -AW     Swallowing FCM Score  6  -NR  4  -AW       User Key  (r) = Recorded By, (t) = Taken By, (c) = Cosigned By    Initials Name Effective Dates    NR Quyen Maurer MA,CCC-SLP 06/16/21 -     Isha Denney, MS [facility]SLP 06/16/21 -            Time Calculation:   Time Calculation- SLP     Row Name 09/11/21 1218             Time Calculation- SLP    SLP Start Time  0900  -NR      SLP Stop Time  1030  -NR      SLP Time Calculation (min)  90 min  -NR        User Key  (r) = Recorded By, (t) = Taken By, (c) = Cosigned By    Initials Name Provider Type    Quyen Perkins MA,CCC-SLP Speech and Language Pathologist          Therapy Charges for Today     Code Description Service Date Service Provider Modifiers Qty    97930161053 HC ST MOTION FLUORO EVAL SWALLOW 6 9/11/2021  Quyen Maurer MA,CCC-SLP GN 1               Quyen Maurer MA,CCC-SLP  9/11/2021

## 2021-09-11 NOTE — MBS/VFSS/FEES
Acute Care - Speech Language Pathology   Swallow Re-Evaluation [facility]     Patient Name: Arthur Mccarthy  : 1985  MRN: [id number]  Today's Date: 2021               Admit Date: 2021    Visit Dx:     ICD-10-CM ICD-9-CM   1. Acute urinary tract infection  N39.0 599.0   2. Acute kidney injury superimposed on chronic kidney disease (CMS/HCC)  N17.9 866.00    N18.9 585.9   3. Acute urinary retention  R33.8 788.29   4. Intellectual disability  F79 319   5. History of seizure disorder  Z86.69 V12.49     Patient Active Problem List   Diagnosis   • Intellectual disability   • Seasonal allergies   • Hypertension   • Seizure disorder (CMS/HCC)   • Stage 3b chronic kidney disease (CMS/HCC)   • Hyperlipidemia   • Annual physical exam   • Hypothyroidism   • GERD (gastroesophageal reflux disease)   • Asthma   • Constipation   • Hypertriglyceridemia   • Acute urinary tract infection   • JORDEN (acute kidney injury) (CMS/HCC)   • Urine retention   • Sepsis without acute organ dysfunction (present on admission)     Past Medical History:   Diagnosis Date   • Acquired intellectual disability    • Allergic rhinitis    • Asthma    • Chronic kidney disease (CKD)    • Chronic renal disease, stage 3, moderately decreased glomerular filtration rate between 30-59 mL/min/1.73 square meter (CMS/HCC)    • Constipation    • Epilepsy, generalized, convulsive (CMS/HCC)    • Episode of altered consciousness    • Essential hypertension     History of Essential Hypertension    • GERD (gastroesophageal reflux disease)    • Hematuria    • Hyperkalemia    • Hyperlipidemia    • Hypertension    • Hypothyroidism    • Immunization due    • Metabolic encephalopathy    • Moderate intellectual disabilities    • Mood disorder (CMS/HCC)    • Nocturia    • Physical exam, annual    • Seizure (CMS/HCC)    • Seizure disorder (CMS/HCC)    Patient was not wearing a face mask during this therapy encounter. Therapist used appropriate personal  protective equipment including mask, eye protection and gloves.  Mask used was standard procedure mask. Appropriate PPE was worn during the entire therapy session. Hand hygiene was completed before and after therapy session. Patient is not in enhanced droplet precautions.             History reviewed. No pertinent surgical history.    SLP Recommendation and Plan  SLP Swallowing Diagnosis: mild, oral dysphagia, pharyngeal dysphagia, esophageal dysphagia  SLP Diet Recommendation: regular textures, thin liquids  Recommended Precautions and Strategies: upright posture during/after eating, small bites of food and sips of liquid, 3 second prep, reflux precautions, assist with feeding  SLP Rec. for Method of Medication Administration: as tolerated     Monitor for Signs of Aspiration: yes     Swallow Criteria for Skilled Therapeutic Interventions Met: demonstrates skilled criteria     Rehab Potential/Prognosis, Swallowing: good, to achieve stated therapy goals  Therapy Frequency (Swallow): PRN  Predicted Duration Therapy Intervention (Days): until discharge                         Plan of Care Reviewed With: patient  Outcome Summary: VFSS completed.  Recommend a regular diet with thin liquids and meds as tolerated.  Swallow precatuions include sit up at 90 degrees, small bites and sips, okay to use straws, and follow reflux precautions.  Will monitor for diet tolerance.  If overt s/s of aspiration noted, recommend downgrade to mechanical soft with no mixed consistencies and thin liquids.         SWALLOW EVALUATION (last 72 hours)      SLP Adult Swallow Evaluation     Row Name 09/11/21 1100 09/10/21 1500                Rehab Evaluation    Document Type  evaluation  -NR  re-evaluation  -AW       Subjective Information  no complaints  -NR  no complaints  -AW       Patient Observations  alert;cooperative;agree to therapy  -NR  alert;cooperative;agree to therapy  -AW       Care Plan Review  evaluation/treatment results  reviewed;care plan/treatment goals reviewed;patient/other agree to care plan  -NR  evaluation/treatment results reviewed;care plan/treatment goals reviewed;risks/benefits reviewed;current/potential barriers reviewed;patient/other agree to care plan  -AW       Patient Effort  good  -NR  good  -AW       Symptoms Noted During/After Treatment  none  -NR  none  -AW          General Information    Patient Profile Reviewed  yes  -NR  yes  -AW       Pertinent History Of Current Problem  Admitted wtih fever, dyspnea, fatigue and UTI.  HIstory of intellectual delay.  -NR  Pt admitted with fever, dyspena, fatigue, and UTI; h/o intellectual delay  -AW       Current Method of Nutrition  pureed;nectar/syrup-thick liquids  -NR  pureed;thin liquids  -AW       Precautions/Limitations, Vision  WFL;for purposes of eval  -NR  WFL;for purposes of eval  -AW       Precautions/Limitations, Hearing  WFL;for purposes of eval  -NR  WFL;for purposes of eval  -AW       Prior Level of Function-Communication  cognitive-linguistic impairment  -NR  cognitive-linguistic impairment  -AW       Prior Level of Function-Swallowing  safe, efficient swallowing in all situations  -NR  unknown  -AW       Plans/Goals Discussed with  patient;agreed upon  -NR  patient;agreed upon  -AW       Barriers to Rehab  cognitive status  -NR  cognitive status  -AW       Patient's Goals for Discharge  patient did not state  -NR  patient did not state  -AW          Pain Scale: Numbers Pre/Post-Treatment    Pretreatment Pain Rating  0/10 - no pain  -NR  --       Posttreatment Pain Rating  0/10 - no pain  -NR  --          Oral Motor Structure and Function    Dentition Assessment  missing teeth  -NR  --       Secretion Management  WNL/WFL  -NR  --       Mucosal Quality  moist, healthy  -NR  --          Oral Musculature and Cranial Nerve Assessment    Oral Motor General Assessment  generalized oral motor weakness  -NR  --       Oral Motor, Comment  -- mouth open at rest with  tongue forward  -NR  forward tongue protrusion  -AW          General Eating/Swallowing Observations    Eating/Swallowing Skills  --  fed by SLP  -AW       Positioning During Eating  --  upright in bed  -AW       Utensils Used  --  spoon;cup;straw  -AW       Consistencies Trialed  --  thin liquids;nectar/syrup-thick liquids;honey-thick liquids;pureed  -AW          Respiratory    Respiratory Status  WFL  -NR  --          Clinical Swallow Eval    Clinical Swallow Evaluation Summary  --  Swallow re-evaluated. Pt's swallow is mistimed and audible with thin with high suspicion of silent penetration/aspiration. Inconsistent wheezes noted after trials of thin. No overt coughing. Timing slightly better with trials of NTL, HTL, and pureed. Recommend pureed and NTL; meds w/ NTL or pureed; upright for meals and 30 min after; slow rate; small bites/sips; assist with feeding. ST to schedule VFSS to further assess.    -AW          MBS/VFSS    Utensils Used  spoon;cup;straw  -NR  --       Consistencies Trialed  regular textures;chopped;mechanical soft, no mixed consistencies;mixed consistency;thin liquids;nectar/syrup-thick liquids  -NR  --          MBS/VFSS Interpretation    VFSS Summary  The patient presents with a mouth open posture and tongue protrusion at rest.  Pt had reduced labial seal around spoon cup.  Mouth somewhat open during mastication.  The oral phase of the swallow noted for adequate mastication with semisold and solid textures given pt's history.  Posterior lingual weakness noted with posterior spillage with most consistencies to the vallecula.  Bolus consolidation and transfer appear adequate as well with minimal lingual residuals after the primary swallow.  The patient appeared to mostly clear pooling during the primary swallow. The patient's pharyngeal swallow reflex is fairly timely with functional laryngeal elevation and closure.  Some mistiming noted with audible swallows in the absence of any observable  penetration or aspiration.  Wheezing noted at times too without observable symptoms.  The patient had trace to mild residuals on the tongue base and in the vallecula with most consistencies and min to moderate residuals with puree.  The pharyngeal residuals mostly cleared with cues or during subsequent swallows.  The patient was able to follow commands well to achieve dry swallows on command throughout the test.  The patient had possible trace penetration during the swallow x 1 with juice from peaches.  Difficult to tell if the material was penetrated or located in the aryepiglottic folds.  If the patient did slightly penetration the material cleared during follow up swallows.  Some retrograde movement located within the upper esophagus with slight status during the study.  Pt was on a regular diet with thin liquids prior to admit.  Feel pt is safe to return to regular/thin diet with superivision and assistance with feeding.  If choking noted, recommend downgrade to a mechanical soft diet with no mixed consistencies and thin liquids.  Pt was challenged with large cup sips of thin liquid as well as single and successive straw sips of thin liquid and no penetration/aspiration was observed.  Recommend regular diet with thin liquids and meds as tolerated.  Swallow precautions include sit up at 90 degrees, slow rate, small bites and sips, okay to use straws, and follow reflux precautions.    -NR  --          Esophageal Phase    Esophageal Phase  esophageal retention with retrograde flow below PES;see radiology report for further details  -NR  --          SLP Communication to Radiology    Severity Level of Dysphagia  mild dysphagia;oral dysfunction;pharyngeal dysfunction;suspected esophageal dysfunction  -NR  --       Consistencies Aspirated/Penetrated  -- possible transient silent penetration with juice from peache  -NR  --       Summary Statement  Habitual mouth open posture with anterior tongue placement.  Grossly  functional mastication, bolus consolidation and transfer with some pooling and oral residuals.  Possible transient penetration x 1 with juice from peaches vs material in aryepiglottic folds.  Trace to mild residuals after the primary swallow on the tongue base and in the vallecula which mostly clear with cues or subsequent swallows.  Some possible retrograde movement noted within the upper esophagus with mild residuals.  Pt was on a regular diet with thin liquids prior to admit.  Recommend resume regular diet with thin liquids and meds as tolerated.  Swallow precautions include sit up at 90 degrees, small bites and sips, okay to use straws, and follow reflux precautions.  -NR  --          Clinical Impression    SLP Swallowing Diagnosis  mild;oral dysphagia;pharyngeal dysphagia;esophageal dysphagia  -NR  oral dysphagia  -AW       Functional Impact  risk of aspiration/pneumonia  -NR  risk of aspiration/pneumonia  -AW       Rehab Potential/Prognosis, Swallowing  good, to achieve stated therapy goals  -NR  good, to achieve stated therapy goals  -AW       Swallow Criteria for Skilled Therapeutic Interventions Met  demonstrates skilled criteria  -NR  demonstrates skilled criteria  -AW          Recommendations    Therapy Frequency (Swallow)  PRN  -NR  PRN  -AW       Predicted Duration Therapy Intervention (Days)  until discharge  -NR  until discharge  -AW       SLP Diet Recommendation  regular textures;thin liquids  -NR  nectar thick liquids;puree  -AW       Recommended Diagnostics  --  VFSS (MBS)  -AW       Recommended Precautions and Strategies  upright posture during/after eating;small bites of food and sips of liquid;3 second prep;reflux precautions;assist with feeding  -NR  upright posture during/after eating;small bites of food and sips of liquid;assist with feeding;1:1 supervision  -AW       Oral Care Recommendations  Oral Care before breakfast, after meals and PRN  -NR  Oral Care before breakfast, after meals and  PRN  -AW       SLP Rec. for Method of Medication Administration  as tolerated  -NR  meds whole;meds crushed;with thick liquids;with pudding or applesauce;as tolerated  -AW       Monitor for Signs of Aspiration  yes  -NR  yes  -AW       Anticipated Discharge Disposition (SLP)  --  unknown  -AW          Oral Nutrition/Hydration Goal 1 (SLP)    Progress/Outcomes (Oral Nutrition/Hydration Goal 1, SLP)  continuing progress toward goal  -NR  --         User Key  (r) = Recorded By, (t) = Taken By, (c) = Cosigned By    Initials Name Effective Dates    NR Quyen Maurer MA,CCC-SLP 06/16/21 -     Isha Denney, MS [facility]-SLP 06/16/21 -           EDUCATION  The patient has been educated in the following areas:   Dysphagia (Swallowing Impairment).       SLP GOALS     Row Name 09/11/21 1100             Oral Nutrition/Hydration Goal 1 (SLP)    Progress/Outcomes (Oral Nutrition/Hydration Goal 1, SLP)  continuing progress toward goal  -NR        User Key  (r) = Recorded By, (t) = Taken By, (c) = Cosigned By    Initials Name Provider Type    Quyen Perkins MA,CCC-SLP Speech and Language Pathologist           SLP Outcome Measures (last 72 hours)      SLP Outcome Measures     Row Name 09/11/21 1200 09/10/21 1600          SLP Outcome Measures    Outcome Measure Used?  --  Adult NOMS  -AW        Adult FCM Scores    FCM Chosen  --  Swallowing  -AW     Swallowing FCM Score  6  -NR  4  -AW       User Key  (r) = Recorded By, (t) = Taken By, (c) = Cosigned By    Initials Name Effective Dates    Quyen Perkins MA,CCC-SLP 06/16/21 -     Isha Denney, MS [facility]-SLP 06/16/21 -            Time Calculation:   Time Calculation- SLP     Row Name 09/11/21 1218             Time Calculation- SLP    SLP Start Time  0900  -NR      SLP Stop Time  1030  -NR      SLP Time Calculation (min)  90 min  -NR        User Key  (r) = Recorded By, (t) = Taken By, (c) = Cosigned By    Initials Name Provider Type    Quyen Perkins MA,CCC-SLP Speech and Language  Pathologist          Therapy Charges for Today     Code Description Service Date Service Provider Modifiers Qty    [id number] HC ST MOTION FLUORO EVAL SWALLOW 6 9/11/2021 Quyen Maurer MA,CCC-SLP GN 1               Quyen Maurer MA,CCC-SLP  9/11/2021

## 2021-09-11 NOTE — PLAN OF CARE
Problem: Adult Inpatient Plan of Care  Goal: Plan of Care Review  Outcome: Ongoing, Progressing  Flowsheets  Taken 9/11/2021 0223 by Aurea Kohler RN  Progress: improving  Outcome Summary: VSS but BP still slightly elevated. Pt urinated multiple times. Pt rested comfortably between care. Nursing will continue to monitor.  Taken 9/10/2021 1617 by Isha Abraham MS CCC-SLP  Plan of Care Reviewed With: patient  Goal: Patient-Specific Goal (Individualized)  Outcome: Ongoing, Progressing  Goal: Absence of Hospital-Acquired Illness or Injury  Outcome: Ongoing, Progressing  Intervention: Identify and Manage Fall Risk  Recent Flowsheet Documentation  Taken 9/11/2021 0205 by Aurea Kohler RN  Safety Promotion/Fall Prevention: safety round/check completed  Taken 9/10/2021 2215 by Aurea Kohler RN  Safety Promotion/Fall Prevention: safety round/check completed  Taken 9/10/2021 2015 by Aurea Kohler RN  Safety Promotion/Fall Prevention: safety round/check completed  Intervention: Prevent Skin Injury  Recent Flowsheet Documentation  Taken 9/11/2021 0205 by Aurea Kohler RN  Body Position: position changed independently  Taken 9/10/2021 2215 by Aurea Kohler RN  Body Position: position maintained  Taken 9/10/2021 2015 by Aurea Kohler RN  Body Position:   position changed independently   position maintained  Goal: Optimal Comfort and Wellbeing  Outcome: Ongoing, Progressing  Goal: Readiness for Transition of Care  Outcome: Ongoing, Progressing     Problem: Skin Injury Risk Increased  Goal: Skin Health and Integrity  Outcome: Ongoing, Progressing     Problem: Fall Injury Risk  Goal: Absence of Fall and Fall-Related Injury  Outcome: Ongoing, Progressing  Intervention: Promote Injury-Free Environment  Recent Flowsheet Documentation  Taken 9/11/2021 0205 by Aurea Kohler RN  Safety Promotion/Fall Prevention: safety round/check completed  Taken 9/10/2021 2215 by Aurea Kohler RN  Safety Promotion/Fall Prevention: safety round/check  completed  Taken 9/10/2021 2015 by Aurea Kohler RN  Safety Promotion/Fall Prevention: safety round/check completed     Problem: Seizure Disorder Comorbidity  Goal: Maintenance of Seizure Control  Outcome: Ongoing, Progressing   Goal Outcome Evaluation:           Progress: improving  Outcome Summary: VSS but BP still slightly elevated. Pt urinated multiple times. Pt rested comfortably between care. Nursing will continue to monitor.

## 2021-09-11 NOTE — PROGRESS NOTES
"   LOS: 6 days    Patient Care Team:  Mikael Vasquez MD as PCP - General (Internal Medicine)  Arthur Phillips MD as Consulting Physician (Nephrology)    Chief Complaint:    Chief Complaint   Patient presents with   • Shortness of Breath   • Fever   • Fatigue     Follow UP JORDEN CKD3  Subjective     Interval History:   No acute issues; BP still a bit high    Objective     Vital Signs  Temp:  [97.9 °F (36.6 °C)-98.8 °F (37.1 °C)] 98.2 °F (36.8 °C)  Heart Rate:  [58-72] 59  Resp:  [16-20] 16  BP: (134-165)/() 146/100    Flowsheet Rows      First Filed Value   Admission Height  167.6 cm (66\") Documented at 09/05/2021 1337   Admission Weight  90.7 kg (200 lb) Documented at 09/05/2021 1337          I/O this shift:  In: 472 [P.O.:472]  Out: 100 [Urine:100]  I/O last 3 completed shifts:  In: -   Out: 1704 [Urine:1704]    Intake/Output Summary (Last 24 hours) at 9/11/2021 1449  Last data filed at 9/11/2021 1231  Gross per 24 hour   Intake 472 ml   Output 100 ml   Net 372 ml       Physical Exam:  GEN getting bath so did not fully examine; nonverbal      Results Review:    Results from last 7 days   Lab Units 09/11/21  1033 09/10/21  0625 09/09/21  0428 09/08/21  1214 09/08/21  1214 09/07/21  1001 09/06/21  0802 09/05/21  1346 09/05/21  1346   SODIUM mmol/L 145 145 145   < > 141   < > 143   < > 142   POTASSIUM mmol/L 4.5 4.4 4.4   < > 3.8   < > 4.6   < > 4.4   CHLORIDE mmol/L 106 108* 108*   < > 104   < > 103   < > 98   CO2 mmol/L 24.7 27.4 23.6   < > 25.0   < > 25.6   < > 26.8   BUN mg/dL 35* 40* 50*   < > 53*   < > 41*   < > 39*   CREATININE mg/dL 1.96* 2.01* 2.37*   < > 2.79*   < > 3.91*   < > 4.50*   CALCIUM mg/dL 10.1 9.8 9.4   < > 9.4   < > 10.1   < > 9.4   BILIRUBIN mg/dL  --   --   --   --  0.2  --  0.3  --  0.4   ALK PHOS U/L  --   --   --   --  116  --  124*  --  130*   ALT (SGPT) U/L  --   --   --   --  48*  --  38  --  33   AST (SGOT) U/L  --   --   --   --  27  --  27  --  24   GLUCOSE " mg/dL 105* 85 86   < > 141*   < > 138*   < > 107*    < > = values in this interval not displayed.       Estimated Creatinine Clearance: 64 mL/min (A) (by C-G formula based on SCr of 1.96 mg/dL (H)).    Results from last 7 days   Lab Units 09/11/21  1033 09/09/21  0428 09/07/21  1001   PHOSPHORUS mg/dL 4.4 3.4 4.2             Results from last 7 days   Lab Units 09/11/21  1033 09/09/21  0428 09/08/21  1214 09/07/21  0612 09/06/21  0644   WBC 10*3/mm3 10.06 10.45 11.26* 18.77* 15.04*   HEMOGLOBIN g/dL 11.6* 11.2* 11.4* 10.9* 11.4*   PLATELETS 10*3/mm3 258 239 270 218 211               Imaging Results (Last 24 Hours)     Procedure Component Value Units Date/Time    FL Video Swallow Single Contrast [[id number]] Resulted: 09/11/21 0943     Updated: 09/11/21 0955        allopurinol, 100 mg, Oral, Daily  [START ON 9/12/2021] amLODIPine, 5 mg, Oral, Daily  carvedilol, 25 mg, Oral, BID With Meals  cephalexin, 500 mg, Oral, Q6H  cloNIDine, 1 patch, Transdermal, Weekly  divalproex, 500 mg, Oral, Q12H  influenza vaccine, 0.5 mL, Intramuscular, Once  levothyroxine, 75 mcg, Oral, Q AM  minoxidil, 5 mg, Oral, Q12H  OLANZapine, 20 mg, Oral, Nightly  pantoprazole, 40 mg, Oral, QAM  sodium chloride, 10 mL, Intravenous, Q12H           Medication Review:   Current Facility-Administered Medications   Medication Dose Route Frequency Provider Last Rate Last Admin   • acetaminophen (TYLENOL) suppository 650 mg  650 mg Rectal Q4H PRN Albert Crooks MD   650 mg at 09/06/21 0051   • acetaminophen (TYLENOL) tablet 650 mg  650 mg Oral Q4H PRN Rafi Hoover MD   650 mg at 09/07/21 2028   • allopurinol (ZYLOPRIM) tablet 100 mg  100 mg Oral Daily Madi Jorgensen MD   100 mg at 09/11/21 0810   • [START ON 9/12/2021] amLODIPine (NORVASC) tablet 5 mg  5 mg Oral Daily Lenard Stokes MD       • carvedilol (COREG) tablet 25 mg  25 mg Oral BID With Meals Lenard Stokes MD   25 mg at 09/11/21 0810   • cephalexin (KEFLEX)  capsule 500 mg  500 mg Oral Q6H Blaine Troy MD   500 mg at 09/11/21 1148   • cloNIDine (CATAPRES-TTS) 0.3 MG/24HR patch 1 patch  1 patch Transdermal Weekly Lenard Stokes MD   1 patch at 09/10/21 0917   • divalproex (DEPAKOTE) DR tablet 500 mg  500 mg Oral Q12H Madi Jorgensen MD   500 mg at 09/11/21 0810   • influenza vac split quad (FLUZONE,FLUARIX,AFLURIA,FLULAVAL) injection 0.5 mL  0.5 mL Intramuscular Once Madi Jorgensen MD       • ipratropium-albuterol (DUO-NEB) nebulizer solution 3 mL  3 mL Nebulization Q4H PRN Alissa Marinelli APRN   3 mL at 09/07/21 1505   • levothyroxine (SYNTHROID, LEVOTHROID) tablet 75 mcg  75 mcg Oral Q AM Madi Jorgensen MD   75 mcg at 09/11/21 0630   • minoxidil (LONITEN) tablet 5 mg  5 mg Oral Q12H Lneard Stokes MD       • OLANZapine (zyPREXA) tablet 20 mg  20 mg Oral Nightly Madi Jorgensen MD   20 mg at 09/10/21 2019   • ondansetron (ZOFRAN) injection 4 mg  4 mg Intravenous Q6H PRN Alissa Marinelli APRN   4 mg at 09/07/21 1718   • pantoprazole (PROTONIX) EC tablet 40 mg  40 mg Oral QAM Madi Jorgensen MD   40 mg at 09/11/21 0630   • sodium chloride 0.9 % flush 10 mL  10 mL Intravenous PRN Lyle Colby MD       • sodium chloride 0.9 % flush 10 mL  10 mL Intravenous Q12H Madi Jorgensen MD   10 mL at 09/11/21 0811   • sodium chloride 0.9 % flush 10 mL  10 mL Intravenous PRN Madi Jorgensen MD           Assessment/Plan   1. JORDEN, nonoliguric. Resolved, Cr down to 1.9 (peak 4.5) . Likely due to hypovolemia and urinary retention.  Mild periph edema due to meds (norvasc & minoxidil).  2. CKD stage 3 - BL Cr low to mid 2's, was 2.4 in Jan 2021  3. UTI - rocephin -> keflex per Dr Troy   4. Cognitive dysfunction  5. HTN uncontrolled, worsened off minoxidil (hydralazine added).  Combination norvasc + minoxidil not ideal given mild edema but needed to resume latter, and also changed lopressor to coreg and PO clonidine to patch  6. Urinary  retention - UROL input appreciated; passed VT    Plan  - inc minoxidil 5 BID, leave norvasc at 5mg -- at follow up could titrate minoxidil and perhaps stop the norvasc; cont coreg & clonidine patch  - restart lasix home dose 40mg daily   - ok with discharge today; arrange follow up 2 weeks with us       Acute urinary tract infection    Intellectual disability    Hypertension    Seizure disorder (CMS/HCC)    Stage 3b chronic kidney disease (CMS/HCC)    Hyperlipidemia    Asthma    JORDEN (acute kidney injury) (CMS/HCC)    Urine retention    Sepsis without acute organ dysfunction (present on admission)              Lenard Stokes MD  09/11/21  14:49 EDT

## 2021-09-11 NOTE — PLAN OF CARE
Goal Outcome Evaluation:  Plan of Care Reviewed With: patient           Outcome Summary: VFSS completed.  Recommend a regular diet with thin liquids and meds as tolerated.  Swallow precatuions include sit up at 90 degrees, small bites and sips, okay to use straws, and follow reflux precautions.  Will monitor for diet tolerance.  If overt s/s of aspiration noted, recommend downgrade to mechanical soft with no mixed consistencies and thin liquids.

## 2021-09-11 NOTE — DISCHARGE SUMMARY
Patient Name: Arthur Mccarthy  : 1985  MRN: [id number]    Date of Admission: 2021  Date of Discharge:  2021  Primary Care Physician: Mikael Vasquez MD      Chief Complaint:   Shortness of Breath, Fever, and Fatigue      Discharge Diagnoses     Active Hospital Problems    Diagnosis  POA   • **Acute urinary tract infection [N39.0]  Yes   • Sepsis without acute organ dysfunction (present on admission) [A41.9]  Yes   • Urine retention [R33.9]  Yes   • JORDEN (acute kidney injury) (CMS/[facility]) [N17.9]  Yes   • Asthma [J45.909]  Yes   • Stage 3b chronic kidney disease (CMS/[facility]) [N18.32]  Yes   • Hyperlipidemia [E78.5]  Yes   • Hypertension [I10]  Yes   • Intellectual disability [F79]  Yes   • Seizure disorder (CMS/[facility]) [G40.909]  Yes      Resolved Hospital Problems   No resolved problems to display.        Hospital Course     Mr. Mccarthy is a 35 y.o. male with a history of CKD 3B, asthma, hypertension, intellectual disability who presented to [facility] initially complaining of shortness of breath, fever for 2.  Please see the admitting history and physical for further details.  He was found to have acute UTI with JORDEN and was admitted to the hospital for further evaluation and treatment.  Patient admitted and treated with broad-spectrum antibiotics for acute UTI, also found to have urinary retention and had Morocho placed.  His creatinine peaked at 4.5 but has improved with improvement of urinary retention and treatment of UTI.  Nephrology and urology consulted while patient was admitted.  Antihypertensives adjusted per nephrology as below and patient needs to follow with them as an outpatient in 2 weeks after discharge.  Will discharge patient to finish course of Keflex for UTI.      Day of Discharge     Subjective:  Doing well today, has no complaints and is looking forward to going home.    Review of Systems   Constitutional: Negative for fatigue and fever.   HENT: Negative for sinus  pressure and sneezing.    Respiratory: Negative for cough and shortness of breath.    Gastrointestinal: Negative for abdominal pain, nausea and vomiting.   Genitourinary: Negative for dysuria, frequency and hematuria.   Neurological: Negative for seizures and numbness.       Physical Exam:  Temp:  [97.9 °F (36.6 °C)-98.8 °F (37.1 °C)] 98.2 °F (36.8 °C)  Heart Rate:  [58-72] 59  Resp:  [16-20] 16  BP: (134-165)/() 146/100  Body mass index is 28.28 kg/m².  Physical Exam  Constitutional:       Appearance: Normal appearance. He is obese.   HENT:      Head: Normocephalic and atraumatic.      Mouth/Throat:      Mouth: Mucous membranes are moist.      Pharynx: Oropharynx is clear.   Cardiovascular:      Rate and Rhythm: Normal rate and regular rhythm.   Pulmonary:      Effort: Pulmonary effort is normal. No respiratory distress.   Abdominal:      General: Abdomen is flat. Bowel sounds are normal.      Palpations: Abdomen is soft.   Musculoskeletal:         General: No swelling or tenderness.      Cervical back: Normal range of motion and neck supple.   Skin:     General: Skin is warm and dry.   Neurological:      General: No focal deficit present.      Mental Status: He is alert and oriented to person, place, and time. Mental status is at baseline.         Consultants     Consult Orders (all) (From admission, onward)     Start     Ordered    09/08/21 1119  Inpatient Urology Consult  Once     Specialty:  Urology  Provider:  Venkatesh Rocha MD    09/08/21 1119 09/05/21 2127  Inpatient Nephrology Consult  Once     Specialty:  Nephrology  Provider:  Saud Nicole MD    09/05/21 2126 09/05/21 1510  [facility] (on-call MD unless specified) Details  Once     Specialty:  Hospitalist  Provider:  Madi Jorgensen MD    09/05/21 1509              Procedures     Imaging Results (All)     Procedure Component Value Units Date/Time    FL Video Swallow Single Contrast [[id number]] Resulted: 09/11/21 0943     Updated:  09/11/21 0955    CT Abdomen Pelvis Without Contrast [[id number]] Collected: 09/08/21 0855     Updated: 09/09/21 1256    Narrative:      CT ABDOMEN AND PELVIS WITHOUT CONTRAST     HISTORY: Acute abdominal pain, UTI.     TECHNIQUE: Axial CT images of the abdomen and pelvis were obtained  without administration of intravenous contrast. The patient was not  given oral contrast. Coronal and sagittal reformats were obtained.     COMPARISON: None     FINDINGS: Diffuse low attenuation of liver, most consistent with  steatosis. Focal fatty sparing is seen adjacent to the gallbladder  fossa. No intrahepatic biliary dilatation. The gallbladder is normal.  The spleen is normal. The pancreas is normal without ductal dilatation.  Bilateral adrenal glands are normal. Both kidneys are normal in size and  attenuation. No renal calculi or hydronephrosis. The urinary bladder is  markedly distended with mild diffuse wall thickening of the stomach is  present that may suggest gastritis. The small bowel loops demonstrate  normal caliber. The colon is mildly distended with air and formed stool.  There are small retroperitoneal lymph nodes at the level of the renal  hilum that are favored to be reactive.       Impression:      1. Despite the urinary bladder being moderately distended, there is mild  wall thickening present and this may suggest cystitis. Correlation with  exam and urinary catheterization may be helpful.  2. Mild distention of the colon containing formed stool and air, likely  representing a colonic ileus.  3. Diffuse hepatic steatosis.     Radiation dose reduction techniques were utilized, including automated  exposure control and exposure modulation based on body size.     This report was finalized on 9/9/2021 12:53 PM by Dr. Eugenio Enriquez M.D.       XR Abdomen KUB [[id number]] Collected: 09/08/21 1333     Updated: 09/08/21 1338    Narrative:      XR ABDOMEN KUB-     INDICATIONS: Ileus, follow-up     TECHNIQUE: Supine  views of the abdomen     COMPARISON:  image from CT from 09/08/2021     FINDINGS:      The bowel gas pattern is nonspecific, with gaseous distention of the  stomach and colon. No supine evidence for free intraperitoneal gas. No  definite nephrolithiasis. Follow-up can be obtained as indications  persist.             Impression:         As described.     This report was finalized on 9/8/2021 1:35 PM by Dr. Rock Giraldo M.D.       XR Chest 1 View [[id number]] Collected: 09/06/21 0105     Updated: 09/06/21 0110    Narrative:      SINGLE VIEW OF THE CHEST     HISTORY: Respiratory distress.     COMPARISON: 09/05/2021     FINDINGS:  Cardiomegaly is present. No pneumothorax or pleural effusion is seen.  There is a hazy appearance to both hemithoraces, which is nonspecific.  Some of it may be artifactual and related to the patient's body habitus  and portable technique. Edema or infiltrate is not excluded.       Impression:      Overall hazy appearance to the hemithoraces bilaterally. Similar  findings were present on earlier study.     This report was finalized on 9/6/2021 1:07 AM by Dr. Mami Ch M.D.       XR Chest AP [[id number]] Collected: 09/05/21 1439     Updated: 09/05/21 1444    Narrative:      PORTABLE CHEST 09/05/2021 AT 2:03 PM     CLINICAL HISTORY: Cough. Fever. COVID 19 evaluation.     The lungs are poorly inflated. There is ill-defined increased density  within both hemithoraces that is likely due to the poor lung expansion  in conjunction with the patient's large size. No definite acute  infiltrates are identified. There are no pleural effusions. The heart is  mildly enlarged.     This report was finalized on 9/5/2021 2:40 PM by Dr. Donn Hassan M.D.             Pertinent Labs     Results from last 7 days   Lab Units 09/11/21  1033 09/09/21  0428 09/08/21  1214 09/07/21  0612   WBC 10*3/mm3 10.06 10.45 11.26* 18.77*   HEMOGLOBIN g/dL 11.6* 11.2* 11.4* 10.9*   PLATELETS 10*3/mm3 258 239  270 218     Results from last 7 days   Lab Units 09/11/21  1033 09/10/21  0625 09/09/21 0428 09/08/21  1214   SODIUM mmol/L 145 145 145 141   POTASSIUM mmol/L 4.5 4.4 4.4 3.8   CHLORIDE mmol/L 106 108* 108* 104   CO2 mmol/L 24.7 27.4 23.6 25.0   BUN mg/dL 35* 40* 50* 53*   CREATININE mg/dL 1.96* 2.01* 2.37* 2.79*   GLUCOSE mg/dL 105* 85 86 141*   Estimated Creatinine Clearance: 64 mL/min (A) (by C-G formula based on SCr of 1.96 mg/dL (H)).  Results from last 7 days   Lab Units 09/11/21 1033 09/09/21 0428 09/08/21 1214 09/07/21  1001 09/06/21  0802 09/06/21  0802 09/05/21  1346 09/05/21  1346   ALBUMIN g/dL 3.80 3.30* 3.50 4.00   < > 3.90   < > 3.90   BILIRUBIN mg/dL  --   --  0.2  --   --  0.3  --  0.4   ALK PHOS U/L  --   --  116  --   --  124*  --  130*   AST (SGOT) U/L  --   --  27  --   --  27  --  24   ALT (SGPT) U/L  --   --  48*  --   --  38  --  33    < > = values in this interval not displayed.     Results from last 7 days   Lab Units 09/11/21  1033 09/10/21  0625 09/09/21  0428 09/08/21  1214 09/07/21  1001 09/07/21  1001   CALCIUM mg/dL 10.1 9.8 9.4 9.4   < > 10.2   ALBUMIN g/dL 3.80  --  3.30* 3.50  --  4.00   PHOSPHORUS mg/dL 4.4  --  3.4  --   --  4.2    < > = values in this interval not displayed.               Invalid input(s): LDLCALC  Results from last 7 days   Lab Units 09/05/21  1408 09/05/21  1407 09/05/21  1346   BLOODCX  Staphylococcus, coagulase negative*  --  No growth at 5 days   URINECX   --  >100,000 CFU/mL Klebsiella pneumoniae ssp pneumoniae*  --    BCIDPCR  Staph spp, not aureus or lugdunesis. Identification by BCID2 PCR.*  --   --        Test Results Pending at Discharge       Discharge Details        Discharge Medications      New Medications      Instructions Start Date   carvedilol 25 MG tablet  Commonly known as: COREG   25 mg, Oral, 2 Times Daily With Meals      cephalexin 500 MG capsule  Commonly known as: KEFLEX   500 mg, Oral, Every 6 Hours Scheduled      cloNIDine 0.3  MG/24HR patch  Commonly known as: CATAPRES-TTS   1 patch, Transdermal, Weekly   Start Date: September 17, 2021        Changes to Medications      Instructions Start Date   amLODIPine 5 MG tablet  Commonly known as: NORVASC  What changed:   · medication strength  · how much to take   5 mg, Oral, Daily   Start Date: September 12, 2021     divalproex 500 MG DR tablet  Commonly known as: DEPAKOTE  What changed:   · how much to take  · when to take this  · additional instructions   500 mg, Oral, 2 Times Daily      minoxidil 2.5 MG tablet  Commonly known as: LONITEN  What changed:   · how much to take  · when to take this   5 mg, Oral, Every 12 Hours Scheduled      OLANZapine 20 MG tablet  Commonly known as: zyPREXA  What changed: when to take this   20 mg, Oral, Nightly         Continue These Medications      Instructions Start Date   allopurinol 100 MG tablet  Commonly known as: ZYLOPRIM   100 mg, Oral, Daily      ammonium lactate 12 % cream  Commonly known as: AMLACTIN   1 application, Topical, 2 times daily, Apply to legs and feet as directed      ferrous sulfate 325 (65 FE) MG tablet   325 mg, Oral, 2 times daily      fluticasone 50 MCG/ACT nasal spray  Commonly known as: FLONASE   2 sprays, Nasal, Daily      furosemide 40 MG tablet  Commonly known as: LASIX   40 mg, Oral, Daily      levothyroxine 75 MCG tablet  Commonly known as: SYNTHROID, LEVOTHROID   75 mcg, Oral, Daily      loratadine 10 MG tablet  Commonly known as: CLARITIN   10 mg, Oral, Daily      MiraLax 17 GM/SCOOP powder  Generic drug: polyethylene glycol   17 g, Oral, Daily      montelukast 10 MG tablet  Commonly known as: SINGULAIR   10 mg, Oral, Every Night at Bedtime      omeprazole 20 MG capsule  Commonly known as: priLOSEC   20 mg, Oral, Daily         Stop These Medications    cloNIDine 0.3 MG tablet  Commonly known as: CATAPRES     metoprolol tartrate 100 MG tablet  Commonly known as: LOPRESSOR            Allergies   Allergen Reactions   •  Doxycycline Anaphylaxis         Discharge Disposition:  Home or Self Care    Discharge Diet:  Diet Order   Procedures   • Diet Regular; Thin; Cardiac       Discharge Activity:   Activity Instructions     Activity as Tolerated            CODE STATUS:    Code Status and Medical Interventions:   Ordered at: 09/05/21 2124     Code Status:    CPR     Medical Interventions (Level of Support Prior to Arrest):    Full       Future Appointments   Date Time Provider Department Center   1/25/2022 10:00 AM Mikael Vasquez MD [id number]     Additional Instructions for the Follow-ups that You Need to Schedule     Discharge Follow-up with PCP   As directed       Currently Documented PCP:    Mikael Vasquez MD    PCP Phone Number:    [phone]     Follow Up Details: with your PCP for additional blood pressure management            Contact information for follow-up providers     Mikael Vasquez MD .    Specialty: Internal Medicine  Why: with your PCP for additional blood pressure management  Contact information:  [address]  [facility] [id number] [phone]                   Contact information for after-discharge care     Home Medical Care     [facility] .    Service: Home Health Services  Contact information:  [address] [facility] [address]  [facility] [address] [phone]                             Additional Instructions for the Follow-ups that You Need to Schedule     Discharge Follow-up with PCP   As directed       Currently Documented PCP:    Mikael Vasquez MD    PCP Phone Number:    [phone]     Follow Up Details: with your PCP for additional blood pressure management           Time Spent on Discharge:  Greater than 30 minutes      Yue Mcrae MD  [facility]  09/11/21  19:08 EDT

## 2021-09-11 NOTE — CASE MANAGEMENT/SOCIAL WORK
Continued Stay Note  [facility]     Patient Name: Arthur Mccarthy  MRN: [id number]  Today's Date: 9/11/2021    Admit Date: 9/5/2021    Discharge Plan     Row Name 09/11/21 1536       Plan    Plan  Plans are to group home [facility] and potentially [facility] to follow (had F/C needs, but currently is doing well without - H following.    Patient/Family in Agreement with Plan  yes    Plan Comments  CCP spoke with Staff RN regarding needing transportation back to group home.  [facility] unavailable for the rest of the day.  [facility] unable to transport patient today.  Reservation made with [facility] for tomorrow am (9/12/21) at 0930.  Reservation number [id number]. [facility] made staff RN and MD aware.  [facility] also left VM for , [facility], letting her know patient will return tomorrow. Per , Florentino Izaguirre, patient is able to return tomorrow and report should be called to [facility] at [phone].  [facility] left VM with Matthew Uriostegui, guardian, to make him aware of discharge as well.  CCP to follow.  [place]        Discharge Codes    No documentation.       Expected Discharge Date and Time     Expected Discharge Date Expected Discharge Time    Sep 12, 2021             Gilmar Jacobs

## 2021-09-12 ENCOUNTER — READMISSION MANAGEMENT (OUTPATIENT)
Dept: CALL CENTER | Facility: HOSPITAL | Age: 36
End: 2021-09-12

## 2021-09-12 VITALS
TEMPERATURE: 98.5 F | HEIGHT: 72 IN | OXYGEN SATURATION: 99 % | BODY MASS INDEX: 26.98 KG/M2 | DIASTOLIC BLOOD PRESSURE: 111 MMHG | WEIGHT: 199.2 LBS | RESPIRATION RATE: 16 BRPM | SYSTOLIC BLOOD PRESSURE: 158 MMHG | HEART RATE: 81 BPM

## 2021-09-12 LAB — GLUCOSE BLDC GLUCOMTR-MCNC: 105 MG/DL (ref 70–130)

## 2021-09-12 PROCEDURE — 82962 GLUCOSE BLOOD TEST: CPT

## 2021-09-12 RX ADMIN — AMLODIPINE BESYLATE 5 MG: 5 TABLET ORAL at 08:22

## 2021-09-12 RX ADMIN — ALLOPURINOL 100 MG: 100 TABLET ORAL at 08:22

## 2021-09-12 RX ADMIN — CEPHALEXIN 500 MG: 500 CAPSULE ORAL at 07:11

## 2021-09-12 RX ADMIN — CARVEDILOL 25 MG: 25 TABLET, FILM COATED ORAL at 07:10

## 2021-09-12 RX ADMIN — PANTOPRAZOLE SODIUM 40 MG: 40 TABLET, DELAYED RELEASE ORAL at 07:10

## 2021-09-12 RX ADMIN — DIVALPROEX SODIUM 500 MG: 500 TABLET, DELAYED RELEASE ORAL at 08:22

## 2021-09-12 RX ADMIN — LEVOTHYROXINE SODIUM 75 MCG: 0.07 TABLET ORAL at 07:10

## 2021-09-12 RX ADMIN — MINOXIDIL 5 MG: 2.5 TABLET ORAL at 08:22

## 2021-09-12 NOTE — PLAN OF CARE
Problem: Adult Inpatient Plan of Care  Goal: Plan of Care Review  Outcome: Ongoing, Progressing  Goal: Patient-Specific Goal (Individualized)  Outcome: Ongoing, Progressing  Goal: Absence of Hospital-Acquired Illness or Injury  Outcome: Ongoing, Progressing  Intervention: Identify and Manage Fall Risk  Recent Flowsheet Documentation  Taken 9/12/2021 0600 by Aurea Kohler RN  Safety Promotion/Fall Prevention: safety round/check completed  Taken 9/12/2021 0400 by Aurea Kohler RN  Safety Promotion/Fall Prevention: safety round/check completed  Taken 9/12/2021 0200 by Aurea Kohler RN  Safety Promotion/Fall Prevention: safety round/check completed  Taken 9/12/2021 0000 by Aurea Kohler RN  Safety Promotion/Fall Prevention: safety round/check completed  Taken 9/11/2021 2200 by Aurea Kohler RN  Safety Promotion/Fall Prevention: safety round/check completed  Taken 9/11/2021 2000 by Aurea Kohler RN  Safety Promotion/Fall Prevention: safety round/check completed  Intervention: Prevent Skin Injury  Recent Flowsheet Documentation  Taken 9/12/2021 0600 by Aurea Kohler RN  Body Position: position changed independently  Taken 9/12/2021 0400 by Aurea Kohler RN  Body Position: position changed independently  Taken 9/12/2021 0200 by Aurea Kohler RN  Body Position: position changed independently  Taken 9/12/2021 0000 by Aurea Kohler RN  Body Position: position maintained  Taken 9/11/2021 2200 by Aurea Kohler RN  Body Position: position changed independently  Taken 9/11/2021 2000 by Aurea Kohler RN  Body Position: position changed independently  Goal: Optimal Comfort and Wellbeing  Outcome: Ongoing, Progressing  Goal: Readiness for Transition of Care  Outcome: Ongoing, Progressing     Problem: Skin Injury Risk Increased  Goal: Skin Health and Integrity  Outcome: Ongoing, Progressing     Problem: Fall Injury Risk  Goal: Absence of Fall and Fall-Related Injury  Outcome: Ongoing, Progressing  Intervention: Promote Injury-Free  Environment  Recent Flowsheet Documentation  Taken 9/12/2021 0600 by Aurea Kohler RN  Safety Promotion/Fall Prevention: safety round/check completed  Taken 9/12/2021 0400 by Aurea Kohler RN  Safety Promotion/Fall Prevention: safety round/check completed  Taken 9/12/2021 0200 by Aurea Kohler RN  Safety Promotion/Fall Prevention: safety round/check completed  Taken 9/12/2021 0000 by Aurea Kohler RN  Safety Promotion/Fall Prevention: safety round/check completed  Taken 9/11/2021 2200 by Aurea Kohler RN  Safety Promotion/Fall Prevention: safety round/check completed  Taken 9/11/2021 2000 by Aurea Kohler RN  Safety Promotion/Fall Prevention: safety round/check completed     Problem: Seizure Disorder Comorbidity  Goal: Maintenance of Seizure Control  Outcome: Ongoing, Progressing     Problem: Adult Inpatient Plan of Care  Goal: Plan of Care Review  Outcome: Ongoing, Progressing  Goal: Patient-Specific Goal (Individualized)  Outcome: Ongoing, Progressing  Goal: Absence of Hospital-Acquired Illness or Injury  Outcome: Ongoing, Progressing  Intervention: Identify and Manage Fall Risk  Recent Flowsheet Documentation  Taken 9/12/2021 0600 by Aurea Kohler RN  Safety Promotion/Fall Prevention: safety round/check completed  Taken 9/12/2021 0400 by Aurea Kohler RN  Safety Promotion/Fall Prevention: safety round/check completed  Taken 9/12/2021 0200 by Aurea Kohler RN  Safety Promotion/Fall Prevention: safety round/check completed  Taken 9/12/2021 0000 by Aurea Kohler RN  Safety Promotion/Fall Prevention: safety round/check completed  Taken 9/11/2021 2200 by Aurea Kohler RN  Safety Promotion/Fall Prevention: safety round/check completed  Taken 9/11/2021 2000 by Aurea Kohler RN  Safety Promotion/Fall Prevention: safety round/check completed  Intervention: Prevent Skin Injury  Recent Flowsheet Documentation  Taken 9/12/2021 0600 by Aurea Kohler RN  Body Position: position changed independently  Taken 9/12/2021 0400 by  Aurea Kohler RN  Body Position: position changed independently  Taken 9/12/2021 0200 by Aurea Kohler RN  Body Position: position changed independently  Taken 9/12/2021 0000 by Aurea Kohler RN  Body Position: position maintained  Taken 9/11/2021 2200 by Aurea Kohler RN  Body Position: position changed independently  Taken 9/11/2021 2000 by Aurea Kohler RN  Body Position: position changed independently  Goal: Optimal Comfort and Wellbeing  Outcome: Ongoing, Progressing  Goal: Readiness for Transition of Care  Outcome: Ongoing, Progressing     Problem: Skin Injury Risk Increased  Goal: Skin Health and Integrity  Outcome: Ongoing, Progressing     Problem: Fall Injury Risk  Goal: Absence of Fall and Fall-Related Injury  Outcome: Ongoing, Progressing  Intervention: Promote Injury-Free Environment  Recent Flowsheet Documentation  Taken 9/12/2021 0600 by Aurea Kohler RN  Safety Promotion/Fall Prevention: safety round/check completed  Taken 9/12/2021 0400 by Aurea Kohler RN  Safety Promotion/Fall Prevention: safety round/check completed  Taken 9/12/2021 0200 by Aurea Kohler RN  Safety Promotion/Fall Prevention: safety round/check completed  Taken 9/12/2021 0000 by Aurea Kohler RN  Safety Promotion/Fall Prevention: safety round/check completed  Taken 9/11/2021 2200 by Aurea Kohler RN  Safety Promotion/Fall Prevention: safety round/check completed  Taken 9/11/2021 2000 by Aurea Kohler RN  Safety Promotion/Fall Prevention: safety round/check completed     Problem: Seizure Disorder Comorbidity  Goal: Maintenance of Seizure Control  Outcome: Ongoing, Progressing   Goal Outcome Evaluation:           Progress: improving

## 2021-09-13 ENCOUNTER — TRANSITIONAL CARE MANAGEMENT TELEPHONE ENCOUNTER (OUTPATIENT)
Dept: CALL CENTER | Facility: HOSPITAL | Age: 36
End: 2021-09-13

## 2021-09-13 NOTE — OUTREACH NOTE
Call Center TCM Note      Responses   [facility] patient discharged from?  [place]   Does the patient have one of the following disease processes/diagnoses(primary or secondary)?  Sepsis   TCM attempt successful?  No   Unsuccessful attempts  Attempt 1          Nilam Vargas RN    9/13/2021, 09:52 EDT

## 2021-09-13 NOTE — OUTREACH NOTE
Call Center TCM Note      Responses   [facility] patient discharged from?  [place]   Does the patient have one of the following disease processes/diagnoses(primary or secondary)?  Sepsis   TCM attempt successful?  No   Unsuccessful attempts  Attempt 2          Nilam Vargas RN    9/13/2021, 13:53 EDT

## 2021-09-13 NOTE — CASE MANAGEMENT/SOCIAL WORK
Case Management Discharge Note      Final Note: Pt was dc'd back to group home with [facility]         Selected Continued Care - Discharged on 9/12/2021 Admission date: 9/5/2021 - Discharge disposition: Home or Self Care    Destination    No services have been selected for the patient.              Durable Medical Equipment    No services have been selected for the patient.              Dialysis/Infusion    No services have been selected for the patient.              Home Medical Care Coordination complete.    Service Provider Selected Services Address Phone Fax Patient Preferred     [facility]  Home Health Services [address] [place] [address] [phone] --          Therapy    No services have been selected for the patient.              Community Resources    No services have been selected for the patient.              Community & DME    No services have been selected for the patient.                  Transportation Services  W/C Van: [facility]    Final Discharge Disposition Code: 06 - home with home health care

## 2021-09-14 ENCOUNTER — TRANSITIONAL CARE MANAGEMENT TELEPHONE ENCOUNTER (OUTPATIENT)
Dept: CALL CENTER | Facility: HOSPITAL | Age: 36
End: 2021-09-14

## 2021-09-14 ENCOUNTER — HOSPITAL ENCOUNTER (EMERGENCY)
Facility: HOSPITAL | Age: 36
Discharge: HOME | End: 2021-09-14
Attending: EMERGENCY MEDICINE | Admitting: EMERGENCY MEDICINE

## 2021-09-14 VITALS
WEIGHT: 200 LBS | RESPIRATION RATE: 18 BRPM | TEMPERATURE: 97.7 F | OXYGEN SATURATION: 99 % | HEIGHT: 72 IN | SYSTOLIC BLOOD PRESSURE: 150 MMHG | DIASTOLIC BLOOD PRESSURE: 91 MMHG | BODY MASS INDEX: 27.09 KG/M2 | HEART RATE: 88 BPM

## 2021-09-14 DIAGNOSIS — R33.9 URINARY RETENTION: Primary | ICD-10-CM

## 2021-09-14 DIAGNOSIS — N18.9 CHRONIC KIDNEY DISEASE, UNSPECIFIED CKD STAGE: ICD-10-CM

## 2021-09-14 DIAGNOSIS — F79 INTELLECTUAL DISABILITY: ICD-10-CM

## 2021-09-14 LAB
ALBUMIN SERPL-MCNC: 4.3 G/DL (ref 3.5–5.2)
ALBUMIN/GLOB SERPL: 1.3 G/DL
ALP SERPL-CCNC: 116 U/L (ref 39–117)
ALT SERPL W P-5'-P-CCNC: 24 U/L (ref 1–41)
ANION GAP SERPL CALCULATED.3IONS-SCNC: 12 MMOL/L (ref 5–15)
AST SERPL-CCNC: 24 U/L (ref 1–40)
BACTERIA UR QL AUTO: ABNORMAL /HPF
BASOPHILS # BLD MANUAL: 0.08 10*3/MM3 (ref 0–0.2)
BASOPHILS NFR BLD AUTO: 1 % (ref 0–1.5)
BILIRUB SERPL-MCNC: 0.3 MG/DL (ref 0–1.2)
BILIRUB UR QL STRIP: NEGATIVE
BUN SERPL-MCNC: 29 MG/DL (ref 6–20)
BUN/CREAT SERPL: 11.9 (ref 7–25)
CALCIUM SPEC-SCNC: 10.4 MG/DL (ref 8.6–10.5)
CHLORIDE SERPL-SCNC: 101 MMOL/L (ref 98–107)
CLARITY UR: CLEAR
CO2 SERPL-SCNC: 28 MMOL/L (ref 22–29)
COLOR UR: YELLOW
CREAT SERPL-MCNC: 2.43 MG/DL (ref 0.76–1.27)
D-LACTATE SERPL-SCNC: 1.5 MMOL/L (ref 0.5–2)
DEPRECATED RDW RBC AUTO: 40.5 FL (ref 37–54)
EOSINOPHIL # BLD MANUAL: 0.16 10*3/MM3 (ref 0–0.4)
EOSINOPHIL NFR BLD MANUAL: 2 % (ref 0.3–6.2)
ERYTHROCYTE [DISTWIDTH] IN BLOOD BY AUTOMATED COUNT: 17 % (ref 12.3–15.4)
GFR SERPL CREATININE-BSD FRML MDRD: 37 ML/MIN/1.73
GLOBULIN UR ELPH-MCNC: 3.4 GM/DL
GLUCOSE SERPL-MCNC: 96 MG/DL (ref 65–99)
GLUCOSE UR STRIP-MCNC: NEGATIVE MG/DL
HCT VFR BLD AUTO: 38.9 % (ref 37.5–51)
HGB BLD-MCNC: 12.2 G/DL (ref 13–17.7)
HGB UR QL STRIP.AUTO: ABNORMAL
HYALINE CASTS UR QL AUTO: ABNORMAL /LPF
KETONES UR QL STRIP: NEGATIVE
LEUKOCYTE ESTERASE UR QL STRIP.AUTO: NEGATIVE
LYMPHOCYTES # BLD MANUAL: 2.03 10*3/MM3 (ref 0.7–3.1)
LYMPHOCYTES NFR BLD MANUAL: 26 % (ref 19.6–45.3)
LYMPHOCYTES NFR BLD MANUAL: 3 % (ref 5–12)
MCH RBC QN AUTO: 22.7 PG (ref 26.6–33)
MCHC RBC AUTO-ENTMCNC: 31.4 G/DL (ref 31.5–35.7)
MCV RBC AUTO: 72.4 FL (ref 79–97)
MONOCYTES # BLD AUTO: 0.23 10*3/MM3 (ref 0.1–0.9)
NEUTROPHILS # BLD AUTO: 5.31 10*3/MM3 (ref 1.7–7)
NEUTROPHILS NFR BLD MANUAL: 68 % (ref 42.7–76)
NITRITE UR QL STRIP: NEGATIVE
PH UR STRIP.AUTO: 6.5 [PH] (ref 5–8)
PLAT MORPH BLD: NORMAL
PLATELET # BLD AUTO: 249 10*3/MM3 (ref 140–450)
PMV BLD AUTO: 10.6 FL (ref 6–12)
POIKILOCYTOSIS BLD QL SMEAR: ABNORMAL
POTASSIUM SERPL-SCNC: 4.3 MMOL/L (ref 3.5–5.2)
PROT SERPL-MCNC: 7.7 G/DL (ref 6–8.5)
PROT UR QL STRIP: ABNORMAL
RBC # BLD AUTO: 5.37 10*6/MM3 (ref 4.14–5.8)
RBC # UR: ABNORMAL /HPF
REF LAB TEST METHOD: ABNORMAL
SMUDGE CELLS BLD QL SMEAR: ABNORMAL
SODIUM SERPL-SCNC: 141 MMOL/L (ref 136–145)
SP GR UR STRIP: 1.01 (ref 1–1.03)
SQUAMOUS #/AREA URNS HPF: ABNORMAL /HPF
UROBILINOGEN UR QL STRIP: ABNORMAL
VALPROATE SERPL-MCNC: 89 MCG/ML (ref 50–125)
WBC # BLD AUTO: 7.81 10*3/MM3 (ref 3.4–10.8)
WBC UR QL AUTO: ABNORMAL /HPF

## 2021-09-14 PROCEDURE — 80053 COMPREHEN METABOLIC PANEL: CPT | Performed by: EMERGENCY MEDICINE

## 2021-09-14 PROCEDURE — 85007 BL SMEAR W/DIFF WBC COUNT: CPT | Performed by: EMERGENCY MEDICINE

## 2021-09-14 PROCEDURE — 85025 COMPLETE CBC W/AUTO DIFF WBC: CPT | Performed by: EMERGENCY MEDICINE

## 2021-09-14 PROCEDURE — 81001 URINALYSIS AUTO W/SCOPE: CPT | Performed by: EMERGENCY MEDICINE

## 2021-09-14 PROCEDURE — 83605 ASSAY OF LACTIC ACID: CPT | Performed by: EMERGENCY MEDICINE

## 2021-09-14 PROCEDURE — 99283 EMERGENCY DEPT VISIT LOW MDM: CPT

## 2021-09-14 PROCEDURE — 80164 ASSAY DIPROPYLACETIC ACD TOT: CPT | Performed by: EMERGENCY MEDICINE

## 2021-09-14 PROCEDURE — 51798 US URINE CAPACITY MEASURE: CPT

## 2021-09-14 RX ORDER — TAMSULOSIN HYDROCHLORIDE 0.4 MG/1
1 CAPSULE ORAL NIGHTLY
Qty: 30 CAPSULE | Refills: 0 | Status: SHIPPED | OUTPATIENT
Start: 2021-09-14 | End: 2021-09-27

## 2021-09-14 NOTE — CASE MANAGEMENT/SOCIAL WORK
[facility],  where patient resides, called and they will send someone to transport him back to facility. RN updated

## 2021-09-14 NOTE — ED PROVIDER NOTES
" EMERGENCY DEPARTMENT ENCOUNTER    Room Number:  15/15  Date of encounter:  9/14/2021  PCP: Mikael Vasquez MD  Historian: Patient and prior records      HPI:  Chief Complaint: \"Not acting normally\"  A complete HPI/ROS/PMH/PSH/SH/FH are unobtainable due to: Underlying intellectual disability    Context: Arthur Mccarthy is a 35 y.o. male who presents to the ED from his facility with reports of the patient \"not acting normally\".  He has underlying intellectual disability and cannot give much in the way of history, although he will answer simple yes or no questions.  He denies pain.  When asked if he has any problems urinating, he replied no.  When asked if he has any problems breathing, he replied no.  When asked if he had any abdominal pain, he replied no.  When I asked if he is hungry, he replied yes.      The patient was placed in a mask in triage, hand hygiene was performed before and after my interaction with the patient.  I wore a mask, safety glasses and gloves during my entire interaction with the patient.    PAST MEDICAL HISTORY  Active Ambulatory Problems     Diagnosis Date Noted   • Intellectual disability 10/15/2019   • Seasonal allergies 10/15/2019   • Hypertension 10/15/2019   • Seizure disorder (CMS/formerly [facility]) 10/15/2019   • Stage 3b chronic kidney disease (CMS/formerly [facility]) 10/15/2019   • Hyperlipidemia 10/15/2019   • Annual physical exam 01/22/2020   • Hypothyroidism 01/22/2020   • GERD (gastroesophageal reflux disease) 01/22/2020   • Asthma 01/22/2020   • Constipation 01/24/2020   • Hypertriglyceridemia 01/24/2020   • Acute urinary tract infection 09/05/2021   • JORDEN (acute kidney injury) (CMS/formerly [facility]) 09/05/2021   • Urine retention 09/08/2021   • Sepsis without acute organ dysfunction (present on admission) 09/09/2021     Resolved Ambulatory Problems     Diagnosis Date Noted   • No Resolved Ambulatory Problems     Past Medical History:   Diagnosis Date   • Acquired intellectual disability    • Allergic rhinitis  "   • Chronic kidney disease (CKD)    • Chronic renal disease, stage 3, moderately decreased glomerular filtration rate between 30-59 mL/min/1.73 square meter (CMS/HCC)    • Epilepsy, generalized, convulsive (CMS/HCC)    • Episode of altered consciousness    • Essential hypertension    • Hematuria    • Hyperkalemia    • Immunization due    • Metabolic encephalopathy    • Moderate intellectual disabilities    • Mood disorder (CMS/HCC)    • Nocturia    • Physical exam, annual    • Seizure (CMS/HCC)          PAST SURGICAL HISTORY  History reviewed. No pertinent surgical history.      FAMILY HISTORY  Family History   Problem Relation Age of Onset   • Down syndrome Brother    • No Known Problems Other          SOCIAL HISTORY  Social History     Socioeconomic History   • Marital status: Single     Spouse name: Not on file   • Number of children: Not on file   • Years of education: Not on file   • Highest education level: Not on file   Tobacco Use   • Smoking status: Never Smoker   • Smokeless tobacco: Never Used   Substance and Sexual Activity   • Alcohol use: No   • Drug use: Defer         ALLERGIES  Doxycycline        REVIEW OF SYSTEMS  Review of Systems   Reason unable to perform ROS: Intellectual disability.          PHYSICAL EXAM    I have reviewed the triage vital signs and nursing notes.    ED Triage Vitals [09/14/21 1136]   Temp Heart Rate Resp BP SpO2   97.7 °F (36.5 °C) 82 16 158/96 98 %      Temp src Heart Rate Source Patient Position BP Location FiO2 (%)   -- -- -- -- --       Physical Exam   Constitutional: Pt. is awake and alert.  He is in no acute distress.  He is well-developed well-nourished.  HENT: Normocephalic and atraumatic. Oropharynx moist/nonerythematous.  He has a disconjugate gaze.  Neck: Normal range of motion. Neck supple. No JVD present.   Cardiovascular: Normal rate, regular rhythm and normal heart sounds. Exam reveals no gallop and no friction rub.   No murmur heard.  Pulmonary/Chest: Effort  normal and breath sounds normal. No stridor. No respiratory distress. No wheezes, no rales.   Abdominal: Soft, slightly distended. Bowel sounds are normal. No distension. There is no tenderness. There is no rebound and no guarding.   Musculoskeletal: Normal range of motion. No edema, tenderness or deformity.   Neurological: Pt. is awake and alert.  He has no focal neurologic deficits.  Skin: Skin is warm and dry. No rash noted. Pt. is not diaphoretic. No erythema.   Psychiatric: He appears pleasant and is cooperative..  Nursing note and vitals reviewed.        LAB RESULTS  Recent Results (from the past 24 hour(s))   Urinalysis With Culture If Indicated - Urine, Clean Catch    Collection Time: 09/14/21 11:57 AM    Specimen: Urine, Clean Catch   Result Value Ref Range    Color, UA Yellow Yellow, Straw    Appearance, UA Clear Clear    pH, UA 6.5 5.0 - 8.0    Specific Gravity, UA 1.007 1.005 - 1.030    Glucose, UA Negative Negative    Ketones, UA Negative Negative    Bilirubin, UA Negative Negative    Blood, UA Trace (A) Negative    Protein, UA 30 mg/dL (1+) (A) Negative    Leuk Esterase, UA Negative Negative    Nitrite, UA Negative Negative    Urobilinogen, UA 0.2 E.U./dL 0.2 - 1.0 E.U./dL   Urinalysis, Microscopic Only - Urine, Clean Catch    Collection Time: 09/14/21 11:57 AM    Specimen: Urine, Clean Catch   Result Value Ref Range    RBC, UA 3-5 (A) None Seen, 0-2 /HPF    WBC, UA 0-2 None Seen, 0-2 /HPF    Bacteria, UA None Seen None Seen /HPF    Squamous Epithelial Cells, UA 0-2 None Seen, 0-2 /HPF    Hyaline Casts, UA None Seen None Seen /LPF    Methodology Automated Microscopy    Comprehensive Metabolic Panel    Collection Time: 09/14/21 12:00 PM    Specimen: Blood   Result Value Ref Range    Glucose 96 65 - 99 mg/dL    BUN 29 (H) 6 - 20 mg/dL    Creatinine 2.43 (H) 0.76 - 1.27 mg/dL    Sodium 141 136 - 145 mmol/L    Potassium 4.3 3.5 - 5.2 mmol/L    Chloride 101 98 - 107 mmol/L    CO2 28.0 22.0 - 29.0 mmol/L     Calcium 10.4 8.6 - 10.5 mg/dL    Total Protein 7.7 6.0 - 8.5 g/dL    Albumin 4.30 3.50 - 5.20 g/dL    ALT (SGPT) 24 1 - 41 U/L    AST (SGOT) 24 1 - 40 U/L    Alkaline Phosphatase 116 39 - 117 U/L    Total Bilirubin 0.3 0.0 - 1.2 mg/dL    eGFR  African Amer 37 (L) >60 mL/min/1.73    Globulin 3.4 gm/dL    A/G Ratio 1.3 g/dL    BUN/Creatinine Ratio 11.9 7.0 - 25.0    Anion Gap 12.0 5.0 - 15.0 mmol/L   Lactic Acid, Plasma    Collection Time: 09/14/21 12:00 PM    Specimen: Blood   Result Value Ref Range    Lactate 1.5 0.5 - 2.0 mmol/L   Valproic Acid Level, Total    Collection Time: 09/14/21 12:00 PM    Specimen: Blood   Result Value Ref Range    Valproic Acid 89.0 50.0 - 125.0 mcg/mL   CBC Auto Differential    Collection Time: 09/14/21 12:00 PM    Specimen: Blood   Result Value Ref Range    WBC 7.81 3.40 - 10.80 10*3/mm3    RBC 5.37 4.14 - 5.80 10*6/mm3    Hemoglobin 12.2 (L) 13.0 - 17.7 g/dL    Hematocrit 38.9 37.5 - 51.0 %    MCV 72.4 (L) 79.0 - 97.0 fL    MCH 22.7 (L) 26.6 - 33.0 pg    MCHC 31.4 (L) 31.5 - 35.7 g/dL    RDW 17.0 (H) 12.3 - 15.4 %    RDW-SD 40.5 37.0 - 54.0 fl    MPV 10.6 6.0 - 12.0 fL    Platelets 249 140 - 450 10*3/mm3   Manual Differential    Collection Time: 09/14/21 12:00 PM    Specimen: Blood   Result Value Ref Range    Neutrophil % 68.0 42.7 - 76.0 %    Lymphocyte % 26.0 19.6 - 45.3 %    Monocyte % 3.0 (L) 5.0 - 12.0 %    Eosinophil % 2.0 0.3 - 6.2 %    Basophil % 1.0 0.0 - 1.5 %    Neutrophils Absolute 5.31 1.70 - 7.00 10*3/mm3    Lymphocytes Absolute 2.03 0.70 - 3.10 10*3/mm3    Monocytes Absolute 0.23 0.10 - 0.90 10*3/mm3    Eosinophils Absolute 0.16 0.00 - 0.40 10*3/mm3    Basophils Absolute 0.08 0.00 - 0.20 10*3/mm3    Poikilocytes Slight/1+ None Seen    Smudge Cells Slight/1+ None Seen    Platelet Morphology Normal Normal       Ordered the above labs and independently reviewed the results.        RADIOLOGY  No Radiology Exams Resulted Within Past 24 Hours    I ordered the above noted  radiological studies. Reviewed by me and discussed with radiologist.  See dictation for official radiology interpretation.      PROCEDURES    Procedures      MEDICATIONS GIVEN IN ER    Medications - No data to display      PROGRESS, DATA ANALYSIS, CONSULTS, AND MEDICAL DECISION MAKING    Any/all labs have been independently reviewed by me.  Any/all radiology studies have been reviewed by me and discussed with radiologist dictating the report.   EKG's independently viewed and interpreted by me.  Discussion below represents my analysis of pertinent findings related to patient's condition, differential diagnosis, treatment plan and final disposition.      ED Course as of Sep 14 1326   Tue Sep 14, 2021   1154 Prior record review: The patient was admitted to this facility from 9/5/2021 through 9/11/2021 for acute urinary tract infection/sepsis and urinary retention.    [WC]   1211 Patient had over a liter of urine, however he urinated with ease.    [WC]   1233 Normal   Lactate: 1.5 [WC]   1240 Valproic Acid: 89.0 [WC]   1240 BUN(!): 29 [WC]   1240 This is a little bit above his baseline.  Likely due to chronic urinary retention.    Nephrology had recommended Flomax in the past, however this was not continued on discharge.     Creatinine(!): 2.43 [WC]   1308 CBC is unremarkable.    [WC]      ED Course User Index  [WC] Lyle Colby MD       AS OF 13:26 EDT VITALS:    BP - 158/96  HR - 82  TEMP - 97.7 °F (36.5 °C)  02 SATS - 98%        DIAGNOSIS  Final diagnoses:   Urinary retention   Chronic kidney disease, unspecified CKD stage   Intellectual disability         DISPOSITION  Discharged           Lyle Colby MD  09/14/21 1327

## 2021-09-14 NOTE — OUTREACH NOTE
Call Center TCM Note      Responses   [facility] [place] patient discharged from?  [place]   Does the patient have one of the following disease processes/diagnoses(primary or secondary)?  Sepsis   TCM attempt successful?  No   Unsuccessful attempts  Attempt 3   Wrap up additional comments  Unfortunately pt has returned to ED at time of this call. Pt is not yet sched for TCM FWP with Dr Vasquez. Unsure if pt will be re-admitted. If not, TCM FWP would need to be completed by 09/26/2021.           Sari Bradshaw MA    9/14/2021, 13:50 EDT

## 2021-09-14 NOTE — ED NOTES
"Patient from alternative services via EMS; patient has moderate intellectual disabilities and facility states \"he was not acting normal.\" patient is at baseline and currently has no complaints. Guardian of the state wanted patient transported. Patient recently treated for UTI.             Florecita Morales RN  09/14/21 1137    "

## 2021-09-15 ENCOUNTER — TELEPHONE (OUTPATIENT)
Dept: SOCIAL WORK | Facility: HOSPITAL | Age: 36
End: 2021-09-15

## 2021-09-15 RX ORDER — CLONIDINE 0.3 MG/24H
1 PATCH, EXTENDED RELEASE TRANSDERMAL WEEKLY
Qty: 4 PATCH | Refills: 3 | OUTPATIENT
Start: 2021-09-17

## 2021-09-15 NOTE — TELEPHONE ENCOUNTER
Pt called in asking for refill on     cloNIDine (CATAPRES-TTS) 0.3 MG/24HR patch    Has apt scheduled on 09/29/2021 with Dr. Vasquez      Pt contact [phone]

## 2021-09-15 NOTE — TELEPHONE ENCOUNTER
Call placed to Ms. Rocha, Mr. Mccarthy'  at his group home to alert her.  [facility], at [facility] is transferring the prescription now to [facility].  [facility] was able to complete the PA today and it was approved by his insurance.  Now the medication will be filled by [facility] and delivered to pt's group home prior to his needing it applied on 9/17/21.  Ms. Rocha was very appreciative and states that she will make sure pt gets it on time.  She will call back with any concerns.

## 2021-09-15 NOTE — TELEPHONE ENCOUNTER
Rx Refill Note  Requested Prescriptions      No prescriptions requested or ordered in this encounter      Last office visit with prescribing clinician: 7/27/2021      Next office visit with prescribing clinician: 9/29/2021            Luz Gunn MA  09/15/21, 13:54 EDT

## 2021-09-22 ENCOUNTER — READMISSION MANAGEMENT (OUTPATIENT)
Dept: CALL CENTER | Facility: HOSPITAL | Age: 36
End: 2021-09-22

## 2021-09-24 RX ORDER — LORATADINE 10 MG/1
TABLET ORAL
Qty: 30 TABLET | Refills: 11 | Status: SHIPPED | OUTPATIENT
Start: 2021-09-24 | End: 2021-10-22

## 2021-09-24 NOTE — TELEPHONE ENCOUNTER
Rx Refill Note  Requested Prescriptions     Pending Prescriptions Disp Refills   • loratadine (CLARITIN) 10 MG tablet [Pharmacy Med Name: loratadine 10 mg tablet] 30 tablet 11     Sig: TAKE ONE TABLET BY MOUTH DAILY      Last office visit with prescribing clinician: 7/27/2021      Next office visit with prescribing clinician: 9/29/2021            Luz Gunn MA  09/24/21, 12:25 EDT

## 2021-09-27 RX ORDER — TAMSULOSIN HYDROCHLORIDE 0.4 MG/1
CAPSULE ORAL
Qty: 30 CAPSULE | Refills: 0 | Status: SHIPPED | OUTPATIENT
Start: 2021-09-27 | End: 2021-09-27 | Stop reason: SDUPTHER

## 2021-09-27 RX ORDER — TAMSULOSIN HYDROCHLORIDE 0.4 MG/1
1 CAPSULE ORAL NIGHTLY
Qty: 30 CAPSULE | Refills: 2 | Status: SHIPPED | OUTPATIENT
Start: 2021-09-27 | End: 2021-09-28 | Stop reason: SDUPTHER

## 2021-09-27 RX ORDER — CLONIDINE 0.3 MG/24H
PATCH, EXTENDED RELEASE TRANSDERMAL
Qty: 4 PATCH | Refills: 0 | Status: SHIPPED | OUTPATIENT
Start: 2021-09-27 | End: 2021-09-27 | Stop reason: SDUPTHER

## 2021-09-27 RX ORDER — CLONIDINE 0.3 MG/24H
1 PATCH, EXTENDED RELEASE TRANSDERMAL WEEKLY
Qty: 4 PATCH | Refills: 2 | Status: SHIPPED | OUTPATIENT
Start: 2021-09-27 | End: 2021-09-28 | Stop reason: SDUPTHER

## 2021-09-27 NOTE — TELEPHONE ENCOUNTER
Rx Refill Note  Requested Prescriptions     Pending Prescriptions Disp Refills   • tamsulosin (FLOMAX) 0.4 MG capsule 24 hr capsule [Pharmacy Med Name: tamsulosin 0.4 mg capsule] 30 capsule 0     Sig: TAKE ONE CAPSULE BY MOUTH NIGHTLY   • cloNIDine (CATAPRES-TTS) 0.3 MG/24HR patch [Pharmacy Med Name: clonidine 0.3 mg/24 hr weekly transdermal patch] 4 patch 0     Sig: apply ONE PATCH TO SKIN WEEKLY      Last office visit with prescribing clinician: 7/27/2021      Next office visit with prescribing clinician: 9/29/2021            Luz Gunn MA  09/27/21, 07:55 EDT

## 2021-09-28 ENCOUNTER — TELEPHONE (OUTPATIENT)
Dept: FAMILY MEDICINE CLINIC | Facility: CLINIC | Age: 36
End: 2021-09-28

## 2021-09-28 RX ORDER — FUROSEMIDE 40 MG/1
40 TABLET ORAL DAILY
Qty: 30 TABLET | Refills: 3 | Status: SHIPPED | OUTPATIENT
Start: 2021-09-28 | End: 2021-09-28 | Stop reason: SDUPTHER

## 2021-09-28 RX ORDER — CLONIDINE 0.3 MG/24H
1 PATCH, EXTENDED RELEASE TRANSDERMAL WEEKLY
Qty: 4 PATCH | Refills: 5 | Status: SHIPPED | OUTPATIENT
Start: 2021-09-28 | End: 2021-09-29

## 2021-09-28 RX ORDER — CLONIDINE 0.3 MG/24H
1 PATCH, EXTENDED RELEASE TRANSDERMAL WEEKLY
Qty: 4 PATCH | Refills: 2 | Status: SHIPPED | OUTPATIENT
Start: 2021-09-28 | End: 2021-09-28 | Stop reason: SDUPTHER

## 2021-09-28 RX ORDER — FUROSEMIDE 40 MG/1
40 TABLET ORAL DAILY
Qty: 30 TABLET | Refills: 3 | Status: SHIPPED | OUTPATIENT
Start: 2021-09-28 | End: 2021-10-27 | Stop reason: SDUPTHER

## 2021-09-28 RX ORDER — TAMSULOSIN HYDROCHLORIDE 0.4 MG/1
1 CAPSULE ORAL NIGHTLY
Qty: 30 CAPSULE | Refills: 5 | Status: SHIPPED | OUTPATIENT
Start: 2021-09-28 | End: 2022-02-08

## 2021-09-28 RX ORDER — TAMSULOSIN HYDROCHLORIDE 0.4 MG/1
1 CAPSULE ORAL NIGHTLY
Qty: 30 CAPSULE | Refills: 2 | Status: SHIPPED | OUTPATIENT
Start: 2021-09-28 | End: 2021-09-28 | Stop reason: SDUPTHER

## 2021-09-28 NOTE — TELEPHONE ENCOUNTER
Rx Refill Note  Requested Prescriptions     Pending Prescriptions Disp Refills   • tamsulosin (FLOMAX) 0.4 MG capsule 24 hr capsule 30 capsule 2     Sig: Take 1 capsule by mouth Every Night. nightly   • furosemide (LASIX) 40 MG tablet       Sig: Take 1 tablet by mouth Daily.   • cloNIDine (CATAPRES-TTS) 0.3 MG/24HR patch 4 patch 2     Sig: Place 1 patch on the skin as directed by provider 1 (One) Time Per Week.      Last office visit with prescribing clinician: 7/27/2021      Next office visit with prescribing clinician: 9/29/2021            Андрей Bentley  09/28/21, 11:39 EDT

## 2021-09-28 NOTE — TELEPHONE ENCOUNTER
Caller: [facility] - [facility] [address] [place] - [phone] [facility] [phone] FX    Relationship: Pharmacy      Medication requested (name and dosage): tamsulosin (FLOMAX) 0.4 MG capsule 24 hr capsule    furosemide (LASIX) 40 MG tablet     cloNIDine (CATAPRES-TTS) 0.3 MG/24HR patch     Pharmacy where request should be sent: [facility] - [facility] [address] [address] [place] - [phone] [facility] [phone] FX  [phone]    Additional details provided by patient:     Best call back number: [phone]     Does the patient have less than a 3 day supply:  [] Yes  [] No    Jean Friedman Rep   09/28/21 10:20 EDT

## 2021-09-28 NOTE — TELEPHONE ENCOUNTER
Pharmacy Name:  PHARMACY - [place], [place] - [address] [place] - [phone] [facility] [phone] FX<br>[facility] - [place], [place] - [address] - [phone] [facility] [phone] FX<br>[facility] - [place]     Pharmacy representative name: ZOEY    Pharmacy representative phone number: [phone]    What medication are you calling in regards to: furosemide (LASIX) 40 MG tablet    What question does the pharmacy have: DOSAGE INSTRUCTIONS    Who is the provider that prescribed the medication: CLAU    Additional notes: ZOEY FROM [facility] IS CALLING IN REGARDS TO THE MEDICATION furosemide (LASIX) 40 MG tablet. ZOEY STATES THAT THE ORIGINAL REQUEST FOR THE MEDICATION WAS FOR TWICE A DAY AND THE LAST REQUEST FOR THE MEDICATION FOR ONCE A DAY AND ZOEY JUST NEEDS TO KNOW WHICH ONE IS CORRECT. PLEASE RETURN CALL ASAP SO MEDICATION CAN BE FILLED.

## 2021-09-29 ENCOUNTER — TELEPHONE (OUTPATIENT)
Dept: FAMILY MEDICINE CLINIC | Facility: CLINIC | Age: 36
End: 2021-09-29

## 2021-09-29 ENCOUNTER — OFFICE VISIT (OUTPATIENT)
Dept: FAMILY MEDICINE CLINIC | Facility: CLINIC | Age: 36
End: 2021-09-29

## 2021-09-29 VITALS
OXYGEN SATURATION: 95 % | HEART RATE: 99 BPM | WEIGHT: 197 LBS | TEMPERATURE: 98.4 F | SYSTOLIC BLOOD PRESSURE: 164 MMHG | DIASTOLIC BLOOD PRESSURE: 98 MMHG | BODY MASS INDEX: 26.72 KG/M2

## 2021-09-29 DIAGNOSIS — I10 ESSENTIAL HYPERTENSION: ICD-10-CM

## 2021-09-29 DIAGNOSIS — A41.9 SEPSIS WITHOUT ACUTE ORGAN DYSFUNCTION, DUE TO UNSPECIFIED ORGANISM (HCC): ICD-10-CM

## 2021-09-29 DIAGNOSIS — N39.0 ACUTE URINARY TRACT INFECTION: ICD-10-CM

## 2021-09-29 DIAGNOSIS — Z09 HOSPITAL DISCHARGE FOLLOW-UP: Primary | ICD-10-CM

## 2021-09-29 PROCEDURE — 99214 OFFICE O/P EST MOD 30 MIN: CPT | Performed by: INTERNAL MEDICINE

## 2021-09-29 RX ORDER — SOLIFENACIN SUCCINATE 5 MG/1
TABLET, FILM COATED ORAL DAILY
COMMUNITY
End: 2022-05-31

## 2021-09-29 RX ORDER — CLONIDINE HYDROCHLORIDE 0.3 MG/1
0.3 TABLET ORAL 3 TIMES DAILY
Qty: 90 TABLET | Refills: 3 | Status: SHIPPED | OUTPATIENT
Start: 2021-09-29 | End: 2021-11-19

## 2021-09-29 RX ORDER — QUETIAPINE FUMARATE 300 MG/1
300 TABLET, FILM COATED ORAL NIGHTLY
COMMUNITY
End: 2021-10-06 | Stop reason: SDUPTHER

## 2021-09-29 RX ORDER — ALBUTEROL SULFATE 2.5 MG/3ML
SOLUTION RESPIRATORY (INHALATION) EVERY 4 HOURS PRN
COMMUNITY

## 2021-09-29 RX ORDER — BUDESONIDE 1 MG/2ML
1 INHALANT ORAL
COMMUNITY
End: 2022-01-18

## 2021-09-29 RX ORDER — ALBUTEROL SULFATE 90 UG/1
2 AEROSOL, METERED RESPIRATORY (INHALATION) EVERY 6 HOURS PRN
COMMUNITY
End: 2022-02-01

## 2021-09-29 NOTE — TELEPHONE ENCOUNTER
Caller: [facility] - [place], [place] - [address] [place] - [phone] [facility] [phone] FX    Relationship: PHARMACY    Best call back number: [phone] - FAX: [phone]    What medications are you currently taking: cloNIDine (Catapres) 0.3 MG tablet    When did you start taking these medications: 09/29/2021    Which medication are you concerned about: [facility] NEEDS A NEW MED LIST UPDATED AS OF TODAY, IT'S REQUIRED BY THE FACILITY THIS PATIENT IS AT.  IT SEEMS LIKE CLONIDINE WAS CHANGED FROM PATCH TO TABLETS TODAY    Who prescribed you this medication: DR OLGUIN

## 2021-09-29 NOTE — TELEPHONE ENCOUNTER
Danielle from [facility] pharmacy called back, needs to verify the dosage on rx-Lasix, if 2x day or 1x day.  Please call  to advise

## 2021-09-29 NOTE — PROGRESS NOTES
Subjective   Arthur Mccarthy is a 35 y.o. male.     Chief Complaint   Patient presents with   • Hospital Follow Up Visit       History of Present Illness   Within 48 business hours after discharge our office contacted him via telephone to coordinate his care and needs.  Date of TCM Phone Call 9/12/2021   [facility]   Date of Admission 9/5/2021   Date of Discharge 9/12/2021   Discharge Disposition Home-Health Care [facility]     Risk for Readmission (LACE) No data recorded  Caregiver, Arnold from within the group home, was present during the history-taking and subsequent discussion (and for part of the physical exam) with this patient.  Patient agrees to the presence of the individual during this visit.    Patient was admitted to [facility]  ALL records were obtained and reviewed and /or discussed with admitting physician  Date of admission 9/5/2021  Date of discharge 1/11/2021  Diagnosis UTI with sepsis, acute kidney injury with chronic stage III renal failure, asthma  Hospital Course   Mr. Mccarthy is a 35 y.o. male with a history of CKD 3B, asthma, hypertension, intellectual disability who presented to [facility] initially complaining of shortness of breath, fever for 2.  Please see the admitting history and physical for further details.  He was found to have acute UTI with JORDEN and was admitted to the hospital for further evaluation and treatment.  Patient admitted and treated with broad-spectrum antibiotics for acute UTI, also found to have urinary retention and had Mor[place]o placed.  His creatinine peaked at 4.5 but has improved with improvement of urinary retention and treatment of UTI.  Nephrology and urology consulted while patient was admitted.  Antihypertensives adjusted per nephrology as below and patient needs to follow with them as an outpatient in 2 weeks after discharge.  Will discharge patient to finish course of Keflex for UTI.  Medications upon discharge  Keflex for UTI.  In hospital stopped the oral clonidine and started a once a week patch, and depakote 500 at night and seroquel 600 mg at bedtime. Patient was reevaluated emergency room on 9/14/2021 noting continued improvement of his labs with a BUN of 29 creatinine 2.43  Disposition return to the group home setting  Follow up nephrology  Currently c/o Caregiver has been noting elevated BP that has been staying elevated.  Caregivers are concerned about the high BP readings at home which were controlled prior to the admission and medication changed in hospital.  Has been acting appropriately for Jim in the home ever since last ER visit 2 weeks ago.  Condition stable    I reviewed and requested records labs and diagnostics from the hospital with the patient and family.  The patient is to follow-up with specialist as discussed and directed.  If any problems arise or further questions develop patient is to call or to contact us for any needs.    The following portions of the patient's history were reviewed and updated as appropriate: allergies, current medications, past family history, past medical history, past social history, past surgical history and problem list.    Past Medical History:   Diagnosis Date   • Acquired intellectual disability    • Allergic rhinitis    • Asthma    • Chronic kidney disease (CKD)    • Chronic renal disease, stage 3, moderately decreased glomerular filtration rate between 30-59 mL/min/1.73 square meter (CMS/HCC)    • Constipation    • Epilepsy, generalized, convulsive (CMS/HCC)    • Episode of altered consciousness    • Essential hypertension     History of Essential Hypertension    • GERD (gastroesophageal reflux disease)    • Hematuria    • Hyperkalemia    • Hyperlipidemia    • Hypertension    • Hypothyroidism    • Immunization due    • Metabolic encephalopathy    • Moderate intellectual disabilities    • Mood disorder (CMS/HCC)    • Nocturia    • Physical exam, annual    • Seizure  (CMS/[facility])    • Seizure disorder (CMS/[facility])        History reviewed. No pertinent surgical history.    Family History   Problem Relation Age of Onset   • Down syndrome Brother    • No Known Problems Other        Social History     Socioeconomic History   • Marital status: Single     Spouse name: Not on file   • Number of children: Not on file   • Years of education: Not on file   • Highest education level: Not on file   Tobacco Use   • Smoking status: Never Smoker   • Smokeless tobacco: Never Used   Substance and Sexual Activity   • Alcohol use: No   • Drug use: Defer       Current Outpatient Medications   Medication Sig Dispense Refill   • albuterol (PROVENTIL/VENTOLIN) nebulization syringe Take  by nebulization Every 4 (Four) Hours As Needed for Wheezing. Every 4-6 hrs prn wheezing     • albuterol sulfate HFA (ProAir HFA) 108 (90 Base) MCG/ACT inhaler Inhale 2 puffs Every 6 (Six) Hours As Needed for Wheezing.     • amLODIPine (NORVASC) 5 MG tablet Take 1 tablet by mouth Daily. 90 tablet 0   • ammonium lactate (AMLACTIN) 12 % cream Apply 1 application topically to the appropriate area as directed 2 (two) times a day. Apply to legs and feet as directed     • budesonide (PULMICORT) 1 MG/2ML nebulizer solution Take 1 mg by nebulization Daily.     • carvedilol (COREG) 25 MG tablet Take 1 tablet by mouth 2 (Two) Times a Day With Meals. 60 tablet 0   • divalproex (DEPAKOTE) 500 MG DR tablet Take 1 tablet by mouth 2 (Two) Times a Day. 180 tablet 0   • ferrous sulfate 325 (65 FE) MG tablet Take 325 mg by mouth 2 (two) times a day.     • fluticasone (FLONASE) 50 MCG/ACT nasal spray 2 sprays into the nostril(s) as directed by provider Daily.     • furosemide (LASIX) 40 MG tablet Take 1 tablet by mouth Daily. 30 tablet 3   • levothyroxine (SYNTHROID, LEVOTHROID) 75 MCG tablet Take 75 mcg by mouth Daily.     • loratadine (CLARITIN) 10 MG tablet TAKE ONE TABLET BY MOUTH DAILY 30 tablet 11   • montelukast (SINGULAIR) 10 MG tablet  Take 10 mg by mouth every night at bedtime.     • O2 (OXYGEN) Inhale 2 L/min Every Night.     • OLANZapine (zyPREXA) 20 MG tablet Take 1 tablet by mouth Every Night. 30 tablet 0   • omeprazole (priLOSEC) 20 MG capsule Take 20 mg by mouth Daily.     • polyethylene glycol (MiraLax) 17 GM/SCOOP powder Take 17 g by mouth Daily.     • QUEtiapine (SEROquel) 300 MG tablet Take 300 mg by mouth Every Night.     • solifenacin (VESIcare) 5 MG tablet Take  by mouth Daily.     • cloNIDine (Catapres) 0.3 MG tablet Take 1 tablet by mouth 3 (Three) Times a Day. 90 tablet 3   • minoxidil (LONITEN) 10 MG tablet Take 0.5 tablet (5 mg) by mouth Every 12 (Twelve) Hours. 30 tablet 0   • tamsulosin (FLOMAX) 0.4 MG capsule 24 hr capsule Take 1 capsule by mouth Every Night. nightly 30 capsule 5     No current facility-administered medications for this visit.       Review of Systems   Constitutional: Negative for activity change, appetite change, fatigue, fever, unexpected weight gain and unexpected weight loss.   HENT: Negative for nosebleeds, rhinorrhea, trouble swallowing and voice change.    Eyes: Negative for visual disturbance.   Respiratory: Negative for cough, chest tightness, shortness of breath and wheezing.    Cardiovascular: Negative for chest pain, palpitations and leg swelling.   Gastrointestinal: Negative for abdominal pain, blood in stool, constipation, diarrhea, nausea, vomiting, GERD and indigestion.   Genitourinary: Negative for dysuria, frequency and hematuria.   Musculoskeletal: Negative for arthralgias, back pain and myalgias.   Skin: Negative for rash and bruise.   Neurological: Negative for dizziness, tremors, weakness, light-headedness, numbness, headache and memory problem.   Hematological: Negative for adenopathy. Does not bruise/bleed easily.   Psychiatric/Behavioral: Negative for sleep disturbance and depressed mood. The patient is not nervous/anxious.        Objective   Vitals:    09/29/21 0916   BP: 164/98    Pulse: 99   Temp: 98.4 °F (36.9 °C)   SpO2: 95%     Body mass index is 26.72 kg/m².  Physical Exam  Vitals and nursing note reviewed.   Constitutional:       General: He is not in acute distress.     Appearance: He is well-developed. He is not diaphoretic.      Comments: Wiet and answers Yes and no appropriately.   HENT:      Head: Normocephalic and atraumatic.      Right Ear: External ear normal.      Left Ear: External ear normal.      Nose: Nose normal.   Eyes:      Conjunctiva/sclera: Conjunctivae normal.      Pupils: Pupils are equal, round, and reactive to light.      Comments: Left exotropia   Neck:      Thyroid: No thyromegaly.      Trachea: No tracheal deviation.   Cardiovascular:      Rate and Rhythm: Normal rate and regular rhythm.      Heart sounds: Normal heart sounds. No murmur heard.   No friction rub. No gallop.    Pulmonary:      Effort: Pulmonary effort is normal. No respiratory distress.      Breath sounds: Normal breath sounds.   Abdominal:      General: Bowel sounds are normal.      Palpations: Abdomen is soft. There is no mass.      Tenderness: There is no abdominal tenderness. There is no guarding.   Musculoskeletal:         General: Normal range of motion.      Cervical back: Normal range of motion and neck supple.   Lymphadenopathy:      Cervical: No cervical adenopathy.   Skin:     General: Skin is warm and dry.      Capillary Refill: Capillary refill takes less than 2 seconds.      Findings: No rash.   Neurological:      Mental Status: He is alert and oriented to person, place, and time.      Motor: No abnormal muscle tone.      Deep Tendon Reflexes: Reflexes normal.   Psychiatric:         Behavior: Behavior normal.         Thought Content: Thought content normal.         Judgment: Judgment normal.           Assessment/Plan   Diagnoses and all orders for this visit:    1. Hospital discharge follow-up (Primary)    2. Essential hypertension  -     cloNIDine (Catapres) 0.3 MG tablet; Take 1  tablet by mouth 3 (Three) Times a Day.  Dispense: 90 tablet; Refill: 3    3. Acute urinary tract infection  -     Urine Culture - Urine, Urine, Clean Catch    4. Sepsis without acute organ dysfunction, due to unspecified organism (HCC)  -     Urine Culture - Urine, Urine, Clean Catch        Recheck the urine culture today to confirm resolution of the infection.  Follow up with nephrology and continue the tamsulosin for the urinary retention.  Uncontrolled hypertension after changes in the hospital.  We will change the clonidine patch back to the clonidine 0.3 mg TID and restart the amlodipine 5 mg.  Continue the other medications including the carvedilol at 25 mg BID.  Follow up in 2 weeks and reassess the BP.  May consider increasing the carvedilol and/or the amlodipine if BP remains elevated.            · COVID-19 Precautions - Patient was compliant in wearing a mask. When I saw the patient, I used appropriate personal protective equipment (PPE) including mask and eye shield (standard procedure).  Additionally, I used gown and gloves if indicated.  Hand hygiene was completed before and after seeing the patient.  · Dictated utilizing Dragon Dictation

## 2021-10-01 LAB
BACTERIA UR CULT: NO GROWTH
BACTERIA UR CULT: NORMAL

## 2021-10-06 NOTE — TELEPHONE ENCOUNTER
Spoke with Iman at the pharmacy she states that when patient was in the hospital a lot of his meds were changed. She is needing new prescriptions for these meds with the correct dosing whether we keep them the same from after the hospital or switch back to the old dosage.  Please advise and send new prescriptions for    Amlodipine   Minoxidil   Olanzapine   Carvedilol   Quetiapine    Also need to know if you would like to keep patient on Allopurinol and he will need a prescription. On the Quetiapine pharmacist needs to know whether he is taking this once daily or twice daily.

## 2021-10-07 RX ORDER — AMLODIPINE BESYLATE 5 MG/1
TABLET ORAL
Qty: 90 TABLET | Refills: 1 | Status: SHIPPED | OUTPATIENT
Start: 2021-10-07 | End: 2022-02-08

## 2021-10-07 RX ORDER — QUETIAPINE FUMARATE 300 MG/1
300 TABLET, FILM COATED ORAL NIGHTLY
Qty: 90 TABLET | Refills: 1 | Status: SHIPPED | OUTPATIENT
Start: 2021-10-07 | End: 2022-02-08

## 2021-10-07 RX ORDER — MINOXIDIL 2.5 MG/1
TABLET ORAL
Qty: 120 TABLET | Refills: 1 | Status: SHIPPED | OUTPATIENT
Start: 2021-10-07 | End: 2021-10-22

## 2021-10-07 RX ORDER — OLANZAPINE 20 MG/1
TABLET ORAL
Qty: 90 TABLET | Refills: 1 | Status: SHIPPED | OUTPATIENT
Start: 2021-10-07 | End: 2022-02-08

## 2021-10-07 RX ORDER — CARVEDILOL 25 MG/1
25 TABLET ORAL 2 TIMES DAILY WITH MEALS
Qty: 60 TABLET | Refills: 1 | Status: SHIPPED | OUTPATIENT
Start: 2021-10-07 | End: 2021-10-08 | Stop reason: SDUPTHER

## 2021-10-08 RX ORDER — CARVEDILOL 25 MG/1
25 TABLET ORAL 2 TIMES DAILY WITH MEALS
Qty: 60 TABLET | Refills: 1 | Status: SHIPPED | OUTPATIENT
Start: 2021-10-08 | End: 2021-10-22

## 2021-10-12 RX ORDER — AMMONIUM LACTATE 12 G/100G
LOTION TOPICAL
Qty: 400 G | Refills: 11 | Status: SHIPPED | OUTPATIENT
Start: 2021-10-12 | End: 2022-09-12

## 2021-10-12 NOTE — TELEPHONE ENCOUNTER
Rx Refill Note  Requested Prescriptions     Pending Prescriptions Disp Refills   • ammonium lactate (LAC-HYDRIN) 12 % lotion [Pharmacy Med Name: ammonium lactate 12 % lotion] 400 g 11     Sig: APPLY TO FEET AND LEGS TWICE DAILY AS DIRECTED.      Last office visit with prescribing clinician: 9/29/2021      Next office visit with prescribing clinician: 10/12/2021            Luz Gunn MA  10/12/21, 14:37 EDT

## 2021-10-22 RX ORDER — LORATADINE 10 MG/1
TABLET ORAL
Qty: 30 TABLET | Refills: 11 | Status: SHIPPED | OUTPATIENT
Start: 2021-10-22 | End: 2022-08-29

## 2021-10-22 RX ORDER — CARVEDILOL 25 MG/1
TABLET ORAL
Qty: 60 TABLET | Refills: 1 | Status: SHIPPED | OUTPATIENT
Start: 2021-10-22 | End: 2021-12-17

## 2021-10-22 RX ORDER — MINOXIDIL 2.5 MG/1
TABLET ORAL
Qty: 120 TABLET | Refills: 1 | Status: SHIPPED | OUTPATIENT
Start: 2021-10-22 | End: 2021-12-17

## 2021-10-27 ENCOUNTER — APPOINTMENT (OUTPATIENT)
Dept: GENERAL RADIOLOGY | Facility: HOSPITAL | Age: 36
End: 2021-10-27

## 2021-10-27 ENCOUNTER — HOSPITAL ENCOUNTER (EMERGENCY)
Facility: HOSPITAL | Age: 36
Discharge: HOME | End: 2021-10-27
Attending: EMERGENCY MEDICINE | Admitting: EMERGENCY MEDICINE

## 2021-10-27 VITALS
RESPIRATION RATE: 16 BRPM | SYSTOLIC BLOOD PRESSURE: 117 MMHG | BODY MASS INDEX: 28.93 KG/M2 | TEMPERATURE: 98.8 F | HEART RATE: 81 BPM | OXYGEN SATURATION: 98 % | WEIGHT: 213.63 LBS | HEIGHT: 72 IN | DIASTOLIC BLOOD PRESSURE: 83 MMHG

## 2021-10-27 DIAGNOSIS — N18.6 ESRD (END STAGE RENAL DISEASE) (HCC): ICD-10-CM

## 2021-10-27 DIAGNOSIS — R60.0 BILATERAL LOWER EXTREMITY EDEMA: Primary | ICD-10-CM

## 2021-10-27 LAB
ALBUMIN SERPL-MCNC: 4 G/DL (ref 3.5–5.2)
ANION GAP SERPL CALCULATED.3IONS-SCNC: 6.5 MMOL/L (ref 5–15)
BASOPHILS # BLD AUTO: 0.03 10*3/MM3 (ref 0–0.2)
BASOPHILS NFR BLD AUTO: 0.6 % (ref 0–1.5)
BUN SERPL-MCNC: 14 MG/DL (ref 6–20)
BUN/CREAT SERPL: 6.1 (ref 7–25)
CALCIUM SPEC-SCNC: 9.6 MG/DL (ref 8.6–10.5)
CHLORIDE SERPL-SCNC: 108 MMOL/L (ref 98–107)
CO2 SERPL-SCNC: 28.5 MMOL/L (ref 22–29)
CREAT SERPL-MCNC: 2.28 MG/DL (ref 0.76–1.27)
DEPRECATED RDW RBC AUTO: 46.5 FL (ref 37–54)
EOSINOPHIL # BLD AUTO: 0.05 10*3/MM3 (ref 0–0.4)
EOSINOPHIL NFR BLD AUTO: 1 % (ref 0.3–6.2)
ERYTHROCYTE [DISTWIDTH] IN BLOOD BY AUTOMATED COUNT: 17.5 % (ref 12.3–15.4)
GFR SERPL CREATININE-BSD FRML MDRD: 40 ML/MIN/1.73
GLUCOSE SERPL-MCNC: 95 MG/DL (ref 65–99)
HCT VFR BLD AUTO: 35.9 % (ref 37.5–51)
HGB BLD-MCNC: 11.2 G/DL (ref 13–17.7)
LYMPHOCYTES # BLD AUTO: 2.11 10*3/MM3 (ref 0.7–3.1)
LYMPHOCYTES NFR BLD AUTO: 41.5 % (ref 19.6–45.3)
MCH RBC QN AUTO: 23.2 PG (ref 26.6–33)
MCHC RBC AUTO-ENTMCNC: 31.2 G/DL (ref 31.5–35.7)
MCV RBC AUTO: 74.5 FL (ref 79–97)
MONOCYTES # BLD AUTO: 0.69 10*3/MM3 (ref 0.1–0.9)
MONOCYTES NFR BLD AUTO: 13.6 % (ref 5–12)
NEUTROPHILS NFR BLD AUTO: 2.15 10*3/MM3 (ref 1.7–7)
NEUTROPHILS NFR BLD AUTO: 42.1 % (ref 42.7–76)
NT-PROBNP SERPL-MCNC: 1097 PG/ML (ref 0–450)
PHOSPHATE SERPL-MCNC: 3.5 MG/DL (ref 2.5–4.5)
PLATELET # BLD AUTO: 260 10*3/MM3 (ref 140–450)
PMV BLD AUTO: 10.4 FL (ref 6–12)
POTASSIUM SERPL-SCNC: 4.7 MMOL/L (ref 3.5–5.2)
RBC # BLD AUTO: 4.82 10*6/MM3 (ref 4.14–5.8)
SODIUM SERPL-SCNC: 143 MMOL/L (ref 136–145)
VALPROATE SERPL-MCNC: 76 MCG/ML (ref 50–125)
WBC # BLD AUTO: 5.09 10*3/MM3 (ref 3.4–10.8)

## 2021-10-27 PROCEDURE — 85025 COMPLETE CBC W/AUTO DIFF WBC: CPT | Performed by: EMERGENCY MEDICINE

## 2021-10-27 PROCEDURE — 71045 X-RAY EXAM CHEST 1 VIEW: CPT

## 2021-10-27 PROCEDURE — 99283 EMERGENCY DEPT VISIT LOW MDM: CPT

## 2021-10-27 PROCEDURE — 83880 ASSAY OF NATRIURETIC PEPTIDE: CPT | Performed by: EMERGENCY MEDICINE

## 2021-10-27 PROCEDURE — 80069 RENAL FUNCTION PANEL: CPT | Performed by: EMERGENCY MEDICINE

## 2021-10-27 PROCEDURE — 80164 ASSAY DIPROPYLACETIC ACD TOT: CPT | Performed by: EMERGENCY MEDICINE

## 2021-10-27 RX ORDER — FUROSEMIDE 20 MG/1
60 TABLET ORAL 2 TIMES DAILY
Qty: 180 TABLET | Refills: 0 | Status: SHIPPED | OUTPATIENT
Start: 2021-10-27 | End: 2022-10-18 | Stop reason: SDUPTHER

## 2021-11-09 ENCOUNTER — OFFICE VISIT (OUTPATIENT)
Dept: FAMILY MEDICINE CLINIC | Facility: CLINIC | Age: 36
End: 2021-11-09

## 2021-11-09 VITALS
SYSTOLIC BLOOD PRESSURE: 130 MMHG | DIASTOLIC BLOOD PRESSURE: 82 MMHG | RESPIRATION RATE: 15 BRPM | HEIGHT: 72 IN | OXYGEN SATURATION: 98 % | WEIGHT: 214 LBS | TEMPERATURE: 97.8 F | BODY MASS INDEX: 28.99 KG/M2 | HEART RATE: 88 BPM

## 2021-11-09 DIAGNOSIS — I10 PRIMARY HYPERTENSION: Primary | ICD-10-CM

## 2021-11-09 DIAGNOSIS — N18.32 STAGE 3B CHRONIC KIDNEY DISEASE (HCC): ICD-10-CM

## 2021-11-09 PROBLEM — N25.81 SECONDARY HYPERPARATHYROIDISM: Status: ACTIVE | Noted: 2021-11-03

## 2021-11-09 PROBLEM — E55.9 VITAMIN D DEFICIENCY: Status: ACTIVE | Noted: 2021-11-03

## 2021-11-09 PROCEDURE — 90471 IMMUNIZATION ADMIN: CPT | Performed by: INTERNAL MEDICINE

## 2021-11-09 PROCEDURE — 99214 OFFICE O/P EST MOD 30 MIN: CPT | Performed by: INTERNAL MEDICINE

## 2021-11-09 PROCEDURE — 90686 IIV4 VACC NO PRSV 0.5 ML IM: CPT | Performed by: INTERNAL MEDICINE

## 2021-11-09 RX ORDER — ALLOPURINOL 100 MG/1
100 TABLET ORAL DAILY
COMMUNITY
End: 2022-02-16 | Stop reason: ALTCHOICE

## 2021-11-09 RX ORDER — DIVALPROEX SODIUM 500 MG/1
500 TABLET, EXTENDED RELEASE ORAL 2 TIMES DAILY
COMMUNITY
Start: 2021-10-24 | End: 2021-11-09 | Stop reason: SDUPTHER

## 2021-11-09 NOTE — PROGRESS NOTES
Juanita Mccarthy is a 35 y.o. male.     Chief Complaint   Patient presents with   • Hypertension       History of Present Illness   Caregiver was present during the history-taking and subsequent discussion (and for part of the physical exam) with this patient.  Patient agrees to the presence of the individual during this visit.      Notes was seen in the emergency room on 10/27/2021 secondary to swelling.  Blood pressure is notably somewhat elevated at 152/88.  After they consulted with nephrology he had his Lasix increased to 60 mg twice a day patient discharged follow-up with nephrology was requested and a repeat renal function to be done the following week.  Prior to ER BP was 169/117 and swollen all over.  Has seen nephrology and continued on the furosemide 60 mg BID and had labs with stage 3-4 CKD.  Now BP and swelling improved and has follow up with nephrology in 2 months.  Since discharge blood pressure has been within the normal range per the caregiver.    The following portions of the patient's history were reviewed and updated as appropriate: allergies, current medications, past family history, past medical history, past social history, past surgical history and problem list.    Depression Screen:  PHQ-2/PHQ-9 Depression Screening 1/22/2020   Little interest or pleasure in doing things 0   Feeling down, depressed, or hopeless 0   Trouble falling or staying asleep, or sleeping too much 0   Feeling tired or having little energy 0   Poor appetite or overeating 0   Feeling bad about yourself - or that you are a failure or have let yourself or your family down 0   Trouble concentrating on things, such as reading the newspaper or watching television 0   Moving or speaking so slowly that other people could have noticed. Or the opposite - being so fidgety or restless that you have been moving around a lot more than usual 0   Thoughts that you would be better off dead, or of hurting yourself in some  way 0   Total Score 0       Past Medical History:   Diagnosis Date   • Acquired intellectual disability    • Allergic rhinitis    • Asthma    • Chronic kidney disease (CKD)    • Chronic renal disease, stage 3, moderately decreased glomerular filtration rate between 30-59 mL/min/1.73 square meter (CMS/HCC)    • Constipation    • Epilepsy, generalized, convulsive (CMS/Formerly [facility])    • Episode of altered consciousness    • Essential hypertension     History of Essential Hypertension    • GERD (gastroesophageal reflux disease)    • Hematuria    • Hyperkalemia    • Hyperlipidemia    • Hypertension    • Hypothyroidism    • Immunization due    • Metabolic encephalopathy    • Moderate intellectual disabilities    • Mood disorder (CMS/[facility])    • Nocturia    • Physical exam, annual    • Seizure (CMS/[facility]ly [facility])    • Seizure disorder (CMS/[facility]ly [facility])        No past surgical history on file.    Family History   Problem Relation Age of Onset   • Down syndrome Brother    • No Known Problems Other        Social History     Socioeconomic History   • Marital status: Single   Tobacco Use   • Smoking status: Never Smoker   • Smokeless tobacco: Never Used   Substance and Sexual Activity   • Alcohol use: No   • Drug use: Defer       Current Outpatient Medications   Medication Sig Dispense Refill   • albuterol (PROVENTIL/VENTOLIN) nebulization syringe Take  by nebulization Every 4 (Four) Hours As Needed for Wheezing. Every 4-6 hrs prn wheezing     • albuterol sulfate HFA (ProAir HFA) 108 (90 Base) MCG/ACT inhaler Inhale 2 puffs Every 6 (Six) Hours As Needed for Wheezing.     • allopurinol (ZYLOPRIM) 100 MG tablet Take 100 mg by mouth Daily.     • amLODIPine (NORVASC) 5 MG tablet TAKE ONE TABLET DAILY 90 tablet 1   • ammonium lactate (LAC-HYDRIN) 12 % lotion APPLY TO FEET AND LEGS TWICE DAILY AS DIRECTED. 400 g 11   • budesonide (PULMICORT) 1 MG/2ML nebulizer solution Take 1 mg by nebulization Daily.     • carvedilol (COREG) 25 MG tablet TAKE ONE TABLET BY  "MOUTH TWICE DAILY 60 tablet 1   • cloNIDine (Catapres) 0.3 MG tablet Take 1 tablet by mouth 3 (Three) Times a Day. 90 tablet 3   • divalproex (DEPAKOTE) 500 MG DR tablet Take 1 tablet by mouth 2 (Two) Times a Day. 180 tablet 0   • ferrous sulfate 325 (65 FE) MG tablet Take 325 mg by mouth 2 (two) times a day.     • fluticasone (FLONASE) 50 MCG/ACT nasal spray 2 sprays into the nostril(s) as directed by provider Daily.     • furosemide (LASIX) 20 MG tablet Take 3 tablets by mouth 2 (Two) Times a Day. 180 tablet 0   • levothyroxine (SYNTHROID, LEVOTHROID) 75 MCG tablet Take 75 mcg by mouth Daily.     • loratadine (CLARITIN) 10 MG tablet TAKE ONE TABLET BY MOUTH DAILY 30 tablet 11   • minoxidil (LONITEN) 2.5 MG tablet TAKE TWO TABLETS (5mg) TWICE DAILY 120 tablet 1   • montelukast (SINGULAIR) 10 MG tablet Take 10 mg by mouth every night at bedtime.     • O2 (OXYGEN) Inhale 2 L/min Every Night.     • OLANZapine (zyPREXA) 20 MG tablet TAKE ONE TABLET AT BEDTIME 90 tablet 1   • omeprazole (priLOSEC) 20 MG capsule Take 20 mg by mouth Daily.     • polyethylene glycol (MiraLax) 17 GM/SCOOP powder Take 17 g by mouth Daily.     • QUEtiapine (SEROquel) 300 MG tablet Take 1 tablet by mouth Every Night. 90 tablet 1   • solifenacin (VESIcare) 5 MG tablet Take  by mouth Daily.     • tamsulosin (FLOMAX) 0.4 MG capsule 24 hr capsule Take 1 capsule by mouth Every Night. nightly 30 capsule 5     No current facility-administered medications for this visit.       Review of Systems    Objective   /82 (BP Location: Right arm, Patient Position: Sitting, Cuff Size: Adult)   Pulse 88   Temp 97.8 °F (36.6 °C) (Temporal)   Resp 15   Ht 182.9 cm (72.01\")   Wt 97.1 kg (214 lb)   SpO2 98%   BMI 29.02 kg/m²     Physical Exam    Recent Results (from the past 2016 hour(s))   Respiratory Panel PCR w/COVID-19(SARS-CoV-2) ALDA/ANI/NISREEN/PAD/COR/MAD/LIANNA In-House, NP Swab in UTM/VTM, 3-4 HR TAT - Swab, Nasopharynx    Collection Time: 09/05/21  " 1:39 PM    Specimen: Nasopharynx; Swab   Result Value Ref Range    ADENOVIRUS, PCR Not Detected Not Detected    Coronavirus 229E Not Detected Not Detected    Coronavirus HKU1 Not Detected Not Detected    Coronavirus NL63 Not Detected Not Detected    Coronavirus OC43 Not Detected Not Detected    COVID19 Not Detected Not Detected - Ref. Range    Human Metapneumovirus Not Detected Not Detected    Human Rhinovirus/Enterovirus Not Detected Not Detected    Influenza A PCR Not Detected Not Detected    Influenza B PCR Not Detected Not Detected    Parainfluenza Virus 1 Not Detected Not Detected    Parainfluenza Virus 2 Not Detected Not Detected    Parainfluenza Virus 3 Not Detected Not Detected    Parainfluenza Virus 4 Not Detected Not Detected    RSV, PCR Not Detected Not Detected    Bordetella pertussis pcr Not Detected Not Detected    Bordetella parapertussis PCR Not Detected Not Detected    Chlamydophila pneumoniae PCR Not Detected Not Detected    Mycoplasma pneumo by PCR Not Detected Not Detected   Comprehensive Metabolic Panel    Collection Time: 09/05/21  1:46 PM    Specimen: Blood   Result Value Ref Range    Glucose 107 (H) 65 - 99 mg/dL    BUN 39 (H) 6 - 20 mg/dL    Creatinine 4.50 (H) 0.76 - 1.27 mg/dL    Sodium 142 136 - 145 mmol/L    Potassium 4.4 3.5 - 5.2 mmol/L    Chloride 98 98 - 107 mmol/L    CO2 26.8 22.0 - 29.0 mmol/L    Calcium 9.4 8.6 - 10.5 mg/dL    Total Protein 7.4 6.0 - 8.5 g/dL    Albumin 3.90 3.50 - 5.20 g/dL    ALT (SGPT) 33 1 - 41 U/L    AST (SGOT) 24 1 - 40 U/L    Alkaline Phosphatase 130 (H) 39 - 117 U/L    Total Bilirubin 0.4 0.0 - 1.2 mg/dL    eGFR  African Amer 18 (L) >60 mL/min/1.73    Globulin 3.5 gm/dL    A/G Ratio 1.1 g/dL    BUN/Creatinine Ratio 8.7 7.0 - 25.0    Anion Gap 17.2 (H) 5.0 - 15.0 mmol/L   Blood Culture - Blood, Hand, Right    Collection Time: 09/05/21  1:46 PM    Specimen: Hand, Right; Blood   Result Value Ref Range    Blood Culture No growth at 5 days    Procalcitonin     Collection Time: 09/05/21  1:46 PM    Specimen: Blood   Result Value Ref Range    Procalcitonin 2.83 (H) 0.00 - 0.25 ng/mL   Lactic Acid, Plasma    Collection Time: 09/05/21  1:46 PM    Specimen: Blood   Result Value Ref Range    Lactate 1.4 0.5 - 2.0 mmol/L   Valproic Acid Level, Total    Collection Time: 09/05/21  1:46 PM    Specimen: Blood   Result Value Ref Range    Valproic Acid 86.0 50.0 - 125.0 mcg/mL   CBC Auto Differential    Collection Time: 09/05/21  1:46 PM    Specimen: Blood   Result Value Ref Range    WBC 14.12 (H) 3.40 - 10.80 10*3/mm3    RBC 4.97 4.14 - 5.80 10*6/mm3    Hemoglobin 11.4 (L) 13.0 - 17.7 g/dL    Hematocrit 35.2 (L) 37.5 - 51.0 %    MCV 70.8 (L) 79.0 - 97.0 fL    MCH 22.9 (L) 26.6 - 33.0 pg    MCHC 32.4 31.5 - 35.7 g/dL    RDW 16.0 (H) 12.3 - 15.4 %    RDW-SD 39.7 37.0 - 54.0 fl    MPV 11.0 6.0 - 12.0 fL    Platelets 209 140 - 450 10*3/mm3    nRBC 0.1 0.0 - 0.2 /100 WBC   Manual Differential    Collection Time: 09/05/21  1:46 PM    Specimen: Blood   Result Value Ref Range    Neutrophil % 60.6 42.7 - 76.0 %    Lymphocyte % 30.3 19.6 - 45.3 %    Monocyte % 9.1 5.0 - 12.0 %    Neutrophils Absolute 8.56 (H) 1.70 - 7.00 10*3/mm3    Lymphocytes Absolute 4.28 (H) 0.70 - 3.10 10*3/mm3    Monocytes Absolute 1.28 (H) 0.10 - 0.90 10*3/mm3    Anisocytosis Slight/1+ None Seen    Hypochromia Slight/1+ None Seen    Microcytes Slight/1+ None Seen    WBC Morphology Normal Normal    Platelet Morphology Normal Normal   Urinalysis With Culture If Indicated - Urine, Catheter In/Out    Collection Time: 09/05/21  2:07 PM    Specimen: Urine, Catheter In/Out   Result Value Ref Range    Color, UA Yellow Yellow, Straw    Appearance, UA Cloudy (A) Clear    pH, UA 6.0 5.0 - 8.0    Specific Gravity, UA 1.010 1.005 - 1.030    Glucose, UA Negative Negative    Ketones, UA Negative Negative    Bilirubin, UA Negative Negative    Blood, UA Moderate (2+) (A) Negative    Protein,  mg/dL (2+) (A) Negative    Leuk  Esterase, UA Moderate (2+) (A) Negative    Nitrite, UA Negative Negative    Urobilinogen, UA 1.0 E.U./dL 0.2 - 1.0 E.U./dL   Urinalysis, Microscopic Only - Urine, Catheter In/Out    Collection Time: 09/05/21  2:07 PM    Specimen: Urine, Catheter In/Out   Result Value Ref Range    RBC, UA 13-20 (A) None Seen, 0-2 /HPF    WBC, UA Too Numerous to Count (A) None Seen, 0-2 /HPF    Bacteria, UA 4+ (A) None Seen /HPF    Squamous Epithelial Cells, UA 0-2 None Seen, 0-2 /HPF    Hyaline Casts, UA 0-2 None Seen /LPF    Methodology Automated Microscopy    Urine Culture - Urine, Urine, Catheter In/Out    Collection Time: 09/05/21  2:07 PM    Specimen: Urine, Catheter In/Out   Result Value Ref Range    Urine Culture (A)      >100,000 CFU/mL Klebsiella pneumoniae ssp pneumoniae       Susceptibility    Klebsiella pneumoniae ssp pneumoniae - TRACY     Ampicillin 16 Resistant ug/ml     Ampicillin + Sulbactam 4 Susceptible ug/ml     Cefazolin <=4 Susceptible ug/ml     Cefepime <=1 Susceptible ug/ml     Ceftazidime <=1 Susceptible ug/ml     Ceftriaxone <=1 Susceptible ug/ml     Gentamicin <=1 Susceptible ug/ml     Levofloxacin <=0.12 Susceptible ug/ml     Nitrofurantoin 32 Susceptible ug/ml     Piperacillin + Tazobactam <=4 Susceptible ug/ml     Tetracycline <=1 Susceptible ug/ml     Trimethoprim + Sulfamethoxazole <=20 Susceptible ug/ml   Blood Culture - Blood, Arm, Left    Collection Time: 09/05/21  2:08 PM    Specimen: Arm, Left; Blood   Result Value Ref Range    Blood Culture Staphylococcus, coagulase negative (C)     Isolated from Aerobic Bottle     Gram Stain Aerobic Bottle Gram positive cocci in clusters (C)    Blood Culture ID, PCR - Blood, Arm, Left    Collection Time: 09/05/21  2:08 PM    Specimen: Arm, Left; Blood   Result Value Ref Range    BCID, PCR (A) Negative by BCID PCR. Culture to Follow.     Staph spp, not aureus or lugdunesis. Identification by BCID2 PCR.    BOTTLE TYPE Aerobic Bottle    POC Glucose Once    Collection  Time: 09/06/21 12:42 AM    Specimen: Blood   Result Value Ref Range    Glucose 107 70 - 130 mg/dL   CBC Auto Differential    Collection Time: 09/06/21  6:44 AM    Specimen: Blood   Result Value Ref Range    WBC 15.04 (H) 3.40 - 10.80 10*3/mm3    RBC 4.98 4.14 - 5.80 10*6/mm3    Hemoglobin 11.4 (L) 13.0 - 17.7 g/dL    Hematocrit 36.3 (L) 37.5 - 51.0 %    MCV 72.9 (L) 79.0 - 97.0 fL    MCH 22.9 (L) 26.6 - 33.0 pg    MCHC 31.4 (L) 31.5 - 35.7 g/dL    RDW 15.7 (H) 12.3 - 15.4 %    RDW-SD 40.0 37.0 - 54.0 fl    MPV 12.1 (H) 6.0 - 12.0 fL    Platelets 211 140 - 450 10*3/mm3   Manual Differential    Collection Time: 09/06/21  6:44 AM    Specimen: Blood   Result Value Ref Range    Neutrophil % 88.5 (H) 42.7 - 76.0 %    Lymphocyte % 7.3 (L) 19.6 - 45.3 %    Monocyte % 4.2 (L) 5.0 - 12.0 %    Neutrophils Absolute 13.31 (H) 1.70 - 7.00 10*3/mm3    Lymphocytes Absolute 1.10 0.70 - 3.10 10*3/mm3    Monocytes Absolute 0.63 0.10 - 0.90 10*3/mm3    RBC Morphology Normal Normal    WBC Morphology Normal Normal    Giant Platelets Mod/2+ None Seen   Comprehensive Metabolic Panel    Collection Time: 09/06/21  8:02 AM    Specimen: Blood   Result Value Ref Range    Glucose 138 (H) 65 - 99 mg/dL    BUN 41 (H) 6 - 20 mg/dL    Creatinine 3.91 (H) 0.76 - 1.27 mg/dL    Sodium 143 136 - 145 mmol/L    Potassium 4.6 3.5 - 5.2 mmol/L    Chloride 103 98 - 107 mmol/L    CO2 25.6 22.0 - 29.0 mmol/L    Calcium 10.1 8.6 - 10.5 mg/dL    Total Protein 7.9 6.0 - 8.5 g/dL    Albumin 3.90 3.50 - 5.20 g/dL    ALT (SGPT) 38 1 - 41 U/L    AST (SGOT) 27 1 - 40 U/L    Alkaline Phosphatase 124 (H) 39 - 117 U/L    Total Bilirubin 0.3 0.0 - 1.2 mg/dL    eGFR  African Amer 22 (L) >60 mL/min/1.73    Globulin 4.0 gm/dL    A/G Ratio 1.0 g/dL    BUN/Creatinine Ratio 10.5 7.0 - 25.0    Anion Gap 14.4 5.0 - 15.0 mmol/L   CBC Auto Differential    Collection Time: 09/07/21  6:12 AM    Specimen: Blood   Result Value Ref Range    WBC 18.77 (H) 3.40 - 10.80 10*3/mm3    RBC  4.64 4.14 - 5.80 10*6/mm3    Hemoglobin 10.9 (L) 13.0 - 17.7 g/dL    Hematocrit 32.8 (L) 37.5 - 51.0 %    MCV 70.7 (L) 79.0 - 97.0 fL    MCH 23.5 (L) 26.6 - 33.0 pg    MCHC 33.2 31.5 - 35.7 g/dL    RDW 15.5 (H) 12.3 - 15.4 %    RDW-SD 39.1 37.0 - 54.0 fl    MPV 12.1 (H) 6.0 - 12.0 fL    Platelets 218 140 - 450 10*3/mm3    nRBC 0.2 0.0 - 0.2 /100 WBC   Manual Differential    Collection Time: 09/07/21  6:12 AM    Specimen: Blood   Result Value Ref Range    Neutrophil % 89.0 (H) 42.7 - 76.0 %    Lymphocyte % 4.0 (L) 19.6 - 45.3 %    Monocyte % 7.0 5.0 - 12.0 %    Neutrophils Absolute 16.71 (H) 1.70 - 7.00 10*3/mm3    Lymphocytes Absolute 0.75 0.70 - 3.10 10*3/mm3    Monocytes Absolute 1.31 (H) 0.10 - 0.90 10*3/mm3    Anisocytosis Mod/2+ None Seen    Microcytes Mod/2+ None Seen    WBC Morphology Normal Normal    Platelet Morphology Normal Normal   Renal Function Panel    Collection Time: 09/07/21 10:01 AM    Specimen: Blood   Result Value Ref Range    Glucose 124 (H) 65 - 99 mg/dL    BUN 48 (H) 6 - 20 mg/dL    Creatinine 2.84 (H) 0.76 - 1.27 mg/dL    Sodium 138 136 - 145 mmol/L    Potassium 4.5 3.5 - 5.2 mmol/L    Chloride 100 98 - 107 mmol/L    CO2 22.6 22.0 - 29.0 mmol/L    Calcium 10.2 8.6 - 10.5 mg/dL    Albumin 4.00 3.50 - 5.20 g/dL    Phosphorus 4.2 2.5 - 4.5 mg/dL    Anion Gap 15.4 (H) 5.0 - 15.0 mmol/L    BUN/Creatinine Ratio 16.9 7.0 - 25.0    eGFR  African Amer 31 (L) >60 mL/min/1.73   Comprehensive Metabolic Panel    Collection Time: 09/08/21 12:14 PM    Specimen: Blood   Result Value Ref Range    Glucose 141 (H) 65 - 99 mg/dL    BUN 53 (H) 6 - 20 mg/dL    Creatinine 2.79 (H) 0.76 - 1.27 mg/dL    Sodium 141 136 - 145 mmol/L    Potassium 3.8 3.5 - 5.2 mmol/L    Chloride 104 98 - 107 mmol/L    CO2 25.0 22.0 - 29.0 mmol/L    Calcium 9.4 8.6 - 10.5 mg/dL    Total Protein 7.1 6.0 - 8.5 g/dL    Albumin 3.50 3.50 - 5.20 g/dL    ALT (SGPT) 48 (H) 1 - 41 U/L    AST (SGOT) 27 1 - 40 U/L    Alkaline Phosphatase 116 39 -  117 U/L    Total Bilirubin 0.2 0.0 - 1.2 mg/dL    eGFR  African Amer 32 (L) >60 mL/min/1.73    Globulin 3.6 gm/dL    A/G Ratio 1.0 g/dL    BUN/Creatinine Ratio 19.0 7.0 - 25.0    Anion Gap 12.0 5.0 - 15.0 mmol/L   CBC (No Diff)    Collection Time: 09/08/21 12:14 PM    Specimen: Blood   Result Value Ref Range    WBC 11.26 (H) 3.40 - 10.80 10*3/mm3    RBC 4.97 4.14 - 5.80 10*6/mm3    Hemoglobin 11.4 (L) 13.0 - 17.7 g/dL    Hematocrit 35.3 (L) 37.5 - 51.0 %    MCV 71.0 (L) 79.0 - 97.0 fL    MCH 22.9 (L) 26.6 - 33.0 pg    MCHC 32.3 31.5 - 35.7 g/dL    RDW 16.0 (H) 12.3 - 15.4 %    RDW-SD 39.8 37.0 - 54.0 fl    MPV 11.1 6.0 - 12.0 fL    Platelets 270 140 - 450 10*3/mm3   Iron Profile    Collection Time: 09/08/21 12:14 PM    Specimen: Blood   Result Value Ref Range    Iron 87 59 - 158 mcg/dL    Iron Saturation 37 20 - 50 %    Transferrin 156 (L) 200 - 360 mg/dL    TIBC 232 (L) 298 - 536 mcg/dL   Ferritin    Collection Time: 09/08/21 12:14 PM    Specimen: Blood   Result Value Ref Range    Ferritin 2,236.00 (H) 30.00 - 400.00 ng/mL   CBC (No Diff)    Collection Time: 09/09/21  4:28 AM    Specimen: Blood   Result Value Ref Range    WBC 10.45 3.40 - 10.80 10*3/mm3    RBC 5.02 4.14 - 5.80 10*6/mm3    Hemoglobin 11.2 (L) 13.0 - 17.7 g/dL    Hematocrit 37.0 (L) 37.5 - 51.0 %    MCV 73.7 (L) 79.0 - 97.0 fL    MCH 22.3 (L) 26.6 - 33.0 pg    MCHC 30.3 (L) 31.5 - 35.7 g/dL    RDW 16.1 (H) 12.3 - 15.4 %    RDW-SD 42.4 37.0 - 54.0 fl    MPV 12.0 6.0 - 12.0 fL    Platelets 239 140 - 450 10*3/mm3   Renal Function Panel    Collection Time: 09/09/21  4:28 AM    Specimen: Blood   Result Value Ref Range    Glucose 86 65 - 99 mg/dL    BUN 50 (H) 6 - 20 mg/dL    Creatinine 2.37 (H) 0.76 - 1.27 mg/dL    Sodium 145 136 - 145 mmol/L    Potassium 4.4 3.5 - 5.2 mmol/L    Chloride 108 (H) 98 - 107 mmol/L    CO2 23.6 22.0 - 29.0 mmol/L    Calcium 9.4 8.6 - 10.5 mg/dL    Albumin 3.30 (L) 3.50 - 5.20 g/dL    Phosphorus 3.4 2.5 - 4.5 mg/dL    Anion Gap  13.4 5.0 - 15.0 mmol/L    BUN/Creatinine Ratio 21.1 7.0 - 25.0    eGFR  African Amer 39 (L) >60 mL/min/1.73   Basic Metabolic Panel    Collection Time: 09/10/21  6:25 AM    Specimen: Blood   Result Value Ref Range    Glucose 85 65 - 99 mg/dL    BUN 40 (H) 6 - 20 mg/dL    Creatinine 2.01 (H) 0.76 - 1.27 mg/dL    Sodium 145 136 - 145 mmol/L    Potassium 4.4 3.5 - 5.2 mmol/L    Chloride 108 (H) 98 - 107 mmol/L    CO2 27.4 22.0 - 29.0 mmol/L    Calcium 9.8 8.6 - 10.5 mg/dL    eGFR  African Amer 47 (L) >60 mL/min/1.73    BUN/Creatinine Ratio 19.9 7.0 - 25.0    Anion Gap 9.6 5.0 - 15.0 mmol/L   Renal Function Panel    Collection Time: 09/11/21 10:33 AM    Specimen: Blood   Result Value Ref Range    Glucose 105 (H) 65 - 99 mg/dL    BUN 35 (H) 6 - 20 mg/dL    Creatinine 1.96 (H) 0.76 - 1.27 mg/dL    Sodium 145 136 - 145 mmol/L    Potassium 4.5 3.5 - 5.2 mmol/L    Chloride 106 98 - 107 mmol/L    CO2 24.7 22.0 - 29.0 mmol/L    Calcium 10.1 8.6 - 10.5 mg/dL    Albumin 3.80 3.50 - 5.20 g/dL    Phosphorus 4.4 2.5 - 4.5 mg/dL    Anion Gap 14.3 5.0 - 15.0 mmol/L    BUN/Creatinine Ratio 17.9 7.0 - 25.0    eGFR  African Amer 48 (L) >60 mL/min/1.73   CBC Auto Differential    Collection Time: 09/11/21 10:33 AM    Specimen: Blood   Result Value Ref Range    WBC 10.06 3.40 - 10.80 10*3/mm3    RBC 5.07 4.14 - 5.80 10*6/mm3    Hemoglobin 11.6 (L) 13.0 - 17.7 g/dL    Hematocrit 36.4 (L) 37.5 - 51.0 %    MCV 71.8 (L) 79.0 - 97.0 fL    MCH 22.9 (L) 26.6 - 33.0 pg    MCHC 31.9 31.5 - 35.7 g/dL    RDW 16.3 (H) 12.3 - 15.4 %    RDW-SD 40.5 37.0 - 54.0 fl    MPV 11.7 6.0 - 12.0 fL    Platelets 258 140 - 450 10*3/mm3    Neutrophil % 61.2 42.7 - 76.0 %    Lymphocyte % 24.6 19.6 - 45.3 %    Monocyte % 5.6 5.0 - 12.0 %    Eosinophil % 0.6 0.3 - 6.2 %    Basophil % 0.5 0.0 - 1.5 %    Neutrophils, Absolute 6.17 1.70 - 7.00 10*3/mm3    Lymphocytes, Absolute 2.47 0.70 - 3.10 10*3/mm3    Monocytes, Absolute 0.56 0.10 - 0.90 10*3/mm3    Eosinophils,  Absolute 0.06 0.00 - 0.40 10*3/mm3    Basophils, Absolute 0.05 0.00 - 0.20 10*3/mm3   POC Glucose Once    Collection Time: 09/12/21  2:38 AM    Specimen: Blood   Result Value Ref Range    Glucose 105 70 - 130 mg/dL   Urinalysis With Culture If Indicated - Urine, Clean Catch    Collection Time: 09/14/21 11:57 AM    Specimen: Urine, Clean Catch   Result Value Ref Range    Color, UA Yellow Yellow, Straw    Appearance, UA Clear Clear    pH, UA 6.5 5.0 - 8.0    Specific Gravity, UA 1.007 1.005 - 1.030    Glucose, UA Negative Negative    Ketones, UA Negative Negative    Bilirubin, UA Negative Negative    Blood, UA Trace (A) Negative    Protein, UA 30 mg/dL (1+) (A) Negative    Leuk Esterase, UA Negative Negative    Nitrite, UA Negative Negative    Urobilinogen, UA 0.2 E.U./dL 0.2 - 1.0 E.U./dL   Urinalysis, Microscopic Only - Urine, Clean Catch    Collection Time: 09/14/21 11:57 AM    Specimen: Urine, Clean Catch   Result Value Ref Range    RBC, UA 3-5 (A) None Seen, 0-2 /HPF    WBC, UA 0-2 None Seen, 0-2 /HPF    Bacteria, UA None Seen None Seen /HPF    Squamous Epithelial Cells, UA 0-2 None Seen, 0-2 /HPF    Hyaline Casts, UA None Seen None Seen /LPF    Methodology Automated Microscopy    Comprehensive Metabolic Panel    Collection Time: 09/14/21 12:00 PM    Specimen: Blood   Result Value Ref Range    Glucose 96 65 - 99 mg/dL    BUN 29 (H) 6 - 20 mg/dL    Creatinine 2.43 (H) 0.76 - 1.27 mg/dL    Sodium 141 136 - 145 mmol/L    Potassium 4.3 3.5 - 5.2 mmol/L    Chloride 101 98 - 107 mmol/L    CO2 28.0 22.0 - 29.0 mmol/L    Calcium 10.4 8.6 - 10.5 mg/dL    Total Protein 7.7 6.0 - 8.5 g/dL    Albumin 4.30 3.50 - 5.20 g/dL    ALT (SGPT) 24 1 - 41 U/L    AST (SGOT) 24 1 - 40 U/L    Alkaline Phosphatase 116 39 - 117 U/L    Total Bilirubin 0.3 0.0 - 1.2 mg/dL    eGFR  African Amer 37 (L) >60 mL/min/1.73    Globulin 3.4 gm/dL    A/G Ratio 1.3 g/dL    BUN/Creatinine Ratio 11.9 7.0 - 25.0    Anion Gap 12.0 5.0 - 15.0 mmol/L    Lactic Acid, Plasma    Collection Time: 09/14/21 12:00 PM    Specimen: Blood   Result Value Ref Range    Lactate 1.5 0.5 - 2.0 mmol/L   Valproic Acid Level, Total    Collection Time: 09/14/21 12:00 PM    Specimen: Blood   Result Value Ref Range    Valproic Acid 89.0 50.0 - 125.0 mcg/mL   CBC Auto Differential    Collection Time: 09/14/21 12:00 PM    Specimen: Blood   Result Value Ref Range    WBC 7.81 3.40 - 10.80 10*3/mm3    RBC 5.37 4.14 - 5.80 10*6/mm3    Hemoglobin 12.2 (L) 13.0 - 17.7 g/dL    Hematocrit 38.9 37.5 - 51.0 %    MCV 72.4 (L) 79.0 - 97.0 fL    MCH 22.7 (L) 26.6 - 33.0 pg    MCHC 31.4 (L) 31.5 - 35.7 g/dL    RDW 17.0 (H) 12.3 - 15.4 %    RDW-SD 40.5 37.0 - 54.0 fl    MPV 10.6 6.0 - 12.0 fL    Platelets 249 140 - 450 10*3/mm3   Manual Differential    Collection Time: 09/14/21 12:00 PM    Specimen: Blood   Result Value Ref Range    Neutrophil % 68.0 42.7 - 76.0 %    Lymphocyte % 26.0 19.6 - 45.3 %    Monocyte % 3.0 (L) 5.0 - 12.0 %    Eosinophil % 2.0 0.3 - 6.2 %    Basophil % 1.0 0.0 - 1.5 %    Neutrophils Absolute 5.31 1.70 - 7.00 10*3/mm3    Lymphocytes Absolute 2.03 0.70 - 3.10 10*3/mm3    Monocytes Absolute 0.23 0.10 - 0.90 10*3/mm3    Eosinophils Absolute 0.16 0.00 - 0.40 10*3/mm3    Basophils Absolute 0.08 0.00 - 0.20 10*3/mm3    Poikilocytes Slight/1+ None Seen    Smudge Cells Slight/1+ None Seen    Platelet Morphology Normal Normal   Urine Culture - Urine, Urine, Clean Catch    Collection Time: 09/29/21 10:28 AM    Specimen: Urine, Clean Catch    UR   Result Value Ref Range    Urine Culture Final report     Result 1 No growth    Renal Function Panel    Collection Time: 10/27/21 12:40 PM    Specimen: Blood   Result Value Ref Range    Glucose 95 65 - 99 mg/dL    BUN 14 6 - 20 mg/dL    Creatinine 2.28 (H) 0.76 - 1.27 mg/dL    Sodium 143 136 - 145 mmol/L    Potassium 4.7 3.5 - 5.2 mmol/L    Chloride 108 (H) 98 - 107 mmol/L    CO2 28.5 22.0 - 29.0 mmol/L    Calcium 9.6 8.6 - 10.5 mg/dL    Albumin  4.00 3.50 - 5.20 g/dL    Phosphorus 3.5 2.5 - 4.5 mg/dL    Anion Gap 6.5 5.0 - 15.0 mmol/L    BUN/Creatinine Ratio 6.1 (L) 7.0 - 25.0    eGFR  African Amer 40 (L) >60 mL/min/1.73   BNP    Collection Time: 10/27/21 12:40 PM    Specimen: Blood   Result Value Ref Range    proBNP 1,097.0 (H) 0.0 - 450.0 pg/mL   Valproic Acid Level, Total    Collection Time: 10/27/21 12:40 PM    Specimen: Blood   Result Value Ref Range    Valproic Acid 76.0 50.0 - 125.0 mcg/mL   CBC Auto Differential    Collection Time: 10/27/21 12:40 PM    Specimen: Blood   Result Value Ref Range    WBC 5.09 3.40 - 10.80 10*3/mm3    RBC 4.82 4.14 - 5.80 10*6/mm3    Hemoglobin 11.2 (L) 13.0 - 17.7 g/dL    Hematocrit 35.9 (L) 37.5 - 51.0 %    MCV 74.5 (L) 79.0 - 97.0 fL    MCH 23.2 (L) 26.6 - 33.0 pg    MCHC 31.2 (L) 31.5 - 35.7 g/dL    RDW 17.5 (H) 12.3 - 15.4 %    RDW-SD 46.5 37.0 - 54.0 fl    MPV 10.4 6.0 - 12.0 fL    Platelets 260 140 - 450 10*3/mm3    Neutrophil % 42.1 (L) 42.7 - 76.0 %    Lymphocyte % 41.5 19.6 - 45.3 %    Monocyte % 13.6 (H) 5.0 - 12.0 %    Eosinophil % 1.0 0.3 - 6.2 %    Basophil % 0.6 0.0 - 1.5 %    Neutrophils, Absolute 2.15 1.70 - 7.00 10*3/mm3    Lymphocytes, Absolute 2.11 0.70 - 3.10 10*3/mm3    Monocytes, Absolute 0.69 0.10 - 0.90 10*3/mm3    Eosinophils, Absolute 0.05 0.00 - 0.40 10*3/mm3    Basophils, Absolute 0.03 0.00 - 0.20 10*3/mm3     Assessment/Plan   Diagnoses and all orders for this visit:    1. Primary hypertension (Primary)    2. Stage 3b chronic kidney disease (HCC)    Other orders  -     FluLaval/Fluarix/Fluzone >6 Months ([id number])    Hospital records and emergency room visit reviewed and discussed with caregiver.  Hypertension currently controlled.  Continue current medication unchanged.  Follow-up with nephrology as scheduled.  Monitor blood pressures closely at home.  Avoid any salt additives or high salt containing foods.  Monitor weight and follow-up as needed.           · COVID-19 Precautions - Patient  was compliant in wearing a mask. When I saw the patient, I used appropriate personal protective equipment (PPE) including mask and eye shield (standard procedure).  Additionally, I used gown and gloves if indicated.  Hand hygiene was completed before and after seeing the patient.  · Dictated utilizing Dragon Dictation

## 2021-11-18 DIAGNOSIS — I10 ESSENTIAL HYPERTENSION: ICD-10-CM

## 2021-11-19 RX ORDER — CLONIDINE HYDROCHLORIDE 0.3 MG/1
TABLET ORAL
Qty: 90 TABLET | Refills: 3 | Status: SHIPPED | OUTPATIENT
Start: 2021-11-19 | End: 2021-11-20

## 2021-11-19 RX ORDER — OMEPRAZOLE 20 MG/1
CAPSULE, DELAYED RELEASE ORAL
Qty: 30 CAPSULE | Refills: 11 | Status: SHIPPED | OUTPATIENT
Start: 2021-11-19 | End: 2022-11-15

## 2021-11-19 RX ORDER — FERROUS SULFATE 325(65) MG
TABLET ORAL
Qty: 60 TABLET | Refills: 11 | Status: SHIPPED | OUTPATIENT
Start: 2021-11-19 | End: 2022-11-15

## 2021-11-19 NOTE — TELEPHONE ENCOUNTER
Rx Refill Note  Requested Prescriptions     Pending Prescriptions Disp Refills   • cloNIDine (CATAPRES) 0.3 MG tablet [Pharmacy Med Name: clonidine HCl 0.3 mg tablet] 90 tablet 3     Sig: TAKE ONE TABLET BY MOUTH THREE TIMES DAILY (MORNING, NOON, EVENING)   • omeprazole (priLOSEC) 20 MG capsule [Pharmacy Med Name: omeprazole 20 mg capsule,delayed release] 30 capsule 11     Sig: TAKE ONE CAPSULE BY MOUTH DAILY   • FeroSul 325 (65 Fe) MG tablet [Pharmacy Med Name: FeroSul 325 mg (65 mg iron) tablet] 60 tablet 11     Sig: TAKE ONE TABLET BY MOUTH TWICE A DAY MORNING AND EVENING      Last office visit with prescribing clinician: 11/9/2021      Next office visit with prescribing clinician: 1/25/2022            Luz Gunn MA  11/19/21, 15:27 EST

## 2021-11-20 DIAGNOSIS — I10 ESSENTIAL HYPERTENSION: ICD-10-CM

## 2021-11-20 RX ORDER — CLONIDINE HYDROCHLORIDE 0.3 MG/1
TABLET ORAL
Qty: 90 TABLET | Refills: 3 | Status: SHIPPED | OUTPATIENT
Start: 2021-11-20 | End: 2022-06-28

## 2021-12-17 RX ORDER — CARVEDILOL 25 MG/1
TABLET ORAL
Qty: 56 TABLET | Refills: 1 | Status: SHIPPED | OUTPATIENT
Start: 2021-12-17 | End: 2022-02-08

## 2021-12-17 RX ORDER — MINOXIDIL 2.5 MG/1
TABLET ORAL
Qty: 112 TABLET | Refills: 1 | Status: SHIPPED | OUTPATIENT
Start: 2021-12-17 | End: 2022-02-08

## 2021-12-17 NOTE — TELEPHONE ENCOUNTER
Rx Refill Note  Requested Prescriptions     Pending Prescriptions Disp Refills   • carvedilol (COREG) 25 MG tablet [Pharmacy Med Name: carvedilol 25 mg tablet] 56 tablet 1     Sig: TAKE ONE TABLET BY MOUTH TWICE DAILY   • minoxidil (LONITEN) 2.5 MG tablet [Pharmacy Med Name: minoxidil 2.5 mg tablet] 112 tablet 1     Sig: TAKE TWO TABLETS (5mg) TWICE DAILY      Last office visit with prescribing clinician: 11/9/2021      Next office visit with prescribing clinician: 1/25/2022            Luz Gunn MA  12/17/21, 12:35 EST

## 2022-01-11 RX ORDER — LEVOTHYROXINE SODIUM 0.07 MG/1
TABLET ORAL
Qty: 28 TABLET | Refills: 6 | Status: SHIPPED | OUTPATIENT
Start: 2022-01-11 | End: 2022-07-26

## 2022-01-18 RX ORDER — BUDESONIDE 1 MG/2ML
INHALANT ORAL
Qty: 60 ML | Refills: 5 | Status: SHIPPED | OUTPATIENT
Start: 2022-01-18 | End: 2022-03-29

## 2022-01-18 NOTE — TELEPHONE ENCOUNTER
Rx Refill Note  Requested Prescriptions     Pending Prescriptions Disp Refills   • budesonide (PULMICORT) 1 MG/2ML nebulizer solution [Pharmacy Med Name: budesonide 1 mg/2 mL suspension for nebulization] 60 mL 5     Sig: USE 1 VIAL PER NEBULIZER DAILY      Last office visit with prescribing clinician: 11/9/2021      Next office visit with prescribing clinician: 1/25/2022            Luz Gunn MA  01/18/22, 07:41 EST

## 2022-02-01 ENCOUNTER — TELEPHONE (OUTPATIENT)
Dept: FAMILY MEDICINE CLINIC | Facility: CLINIC | Age: 37
End: 2022-02-01

## 2022-02-01 NOTE — TELEPHONE ENCOUNTER
Sara calling to report BP and heart rate at lunch time    bp 142/85 heart rate 94    Sara can be contacted at [phone]

## 2022-02-01 NOTE — TELEPHONE ENCOUNTER
Patient caregiver called to report a high bp reading for Jim. BP was 159/102 this morning. Patient has shown signs of aggravation towards staff this morning.       Consulted with Dr. Vasquez and he recommends just letting him rest and see what his BP is around lunch time.   Arnold has been notified.    Updated protocol has been requested.

## 2022-02-01 NOTE — TELEPHONE ENCOUNTER
Rx Refill Note  Requested Prescriptions     Pending Prescriptions Disp Refills   • ProAir  (90 Base) MCG/ACT inhaler [Pharmacy Med Name: ProAir HFA 90 mcg/actuation aerosol inhaler] 8.5 g 11     Sig: INHALE TWO PUFFS BY MOUTH EVERY SIX HOURS AS NEEDED AND 20 MINUTES BEFORE EXERCISE      Last office visit with prescribing clinician: 11/9/2021      Next office visit with prescribing clinician: 2/16/2022            Luz Gunn MA  02/01/22, 16:27 EST

## 2022-02-08 RX ORDER — TAMSULOSIN HYDROCHLORIDE 0.4 MG/1
CAPSULE ORAL
Qty: 30 CAPSULE | Refills: 5 | Status: SHIPPED | OUTPATIENT
Start: 2022-02-08 | End: 2022-07-26

## 2022-02-08 RX ORDER — CARVEDILOL 25 MG/1
TABLET ORAL
Qty: 56 TABLET | Refills: 1 | Status: SHIPPED | OUTPATIENT
Start: 2022-02-08 | End: 2022-04-07

## 2022-02-08 RX ORDER — MINOXIDIL 2.5 MG/1
TABLET ORAL
Qty: 112 TABLET | Refills: 1 | Status: SHIPPED | OUTPATIENT
Start: 2022-02-08 | End: 2022-04-07

## 2022-02-08 RX ORDER — OLANZAPINE 20 MG/1
TABLET ORAL
Qty: 90 TABLET | Refills: 1 | Status: SHIPPED | OUTPATIENT
Start: 2022-02-08 | End: 2022-07-26

## 2022-02-08 RX ORDER — QUETIAPINE FUMARATE 300 MG/1
TABLET, FILM COATED ORAL
Qty: 90 TABLET | Refills: 1 | Status: SHIPPED | OUTPATIENT
Start: 2022-02-08 | End: 2022-07-26

## 2022-02-08 RX ORDER — AMLODIPINE BESYLATE 5 MG/1
TABLET ORAL
Qty: 90 TABLET | Refills: 1 | Status: SHIPPED | OUTPATIENT
Start: 2022-02-08

## 2022-02-08 NOTE — TELEPHONE ENCOUNTER
Rx Refill Note  Requested Prescriptions     Pending Prescriptions Disp Refills   • tamsulosin (FLOMAX) 0.4 MG capsule 24 hr capsule [Pharmacy Med Name: tamsulosin 0.4 mg capsule] 30 capsule 5     Sig: TAKE ONE CAPSULE BY MOUTH AT BEDTIME   • carvedilol (COREG) 25 MG tablet [Pharmacy Med Name: carvedilol 25 mg tablet] 56 tablet 1     Sig: TAKE ONE TABLET BY MOUTH TWICE DAILY   • minoxidil (LONITEN) 2.5 MG tablet [Pharmacy Med Name: minoxidil 2.5 mg tablet] 112 tablet 1     Sig: TAKE TWO TABLETS (5mg) TWICE DAILY   • QUEtiapine (SEROquel) 300 MG tablet [Pharmacy Med Name: quetiapine 300 mg tablet] 90 tablet 1     Sig: TAKE ONE TABLET AT BEDTIME   • amLODIPine (NORVASC) 5 MG tablet [Pharmacy Med Name: amlodipine 5 mg tablet] 90 tablet 1     Sig: TAKE ONE TABLET DAILY      Last office visit with prescribing clinician: 11/9/2021      Next office visit with prescribing clinician: 2/16/2022            Luz Gunn MA  02/08/22, 15:48 EST

## 2022-02-08 NOTE — TELEPHONE ENCOUNTER
Rx Refill Note  Requested Prescriptions     Pending Prescriptions Disp Refills   • OLANZapine (zyPREXA) 20 MG tablet [Pharmacy Med Name: olanzapine 20 mg tablet] 90 tablet 1     Sig: TAKE ONE TABLET AT BEDTIME      Last office visit with prescribing clinician: 11/9/2021      Next office visit with prescribing clinician: 2/8/2022            Luz Gunn MA  02/08/22, 15:47 EST

## 2022-02-15 RX ORDER — BUDESONIDE 1 MG/2ML
INHALANT ORAL
Qty: 60 ML | Refills: 5 | OUTPATIENT
Start: 2022-02-15

## 2022-02-16 ENCOUNTER — OFFICE VISIT (OUTPATIENT)
Dept: FAMILY MEDICINE CLINIC | Facility: CLINIC | Age: 37
End: 2022-02-16

## 2022-02-16 VITALS
HEIGHT: 72 IN | TEMPERATURE: 98.6 F | WEIGHT: 207.4 LBS | BODY MASS INDEX: 28.09 KG/M2 | SYSTOLIC BLOOD PRESSURE: 128 MMHG | DIASTOLIC BLOOD PRESSURE: 84 MMHG | HEART RATE: 97 BPM | OXYGEN SATURATION: 94 %

## 2022-02-16 DIAGNOSIS — Z00.00 ANNUAL PHYSICAL EXAM: Primary | ICD-10-CM

## 2022-02-16 DIAGNOSIS — F79 INTELLECTUAL DISABILITY: ICD-10-CM

## 2022-02-16 DIAGNOSIS — I10 PRIMARY HYPERTENSION: ICD-10-CM

## 2022-02-16 DIAGNOSIS — G40.909 SEIZURE DISORDER: ICD-10-CM

## 2022-02-16 PROCEDURE — 2014F MENTAL STATUS ASSESS: CPT | Performed by: INTERNAL MEDICINE

## 2022-02-16 PROCEDURE — 99395 PREV VISIT EST AGE 18-39: CPT | Performed by: INTERNAL MEDICINE

## 2022-02-16 PROCEDURE — 3008F BODY MASS INDEX DOCD: CPT | Performed by: INTERNAL MEDICINE

## 2022-02-16 RX ORDER — ALLOPURINOL 100 MG/1
200 TABLET ORAL DAILY
COMMUNITY
Start: 2022-02-07 | End: 2022-05-16

## 2022-02-16 RX ORDER — DIVALPROEX SODIUM 500 MG/1
1000 TABLET, EXTENDED RELEASE ORAL NIGHTLY
COMMUNITY
Start: 2021-11-18 | End: 2022-09-07

## 2022-02-16 NOTE — PROGRESS NOTES
Chief Complaint   Patient presents with   • Annual Exam       HPI:  Arthur Mccarthy, -1985, is a 36 y.o. male who presents for an annual physical.    Arnold the caregiver was present during the history-taking and subsequent discussion (and for part of the physical exam) with this patient.  Patient agrees to the presence of the individual during this visit.     Follow-up for hypertension.  Currently has been feeling well and asymptomatic without any headaches,vision changes, cough, chest pain, shortness of breath, swelling, focal neurologic deficit, or syncope.  Has been taking the medications regularly and adherent with the regimen of amlodipine 10 mg, clonidine 0.3 mg TID, furosemide 40 mg QD, carvedilol 25 mg BID and hydralazine 100 mg TID.  Denies medication side effects and no significant interval events.  Home blood pressure has been normal and no problems in the home.       Follow up for seizures.  Still no seizures for a long time.  Over 1 year seizure free and no problems with the medications.     Follow-up for thyroid.  Denies fatigue, weakness, constipation/diarrhea, hair/skin changes, weight gain/loss, depression/anxiety, rashes, palpitations, swelling, chest pain, shortness of breath or other issues.  Has been compliant with taking the medication with no recent changes. Denies any difficulty with the current medication of levothyroxine 75 mcg daily. Last labs 21.       Recent Hospitalizations:  Recently treated at the following:  [facility].    Current Medical Providers:  Patient Care Team:  Mikael Vasquez MD as PCP - General (Internal Medicine)  Arthur Phillips MD as Consulting Physician (Nephrology)    Compared to one year ago, the patient feels his physical health is the same and his mental health is the same.    Depression Screen:  PHQ-2/PHQ-9 Depression Screening 2020   Little interest or pleasure in doing things 0   Feeling down, depressed, or  hopeless 0   Trouble falling or staying asleep, or sleeping too much 0   Feeling tired or having little energy 0   Poor appetite or overeating 0   Feeling bad about yourself - or that you are a failure or have let yourself or your family down 0   Trouble concentrating on things, such as reading the newspaper or watching television 0   Moving or speaking so slowly that other people could have noticed. Or the opposite - being so fidgety or restless that you have been moving around a lot more than usual 0   Thoughts that you would be better off dead, or of hurting yourself in some way 0   Total Score 0       Past Medical/Family/Social History:  The following portions of the patient's history were reviewed and updated as appropriate: allergies, current medications, past family history, past medical history, past social history, past surgical history and problem list.    Allergies   Allergen Reactions   • Doxycycline Anaphylaxis         Current Outpatient Medications:   •  albuterol (PROVENTIL/VENTOLIN) nebulization syringe, Take  by nebulization Every 4 (Four) Hours As Needed for Wheezing. Every 4-6 hrs prn wheezing, Disp: , Rfl:   •  allopurinol (ZYLOPRIM) 100 MG tablet, Take 200 mg by mouth Daily., Disp: , Rfl:   •  amLODIPine (NORVASC) 5 MG tablet, TAKE ONE TABLET DAILY, Disp: 90 tablet, Rfl: 1  •  ammonium lactate (LAC-HYDRIN) 12 % lotion, APPLY TO FEET AND LEGS TWICE DAILY AS DIRECTED., Disp: 400 g, Rfl: 11  •  budesonide (PULMICORT) 1 MG/2ML nebulizer solution, USE 1 VIAL PER NEBULIZER DAILY, Disp: 60 mL, Rfl: 5  •  carvedilol (COREG) 25 MG tablet, TAKE ONE TABLET BY MOUTH TWICE DAILY, Disp: 56 tablet, Rfl: 1  •  cloNIDine (CATAPRES) 0.3 MG tablet, TAKE ONE TABLET BY MOUTH THREE TIMES DAILY (MORNING, NOON, EVENING), Disp: 90 tablet, Rfl: 3  •  divalproex (DEPAKOTE ER) 500 MG 24 hr tablet, Take 1,000 mg by mouth Every Night., Disp: , Rfl:   •  FeroSul 325 (65 Fe) MG tablet, TAKE ONE TABLET BY MOUTH TWICE A DAY  MORNING AND EVENING, Disp: 60 tablet, Rfl: 11  •  fluticasone (FLONASE) 50 MCG/ACT nasal spray, 2 sprays into the nostril(s) as directed by provider Daily., Disp: , Rfl:   •  furosemide (LASIX) 20 MG tablet, Take 3 tablets by mouth 2 (Two) Times a Day., Disp: 180 tablet, Rfl: 0  •  levothyroxine (SYNTHROID, LEVOTHROID) 75 MCG tablet, TAKE ONE TABLET BY MOUTH DAILY, Disp: 28 tablet, Rfl: 6  •  loratadine (CLARITIN) 10 MG tablet, TAKE ONE TABLET BY MOUTH DAILY, Disp: 30 tablet, Rfl: 11  •  minoxidil (LONITEN) 2.5 MG tablet, TAKE TWO TABLETS (5mg) TWICE DAILY, Disp: 112 tablet, Rfl: 1  •  montelukast (SINGULAIR) 10 MG tablet, Take 10 mg by mouth every night at bedtime., Disp: , Rfl:   •  O2 (OXYGEN), Inhale 2 L/min Every Night., Disp: , Rfl:   •  OLANZapine (zyPREXA) 20 MG tablet, TAKE ONE TABLET AT BEDTIME, Disp: 90 tablet, Rfl: 1  •  omeprazole (priLOSEC) 20 MG capsule, TAKE ONE CAPSULE BY MOUTH DAILY, Disp: 30 capsule, Rfl: 11  •  polyethylene glycol (MiraLax) 17 GM/SCOOP powder, Take 17 g by mouth Daily., Disp: , Rfl:   •  ProAir  (90 Base) MCG/ACT inhaler, INHALE TWO PUFFS BY MOUTH EVERY SIX HOURS AS NEEDED AND 20 MINUTES BEFORE EXERCISE, Disp: 8.5 g, Rfl: 11  •  QUEtiapine (SEROquel) 300 MG tablet, TAKE ONE TABLET AT BEDTIME, Disp: 90 tablet, Rfl: 1  •  solifenacin (VESIcare) 5 MG tablet, Take  by mouth Daily., Disp: , Rfl:   •  tamsulosin (FLOMAX) 0.4 MG capsule 24 hr capsule, TAKE ONE CAPSULE BY MOUTH AT BEDTIME, Disp: 30 capsule, Rfl: 5    Current medication list contains no high risk medications.  No harmful drug interactions have been identified.     Family History   Problem Relation Age of Onset   • Down syndrome Brother    • No Known Problems Other        Social History     Tobacco Use   • Smoking status: Never Smoker   • Smokeless tobacco: Never Used   Substance Use Topics   • Alcohol use: No       No past surgical history on file.    Patient Active Problem List   Diagnosis   • Intellectual  "disability   • Seasonal allergies   • Hypertension   • Seizure disorder (HCC)   • Stage 3b chronic kidney disease (HCC)   • Hyperlipidemia   • Annual physical exam   • Hypothyroidism   • GERD (gastroesophageal reflux disease)   • Asthma   • Constipation   • Hypertriglyceridemia   • Acute urinary tract infection   • JORDEN (acute kidney injury) (HCC)   • Urine retention   • Sepsis without acute organ dysfunction (present on admission)   • Hospital discharge follow-up   • Secondary hyperparathyroidism (HCC)   • Vitamin D deficiency       Review of Systems   Constitutional: Negative for activity change, appetite change, fatigue, fever, unexpected weight gain and unexpected weight loss.   HENT: Negative for nosebleeds, rhinorrhea, trouble swallowing and voice change.    Eyes: Negative for visual disturbance.   Respiratory: Negative for cough, chest tightness, shortness of breath and wheezing.    Cardiovascular: Negative for chest pain, palpitations and leg swelling.   Gastrointestinal: Negative for abdominal pain, blood in stool, constipation, diarrhea, nausea, vomiting, GERD and indigestion.   Genitourinary: Negative for dysuria, frequency and hematuria.   Musculoskeletal: Negative for arthralgias, back pain and myalgias.   Skin: Negative for rash and wound.   Neurological: Negative for dizziness, tremors, weakness, light-headedness, numbness, headache and memory problem.   Hematological: Negative for adenopathy. Does not bruise/bleed easily.   Psychiatric/Behavioral: Negative for sleep disturbance and depressed mood. The patient is not nervous/anxious.        Objective     Vitals:    02/16/22 1409   BP: 128/84   BP Location: Right arm   Patient Position: Sitting   Pulse: 97   Temp: 98.6 °F (37 °C)   SpO2: 94%   Weight: 94.1 kg (207 lb 6.4 oz)   Height: 182.9 cm (72.01\")       Patient's Body mass index is 28.12 kg/m². indicating that he is overweight (BMI 25-29.9). Patient's (Body mass index is 28.12 kg/m².) indicates " that they are overweight with health conditions that include hypertension and dyslipidemias . Weight is improving with lifestyle modifications. BMI is is above average; BMI management plan is completed. We discussed portion control and increasing exercise. .      No exam data present    Physical Exam  Vitals and nursing note reviewed.   Constitutional:       General: He is not in acute distress.     Appearance: He is well-developed. He is not diaphoretic.      Comments: Patient Down syndrome.   HENT:      Head: Normocephalic and atraumatic.      Right Ear: External ear normal.      Left Ear: External ear normal.      Nose: Nose normal.   Eyes:      Conjunctiva/sclera: Conjunctivae normal.      Pupils: Pupils are equal, round, and reactive to light.      Comments: Left eye exotropia was noted   Neck:      Thyroid: No thyromegaly.      Trachea: No tracheal deviation.   Cardiovascular:      Rate and Rhythm: Normal rate and regular rhythm.      Heart sounds: Normal heart sounds. No murmur heard.  No friction rub. No gallop.    Pulmonary:      Effort: Pulmonary effort is normal. No respiratory distress.      Breath sounds: Normal breath sounds.   Abdominal:      General: Bowel sounds are normal.      Palpations: Abdomen is soft. There is no mass.      Tenderness: There is no abdominal tenderness. There is no guarding.   Musculoskeletal:         General: Normal range of motion.      Cervical back: Normal range of motion and neck supple.   Lymphadenopathy:      Cervical: No cervical adenopathy.   Skin:     General: Skin is warm and dry.      Capillary Refill: Capillary refill takes less than 2 seconds.      Findings: No rash.   Neurological:      Mental Status: He is alert and oriented to person, place, and time.      Motor: No abnormal muscle tone.      Deep Tendon Reflexes: Reflexes normal.   Psychiatric:         Behavior: Behavior normal.         Thought Content: Thought content normal.         Judgment: Judgment  normal.         Recent Lab Results:     Lab Results   Component Value Date    TRIG 979 (H) 01/27/2021    HDL 23 (L) 01/27/2021    VLDL CANCELED 01/27/2021       Assessment/Plan   Age-appropriate Screening Schedule:  Refer to the list below for future screening recommendations based on patient's age, sex and/or medical conditions.      Health Maintenance   Topic Date Due   • LIPID PANEL  01/27/2022   • TDAP/TD VACCINES (2 - Td or Tdap) 05/23/2023   • INFLUENZA VACCINE  Completed       Diagnoses and all orders for this visit:    1. Annual physical exam (Primary)    2. Primary hypertension    3. Seizure disorder (HCC)    4. Intellectual disability    Discussed with caregiver at bedside concerning his care. He is doing well continue current medications unchanged. Blood pressure is well controlled no changes are needed. He does have elevations when he is upset but otherwise is doing well.    Annual wellness visit reviewed with patient.  All past history, medications, social history, and problem list were reviewed.  Discussed advanced directives and medical decision making. Patient does have a medical power of  and is being cared for very well at this time.  Will check the labs as ordered above to evaluate the blood sugars, kidney, liver, cholesterol for screening.  Discussed flu shot recommended to get the influenza vaccine annually in the fall.  Encouraged follow-up with the eye doctor on annual basis.  Discussed weight and encouraged exercise as tolerated while following a healthy diet.  Follow up with current specialists as needed.     An After Visit Summary with all of these plans were given to the patient.        Follow Up:  No follow-ups on file.

## 2022-03-08 DIAGNOSIS — I10 ESSENTIAL HYPERTENSION: ICD-10-CM

## 2022-03-08 RX ORDER — CLONIDINE HYDROCHLORIDE 0.3 MG/1
TABLET ORAL
Qty: 90 TABLET | Refills: 3 | OUTPATIENT
Start: 2022-03-08

## 2022-03-08 NOTE — TELEPHONE ENCOUNTER
Rx Refill Note  Requested Prescriptions     Pending Prescriptions Disp Refills   • ProAir  (90 Base) MCG/ACT inhaler [Pharmacy Med Name: ProAir HFA 90 mcg/actuation aerosol inhaler] 8.5 g 11     Sig: INHALE TWO PUFFS BY MOUTH EVERY SIX HOURS AS NEEDED AND 20 MINUTES BEFORE EXERCISE      Last office visit with prescribing clinician: 2/16/2022      Next office visit with prescribing clinician: Visit date not found            Luz Gunn MA  03/08/22, 13:55 EST

## 2022-03-29 ENCOUNTER — OFFICE VISIT (OUTPATIENT)
Dept: FAMILY MEDICINE CLINIC | Facility: CLINIC | Age: 37
End: 2022-03-29

## 2022-03-29 VITALS
HEIGHT: 72 IN | BODY MASS INDEX: 27.77 KG/M2 | OXYGEN SATURATION: 98 % | DIASTOLIC BLOOD PRESSURE: 82 MMHG | HEART RATE: 69 BPM | SYSTOLIC BLOOD PRESSURE: 126 MMHG | TEMPERATURE: 98.1 F | WEIGHT: 205 LBS

## 2022-03-29 DIAGNOSIS — J45.20 MILD INTERMITTENT ASTHMA WITHOUT COMPLICATION: ICD-10-CM

## 2022-03-29 DIAGNOSIS — I10 PRIMARY HYPERTENSION: Primary | ICD-10-CM

## 2022-03-29 PROCEDURE — 99214 OFFICE O/P EST MOD 30 MIN: CPT | Performed by: INTERNAL MEDICINE

## 2022-03-29 RX ORDER — HYDRALAZINE HYDROCHLORIDE 25 MG/1
25 TABLET, FILM COATED ORAL 3 TIMES DAILY
Qty: 90 TABLET | Refills: 3 | Status: SHIPPED | OUTPATIENT
Start: 2022-03-29 | End: 2022-03-31 | Stop reason: SDUPTHER

## 2022-03-29 NOTE — PROGRESS NOTES
Juanita Mccarthy is a 36 y.o. male.     Chief Complaint   Patient presents with   • Hypertension   • Edema       History of Present Illness   Arnold the caregiver was present during the history-taking and subsequent discussion (and for part of the physical exam) with this patient.  Patient agrees to the presence of the individual during this visit.    Follow-up for hypertension.  Currently has been feeling well and asymptomatic without any headaches,vision changes, cough, chest pain, shortness of breath, focal neurologic deficit/noted changes, or syncope.  Has been taking the medications regularly and adherent with the regimen of amlodipine 10 mg, clonidine 0.3 mg TID, furosemide 40 mg QD, carvedilol 25 mg BID and hydralazine 100 mg TID.  Denies medication side effects and no significant interval events.  Home blood pressure has been variable and up to 153/98.   Last week had one day of swelling in legs and face and the swelling improved as day progressed.  This eventually resolved.     History of seizures.  Still no seizures for a long time.  Over 1 year seizure free and no problems with the medications.     History of hypo-thyroid.  Denies fatigue, weakness, constipation/diarrhea, hair/skin changes, weight gain/loss, depression/anxiety, rashes, palpitations, swelling, chest pain, shortness of breath or other issues.  Has been compliant with taking the medication with no recent changes. Denies any difficulty with the current medication of levothyroxine 75 mcg daily. Last labs 1/27/21.    The following portions of the patient's history were reviewed and updated as appropriate: allergies, current medications, past family history, past medical history, past social history, past surgical history and problem list.    Depression Screen:  PHQ-2/PHQ-9 Depression Screening 1/22/2020   Retired Total Score 0       Past Medical History:   Diagnosis Date   • Acquired intellectual disability    • Allergic rhinitis    •  Asthma    • Chronic kidney disease (CKD)    • Chronic renal disease, stage 3, moderately decreased glomerular filtration rate between 30-59 mL/min/1.73 square meter (HCC)    • Constipation    • Epilepsy, generalized, convulsive (HCC)    • Episode of altered consciousness    • Essential hypertension     History of Essential Hypertension    • GERD (gastroesophageal reflux disease)    • Hematuria    • Hyperkalemia    • Hyperlipidemia    • Hypertension    • Hypothyroidism    • Immunization due    • Metabolic encephalopathy    • Moderate intellectual disabilities    • Mood disorder ([facility])    • Nocturia    • Physical exam, annual    • Seizure ([facility])    • Seizure disorder ([facility])        History reviewed. No pertinent surgical history.    Family History   Problem Relation Age of Onset   • Down syndrome Brother    • No Known Problems Other        Social History     Socioeconomic History   • Marital status: Single   Tobacco Use   • Smoking status: Never Smoker   • Smokeless tobacco: Never Used   Substance and Sexual Activity   • Alcohol use: No   • Drug use: Defer       Current Outpatient Medications   Medication Sig Dispense Refill   • albuterol (PROVENTIL/VENTOLIN) nebulization syringe Take  by nebulization Every 4 (Four) Hours As Needed for Wheezing. Every 4-6 hrs prn wheezing     • allopurinol (ZYLOPRIM) 100 MG tablet Take 200 mg by mouth Daily.     • amLODIPine (NORVASC) 5 MG tablet TAKE ONE TABLET DAILY 90 tablet 1   • ammonium lactate (LAC-HYDRIN) 12 % lotion APPLY TO FEET AND LEGS TWICE DAILY AS DIRECTED. 400 g 11   • carvedilol (COREG) 25 MG tablet TAKE ONE TABLET BY MOUTH TWICE DAILY 56 tablet 1   • cloNIDine (CATAPRES) 0.3 MG tablet TAKE ONE TABLET BY MOUTH THREE TIMES DAILY (MORNING, NOON, EVENING) 90 tablet 3   • divalproex (DEPAKOTE ER) 500 MG 24 hr tablet Take 1,000 mg by mouth Every Night.     • FeroSul 325 (65 Fe) MG tablet TAKE ONE TABLET BY MOUTH TWICE A DAY MORNING AND EVENING 60 tablet 11   • fluticasone  "(FLONASE) 50 MCG/ACT nasal spray 2 sprays into the nostril(s) as directed by provider Daily.     • furosemide (LASIX) 20 MG tablet Take 3 tablets by mouth 2 (Two) Times a Day. 180 tablet 0   • levothyroxine (SYNTHROID, LEVOTHROID) 75 MCG tablet TAKE ONE TABLET BY MOUTH DAILY 28 tablet 6   • loratadine (CLARITIN) 10 MG tablet TAKE ONE TABLET BY MOUTH DAILY 30 tablet 11   • minoxidil (LONITEN) 2.5 MG tablet TAKE TWO TABLETS (5mg) TWICE DAILY 112 tablet 1   • montelukast (SINGULAIR) 10 MG tablet Take 10 mg by mouth every night at bedtime.     • O2 (OXYGEN) Inhale 2 L/min Every Night.     • OLANZapine (zyPREXA) 20 MG tablet TAKE ONE TABLET AT BEDTIME 90 tablet 1   • omeprazole (priLOSEC) 20 MG capsule TAKE ONE CAPSULE BY MOUTH DAILY 30 capsule 11   • polyethylene glycol (MIRALAX) 17 GM/SCOOP powder Take 17 g by mouth Daily.     • ProAir  (90 Base) MCG/ACT inhaler INHALE TWO PUFFS BY MOUTH EVERY SIX HOURS AS NEEDED AND 20 MINUTES BEFORE EXERCISE 8.5 g 11   • QUEtiapine (SEROquel) 300 MG tablet TAKE ONE TABLET AT BEDTIME 90 tablet 1   • solifenacin (VESICARE) 5 MG tablet Take  by mouth Daily.     • tamsulosin (FLOMAX) 0.4 MG capsule 24 hr capsule TAKE ONE CAPSULE BY MOUTH AT BEDTIME 30 capsule 5   • fluticasone-salmeterol (Advair Diskus) 250-50 MCG/DOSE DISKUS Inhale 1 puff 2 (Two) Times a Day. 60 each 5   • hydrALAZINE (APRESOLINE) 25 MG tablet Take 1 tablet by mouth 3 (Three) Times a Day. 90 tablet 3     No current facility-administered medications for this visit.       Review of Systems    Objective   /82 (BP Location: Left arm, Patient Position: Sitting, Cuff Size: Adult)   Pulse 69   Temp 98.1 °F (36.7 °C) (Temporal)   Ht 182.9 cm (72.01\")   Wt 93 kg (205 lb)   SpO2 98%   BMI 27.80 kg/m²     Physical Exam  Vitals and nursing note reviewed.   Constitutional:       General: He is not in acute distress.     Appearance: He is well-developed. He is not diaphoretic.   HENT:      Head: Normocephalic and " atraumatic.      Right Ear: External ear normal.      Left Ear: External ear normal.      Nose: Nose normal.   Eyes:      Extraocular Movements: Extraocular movements intact.      Conjunctiva/sclera: Conjunctivae normal.      Comments: Right exotropia.   Neck:      Thyroid: No thyromegaly.      Trachea: No tracheal deviation.   Cardiovascular:      Rate and Rhythm: Normal rate and regular rhythm.      Heart sounds: Normal heart sounds. No murmur heard.    No friction rub. No gallop.   Pulmonary:      Effort: Pulmonary effort is normal. No respiratory distress.      Breath sounds: Normal breath sounds.   Abdominal:      General: Bowel sounds are normal.      Palpations: Abdomen is soft. There is no mass.      Tenderness: There is no abdominal tenderness. There is no guarding.   Musculoskeletal:         General: Normal range of motion.      Cervical back: Normal range of motion and neck supple.      Right lower leg: Edema present.      Left lower leg: Edema present.   Lymphadenopathy:      Cervical: No cervical adenopathy.   Skin:     General: Skin is warm and dry.      Capillary Refill: Capillary refill takes less than 2 seconds.      Findings: No rash.   Neurological:      Mental Status: He is alert and oriented to person, place, and time.      Motor: No abnormal muscle tone.      Deep Tendon Reflexes: Reflexes normal.   Psychiatric:         Behavior: Behavior normal.         Thought Content: Thought content normal.         Judgment: Judgment normal.         No results found for this or any previous visit (from the past 2016 hour(s)).  Assessment/Plan   Diagnoses and all orders for this visit:    1. Primary hypertension (Primary)  -     hydrALAZINE (APRESOLINE) 25 MG tablet; Take 1 tablet by mouth 3 (Three) Times a Day.  Dispense: 90 tablet; Refill: 3    2. Mild intermittent asthma without complication  -     fluticasone-salmeterol (Advair Diskus) 250-50 MCG/DOSE DISKUS; Inhale 1 puff 2 (Two) Times a Day.  Dispense:  60 each; Refill: 5      Breo, trelegy not covered.  After trying to review formulary, it appears advair, dulera, symbicort, anoro, atrovent, bevespi, combiven respimat, spiriva and stiolto are preferred agents.  Will stop breo and try advair diskus.  Can still use the albuterol as needed prior to the exercise.  Will stop the pulmicort, breo and use only the advair daily and the albuterol MDI prior to exercise and nebulizer as needed for short of breath/wheezing.  Will add hydralazine 25 mg BID, continue the current medications, follow-up in the office in 1 month block, and consider follow-up with nephrology.         · COVID-19 Precautions - Patient was compliant in wearing a mask. When I saw the patient, I used appropriate personal protective equipment (PPE) including mask and eye shield (standard procedure).  Additionally, I used gown and gloves if indicated.  Hand hygiene was completed before and after seeing the patient.  · Dictated utilizing Dragon Dictation

## 2022-03-30 ENCOUNTER — TELEPHONE (OUTPATIENT)
Dept: FAMILY MEDICINE CLINIC | Facility: CLINIC | Age: 37
End: 2022-03-30

## 2022-03-30 DIAGNOSIS — I10 PRIMARY HYPERTENSION: ICD-10-CM

## 2022-03-30 NOTE — TELEPHONE ENCOUNTER
Please call caregiver back and inform that I really do want him to take it twice a day at this time and will monitor his blood pressure but we may have to go to 3 times a day in the future which is probably what I was thinking when I put the prescription into the pharmacy.  Therefore, I want him to be taking it twice a day about every 12 hours at this time.

## 2022-03-30 NOTE — TELEPHONE ENCOUNTER
MICHELLE FROM  FOR PATIENT IS CALLING IN TO GET SOME CLARIFICATION ON A MEDICATION FOR PATIENT. SEEMS TO BE CONFLICTING INSTRUCTIONS ON THE HYDRALAZINE.      PLEASE ADVISE       CALLBACK NUMBER IS  [phone]

## 2022-03-31 RX ORDER — HYDRALAZINE HYDROCHLORIDE 25 MG/1
25 TABLET, FILM COATED ORAL 2 TIMES DAILY
Qty: 180 TABLET | Refills: 3 | Status: SHIPPED | OUTPATIENT
Start: 2022-03-31 | End: 2022-04-26 | Stop reason: SDUPTHER

## 2022-03-31 NOTE — TELEPHONE ENCOUNTER
READ MESSAGE TO CARE GIVER. SHE UNDERSTANDS AND WOULD LIKE FOR THE 2X A DAY SCRIPT OF THIS MEDICATION TO BE SENT IN TO THE PHARMACY IF POSSIBLE. CALLBACK: [phone]  [facility] - [place], [place] - [address] - [phone]  - [phone] FX

## 2022-03-31 NOTE — TELEPHONE ENCOUNTER
OK FOR HUB     inform that I really do want him to take it twice a day at this time and will monitor his blood pressure but we may have to go to 3 times a day in the future which is probably what I was thinking when I put the prescription into the pharmacy.  Therefore, I want him to be taking it twice a day about every 12 hours at this time.

## 2022-04-07 RX ORDER — CARVEDILOL 25 MG/1
TABLET ORAL
Qty: 56 TABLET | Refills: 1 | Status: SHIPPED | OUTPATIENT
Start: 2022-04-07 | End: 2022-05-31

## 2022-04-07 RX ORDER — MINOXIDIL 2.5 MG/1
TABLET ORAL
Qty: 112 TABLET | Refills: 1 | Status: SHIPPED | OUTPATIENT
Start: 2022-04-07 | End: 2022-05-31

## 2022-04-07 NOTE — TELEPHONE ENCOUNTER
Rx Refill Note  Requested Prescriptions     Pending Prescriptions Disp Refills   • minoxidil (LONITEN) 2.5 MG tablet [Pharmacy Med Name: minoxidil 2.5 mg tablet] 112 tablet 1     Sig: TAKE TWO TABLETS (5mg) TWICE DAILY   • carvedilol (COREG) 25 MG tablet [Pharmacy Med Name: carvedilol 25 mg tablet] 56 tablet 1     Sig: TAKE ONE TABLET BY MOUTH TWICE DAILY      Last office visit with prescribing clinician: 3/29/2022      Next office visit with prescribing clinician: 4/26/2022            Luz Gunn MA  04/07/22, 13:18 EDT

## 2022-04-26 ENCOUNTER — OFFICE VISIT (OUTPATIENT)
Dept: FAMILY MEDICINE CLINIC | Facility: CLINIC | Age: 37
End: 2022-04-26

## 2022-04-26 VITALS
HEIGHT: 72 IN | TEMPERATURE: 97.3 F | OXYGEN SATURATION: 95 % | BODY MASS INDEX: 28.31 KG/M2 | WEIGHT: 209 LBS | HEART RATE: 98 BPM | DIASTOLIC BLOOD PRESSURE: 80 MMHG | SYSTOLIC BLOOD PRESSURE: 128 MMHG

## 2022-04-26 DIAGNOSIS — I10 PRIMARY HYPERTENSION: ICD-10-CM

## 2022-04-26 PROCEDURE — 99213 OFFICE O/P EST LOW 20 MIN: CPT | Performed by: INTERNAL MEDICINE

## 2022-04-26 RX ORDER — HYDRALAZINE HYDROCHLORIDE 25 MG/1
25 TABLET, FILM COATED ORAL 3 TIMES DAILY
Qty: 270 TABLET | Refills: 1 | Status: SHIPPED | OUTPATIENT
Start: 2022-04-26 | End: 2022-09-23

## 2022-04-26 NOTE — PROGRESS NOTES
Juanita Mccarthy is a 36 y.o. male.     Chief Complaint   Patient presents with   • Hypertension       History of Present Illness   Arnold the caregiver was present during the history-taking and subsequent discussion (and for part of the physical exam) with this patient.  Patient agrees to the presence of the individual during this visit.     Follow-up for hypertension.  Currently has been feeling well and asymptomatic without any headaches,vision changes, cough, chest pain, shortness of breath, focal neurologic deficit/noted changes, or syncope.  Has been taking the medications regularly and adherent with the regimen of amlodipine 10 mg, clonidine 0.3 mg TID, furosemide 40 mg QD, carvedilol 25 mg BID and hydralazine 100 mg TID.  Denies medication side effects and no significant interval events.  Home blood pressure has been variable 160/101-116/86.  Swelling is in the morning then improves as the day progressive.     History of seizures.  Still no seizures for a long time.  Over 1 year seizure free and no problems with the medications.     History of hypo-thyroid.  Denies fatigue, weakness, constipation/diarrhea, hair/skin changes, weight gain/loss, depression/anxiety, rashes, palpitations, swelling, chest pain, shortness of breath or other issues.  Has been compliant with taking the medication with no recent changes. Denies any difficulty with the current medication of levothyroxine 75 mcg daily. Last labs 1/27/21.    The following portions of the patient's history were reviewed and updated as appropriate: allergies, current medications, past family history, past medical history, past social history, past surgical history and problem list.    Depression Screen:  PHQ-2/PHQ-9 Depression Screening 1/22/2020   Retired Total Score 0       Past Medical History:   Diagnosis Date   • Acquired intellectual disability    • Allergic rhinitis    • Asthma    • Chronic kidney disease (CKD)    • Chronic renal disease,  stage 3, moderately decreased glomerular filtration rate between 30-59 mL/min/1.73 square meter (HCC)    • Constipation    • Epilepsy, generalized, convulsive ([facility])    • Episode of altered consciousness    • Essential hypertension     History of Essential Hypertension    • GERD (gastroesophageal reflux disease)    • Hematuria    • Hyperkalemia    • Hyperlipidemia    • Hypertension    • Hypothyroidism    • Immunization due    • Metabolic encephalopathy    • Moderate intellectual disabilities    • Mood disorder ([facility])    • Nocturia    • Physical exam, annual    • Seizure ([facility])    • Seizure disorder ([facility])        History reviewed. No pertinent surgical history.    Family History   Problem Relation Age of Onset   • Down syndrome Brother    • No Known Problems Other        Social History     Socioeconomic History   • Marital status: Single   Tobacco Use   • Smoking status: Never Smoker   • Smokeless tobacco: Never Used   Substance and Sexual Activity   • Alcohol use: No   • Drug use: Defer       Current Outpatient Medications   Medication Sig Dispense Refill   • albuterol (PROVENTIL/VENTOLIN) nebulization syringe Take  by nebulization Every 4 (Four) Hours As Needed for Wheezing. Every 4-6 hrs prn wheezing     • allopurinol (ZYLOPRIM) 100 MG tablet Take 200 mg by mouth Daily.     • amLODIPine (NORVASC) 5 MG tablet TAKE ONE TABLET DAILY 90 tablet 1   • ammonium lactate (LAC-HYDRIN) 12 % lotion APPLY TO FEET AND LEGS TWICE DAILY AS DIRECTED. 400 g 11   • carvedilol (COREG) 25 MG tablet TAKE ONE TABLET BY MOUTH TWICE DAILY 56 tablet 1   • cloNIDine (CATAPRES) 0.3 MG tablet TAKE ONE TABLET BY MOUTH THREE TIMES DAILY (MORNING, NOON, EVENING) 90 tablet 3   • divalproex (DEPAKOTE ER) 500 MG 24 hr tablet Take 1,000 mg by mouth Every Night.     • FeroSul 325 (65 Fe) MG tablet TAKE ONE TABLET BY MOUTH TWICE A DAY MORNING AND EVENING 60 tablet 11   • fluticasone (FLONASE) 50 MCG/ACT nasal spray 2 sprays into the nostril(s) as directed  by provider Daily.     • fluticasone-salmeterol (Advair Diskus) 250-50 MCG/DOSE DISKUS Inhale 1 puff 2 (Two) Times a Day. 60 each 5   • furosemide (LASIX) 20 MG tablet Take 3 tablets by mouth 2 (Two) Times a Day. 180 tablet 0   • hydrALAZINE (APRESOLINE) 25 MG tablet Take 1 tablet by mouth 3 (Three) Times a Day. 270 tablet 1   • levothyroxine (SYNTHROID, LEVOTHROID) 75 MCG tablet TAKE ONE TABLET BY MOUTH DAILY 28 tablet 6   • loratadine (CLARITIN) 10 MG tablet TAKE ONE TABLET BY MOUTH DAILY 30 tablet 11   • minoxidil (LONITEN) 2.5 MG tablet TAKE TWO TABLETS (5mg) TWICE DAILY 112 tablet 1   • montelukast (SINGULAIR) 10 MG tablet Take 10 mg by mouth every night at bedtime.     • O2 (OXYGEN) Inhale 2 L/min Every Night.     • OLANZapine (zyPREXA) 20 MG tablet TAKE ONE TABLET AT BEDTIME 90 tablet 1   • omeprazole (priLOSEC) 20 MG capsule TAKE ONE CAPSULE BY MOUTH DAILY 30 capsule 11   • polyethylene glycol (MIRALAX) 17 GM/SCOOP powder Take 17 g by mouth Daily.     • ProAir  (90 Base) MCG/ACT inhaler INHALE TWO PUFFS BY MOUTH EVERY SIX HOURS AS NEEDED AND 20 MINUTES BEFORE EXERCISE 8.5 g 11   • QUEtiapine (SEROquel) 300 MG tablet TAKE ONE TABLET AT BEDTIME 90 tablet 1   • solifenacin (VESICARE) 5 MG tablet Take  by mouth Daily.     • tamsulosin (FLOMAX) 0.4 MG capsule 24 hr capsule TAKE ONE CAPSULE BY MOUTH AT BEDTIME 30 capsule 5     No current facility-administered medications for this visit.       Review of Systems   Constitutional: Negative for activity change, appetite change, fatigue, fever, unexpected weight gain and unexpected weight loss.   HENT: Negative for nosebleeds, rhinorrhea and trouble swallowing.    Respiratory: Negative for cough, chest tightness, shortness of breath and wheezing.    Cardiovascular: Negative for chest pain, palpitations and leg swelling.   Gastrointestinal: Negative for abdominal pain, blood in stool, constipation, diarrhea, nausea, vomiting, GERD and indigestion.   Skin:  "Negative for rash and wound.   Neurological: Negative for dizziness, tremors, weakness and numbness.   Hematological: Negative for adenopathy. Does not bruise/bleed easily.   Psychiatric/Behavioral: Negative for sleep disturbance.       Objective   /80 (BP Location: Left arm, Patient Position: Sitting, Cuff Size: Large Adult)   Pulse 98   Temp 97.3 °F (36.3 °C) (Temporal)   Ht 182.9 cm (72.01\")   Wt 94.8 kg (209 lb)   SpO2 95%   BMI 28.34 kg/m²     Physical Exam  Vitals and nursing note reviewed.   Constitutional:       General: He is not in acute distress.     Appearance: He is well-developed. He is not diaphoretic.   HENT:      Head: Normocephalic and atraumatic.      Right Ear: External ear normal.      Left Ear: External ear normal.      Nose: Nose normal.   Eyes:      Conjunctiva/sclera: Conjunctivae normal.      Pupils: Pupils are equal, round, and reactive to light.      Comments: Right eye exotropia   Neck:      Thyroid: No thyromegaly.      Trachea: No tracheal deviation.   Cardiovascular:      Rate and Rhythm: Normal rate and regular rhythm.      Heart sounds: Normal heart sounds. No murmur heard.    No friction rub. No gallop.   Pulmonary:      Effort: Pulmonary effort is normal. No respiratory distress.      Breath sounds: Normal breath sounds.   Abdominal:      General: Bowel sounds are normal.      Palpations: Abdomen is soft. There is no mass.      Tenderness: There is no abdominal tenderness. There is no guarding.   Musculoskeletal:         General: Normal range of motion.      Cervical back: Normal range of motion and neck supple.   Lymphadenopathy:      Cervical: No cervical adenopathy.   Skin:     General: Skin is warm and dry.      Capillary Refill: Capillary refill takes less than 2 seconds.      Findings: No rash.   Neurological:      Mental Status: He is alert and oriented to person, place, and time.      Motor: No abnormal muscle tone.      Deep Tendon Reflexes: Reflexes normal. "   Psychiatric:         Behavior: Behavior normal.         Thought Content: Thought content normal.         Judgment: Judgment normal.         No results found for this or any previous visit (from the past 2016 hour(s)).  Assessment/Plan   Diagnoses and all orders for this visit:    1. Primary hypertension  -     hydrALAZINE (APRESOLINE) 25 MG tablet; Take 1 tablet by mouth 3 (Three) Times a Day.  Dispense: 270 tablet; Refill: 1    Continue the current medications but increase the hydralazine to 25 mg TID and follow up with nephrology in 2 weeks as scheduled.           · COVID-19 Precautions - Patient was compliant in wearing a mask. When I saw the patient, I used appropriate personal protective equipment (PPE) including mask and eye shield (standard procedure).  Additionally, I used gown and gloves if indicated.  Hand hygiene was completed before and after seeing the patient.  · Dictated utilizing Dragon Dictation

## 2022-05-04 RX ORDER — POLYETHYLENE GLYCOL 3350 17 G/17G
POWDER, FOR SOLUTION ORAL
Qty: 510 G | Refills: 11 | Status: SHIPPED | OUTPATIENT
Start: 2022-05-04

## 2022-05-16 RX ORDER — ALLOPURINOL 100 MG/1
TABLET ORAL
Qty: 30 TABLET | Refills: 0 | Status: SHIPPED | OUTPATIENT
Start: 2022-05-16 | End: 2023-03-22 | Stop reason: SDUPTHER

## 2022-05-16 NOTE — TELEPHONE ENCOUNTER
Rx Refill Note  Requested Prescriptions     Pending Prescriptions Disp Refills   • allopurinol (ZYLOPRIM) 100 MG tablet [Pharmacy Med Name: allopurinol 100 mg tablet] 30 tablet 0     Sig: TAKE ONE TABLET BY MOUTH DAILY      Last office visit with prescribing clinician: 4/26/2022      Next office visit with prescribing clinician: 6/7/2022            Jacklyn Tapia MA  05/16/22, 12:36 EDT

## 2022-05-31 RX ORDER — SOLIFENACIN SUCCINATE 5 MG/1
TABLET, FILM COATED ORAL
Qty: 30 TABLET | Refills: 11 | Status: SHIPPED | OUTPATIENT
Start: 2022-05-31

## 2022-05-31 RX ORDER — MINOXIDIL 2.5 MG/1
TABLET ORAL
Qty: 112 TABLET | Refills: 1 | Status: SHIPPED | OUTPATIENT
Start: 2022-05-31 | End: 2022-07-26

## 2022-05-31 RX ORDER — MONTELUKAST SODIUM 10 MG/1
TABLET ORAL
Qty: 30 TABLET | Refills: 11 | Status: SHIPPED | OUTPATIENT
Start: 2022-05-31

## 2022-05-31 RX ORDER — CARVEDILOL 25 MG/1
TABLET ORAL
Qty: 56 TABLET | Refills: 1 | Status: SHIPPED | OUTPATIENT
Start: 2022-05-31 | End: 2022-07-26

## 2022-05-31 RX ORDER — FLUTICASONE PROPIONATE 50 MCG
SPRAY, SUSPENSION (ML) NASAL
Qty: 16 G | Refills: 11 | Status: SHIPPED | OUTPATIENT
Start: 2022-05-31

## 2022-05-31 NOTE — TELEPHONE ENCOUNTER
Rx Refill Note  Requested Prescriptions     Pending Prescriptions Disp Refills   • fluticasone (FLONASE) 50 MCG/ACT nasal spray [Pharmacy Med Name: fluticasone propionate 50 mcg/actuation nasal spray,suspension] 16 g 11     Sig: USE TWO SPRAYS IN EACH NOSTRIL DAILY   • solifenacin (VESICARE) 5 MG tablet [Pharmacy Med Name: solifenacin 5 mg tablet] 30 tablet 11     Sig: TAKE ONE TABLET BY MOUTH DAILY   • montelukast (SINGULAIR) 10 MG tablet [Pharmacy Med Name: montelukast 10 mg tablet] 30 tablet 11     Sig: TAKE ONE TABLET BY MOUTH AT BEDTIME   • carvedilol (COREG) 25 MG tablet [Pharmacy Med Name: carvedilol 25 mg tablet] 56 tablet 1     Sig: TAKE ONE TABLET BY MOUTH TWICE DAILY   • minoxidil (LONITEN) 2.5 MG tablet [Pharmacy Med Name: minoxidil 2.5 mg tablet] 112 tablet 1     Sig: TAKE TWO TABLETS BY MOUTH TWICE DAILY      Last office visit with prescribing clinician: 4/26/2022      Next office visit with prescribing clinician: 6/7/2022            Deshawn Diaz MA  05/31/22, 10:05 EDT

## 2022-06-28 DIAGNOSIS — I10 ESSENTIAL HYPERTENSION: ICD-10-CM

## 2022-06-28 RX ORDER — CLONIDINE HYDROCHLORIDE 0.3 MG/1
TABLET ORAL
Qty: 90 TABLET | Refills: 3 | Status: SHIPPED | OUTPATIENT
Start: 2022-06-28 | End: 2022-11-15

## 2022-06-28 NOTE — TELEPHONE ENCOUNTER
Rx Refill Note  Requested Prescriptions     Pending Prescriptions Disp Refills   • cloNIDine (CATAPRES) 0.3 MG tablet [Pharmacy Med Name: clonidine HCl 0.3 mg tablet] 90 tablet 3     Sig: TAKE ONE TABLET BY MOUTH THREE TIMES DAILY (MORNING, NOON, EVENING)      Last office visit with prescribing clinician: 4/26/2022      Next office visit with prescribing clinician: 7/19/2022    Does not meet protocol

## 2022-07-19 ENCOUNTER — OFFICE VISIT (OUTPATIENT)
Dept: FAMILY MEDICINE CLINIC | Facility: CLINIC | Age: 37
End: 2022-07-19

## 2022-07-19 VITALS
WEIGHT: 204 LBS | HEART RATE: 66 BPM | DIASTOLIC BLOOD PRESSURE: 78 MMHG | SYSTOLIC BLOOD PRESSURE: 118 MMHG | BODY MASS INDEX: 27.63 KG/M2 | OXYGEN SATURATION: 94 % | TEMPERATURE: 97.6 F | HEIGHT: 72 IN

## 2022-07-19 DIAGNOSIS — N18.4 CHRONIC KIDNEY DISEASE, STAGE 4 (SEVERE): ICD-10-CM

## 2022-07-19 DIAGNOSIS — I10 PRIMARY HYPERTENSION: Primary | ICD-10-CM

## 2022-07-19 PROCEDURE — 99213 OFFICE O/P EST LOW 20 MIN: CPT | Performed by: INTERNAL MEDICINE

## 2022-07-19 NOTE — PROGRESS NOTES
Juanita Mccarthy is a 36 y.o. male.     Chief Complaint   Patient presents with   • Hypertension   • Chronic Kidney Disease       History of Present Illness   Arnold the caregiver was present during the history-taking and subsequent discussion (and for part of the physical exam) with this patient.  Patient agrees to the presence of the individual during this visit.     Follow-up for hypertension.  Currently has been feeling well and asymptomatic without any headaches,vision changes, cough, chest pain, shortness of breath, focal neurologic deficit/noted changes, or syncope.  Has been taking the medications regularly and adherent with the regimen of amlodipine 5 mg, clonidine 0.3 mg TID, furosemide 40 mg QD, carvedilol 25 mg BID and hydralazine 25 mg TID.  Denies medication side effects and no significant interval events.  Home blood pressure has been variable 89//79.  Swelling is in the morning then improves as the day progresses.     History of seizures.  Still no seizures for a long time.  Over 1 year seizure free and no problems with the medications.     History of hypo-thyroid.  Denies fatigue, weakness, constipation/diarrhea, hair/skin changes, weight gain/loss, depression/anxiety, rashes, palpitations, swelling, chest pain, shortness of breath or other issues.  Has been compliant with taking the medication with no recent changes. Denies any difficulty with the current medication of levothyroxine 75 mcg daily. Last labs 1/27/21.     History of stage 4 CKD and slowly progressive.  Is referred to vascular for evaluation for av fistula and access.    The following portions of the patient's history were reviewed and updated as appropriate: allergies, current medications, past family history, past medical history, past social history, past surgical history and problem list.    Depression Screen:  PHQ-2/PHQ-9 Depression Screening 1/22/2020   Retired Total Score 0       Past Medical History:   Diagnosis  Date   • Acquired intellectual disability    • Allergic rhinitis    • Asthma    • Chronic kidney disease (CKD)    • Chronic renal disease, stage 3, moderately decreased glomerular filtration rate between 30-59 mL/min/1.73 square meter (HCC)    • Constipation    • Epilepsy, generalized, convulsive ([facility])    • Episode of altered consciousness    • Essential hypertension     History of Essential Hypertension    • GERD (gastroesophageal reflux disease)    • Hematuria    • Hyperkalemia    • Hyperlipidemia    • Hypertension    • Hypothyroidism    • Immunization due    • Metabolic encephalopathy    • Moderate intellectual disabilities    • Mood disorder ([facility])    • Nocturia    • Physical exam, annual    • Seizure ([facility])    • Seizure disorder ([facility])        History reviewed. No pertinent surgical history.    Family History   Problem Relation Age of Onset   • Down syndrome Brother    • No Known Problems Other        Social History     Socioeconomic History   • Marital status: Single   Tobacco Use   • Smoking status: Never Smoker   • Smokeless tobacco: Never Used   Substance and Sexual Activity   • Alcohol use: No   • Drug use: Defer       Current Outpatient Medications   Medication Sig Dispense Refill   • albuterol (PROVENTIL/VENTOLIN) nebulization syringe Take  by nebulization Every 4 (Four) Hours As Needed for Wheezing. Every 4-6 hrs prn wheezing     • allopurinol (ZYLOPRIM) 100 MG tablet TAKE ONE TABLET BY MOUTH DAILY 30 tablet 0   • amLODIPine (NORVASC) 5 MG tablet TAKE ONE TABLET DAILY 90 tablet 1   • ammonium lactate (LAC-HYDRIN) 12 % lotion APPLY TO FEET AND LEGS TWICE DAILY AS DIRECTED. 400 g 11   • carvedilol (COREG) 25 MG tablet TAKE ONE TABLET BY MOUTH TWICE DAILY 56 tablet 1   • cloNIDine (CATAPRES) 0.3 MG tablet TAKE ONE TABLET BY MOUTH THREE TIMES DAILY (MORNING, NOON, EVENING) 90 tablet 3   • divalproex (DEPAKOTE ER) 500 MG 24 hr tablet Take 1,000 mg by mouth Every Night.     • FeroSul 325 (65 Fe) MG tablet TAKE  ONE TABLET BY MOUTH TWICE A DAY MORNING AND EVENING 60 tablet 11   • fluticasone (FLONASE) 50 MCG/ACT nasal spray USE TWO SPRAYS IN EACH NOSTRIL DAILY 16 g 11   • fluticasone-salmeterol (Advair Diskus) 250-50 MCG/DOSE DISKUS Inhale 1 puff 2 (Two) Times a Day. 60 each 5   • furosemide (LASIX) 20 MG tablet Take 3 tablets by mouth 2 (Two) Times a Day. 180 tablet 0   • hydrALAZINE (APRESOLINE) 25 MG tablet Take 1 tablet by mouth 3 (Three) Times a Day. 270 tablet 1   • levothyroxine (SYNTHROID, LEVOTHROID) 75 MCG tablet TAKE ONE TABLET BY MOUTH DAILY 28 tablet 6   • loratadine (CLARITIN) 10 MG tablet TAKE ONE TABLET BY MOUTH DAILY 30 tablet 11   • minoxidil (LONITEN) 2.5 MG tablet TAKE TWO TABLETS BY MOUTH TWICE DAILY 112 tablet 1   • montelukast (SINGULAIR) 10 MG tablet TAKE ONE TABLET BY MOUTH AT BEDTIME 30 tablet 11   • O2 (OXYGEN) Inhale 2 L/min Every Night.     • OLANZapine (zyPREXA) 20 MG tablet TAKE ONE TABLET AT BEDTIME 90 tablet 1   • omeprazole (priLOSEC) 20 MG capsule TAKE ONE CAPSULE BY MOUTH DAILY 30 capsule 11   • polyethylene glycol (MIRALAX) 17 GM/SCOOP powder MIX 17 GRAMS (ONE CAPFUL) IN LIQUID AND DRINK DAILY 510 g 11   • ProAir  (90 Base) MCG/ACT inhaler INHALE TWO PUFFS BY MOUTH EVERY SIX HOURS AS NEEDED AND 20 MINUTES BEFORE EXERCISE 8.5 g 11   • QUEtiapine (SEROquel) 300 MG tablet TAKE ONE TABLET AT BEDTIME 90 tablet 1   • solifenacin (VESICARE) 5 MG tablet TAKE ONE TABLET BY MOUTH DAILY 30 tablet 11   • tamsulosin (FLOMAX) 0.4 MG capsule 24 hr capsule TAKE ONE CAPSULE BY MOUTH AT BEDTIME 30 capsule 5     No current facility-administered medications for this visit.       Review of Systems   Constitutional: Negative for activity change, appetite change, fatigue, fever, unexpected weight gain and unexpected weight loss.   HENT: Negative for nosebleeds, rhinorrhea, trouble swallowing and voice change.    Eyes: Negative for visual disturbance.   Respiratory: Negative for cough, chest tightness,  "shortness of breath and wheezing.    Cardiovascular: Positive for leg swelling. Negative for chest pain and palpitations.   Gastrointestinal: Negative for abdominal pain, blood in stool, constipation, diarrhea, nausea, vomiting, GERD and indigestion.   Genitourinary: Negative for dysuria, frequency and hematuria.   Musculoskeletal: Negative for arthralgias, back pain and myalgias.   Skin: Negative for rash and wound.   Neurological: Negative for dizziness, tremors, weakness, light-headedness, numbness, headache and memory problem.   Hematological: Negative for adenopathy. Does not bruise/bleed easily.   Psychiatric/Behavioral: Negative for sleep disturbance and depressed mood. The patient is not nervous/anxious.        Objective   /78 (BP Location: Left arm, Patient Position: Sitting, Cuff Size: Large Adult)   Pulse 66   Temp 97.6 °F (36.4 °C) (Temporal)   Ht 182.9 cm (72.01\")   Wt 92.5 kg (204 lb)   SpO2 94%   BMI 27.66 kg/m²     Physical Exam  Vitals and nursing note reviewed.   Constitutional:       General: He is not in acute distress.     Appearance: He is well-developed. He is not diaphoretic.   HENT:      Head: Normocephalic and atraumatic.      Right Ear: External ear normal.      Left Ear: External ear normal.      Nose: Nose normal.   Eyes:      Conjunctiva/sclera: Conjunctivae normal.      Pupils: Pupils are equal, round, and reactive to light.      Comments: Right eye exotropia   Neck:      Thyroid: No thyromegaly.      Trachea: No tracheal deviation.   Cardiovascular:      Rate and Rhythm: Normal rate and regular rhythm.      Heart sounds: Normal heart sounds. No murmur heard.    No friction rub. No gallop.   Pulmonary:      Effort: Pulmonary effort is normal. No respiratory distress.      Breath sounds: Normal breath sounds.   Abdominal:      General: Bowel sounds are normal.      Palpations: Abdomen is soft. There is no mass.      Tenderness: There is no abdominal tenderness. There is no " guarding.   Musculoskeletal:         General: Normal range of motion.      Cervical back: Normal range of motion and neck supple.      Right lower leg: Edema present.      Left lower leg: Edema present.   Lymphadenopathy:      Cervical: No cervical adenopathy.   Skin:     General: Skin is warm and dry.      Capillary Refill: Capillary refill takes less than 2 seconds.      Findings: No rash.   Neurological:      Mental Status: He is alert.      Motor: No abnormal muscle tone.      Deep Tendon Reflexes: Reflexes normal.   Psychiatric:         Behavior: Behavior normal.         Recent Results (from the past 2016 hour(s))   VALPROIC ACID LEVEL, TOTAL    Collection Time: 05/03/22 11:15 AM    Specimen: Blood   Result Value Ref Range    Valproic Acid 74.8 50.0 - 100.0 mcg/mL   Urine Drug Screen -    Collection Time: 05/03/22 11:15 AM    Specimen: Urine   Result Value Ref Range    External Urine Drug Screen NEGATIVE Negative    Barbiturates Screen, Urine NEGATIVE Negative    Benzodiazepine Screen, Urine NEGATIVE Negative    Buprenorphine, Urine NEGATIVE Negative    Cocaine Screen, Urine NEGATIVE Negative    Methadone Screen, Urine NEGATIVE Negative    Opiates NEGATIVE Negative    Oxycodone Screen, Urine NEGATIVE Negative    THC Screen, Urine NEGATIVE Negative   ETHANOL    Collection Time: 05/03/22 11:15 AM    Specimen: Blood   Result Value Ref Range    Ethanol <5 <=5 mg/dL    Ethanol % <0.005 % %   AUTODIFF    Collection Time: 05/03/22 11:15 AM    Specimen: Blood   Result Value Ref Range    Neutrophil % 45.4 34.0 - 69.5 %    Lymphocyte % 39.4 20.0 - 53.0 %    Monocyte % 13.9 (H) 5.0 - 12.5 %    Eosinophil % 0.7 0.7 - 6.0 %    Basophil % 0.6 0.0 - 3.0 %    Neutrophils Absolute 2.2 1.7 - 6.0 x10(3)/ul    Lymphocytes Absolute 1.9 0.8 - 3.2 x10(3)/ul    Monocytes Absolute 0.7 0.2 - 1.4 x10(3)/ul    Eosinophils Absolute 0.0 0.0 - 0.6 x10(3)/ul    Basophils Absolute 0.0 0.0 - 0.3 x10(3)/ul   Extra Red Tube    Collection Time:  05/03/22 11:15 AM    Specimen: Blood   Result Value Ref Range    Total # Of Tubes Received    Urinalysis, Microscopic Only -    Collection Time: 05/03/22 11:53 AM    Specimen: Urine   Result Value Ref Range    Urinalysis Gross Exam Correlated Correlated    RBC, UA 0-4 None /HPF    WBC, UA 0-4 None /HPF    Squamous Epithelial Cells, UA 0-4 None /HPF     Assessment & Plan   Diagnoses and all orders for this visit:    1. Primary hypertension (Primary)    2. Chronic kidney disease, stage 4 (severe) (HCC)               · COVID-19 Precautions - Patient was compliant in wearing a mask. When I saw the patient, I used appropriate personal protective equipment (PPE) including mask and eye shield (standard procedure).  Additionally, I used gown and gloves if indicated.  Hand hygiene was completed before and after seeing the patient.  · Dictated utilizing Dragon Dictation

## 2022-07-26 RX ORDER — QUETIAPINE FUMARATE 300 MG/1
TABLET, FILM COATED ORAL
Qty: 90 TABLET | Refills: 1 | Status: SHIPPED | OUTPATIENT
Start: 2022-07-26 | End: 2023-02-07

## 2022-07-26 RX ORDER — OLANZAPINE 20 MG/1
TABLET ORAL
Qty: 90 TABLET | Refills: 1 | Status: SHIPPED | OUTPATIENT
Start: 2022-07-26 | End: 2023-02-07

## 2022-07-26 RX ORDER — TAMSULOSIN HYDROCHLORIDE 0.4 MG/1
CAPSULE ORAL
Qty: 30 CAPSULE | Refills: 5 | Status: SHIPPED | OUTPATIENT
Start: 2022-07-26 | End: 2023-02-07

## 2022-07-26 RX ORDER — CARVEDILOL 25 MG/1
TABLET ORAL
Qty: 56 TABLET | Refills: 1 | Status: SHIPPED | OUTPATIENT
Start: 2022-07-26 | End: 2022-09-23

## 2022-07-26 RX ORDER — LEVOTHYROXINE SODIUM 0.07 MG/1
TABLET ORAL
Qty: 28 TABLET | Refills: 6 | Status: SHIPPED | OUTPATIENT
Start: 2022-07-26 | End: 2023-02-09

## 2022-07-26 RX ORDER — MINOXIDIL 2.5 MG/1
TABLET ORAL
Qty: 112 TABLET | Refills: 1 | Status: SHIPPED | OUTPATIENT
Start: 2022-07-26 | End: 2022-09-23

## 2022-07-26 NOTE — TELEPHONE ENCOUNTER
Rx Refill Note  Requested Prescriptions     Signed Prescriptions Disp Refills   • tamsulosin (FLOMAX) 0.4 MG capsule 24 hr capsule 30 capsule 5     Sig: TAKE ONE CAPSULE BY MOUTH AT BEDTIME     Authorizing Provider: MAYANK OLGUIN     Ordering User: GARY GOYAL   • carvedilol (COREG) 25 MG tablet 56 tablet 1     Sig: TAKE ONE TABLET BY MOUTH TWICE DAILY     Authorizing Provider: MAYANK OLGUIN     Ordering User: GARY GOYAL   • minoxidil (LONITEN) 2.5 MG tablet 112 tablet 1     Sig: TAKE TWO TABLETS BY MOUTH TWICE DAILY     Authorizing Provider: MAYANK OLGUIN     Ordering User: GARY GOYAL   • QUEtiapine (SEROquel) 300 MG tablet 90 tablet 1     Sig: TAKE ONE TABLET AT BEDTIME     Authorizing Provider: MAYANK OLGUIN     Ordering User: GARY GOYAL   • OLANZapine (zyPREXA) 20 MG tablet 90 tablet 1     Sig: TAKE ONE TABLET AT BEDTIME     Authorizing Provider: MAYANK OLGUIN     Ordering User: GARY GOYAL      Last office visit with prescribing clinician: 7/19/2022      Next office visit with prescribing clinician: Visit date not found            Gary Goyal MA  07/26/22, 14:45 EDT

## 2022-08-29 RX ORDER — LORATADINE 10 MG/1
TABLET ORAL
Qty: 30 TABLET | Refills: 11 | Status: SHIPPED | OUTPATIENT
Start: 2022-08-29

## 2022-08-29 NOTE — TELEPHONE ENCOUNTER
Rx Refill Note  Requested Prescriptions     Pending Prescriptions Disp Refills   • loratadine (CLARITIN) 10 MG tablet [Pharmacy Med Name: loratadine 10 mg tablet] 30 tablet 11     Sig: TAKE ONE TABLET BY MOUTH DAILY      Last office visit with prescribing clinician: 7/19/2022      Next office visit with prescribing clinician: Visit date not found            Luz Gunn MA  08/29/22, 13:43 EDT

## 2022-09-07 DIAGNOSIS — G40.309 GENERALIZED IDIOPATHIC EPILEPSY AND EPILEPTIC SYNDROMES, NOT INTRACTABLE, WITHOUT STATUS EPILEPTICUS: ICD-10-CM

## 2022-09-07 RX ORDER — DIVALPROEX SODIUM 500 MG/1
TABLET, EXTENDED RELEASE ORAL
Qty: 90 TABLET | Refills: 6 | Status: SHIPPED | OUTPATIENT
Start: 2022-09-07 | End: 2023-02-09

## 2022-09-07 NOTE — TELEPHONE ENCOUNTER
Rx Refill Note  Requested Prescriptions     Pending Prescriptions Disp Refills   • divalproex (DEPAKOTE ER) 500 MG 24 hr tablet [Pharmacy Med Name: divalproex  mg tablet,extended release 24 hr] 90 tablet 0     Sig: TAKE ONE TABLET EVERY MORNING and TAKE TWO TABLETS AT BEDTIME      Last office visit with prescribing clinician: 7/19/2022      Next office visit with prescribing clinician: Visit date not found            Luz Gunn MA  09/07/22, 13:25 EDT

## 2022-09-12 RX ORDER — AMMONIUM LACTATE 12 G/100G
LOTION TOPICAL
Qty: 400 G | Refills: 11 | Status: SHIPPED | OUTPATIENT
Start: 2022-09-12 | End: 2022-10-13

## 2022-09-12 NOTE — TELEPHONE ENCOUNTER
Rx Refill Note  Requested Prescriptions     Pending Prescriptions Disp Refills   • ammonium lactate (LAC-HYDRIN) 12 % lotion [Pharmacy Med Name: ammonium lactate 12 % lotion] 400 g 11     Sig: APPLY TO FEET AND LEGS TWICE DAILY AS DIRECTED.      Last office visit with prescribing clinician: 7/19/2022      Next office visit with prescribing clinician: Visit date not found            Luz Gunn MA  09/12/22, 12:26 EDT

## 2022-09-23 DIAGNOSIS — I10 PRIMARY HYPERTENSION: ICD-10-CM

## 2022-09-23 RX ORDER — HYDRALAZINE HYDROCHLORIDE 25 MG/1
TABLET, FILM COATED ORAL
Qty: 270 TABLET | Refills: 1 | Status: SHIPPED | OUTPATIENT
Start: 2022-09-23 | End: 2023-03-14

## 2022-09-23 RX ORDER — MINOXIDIL 2.5 MG/1
TABLET ORAL
Qty: 112 TABLET | Refills: 1 | Status: SHIPPED | OUTPATIENT
Start: 2022-09-23 | End: 2022-12-13

## 2022-09-23 RX ORDER — CARVEDILOL 25 MG/1
TABLET ORAL
Qty: 56 TABLET | Refills: 1 | Status: SHIPPED | OUTPATIENT
Start: 2022-09-23 | End: 2022-12-13

## 2022-09-23 NOTE — TELEPHONE ENCOUNTER
Rx Refill Note  Requested Prescriptions     Pending Prescriptions Disp Refills   • carvedilol (COREG) 25 MG tablet [Pharmacy Med Name: carvedilol 25 mg tablet] 56 tablet 1     Sig: TAKE ONE TABLET BY MOUTH TWICE DAILY   • minoxidil (LONITEN) 2.5 MG tablet [Pharmacy Med Name: minoxidil 2.5 mg tablet] 112 tablet 1     Sig: TAKE TWO TABLETS BY MOUTH TWICE DAILY   • hydrALAZINE (APRESOLINE) 25 MG tablet [Pharmacy Med Name: hydralazine 25 mg tablet] 270 tablet 1     Sig: TAKE ONE TABLET BY MOUTH THREE TIMES DAILY (MORNING, NOON, AND BEDTIME)      Last office visit with prescribing clinician: 7/19/2022      Next office visit with prescribing clinician: Visit date not found            Luz Gunn MA  09/23/22, 13:03 EDT

## 2022-09-26 DIAGNOSIS — J45.20 MILD INTERMITTENT ASTHMA WITHOUT COMPLICATION: ICD-10-CM

## 2022-09-26 RX ORDER — FLUTICASONE PROPIONATE AND SALMETEROL 250; 50 UG/1; UG/1
POWDER RESPIRATORY (INHALATION)
Qty: 60 EACH | Refills: 5 | Status: SHIPPED | OUTPATIENT
Start: 2022-09-26 | End: 2023-04-03

## 2022-09-26 NOTE — TELEPHONE ENCOUNTER
Rx Refill Note  Requested Prescriptions     Pending Prescriptions Disp Refills   • Fluticasone-Salmeterol (ADVAIR/WIXELA) 250-50 MCG/ACT DISKUS [Pharmacy Med Name: Advair Diskus 250 mcg-50 mcg/dose powder for inhalation] 60 each 5     Sig: INHALE ONE PUFFS TWICE DAILY      Last office visit with prescribing clinician: 7/19/2022      Next office visit with prescribing clinician: Visit date not found            Luz Gunn MA  09/26/22, 15:44 EDT

## 2022-10-13 RX ORDER — AMMONIUM LACTATE 12 G/100G
LOTION TOPICAL
Qty: 400 G | Refills: 11 | Status: SHIPPED | OUTPATIENT
Start: 2022-10-13

## 2022-10-18 ENCOUNTER — OFFICE VISIT (OUTPATIENT)
Dept: FAMILY MEDICINE CLINIC | Facility: CLINIC | Age: 37
End: 2022-10-18

## 2022-10-18 VITALS
HEIGHT: 67 IN | BODY MASS INDEX: 32.02 KG/M2 | DIASTOLIC BLOOD PRESSURE: 80 MMHG | OXYGEN SATURATION: 99 % | HEART RATE: 88 BPM | SYSTOLIC BLOOD PRESSURE: 138 MMHG | WEIGHT: 204 LBS | TEMPERATURE: 98.3 F

## 2022-10-18 DIAGNOSIS — R60.1 GENERALIZED EDEMA: ICD-10-CM

## 2022-10-18 DIAGNOSIS — I10 PRIMARY HYPERTENSION: ICD-10-CM

## 2022-10-18 DIAGNOSIS — N28.9 RENAL INSUFFICIENCY: Primary | ICD-10-CM

## 2022-10-18 PROCEDURE — 99214 OFFICE O/P EST MOD 30 MIN: CPT | Performed by: FAMILY MEDICINE

## 2022-10-18 RX ORDER — FUROSEMIDE 20 MG/1
TABLET ORAL
Qty: 180 TABLET | Refills: 3 | Status: SHIPPED | OUTPATIENT
Start: 2022-10-18 | End: 2023-02-09

## 2022-10-18 NOTE — PROGRESS NOTES
"Chief Complaint  Follow-up    Subjective        Arthur Mccarthy presents to [facility]  History of Present Illness  Pt is here with caregiver from the home he is staying at.    He has been gaining wt and getting more edema.  With activity, he is breathing a little heavy.  No chest pains.    Pt was on 60 mg lasix bid and about a month ago saw nephrology and dosage was reduced to 40 qod.  Since his visit last month to nephro, his wt has gone up about 7 lbs.     He will be getting a port to start dialysis tomorrow.    HTN- stable.    Objective   Vital Signs:  /80 (BP Location: Left arm, Patient Position: Sitting, Cuff Size: Adult)   Pulse 88   Temp 98.3 °F (36.8 °C) (Temporal)   Ht 170.2 cm (67\")   Wt 92.5 kg (204 lb)   SpO2 99%   BMI 31.95 kg/m²   Estimated body mass index is 31.95 kg/m² as calculated from the following:    Height as of this encounter: 170.2 cm (67\").    Weight as of this encounter: 92.5 kg (204 lb).    BMI is >= 30 and <35. (Class 1 Obesity). The following options were offered after discussion;: weight loss educational material (shared in after visit summary)      Physical Exam  Vitals and nursing note reviewed.   Constitutional:       Appearance: Normal appearance. He is well-developed.   Neck:      Comments: No JVD  Cardiovascular:      Rate and Rhythm: Normal rate and regular rhythm.      Heart sounds: Normal heart sounds. No murmur heard.  Pulmonary:      Effort: Pulmonary effort is normal. No respiratory distress.      Breath sounds: Normal breath sounds. No stridor. No wheezing or rhonchi.   Musculoskeletal:      Right lower leg: Edema present.      Left lower leg: Edema present.      Comments: 2 + edema up to knees.     Neurological:      General: No focal deficit present.      Mental Status: He is alert and oriented to person, place, and time. He is not disoriented.   Psychiatric:         Mood and Affect: Mood normal.         Behavior: Behavior " normal.        Result Review :                Assessment and Plan   Diagnoses and all orders for this visit:    1. Renal insufficiency (Primary)  -     furosemide (LASIX) 20 MG tablet; Take 2 pills bid.  Dispense: 180 tablet; Refill: 3  -     Basic Metabolic Panel    2. Generalized edema  -     furosemide (LASIX) 20 MG tablet; Take 2 pills bid.  Dispense: 180 tablet; Refill: 3  -     Basic Metabolic Panel    3. Primary hypertension  -     Basic Metabolic Panel             Follow Up   No follow-ups on file.  Patient was given instructions and counseling regarding his condition or for health maintenance advice. Please see specific information pulled into the AVS if appropriate.     Will increase dosage of lasix to 40 mg bid.  I have reviewed labs done by his nephrologist.    Daily weight checks.  Follow up if no better or if develop SOB, go to ER.

## 2022-10-19 ENCOUNTER — TELEPHONE (OUTPATIENT)
Dept: FAMILY MEDICINE CLINIC | Facility: CLINIC | Age: 37
End: 2022-10-19

## 2022-10-19 LAB
BUN SERPL-MCNC: 20 MG/DL (ref 6–20)
BUN/CREAT SERPL: 7 (ref 7–25)
CALCIUM SERPL-MCNC: 9 MG/DL (ref 8.6–10.5)
CHLORIDE SERPL-SCNC: 101 MMOL/L (ref 98–107)
CO2 SERPL-SCNC: 27.4 MMOL/L (ref 22–29)
CREAT SERPL-MCNC: 2.86 MG/DL (ref 0.76–1.27)
EGFRCR SERPLBLD CKD-EPI 2021: 28.7 ML/MIN/1.73
GLUCOSE SERPL-MCNC: 88 MG/DL (ref 65–99)
POTASSIUM SERPL-SCNC: 5.2 MMOL/L (ref 3.5–5.2)
SODIUM SERPL-SCNC: 139 MMOL/L (ref 136–145)

## 2022-10-19 NOTE — TELEPHONE ENCOUNTER
LMTCB     OK FOR HUB TO READ      Kidney function is down a little from last time.  Take the lasix as we discussed and continue to monitor weight daily.  Let me know how you are doing in about 10 days.  If develop severe SOB, go to ER.

## 2022-11-15 DIAGNOSIS — I10 ESSENTIAL HYPERTENSION: ICD-10-CM

## 2022-11-15 RX ORDER — OMEPRAZOLE 20 MG/1
CAPSULE, DELAYED RELEASE ORAL
Qty: 30 CAPSULE | Refills: 11 | Status: SHIPPED | OUTPATIENT
Start: 2022-11-15

## 2022-11-15 RX ORDER — FERROUS SULFATE 325(65) MG
TABLET ORAL
Qty: 60 TABLET | Refills: 11 | Status: SHIPPED | OUTPATIENT
Start: 2022-11-15

## 2022-11-15 RX ORDER — CLONIDINE HYDROCHLORIDE 0.3 MG/1
TABLET ORAL
Qty: 90 TABLET | Refills: 11 | Status: SHIPPED | OUTPATIENT
Start: 2022-11-15 | End: 2023-03-22 | Stop reason: ALTCHOICE

## 2022-11-15 NOTE — TELEPHONE ENCOUNTER
Rx Refill Note  Requested Prescriptions     Pending Prescriptions Disp Refills   • omeprazole (priLOSEC) 20 MG capsule [Pharmacy Med Name: omeprazole 20 mg capsule,delayed release] 30 capsule 11     Sig: TAKE ONE CAPSULE BY MOUTH DAILY   • FeroSul 325 (65 Fe) MG tablet [Pharmacy Med Name: FeroSul 325 mg (65 mg iron) tablet] 60 tablet 11     Sig: TAKE ONE TABLET BY MOUTH TWICE A DAY MORNING AND EVENING   • cloNIDine (CATAPRES) 0.3 MG tablet [Pharmacy Med Name: clonidine HCl 0.3 mg tablet] 90 tablet 11     Sig: TAKE ONE TABLET BY MOUTH THREE TIMES DAILY (MORNING, NOON, EVENING)      Last office visit with prescribing clinician: 10/18/2022      Next office visit with prescribing clinician: Visit date not found

## 2022-12-13 RX ORDER — MINOXIDIL 2.5 MG/1
TABLET ORAL
Qty: 112 TABLET | Refills: 1 | Status: SHIPPED | OUTPATIENT
Start: 2022-12-13 | End: 2023-02-07

## 2022-12-13 RX ORDER — CARVEDILOL 25 MG/1
TABLET ORAL
Qty: 56 TABLET | Refills: 1 | Status: SHIPPED | OUTPATIENT
Start: 2022-12-13 | End: 2023-02-07

## 2023-02-07 RX ORDER — OLANZAPINE 20 MG/1
TABLET ORAL
Qty: 90 TABLET | Refills: 1 | Status: SHIPPED | OUTPATIENT
Start: 2023-02-07

## 2023-02-07 RX ORDER — TAMSULOSIN HYDROCHLORIDE 0.4 MG/1
CAPSULE ORAL
Qty: 30 CAPSULE | Refills: 1 | Status: SHIPPED | OUTPATIENT
Start: 2023-02-07 | End: 2023-03-14

## 2023-02-07 RX ORDER — QUETIAPINE FUMARATE 300 MG/1
TABLET, FILM COATED ORAL
Qty: 90 TABLET | Refills: 1 | Status: SHIPPED | OUTPATIENT
Start: 2023-02-07

## 2023-02-07 RX ORDER — CARVEDILOL 25 MG/1
TABLET ORAL
Qty: 56 TABLET | Refills: 1 | Status: SHIPPED | OUTPATIENT
Start: 2023-02-07 | End: 2023-03-14

## 2023-02-07 RX ORDER — MINOXIDIL 2.5 MG/1
TABLET ORAL
Qty: 112 TABLET | Refills: 1 | Status: SHIPPED | OUTPATIENT
Start: 2023-02-07 | End: 2023-03-14

## 2023-02-09 DIAGNOSIS — N28.9 RENAL INSUFFICIENCY: ICD-10-CM

## 2023-02-09 DIAGNOSIS — G40.309 GENERALIZED IDIOPATHIC EPILEPSY AND EPILEPTIC SYNDROMES, NOT INTRACTABLE, WITHOUT STATUS EPILEPTICUS: ICD-10-CM

## 2023-02-09 DIAGNOSIS — R60.1 GENERALIZED EDEMA: ICD-10-CM

## 2023-02-09 RX ORDER — FUROSEMIDE 20 MG/1
TABLET ORAL
Qty: 180 TABLET | Refills: 3 | Status: SHIPPED | OUTPATIENT
Start: 2023-02-09

## 2023-02-09 RX ORDER — DIVALPROEX SODIUM 500 MG/1
TABLET, EXTENDED RELEASE ORAL
Qty: 90 TABLET | Refills: 6 | Status: SHIPPED | OUTPATIENT
Start: 2023-02-09

## 2023-02-09 RX ORDER — LEVOTHYROXINE SODIUM 0.07 MG/1
TABLET ORAL
Qty: 28 TABLET | Refills: 6 | Status: SHIPPED | OUTPATIENT
Start: 2023-02-09

## 2023-02-09 NOTE — TELEPHONE ENCOUNTER
Rx Refill Note  Requested Prescriptions     Pending Prescriptions Disp Refills   • divalproex (DEPAKOTE ER) 500 MG 24 hr tablet [Pharmacy Med Name: divalproex  mg tablet,extended release 24 hr] 90 tablet 6     Sig: TAKE ONE TABLET EVERY MORNING and TAKE TWO TABLETS AT BEDTIME      Last office visit with prescribing clinician: 7/19/2022   Last telemedicine visit with prescribing clinician: 2/9/2023   Next office visit with prescribing clinician: 2/22/2023

## 2023-03-13 DIAGNOSIS — I10 PRIMARY HYPERTENSION: ICD-10-CM

## 2023-03-14 RX ORDER — TAMSULOSIN HYDROCHLORIDE 0.4 MG/1
CAPSULE ORAL
Qty: 30 CAPSULE | Refills: 1 | Status: SHIPPED | OUTPATIENT
Start: 2023-03-14

## 2023-03-14 RX ORDER — HYDRALAZINE HYDROCHLORIDE 25 MG/1
TABLET, FILM COATED ORAL
Qty: 270 TABLET | Refills: 1 | Status: SHIPPED | OUTPATIENT
Start: 2023-03-14

## 2023-03-14 RX ORDER — CARVEDILOL 25 MG/1
TABLET ORAL
Qty: 56 TABLET | Refills: 1 | Status: SHIPPED | OUTPATIENT
Start: 2023-03-14

## 2023-03-14 RX ORDER — MINOXIDIL 2.5 MG/1
TABLET ORAL
Qty: 112 TABLET | Refills: 1 | Status: SHIPPED | OUTPATIENT
Start: 2023-03-14

## 2023-03-22 ENCOUNTER — OFFICE VISIT (OUTPATIENT)
Dept: FAMILY MEDICINE CLINIC | Facility: CLINIC | Age: 38
End: 2023-03-22
Payer: MEDICAID

## 2023-03-22 VITALS
OXYGEN SATURATION: 98 % | HEIGHT: 67 IN | HEART RATE: 91 BPM | SYSTOLIC BLOOD PRESSURE: 132 MMHG | DIASTOLIC BLOOD PRESSURE: 84 MMHG | WEIGHT: 174 LBS | BODY MASS INDEX: 27.31 KG/M2

## 2023-03-22 DIAGNOSIS — N18.4 CHRONIC KIDNEY DISEASE, STAGE 4 (SEVERE): ICD-10-CM

## 2023-03-22 DIAGNOSIS — Z09 HOSPITAL DISCHARGE FOLLOW-UP: Primary | ICD-10-CM

## 2023-03-22 DIAGNOSIS — R65.20 SEVERE SEPSIS: ICD-10-CM

## 2023-03-22 DIAGNOSIS — A41.9 SEVERE SEPSIS: ICD-10-CM

## 2023-03-22 DIAGNOSIS — I10 PRIMARY HYPERTENSION: ICD-10-CM

## 2023-03-22 PROBLEM — N18.9 ACUTE ON CHRONIC KIDNEY FAILURE (HCC): Status: ACTIVE | Noted: 2023-03-11

## 2023-03-22 PROBLEM — Z98.890 S/P ARTERIOVENOUS (AV) FISTULA CREATION: Status: ACTIVE | Noted: 2022-11-11

## 2023-03-22 PROBLEM — N17.9 ACUTE ON CHRONIC KIDNEY FAILURE (HCC): Status: ACTIVE | Noted: 2023-03-11

## 2023-03-22 PROBLEM — N30.00 ACUTE INFECTIVE CYSTITIS: Status: ACTIVE | Noted: 2023-03-11

## 2023-03-22 RX ORDER — AMMONIUM LACTATE 12 G/100G
LOTION TOPICAL
COMMUNITY

## 2023-03-22 RX ORDER — ALLOPURINOL 100 MG/1
1 TABLET ORAL DAILY
COMMUNITY
Start: 2022-12-14

## 2023-03-22 NOTE — PROGRESS NOTES
Juainta Mccarthy is a 37 y.o. male.     Chief Complaint   Patient presents with   • Hospital Follow Up Visit   • Hypertension       History of Present Illness   Arnold the caregiver was present during the history-taking and subsequent discussion (and for part of the physical exam) with this patient.  Patient agrees to the presence of the individual during this visit.    Here for follow-up after being hospitalized at [facility] from 3/10/2023 - 3/13/2023.  Diagnosis of persistent acute cystitis with leukocytosis and tachycardia and hypotension.  In hospital was given aggressive IV fluids antihypertensives were held and was diagnosed with a urosepsis.  His cultures however did remain negative and his blood pressure improved and normal.  His leukocytosis resolved in the hospital and his clonidine was weaned down and discontinued.  Patient is discharged on antibiotics of Keflex 500 mg twice daily for total of 7 days further and is to follow-up with nephrology 1 week after discharge.    Follow-up for hypertension.  Currently has been feeling well and asymptomatic without any headaches,vision changes, cough, chest pain, shortness of breath, focal neurologic deficit/noted changes, or syncope.  Has been taking the medications regularly and adherent with the regimen of amlodipine 5 mg, clonidine 0.3 mg TID, furosemide 40 mg QD, carvedilol 25 mg BID and hydralazine 25 mg TID.  Denies medication side effects and no significant interval events.  Home blood pressure has been variable 89//79.  Swelling is in the morning then improves as the day progresses.     History of seizures.  Still no seizures for a long time.  Over 1 year seizure free and no problems with the medications.     History of hypo-thyroid.  Denies fatigue, weakness, constipation/diarrhea, hair/skin changes, weight gain/loss, depression/anxiety, rashes, palpitations, swelling, chest pain, shortness of breath or other issues.  Has been compliant with  taking the medication with no recent changes. Denies any difficulty with the current medication of levothyroxine 75 mcg daily. Last labs 1/27/21.     History of stage 4 CKD and slowly progressive.  Is referred to vascular for evaluation for av fistula and access.    The following portions of the patient's history were reviewed and updated as appropriate: allergies, current medications, past family history, past medical history, past social history, past surgical history and problem list.    Depression Screen:  PHQ-2/PHQ-9 Depression Screening 1/22/2020   Retired Total Score 0       Past Medical History:   Diagnosis Date   • Acquired intellectual disability    • Allergic rhinitis    • Asthma    • Chronic kidney disease (CKD)    • Chronic renal disease, stage 3, moderately decreased glomerular filtration rate between 30-59 mL/min/1.73 square meter (HCC)    • Constipation    • Epilepsy, generalized, convulsive (HCC)    • Episode of altered consciousness    • Essential hypertension     History of Essential Hypertension    • GERD (gastroesophageal reflux disease)    • Hematuria    • Hyperkalemia    • Hyperlipidemia    • Hypertension    • Hypothyroidism    • Immunization due    • Metabolic encephalopathy    • Moderate intellectual disabilities    • Mood disorder (HCC)    • Nocturia    • Physical exam, annual    • Seizure (HCC)    • Seizure disorder (HCC)        No past surgical history on file.    Family History   Problem Relation Age of Onset   • Down syndrome Brother    • No Known Problems Other        Social History     Socioeconomic History   • Marital status: Single   Tobacco Use   • Smoking status: Never   • Smokeless tobacco: Never   Substance and Sexual Activity   • Alcohol use: No   • Drug use: Defer       Current Outpatient Medications   Medication Sig Dispense Refill   • allopurinol (ZYLOPRIM) 100 MG tablet Take 1 tablet by mouth Daily.     • divalproex (DEPAKOTE ER) 500 MG 24 hr tablet TAKE ONE TABLET EVERY  MORNING and TAKE TWO TABLETS AT BEDTIME 90 tablet 6   • furosemide (LASIX) 20 MG tablet TAKE TWO TABLETS TWICE DAILY 180 tablet 3   • levothyroxine (SYNTHROID, LEVOTHROID) 75 MCG tablet TAKE ONE TABLET BY MOUTH DAILY 28 tablet 6   • albuterol (PROVENTIL/VENTOLIN) nebulization syringe Take  by nebulization Every 4 (Four) Hours As Needed for Wheezing. Every 4-6 hrs prn wheezing     • amLODIPine (NORVASC) 5 MG tablet TAKE ONE TABLET DAILY 90 tablet 1   • ammonium lactate (LAC-HYDRIN) 12 % lotion APPLY TO FEET AND LEGS TWICE DAILY AS DIRECTED. 400 g 11   • ammonium lactate (LAC-HYDRIN) 12 % lotion Apply  topically to the appropriate area as directed.     • carvedilol (COREG) 25 MG tablet TAKE ONE TABLET BY MOUTH TWICE DAILY 56 tablet 1   • FeroSul 325 (65 Fe) MG tablet TAKE ONE TABLET BY MOUTH TWICE A DAY MORNING AND EVENING 60 tablet 11   • fluticasone (FLONASE) 50 MCG/ACT nasal spray USE TWO SPRAYS IN EACH NOSTRIL DAILY 16 g 11   • Fluticasone-Salmeterol (ADVAIR/WIXELA) 250-50 MCG/ACT DISKUS INHALE ONE PUFFS TWICE DAILY 60 each 5   • hydrALAZINE (APRESOLINE) 25 MG tablet TAKE ONE TABLET BY MOUTH THREE TIMES DAILY (MORNING, NOON, AND BEDTIME) 270 tablet 1   • loratadine (CLARITIN) 10 MG tablet TAKE ONE TABLET BY MOUTH DAILY 30 tablet 11   • minoxidil (LONITEN) 2.5 MG tablet TAKE TWO TABLETS BY MOUTH TWICE DAILY 112 tablet 1   • montelukast (SINGULAIR) 10 MG tablet TAKE ONE TABLET BY MOUTH AT BEDTIME 30 tablet 11   • O2 (OXYGEN) Inhale 2 L/min Every Night.     • OLANZapine (zyPREXA) 20 MG tablet TAKE ONE TABLET AT BEDTIME 90 tablet 1   • omeprazole (priLOSEC) 20 MG capsule TAKE ONE CAPSULE BY MOUTH DAILY 30 capsule 11   • polyethylene glycol (MIRALAX) 17 GM/SCOOP powder MIX 17 GRAMS (ONE CAPFUL) IN LIQUID AND DRINK DAILY 510 g 11   • ProAir  (90 Base) MCG/ACT inhaler INHALE TWO PUFFS BY MOUTH EVERY SIX HOURS AS NEEDED AND 20 MINUTES BEFORE EXERCISE 8.5 g 11   • QUEtiapine (SEROquel) 300 MG tablet TAKE ONE TABLET  "AT BEDTIME 90 tablet 1   • solifenacin (VESICARE) 5 MG tablet TAKE ONE TABLET BY MOUTH DAILY 30 tablet 11   • tamsulosin (FLOMAX) 0.4 MG capsule 24 hr capsule TAKE ONE CAPSULE BY MOUTH AT BEDTIME 30 capsule 1     No current facility-administered medications for this visit.       Review of Systems   Constitutional: Negative for activity change, appetite change, fatigue, fever, unexpected weight gain and unexpected weight loss.   HENT: Negative for nosebleeds, rhinorrhea, trouble swallowing and voice change.    Eyes: Negative for visual disturbance.   Respiratory: Negative for cough, chest tightness, shortness of breath and wheezing.    Cardiovascular: Negative for chest pain, palpitations and leg swelling.   Gastrointestinal: Negative for abdominal pain, blood in stool, constipation, diarrhea, nausea, vomiting, GERD and indigestion.   Genitourinary: Negative for dysuria, frequency and hematuria.   Musculoskeletal: Negative for arthralgias, back pain and myalgias.   Skin: Negative for rash and wound.   Neurological: Negative for dizziness, tremors, weakness, light-headedness, numbness, headache and memory problem.   Hematological: Negative for adenopathy. Does not bruise/bleed easily.   Psychiatric/Behavioral: Negative for sleep disturbance and depressed mood. The patient is not nervous/anxious.        Objective   /84 (BP Location: Left arm, Patient Position: Sitting, Cuff Size: Adult)   Pulse 91   Ht 170.2 cm (67\")   Wt 78.9 kg (174 lb)   SpO2 98%   BMI 27.25 kg/m²     Physical Exam  Vitals and nursing note reviewed.   Constitutional:       General: He is not in acute distress.     Appearance: He is well-developed. He is not diaphoretic.   HENT:      Head: Normocephalic and atraumatic.      Right Ear: External ear normal.      Left Ear: External ear normal.      Nose: Nose normal.   Eyes:      Conjunctiva/sclera: Conjunctivae normal.      Pupils: Pupils are equal, round, and reactive to light.      " Comments: Right eye exotropia   Neck:      Thyroid: No thyromegaly.      Trachea: No tracheal deviation.   Cardiovascular:      Rate and Rhythm: Normal rate and regular rhythm.      Heart sounds: Normal heart sounds. No murmur heard.    No friction rub. No gallop.   Pulmonary:      Effort: Pulmonary effort is normal. No respiratory distress.      Breath sounds: Normal breath sounds.   Abdominal:      General: Bowel sounds are normal.      Palpations: Abdomen is soft. There is no mass.      Tenderness: There is no abdominal tenderness. There is no guarding.   Musculoskeletal:         General: No swelling. Normal range of motion.      Cervical back: Normal range of motion and neck supple.      Right lower leg: No edema.      Left lower leg: No edema.   Lymphadenopathy:      Cervical: No cervical adenopathy.   Skin:     General: Skin is warm and dry.      Capillary Refill: Capillary refill takes less than 2 seconds.      Findings: No rash.   Neurological:      Mental Status: He is alert and oriented to person, place, and time.      Motor: No abnormal muscle tone.      Deep Tendon Reflexes: Reflexes normal.   Psychiatric:         Behavior: Behavior normal.         Thought Content: Thought content normal.         Judgment: Judgment normal.       Recent Results (from the past 2016 hour(s))   VITAMIN B12    Collection Time: 02/23/23  3:51 PM    Specimen type and source: Serum, Blood   Result Value Ref Range    Vitamin B-12 615 >180 pg/mL   HEMOGLOBIN A1C    Collection Time: 02/23/23  3:51 PM    Specimen type and source: Whole Blood, Blood   Result Value Ref Range    Hemoglobin A1C 5.8 (H) 4.3 - 5.6 %    Mean Bld Glu Estim. 120 mg/dL   PHOSPHORUS    Collection Time: 02/23/23  3:51 PM    Specimen: Fresh Frozen Plasma    Specimen type and source: Plasma, Blood   Result Value Ref Range    Phosphorus 3.3 2.3 - 4.7 mg/dL   VITAMIN D,25-HYDROXY    Collection Time: 02/23/23  3:51 PM    Specimen type and source: Serum, Blood    Result Value Ref Range    25 Hydroxy, Vitamin D 16.0 (L) >30 ng/mL   MAGNESIUM    Collection Time: 02/23/23  3:51 PM    Specimen: Fresh Frozen Plasma    Specimen type and source: Plasma, Blood   Result Value Ref Range    Magnesium 2.0 1.6 - 2.6 mg/dL   LACTIC ACID, PLASMA    Collection Time: 03/11/23 12:20 AM    Specimen type and source: Serum, Blood   Result Value Ref Range    Lactate 0.9 0.7 - 2.0 mmol/L   PROCALCITONIN    Collection Time: 03/11/23  3:16 AM    Specimen: Fresh Frozen Plasma    Specimen type and source: Plasma, Blood   Result Value Ref Range    Procalcitonin 1.47 (H) 0.1 - 0.5 ng/mL   POCT GLUCOSE FINGERSTICK    Collection Time: 03/11/23  9:19 AM    Specimen type and source: Serum,    Result Value Ref Range    Glucose 86 71 - 139 mg/dL   MAGNESIUM    Collection Time: 03/12/23  3:30 AM    Specimen: Fresh Frozen Plasma    Specimen type and source: Plasma, Blood   Result Value Ref Range    Magnesium 2.1 1.6 - 2.6 mg/dL   MAGNESIUM    Collection Time: 03/13/23  3:37 AM    Specimen: Fresh Frozen Plasma    Specimen type and source: Plasma, Blood   Result Value Ref Range    Magnesium 1.9 1.6 - 2.6 mg/dL   LIPASE    Collection Time: 03/14/23 12:58 PM    Specimen: Fresh Frozen Plasma    Specimen type and source: Plasma, Blood   Result Value Ref Range    Lipase 100 (H) 0 - 59 U/L   LACTIC ACID, PLASMA    Collection Time: 03/14/23 12:58 PM    Specimen type and source: Serum, Blood   Result Value Ref Range    Lactate 1.4 0.7 - 2.0 mmol/L     Assessment & Plan   Diagnoses and all orders for this visit:    1. Hospital discharge follow-up (Primary)    2. Primary hypertension    3. Severe sepsis (HCC)    4. Chronic kidney disease, stage 4 (severe) (HCC)    Continue off the clonidine and  Follow up with neprhology as scheduled.  No other changes are needed at this time.  Records reviewed and discussed with caregiver.  All questions answered.         · COVID-19 Precautions - Patient was compliant in wearing a mask.  When I saw the patient, I used appropriate personal protective equipment (PPE) including mask and eye shield (standard procedure).  Additionally, I used gown and gloves if indicated.  Hand hygiene was completed before and after seeing the patient.  · Dictated utilizing Dragon Dictation

## 2023-03-29 ENCOUNTER — OFFICE VISIT (OUTPATIENT)
Dept: FAMILY MEDICINE CLINIC | Facility: CLINIC | Age: 38
End: 2023-03-29
Payer: MEDICAID

## 2023-03-29 VITALS
OXYGEN SATURATION: 95 % | DIASTOLIC BLOOD PRESSURE: 80 MMHG | WEIGHT: 178 LBS | BODY MASS INDEX: 27.94 KG/M2 | HEART RATE: 98 BPM | SYSTOLIC BLOOD PRESSURE: 130 MMHG | HEIGHT: 67 IN

## 2023-03-29 DIAGNOSIS — Z00.00 ANNUAL PHYSICAL EXAM: Primary | ICD-10-CM

## 2023-03-29 NOTE — PROGRESS NOTES
Chief Complaint   Patient presents with   • Hypertension   • Annual Exam       HPI:  Arthur Mccarthy, -1985, is a 37 y.o. male who presents for an annual physical.    Arnold the caregiver was present during the history-taking and subsequent discussion (and for part of the physical exam) with this patient.  Patient agrees to the presence of the individual during this visit.    Follow-up for hypertension.  Currently has been feeling well and asymptomatic without any headaches,vision changes, cough, chest pain, shortness of breath, focal neurologic deficit/noted changes, or syncope.  Has been taking the medications regularly and adherent with the regimen of amlodipine 5 mg, clonidine 0.3 mg TID, furosemide 40 mg QD, carvedilol 25 mg BID and hydralazine 25 mg TID.  Denies medication side effects and no significant interval events.  Home blood pressure has been variable 89//79.  Swelling is in the morning then improves as the day progresses.     History of seizures.  Still no seizures for a long time.  Over 1 year seizure free and no problems with the medications.     History of hypo-thyroid.  Denies fatigue, weakness, constipation/diarrhea, hair/skin changes, weight gain/loss, depression/anxiety, rashes, palpitations, swelling, chest pain, shortness of breath or other issues.  Has been compliant with taking the medication with no recent changes. Denies any difficulty with the current medication of levothyroxine 75 mcg daily. Last labs 21.     History of stage 4 CKD and slowly progressive.  Is referred to vascular for evaluation for av fistula and access.       Recent Hospitalizations:  Recently treated at the following:  Other: [facility] 3/10/2023 - 3/13/2023 secondary to persistent cystitis with leukocytosis and hypotension.    Current Medical Providers:  Patient Care Team:  Mikael Vasquez MD as PCP - General (Internal Medicine)  Arthur Phillips MD as Consulting Physician  (Nephrology)    Compared to one year ago, the patient feels his physical health is the same and his mental health is the same.    Depression Screen:  PHQ-2/PHQ-9 Depression Screening 1/22/2020   Retired Total Score 0       Past Medical/Family/Social History:  The following portions of the patient's history were reviewed and updated as appropriate: allergies, current medications, past family history, past medical history, past social history, past surgical history and problem list.    Allergies   Allergen Reactions   • Doxycycline Anaphylaxis         Current Outpatient Medications:   •  albuterol (PROVENTIL/VENTOLIN) nebulization syringe, Take  by nebulization Every 4 (Four) Hours As Needed for Wheezing. Every 4-6 hrs prn wheezing, Disp: , Rfl:   •  allopurinol (ZYLOPRIM) 100 MG tablet, Take 1 tablet by mouth Daily., Disp: , Rfl:   •  amLODIPine (NORVASC) 5 MG tablet, TAKE ONE TABLET DAILY, Disp: 90 tablet, Rfl: 1  •  ammonium lactate (LAC-HYDRIN) 12 % lotion, APPLY TO FEET AND LEGS TWICE DAILY AS DIRECTED., Disp: 400 g, Rfl: 11  •  ammonium lactate (LAC-HYDRIN) 12 % lotion, Apply  topically to the appropriate area as directed., Disp: , Rfl:   •  carvedilol (COREG) 25 MG tablet, TAKE ONE TABLET BY MOUTH TWICE DAILY, Disp: 56 tablet, Rfl: 1  •  divalproex (DEPAKOTE ER) 500 MG 24 hr tablet, TAKE ONE TABLET EVERY MORNING and TAKE TWO TABLETS AT BEDTIME, Disp: 90 tablet, Rfl: 6  •  FeroSul 325 (65 Fe) MG tablet, TAKE ONE TABLET BY MOUTH TWICE A DAY MORNING AND EVENING, Disp: 60 tablet, Rfl: 11  •  fluticasone (FLONASE) 50 MCG/ACT nasal spray, USE TWO SPRAYS IN EACH NOSTRIL DAILY, Disp: 16 g, Rfl: 11  •  Fluticasone-Salmeterol (ADVAIR/WIXELA) 250-50 MCG/ACT DISKUS, INHALE ONE PUFFS TWICE DAILY, Disp: 60 each, Rfl: 5  •  furosemide (LASIX) 20 MG tablet, TAKE TWO TABLETS TWICE DAILY, Disp: 180 tablet, Rfl: 3  •  hydrALAZINE (APRESOLINE) 25 MG tablet, TAKE ONE TABLET BY MOUTH THREE TIMES DAILY (MORNING, NOON, AND BEDTIME),  Disp: 270 tablet, Rfl: 1  •  levothyroxine (SYNTHROID, LEVOTHROID) 75 MCG tablet, TAKE ONE TABLET BY MOUTH DAILY, Disp: 28 tablet, Rfl: 6  •  loratadine (CLARITIN) 10 MG tablet, TAKE ONE TABLET BY MOUTH DAILY, Disp: 30 tablet, Rfl: 11  •  minoxidil (LONITEN) 2.5 MG tablet, TAKE TWO TABLETS BY MOUTH TWICE DAILY, Disp: 112 tablet, Rfl: 1  •  montelukast (SINGULAIR) 10 MG tablet, TAKE ONE TABLET BY MOUTH AT BEDTIME, Disp: 30 tablet, Rfl: 11  •  O2 (OXYGEN), Inhale 2 L/min Every Night., Disp: , Rfl:   •  OLANZapine (zyPREXA) 20 MG tablet, TAKE ONE TABLET AT BEDTIME, Disp: 90 tablet, Rfl: 1  •  omeprazole (priLOSEC) 20 MG capsule, TAKE ONE CAPSULE BY MOUTH DAILY, Disp: 30 capsule, Rfl: 11  •  polyethylene glycol (MIRALAX) 17 GM/SCOOP powder, MIX 17 GRAMS (ONE CAPFUL) IN LIQUID AND DRINK DAILY, Disp: 510 g, Rfl: 11  •  ProAir  (90 Base) MCG/ACT inhaler, INHALE TWO PUFFS BY MOUTH EVERY SIX HOURS AS NEEDED AND 20 MINUTES BEFORE EXERCISE, Disp: 8.5 g, Rfl: 11  •  QUEtiapine (SEROquel) 300 MG tablet, TAKE ONE TABLET AT BEDTIME, Disp: 90 tablet, Rfl: 1  •  solifenacin (VESICARE) 5 MG tablet, TAKE ONE TABLET BY MOUTH DAILY, Disp: 30 tablet, Rfl: 11  •  tamsulosin (FLOMAX) 0.4 MG capsule 24 hr capsule, TAKE ONE CAPSULE BY MOUTH AT BEDTIME, Disp: 30 capsule, Rfl: 1    Current medication list contains no high risk medications.  No harmful drug interactions have been identified.     Family History   Problem Relation Age of Onset   • Down syndrome Brother    • No Known Problems Other        Social History     Tobacco Use   • Smoking status: Never   • Smokeless tobacco: Never   Substance Use Topics   • Alcohol use: No       History reviewed. No pertinent surgical history.    Patient Active Problem List   Diagnosis   • Intellectual disability   • Seasonal allergies   • Hypertension   • Seizure disorder (HCC)   • Stage 3b chronic kidney disease (HCC)   • Hyperlipidemia   • Annual physical exam   • Hypothyroidism   • GERD  "(gastroesophageal reflux disease)   • Asthma   • Constipation   • Hypertriglyceridemia   • Acute urinary tract infection   • JORDEN (acute kidney injury) ([facility])   • Urine retention   • Sepsis without acute organ dysfunction (present on admission)   • Hospital discharge follow-up   • Secondary hyperparathyroidism ([facility])   • Vitamin D deficiency   • Impulse control disorder   • Chronic kidney disease, stage 4 (severe) ([facility])   • Renal insufficiency   • Generalized edema   • Acute on chronic kidney failure ([facility])   • S/P arteriovenous (AV) fistula creation   • Acute infective cystitis   • Severe sepsis ([facility])       Review of Systems   Constitutional: Negative for activity change, appetite change, fatigue, fever, unexpected weight gain and unexpected weight loss.   HENT: Negative for nosebleeds, rhinorrhea, trouble swallowing and voice change.    Eyes: Negative for visual disturbance.   Respiratory: Negative for cough, chest tightness, shortness of breath and wheezing.    Cardiovascular: Negative for chest pain, palpitations and leg swelling.   Gastrointestinal: Negative for abdominal pain, blood in stool, constipation, diarrhea, nausea, vomiting, GERD and indigestion.   Genitourinary: Negative for dysuria, frequency and hematuria.   Musculoskeletal: Negative for arthralgias, back pain and myalgias.   Skin: Negative for rash and wound.   Neurological: Negative for dizziness, tremors, weakness, light-headedness, numbness, headache and memory problem.   Hematological: Negative for adenopathy. Does not bruise/bleed easily.   Psychiatric/Behavioral: Negative for sleep disturbance and depressed mood. The patient is not nervous/anxious.        Objective     Vitals:    03/29/23 1317 03/29/23 1352   BP: 140/84 130/80   BP Location: Left arm Left arm   Patient Position: Sitting Sitting   Cuff Size: Adult Adult   Pulse: 98    SpO2: 95%    Weight: 80.7 kg (178 lb)    Height: 170.2 cm (67\")        BMI is >= 25 and <30. (Overweight) The " following options were offered after discussion;: exercise counseling/recommendations and nutrition counseling/recommendations      No results found.    Physical Exam  Vitals and nursing note reviewed.   Constitutional:       General: He is not in acute distress.     Appearance: He is well-developed. He is not diaphoretic.   HENT:      Head: Normocephalic and atraumatic.      Right Ear: External ear normal.      Left Ear: External ear normal.      Nose: Nose normal.   Eyes:      Conjunctiva/sclera: Conjunctivae normal.      Pupils: Pupils are equal, round, and reactive to light.      Comments: Right eye exotropia   Neck:      Thyroid: No thyromegaly.      Trachea: No tracheal deviation.   Cardiovascular:      Rate and Rhythm: Normal rate and regular rhythm.      Heart sounds: Normal heart sounds. No murmur heard.    No friction rub. No gallop.   Pulmonary:      Effort: Pulmonary effort is normal. No respiratory distress.      Breath sounds: Normal breath sounds.   Abdominal:      General: Bowel sounds are normal.      Palpations: Abdomen is soft. There is no mass.      Tenderness: There is no abdominal tenderness. There is no guarding.   Musculoskeletal:         General: Normal range of motion.      Cervical back: Normal range of motion and neck supple.   Lymphadenopathy:      Cervical: No cervical adenopathy.   Skin:     General: Skin is warm and dry.      Capillary Refill: Capillary refill takes less than 2 seconds.      Findings: No rash.   Neurological:      Mental Status: He is alert and oriented to person, place, and time.      Motor: No abnormal muscle tone.      Deep Tendon Reflexes: Reflexes normal.   Psychiatric:         Behavior: Behavior normal.         Thought Content: Thought content normal.         Judgment: Judgment normal.         Recent Lab Results:     Lab Results   Component Value Date    TRIG 979 (H) 01/27/2021    HDL 23 (L) 01/27/2021    VLDL CANCELED 01/27/2021       Assessment & Plan    Age-appropriate Screening Schedule:  Refer to the list below for future screening recommendations based on patient's age, sex and/or medical conditions.      Health Maintenance   Topic Date Due   • Pneumococcal Vaccine 0-64 (1 - PCV) Never done   • Hepatitis B (2 of 3 - 3-dose series) 10/10/2000   • HEPATITIS C SCREENING  Never done   • ANNUAL PHYSICAL  02/16/2023   • TDAP/TD VACCINES (2 - Td or Tdap) 05/23/2023   • LIPID PANEL  02/23/2024   • COVID-19 Vaccine  Completed   • INFLUENZA VACCINE  Completed       Diagnoses and all orders for this visit:    1. Annual physical exam (Primary)        Annual wellness visit reviewed with patient.  All past history, medications, social history, and problem list were reviewed.  Discussed advanced directives and living will.  Patient is unable to make decisions for himself or medical decision making.  Has a caregiver and a healthcare surrogate..  Will check the labs as ordered above to evaluate the blood sugars, kidney, liver, cholesterol for screening.  Discussed flu shot recommended to get the influenza vaccine annually in the fall.  Shingrix and pneumonia vaccination series discussed.  Encouraged follow-up with the eye doctor on annual basis.  Discussed weight and encouraged exercise as tolerated while following a healthy diet.  Follow up with current specialists as needed.     An After Visit Summary with all of these plans were given to the patient.        Follow Up:  Return in about 6 months (around 9/29/2023).

## 2023-04-03 DIAGNOSIS — J45.20 MILD INTERMITTENT ASTHMA WITHOUT COMPLICATION: ICD-10-CM

## 2023-04-03 RX ORDER — FLUTICASONE PROPIONATE AND SALMETEROL 50; 250 UG/1; UG/1
POWDER RESPIRATORY (INHALATION)
Qty: 60 EACH | Refills: 5 | Status: SHIPPED | OUTPATIENT
Start: 2023-04-03

## 2023-04-11 RX ORDER — POLYETHYLENE GLYCOL 3350 17 G/17G
POWDER ORAL
Qty: 510 G | Refills: 11 | Status: SHIPPED | OUTPATIENT
Start: 2023-04-11

## 2023-05-04 DIAGNOSIS — I10 PRIMARY HYPERTENSION: ICD-10-CM

## 2023-05-04 RX ORDER — NIFEDIPINE 30 MG/1
TABLET, EXTENDED RELEASE ORAL
Qty: 30 TABLET | Refills: 3 | Status: SHIPPED | OUTPATIENT
Start: 2023-05-04

## 2023-05-04 RX ORDER — PANTOPRAZOLE SODIUM 40 MG/1
TABLET, DELAYED RELEASE ORAL
Qty: 30 TABLET | Refills: 3 | Status: SHIPPED | OUTPATIENT
Start: 2023-05-04

## 2023-05-04 RX ORDER — HYDRALAZINE HYDROCHLORIDE 25 MG/1
TABLET, FILM COATED ORAL
Qty: 270 TABLET | Refills: 1 | Status: SHIPPED | OUTPATIENT
Start: 2023-05-04

## 2023-05-04 RX ORDER — DIPHENOXYLATE HYDROCHLORIDE AND ATROPINE SULFATE 2.5; .025 MG/1; MG/1
1 TABLET ORAL DAILY
Qty: 30 TABLET | Refills: 0 | COMMUNITY
Start: 2023-04-25 | End: 2023-05-15 | Stop reason: SDUPTHER

## 2023-05-04 RX ORDER — TOLTERODINE 2 MG/1
1 CAPSULE, EXTENDED RELEASE ORAL DAILY
Qty: 30 CAPSULE | Refills: 0 | COMMUNITY
Start: 2023-04-25 | End: 2023-05-15 | Stop reason: SDUPTHER

## 2023-05-04 RX ORDER — MULTIVITAMIN,THERAPEUTIC
TABLET ORAL
Qty: 30 TABLET | Refills: 3 | Status: SHIPPED | OUTPATIENT
Start: 2023-05-04

## 2023-05-04 RX ORDER — DIVALPROEX SODIUM 500 MG/1
TABLET, DELAYED RELEASE ORAL
Qty: 90 TABLET | Refills: 0 | OUTPATIENT
Start: 2023-05-04

## 2023-05-04 NOTE — TELEPHONE ENCOUNTER
LOV             3/29/2023 AWV  NOV             (around 9/29/2023)  Last RF        Hydralazine    3/14/23                     **Divalproex      2/9/23  #90 RF x 6                        nifedipine      **Not on med list**                        Multivitamin    4/25/23   (Thera tablet)                        Pantoprazole **Not  On med list**    Merari Mtz, NRCMA/LMR

## 2023-05-05 RX ORDER — FLUTICASONE PROPIONATE 50 MCG
SPRAY, SUSPENSION (ML) NASAL
Qty: 16 G | Refills: 11 | Status: SHIPPED | OUTPATIENT
Start: 2023-05-05

## 2023-05-05 RX ORDER — CARVEDILOL 25 MG/1
TABLET ORAL
Qty: 56 TABLET | Refills: 1 | Status: SHIPPED | OUTPATIENT
Start: 2023-05-05

## 2023-05-08 ENCOUNTER — TELEPHONE (OUTPATIENT)
Dept: FAMILY MEDICINE CLINIC | Facility: CLINIC | Age: 38
End: 2023-05-08
Payer: MEDICAID

## 2023-05-08 NOTE — TELEPHONE ENCOUNTER
Wesley high from [employer] called on patient's behalf about the dates of patient's upcoming appointments. She is not on the verbal release form for [facility], so I told her I could not release that information.

## 2023-05-10 RX ORDER — DIVALPROEX SODIUM 500 MG/1
TABLET, DELAYED RELEASE ORAL
Qty: 90 TABLET | Refills: 0 | OUTPATIENT
Start: 2023-05-10

## 2023-05-10 NOTE — TELEPHONE ENCOUNTER
LOV             3/29/2023   NOV              5/22/23  AP  Last RF        2/9/23  6 mon    Refill not appropriate    Merari Mtz, NRCMA/LMR

## 2023-05-15 ENCOUNTER — TELEMEDICINE (OUTPATIENT)
Dept: FAMILY MEDICINE CLINIC | Facility: CLINIC | Age: 38
End: 2023-05-15
Payer: MEDICAID

## 2023-05-15 DIAGNOSIS — K59.00 CONSTIPATION, UNSPECIFIED CONSTIPATION TYPE: ICD-10-CM

## 2023-05-15 DIAGNOSIS — N32.81 OVERACTIVE BLADDER: ICD-10-CM

## 2023-05-15 DIAGNOSIS — K21.9 GASTROESOPHAGEAL REFLUX DISEASE WITHOUT ESOPHAGITIS: ICD-10-CM

## 2023-05-15 DIAGNOSIS — F63.9 IMPULSE CONTROL DISORDER: Primary | ICD-10-CM

## 2023-05-15 DIAGNOSIS — N18.32 STAGE 3B CHRONIC KIDNEY DISEASE: ICD-10-CM

## 2023-05-15 RX ORDER — FUROSEMIDE 40 MG/1
40 TABLET ORAL 2 TIMES DAILY
COMMUNITY

## 2023-05-15 RX ORDER — ALBUTEROL SULFATE 90 UG/1
2 AEROSOL, METERED RESPIRATORY (INHALATION) EVERY 6 HOURS PRN
COMMUNITY

## 2023-05-15 RX ORDER — TOLTERODINE 2 MG/1
2 CAPSULE, EXTENDED RELEASE ORAL DAILY
Qty: 30 CAPSULE | Refills: 2 | Status: SHIPPED | OUTPATIENT
Start: 2023-05-15 | End: 2023-05-17

## 2023-05-15 NOTE — PATIENT INSTRUCTIONS
Continue to drink adequate liquids.  Continue to monitor your blood pressure twice per week and record the results.  Continue to work on healthy diet.  Follow up in 3 months, or sooner if problems or concerns.   Follow up as scheduled with renal.

## 2023-05-15 NOTE — PROGRESS NOTES
Subjective   Arthur Mccarthy is a 37 y.o. male.     Chief Complaint   Patient presents with   • Med Refill     Review current medications     You have chosen to receive care through a telehealth visit.  Do you consent to use a video/audio connection for your medical care today? Yes    This was an audio and video enabled telemedicine encounter using [employer] from patient's home in [place], [place] with provider in office in [place], [place].      History of Present Illness   Patient of Dr. Vasquez and is new to me; patient presents for follow up medications; patient is new to current agency/group home; previous agency did [facility] when terminated services; needed this appointment to get everything set up with new agency; Guardian at the agency is Matthew Uriostegui and Caretaker at agency is Wesley Zapien; Lashay Zapien provides much of HPI today.    Care Agency needs clarification of dose of oxygen; notified of being on 2 liters of oxygen at night; needs order for oxygen for agency to have on file; caretaker recently called company regarding oxygen tank and tubing--gave new tank for bedroom and 5 smaller tanks as well as tubing.    Patient was in [facility] for 2 weeks after admitted for agitation and violent behavior; admitted on 4/9/23 for safety and stabilization; had psych eval and noted problems with kidneys; discharged home 4/25/23; pt previous group home would not allow him to return.    Had follow up with neurology after discharge; hospital stopped a lot of his medications and added new medications; since then had appointment with [facility] and they will be prescribing his medication; no problems since has been at new environment; wondering if other housemates were aggravating him.    Trying to get patient to eat more; caretaker has found he does better eating small frequent meals; has met with dietician and will be coming at end of the month; pt was previously drinking a lot of Gatorade, but  caretaker has been encouraging more water and less Gatorade; pt was really sleepy when first came to group home 4/25/23, but has been doing better since has been more active.    Also met with renal 5/4/23 regarding CKD stage 4 and will be having renal US; to follow up in 3 months; had repeat labs at renal.    F/U HTN: takes Carvedilol twice daily, Furosemide and Nifedipine daily and Hydralazine TID; monitors BP, typically runs 120-130s/80s plans to start monitoring BP 2 times per week; will be starting to monitor O2 saturation as well.    Needs refill of medication for OAB; has been taking Tolterodine daily and helps.    The following portions of the patient's history were reviewed and updated as appropriate: allergies, current medications, past family history, past medical history, past social history, past surgical history and problem list.    Current Outpatient Medications on File Prior to Visit   Medication Sig   • albuterol (PROVENTIL/VENTOLIN) nebulization syringe Take  by nebulization Every 4 (Four) Hours As Needed for Wheezing. Every 4-6 hrs prn wheezing   • albuterol sulfate  (90 Base) MCG/ACT inhaler Inhale 2 puffs Every 6 (Six) Hours As Needed for Wheezing.   • allopurinol (ZYLOPRIM) 100 MG tablet Take 1 tablet by mouth Daily.   • carvedilol (COREG) 25 MG tablet TAKE ONE TABLET BY MOUTH TWICE DAILY   • divalproex (DEPAKOTE ER) 500 MG 24 hr tablet TAKE ONE TABLET EVERY MORNING and TAKE TWO TABLETS AT BEDTIME   • FeroSul 325 (65 Fe) MG tablet TAKE ONE TABLET BY MOUTH TWICE A DAY MORNING AND EVENING   • fluticasone (FLONASE) 50 MCG/ACT nasal spray USE TWO SPRAYS IN EACH NOSTRIL DAILY   • furosemide (LASIX) 40 MG tablet Take 1 tablet by mouth 2 (Two) Times a Day.   • hydrALAZINE (APRESOLINE) 25 MG tablet TAKE THREE TABLETS BY MOUTH THREE TIMES DAILY (MORNING, NOON, AND BEDTIME)   • levothyroxine (SYNTHROID, LEVOTHROID) 75 MCG tablet TAKE ONE TABLET BY MOUTH DAILY   • loratadine (CLARITIN) 10 MG tablet  TAKE ONE TABLET BY MOUTH DAILY   • montelukast (SINGULAIR) 10 MG tablet TAKE ONE TABLET BY MOUTH AT BEDTIME   • NIFEdipine XL (PROCARDIA XL) 30 MG 24 hr tablet TAKE ONE TABLET BY MOUTH AT BEDTIME   • O2 (OXYGEN) Inhale 2 L/min Every Night.   • OLANZapine (zyPREXA) 20 MG tablet TAKE ONE TABLET AT BEDTIME   • pantoprazole (PROTONIX) 40 MG EC tablet TAKE ONE TABLET BY MOUTH EVERY MORNING BEFORE breakfast   • PEG 3350 17 GM/SCOOP powder MIX 17 GRAMS (ONE CAPFUL) IN LIQUID AND DRINK DAILY   • tamsulosin (FLOMAX) 0.4 MG capsule 24 hr capsule TAKE ONE CAPSULE BY MOUTH AT BEDTIME   • Thera tablet tablet TAKE ONE TABLET BY MOUTH DAILY     No current facility-administered medications on file prior to visit.        Current Outpatient Medications   Medication Sig Dispense Refill   • albuterol (PROVENTIL/VENTOLIN) nebulization syringe Take  by nebulization Every 4 (Four) Hours As Needed for Wheezing. Every 4-6 hrs prn wheezing     • albuterol sulfate  (90 Base) MCG/ACT inhaler Inhale 2 puffs Every 6 (Six) Hours As Needed for Wheezing.     • allopurinol (ZYLOPRIM) 100 MG tablet Take 1 tablet by mouth Daily.     • carvedilol (COREG) 25 MG tablet TAKE ONE TABLET BY MOUTH TWICE DAILY 56 tablet 1   • divalproex (DEPAKOTE ER) 500 MG 24 hr tablet TAKE ONE TABLET EVERY MORNING and TAKE TWO TABLETS AT BEDTIME 90 tablet 6   • FeroSul 325 (65 Fe) MG tablet TAKE ONE TABLET BY MOUTH TWICE A DAY MORNING AND EVENING 60 tablet 11   • fluticasone (FLONASE) 50 MCG/ACT nasal spray USE TWO SPRAYS IN EACH NOSTRIL DAILY 16 g 11   • furosemide (LASIX) 40 MG tablet Take 1 tablet by mouth 2 (Two) Times a Day.     • hydrALAZINE (APRESOLINE) 25 MG tablet TAKE THREE TABLETS BY MOUTH THREE TIMES DAILY (MORNING, NOON, AND BEDTIME) 270 tablet 1   • levothyroxine (SYNTHROID, LEVOTHROID) 75 MCG tablet TAKE ONE TABLET BY MOUTH DAILY 28 tablet 6   • loratadine (CLARITIN) 10 MG tablet TAKE ONE TABLET BY MOUTH DAILY 30 tablet 11   • montelukast (SINGULAIR)  10 MG tablet TAKE ONE TABLET BY MOUTH AT BEDTIME 30 tablet 11   • NIFEdipine XL (PROCARDIA XL) 30 MG 24 hr tablet TAKE ONE TABLET BY MOUTH AT BEDTIME 30 tablet 3   • O2 (OXYGEN) Inhale 2 L/min Every Night.     • OLANZapine (zyPREXA) 20 MG tablet TAKE ONE TABLET AT BEDTIME 90 tablet 1   • pantoprazole (PROTONIX) 40 MG EC tablet TAKE ONE TABLET BY MOUTH EVERY MORNING BEFORE breakfast 30 tablet 3   • PEG 3350 17 GM/SCOOP powder MIX 17 GRAMS (ONE CAPFUL) IN LIQUID AND DRINK DAILY 510 g 11   • tamsulosin (FLOMAX) 0.4 MG capsule 24 hr capsule TAKE ONE CAPSULE BY MOUTH AT BEDTIME 30 capsule 1   • Thera tablet tablet TAKE ONE TABLET BY MOUTH DAILY 30 tablet 3   • tolterodine LA (DETROL LA) 2 MG 24 hr capsule Take 1 capsule by mouth Daily. 30 capsule 2     No current facility-administered medications for this visit.       Past Medical History:   Diagnosis Date   • Acquired intellectual disability    • Allergic rhinitis    • Asthma    • Chronic kidney disease (CKD)    • Chronic renal disease, stage 3, moderately decreased glomerular filtration rate between 30-59 mL/min/1.73 square meter    • Constipation    • Epilepsy, generalized, convulsive    • Episode of altered consciousness    • Essential hypertension     History of Essential Hypertension    • GERD (gastroesophageal reflux disease)    • Hematuria    • Hyperkalemia    • Hyperlipidemia    • Hypertension    • Hypothyroidism    • Immunization due    • Metabolic encephalopathy    • Moderate intellectual disabilities    • Mood disorder    • Nocturia    • Physical exam, annual    • Seizure    • Seizure disorder    • Sepsis without acute organ dysfunction (present on admission) 9/9/2021       History reviewed. No pertinent surgical history.    Family History   Problem Relation Age of Onset   • Down syndrome Brother    • No Known Problems Other        Social History     Socioeconomic History   • Marital status: Single   Tobacco Use   • Smoking status: Never   • Smokeless  tobacco: Never   Substance and Sexual Activity   • Alcohol use: No   • Drug use: Defer       Review of Systems   Constitutional: Negative for appetite change, chills, fever, unexpected weight gain and unexpected weight loss. Fatigue: some.   HENT: Negative for ear pain and sore throat. Trouble swallowing: mild at times; hospital changed from Omeprazole to Pantoprazole daily.    Respiratory: Negative for cough, chest tightness and shortness of breath.    Cardiovascular: Negative for chest pain, palpitations and leg swelling.   Gastrointestinal: Negative for abdominal pain, constipation, diarrhea and GERD. Indigestion: some mild heartburn at times.   Endocrine: Negative for polydipsia.   Musculoskeletal: Negative for back pain.   Skin: Negative for rash.   Neurological: Negative for dizziness, light-headedness and headache.   Hematological: Does not bruise/bleed easily.       Objective   There were no vitals filed for this visit.  There is no height or weight on file to calculate BMI.    Physical Exam  Constitutional:       General: He is not in acute distress.     Appearance: He is well-developed and well-groomed. He is not ill-appearing or diaphoretic.   HENT:      Head: Normocephalic.      Nose:      Right Sinus: No maxillary sinus tenderness or frontal sinus tenderness.      Left Sinus: No maxillary sinus tenderness or frontal sinus tenderness (per patient palpation).   Pulmonary:      Effort: Pulmonary effort is normal. No accessory muscle usage or respiratory distress.   Abdominal:      Tenderness: There is no abdominal tenderness (per patient palpation).   Musculoskeletal:      Cervical back: Normal range of motion and neck supple.   Neurological:      Mental Status: He is alert.   Psychiatric:         Mood and Affect: Mood normal.         Behavior: Behavior normal.         Lab Results   Component Value Date    WBC 7.84 03/13/2023    RBC 3.81 (L) 03/13/2023    HGB 9.2 (L) 03/13/2023    HCT 30.0 (L) 03/13/2023     MCV 78.7 (L) 03/13/2023    MCH 24.1 (L) 03/13/2023    MCHC 30.7 (L) 03/13/2023    RDW 17.1 (H) 03/13/2023    RDWSD 46.5 10/27/2021    MPV Not Measured 03/13/2023     (L) 03/13/2023    NEUTRORELPCT 59.1 03/13/2023    LYMPHORELPCT 27.7 03/13/2023    MONORELPCT 12.6 03/13/2023    EOSRELPCT 0.1 03/13/2023    BASORELPCT 0.1 03/13/2023    AUTOIGPER 0.4 03/13/2023    NEUTROABS 4.63 03/13/2023    LYMPHSABS 2.17 03/13/2023    MONOSABS 0.99 03/13/2023    EOSABS 0.01 03/13/2023    BASOSABS 0.01 03/13/2023    AUTOIGNUM 0.03 03/13/2023    NRBC 0 03/13/2023     Lab Results   Component Value Date    GLUCOSE 88 10/18/2022    BUN 20 10/18/2022    CREATININE 2.86 (H) 10/18/2022    EGFRIFNONA 31 (L) 01/27/2021    EGFRIFAFRI 40 (L) 10/27/2021    BCR 7.0 10/18/2022    K 5.2 10/18/2022    CO2 27.4 10/18/2022    CALCIUM 9.0 10/18/2022    PROTENTOTREF 6.8 01/27/2021    ALBUMIN 4.00 10/27/2021    LABIL2 1.7 01/27/2021    AST 24 09/14/2021    ALT 24 09/14/2021      Lab Results   Component Value Date    CHLPL 264 (H) 01/27/2021    TRIG 979 (H) 01/27/2021    HDL 23 (L) 01/27/2021    VLDL CANCELED 01/27/2021    LDL CANCELED 01/27/2021     Lab Results   Component Value Date    TSH 1.550 01/27/2021     Lab Results   Component Value Date    HGBA1C 5.8 (H) 02/23/2023     Lab Results   Component Value Date    WBCU 0-4 05/03/2022    RBCUA 0-4 05/03/2022    BACTERIA None Seen 09/14/2021    COLORU Yellow 09/14/2021    CLARITYU Clear 09/14/2021    LEUKOCYTESUR Negative 09/14/2021    GLUCOSEU Negative 09/14/2021    BLOODU Trace (A) 09/14/2021    BILIRUBINUR Negative 09/14/2021    NITRITEU Negative 09/14/2021        Records reviewed from renal office visit on 5/4/23; creatinine slightly better, electrolytes stable; recommended to wean off Clonidine due to low BP and fatigue; also stopped Amlodipine; has AV fistula, but not requiring dialysis at this point; instructed to avoid NSAIDs; will get renal US in 3 months to follow up 2.4 cm cyst in mid  left kidney.    5/4/23  /74, HR 92 at renal with BMI 28.89, weight 179 lb   5/4/23 ; CBC WNL except Hgb 11.5, Hct 34.7, MCV 74; CMP WNL except glucose 118, BUN 53, creat 3.43, eGFR 23    Assessment    Problem List Items Addressed This Visit     Stage 3b chronic kidney disease    Current Assessment & Plan     Follow up as scheduled with renal.         Relevant Medications    furosemide (LASIX) 40 MG tablet    GERD (gastroesophageal reflux disease)    Current Assessment & Plan     Continue Pantoprazole daily.         Constipation    Current Assessment & Plan     Stable.  Continue MiraLax daily.         Impulse control disorder - Primary    Current Assessment & Plan     Psychological condition is stable.  Continue current treatment regimen.  Regular aerobic exercise.  Psychological condition  will be reassessed in 3 months.  Follow up as scheduled with psychiatry.         Overactive bladder    Relevant Medications    tolterodine LA (DETROL LA) 2 MG 24 hr capsule          Return in about 3 months (around 8/15/2023) for Recheck.or sooner if problems or concerns.  Order given for O2 at 2 liters per nasal cannula at bedtime.  Unable to see records from [facility] at time of visit; caretaker held up each page of medication list to reconcile in chart.    Time spent doing video visit, reviewing, discussing, answering questions, and educating patient was 45 minutes.

## 2023-05-16 PROBLEM — N32.81 OVERACTIVE BLADDER: Status: ACTIVE | Noted: 2023-05-16

## 2023-05-16 PROBLEM — A41.9 SEPSIS WITHOUT ACUTE ORGAN DYSFUNCTION: Status: RESOLVED | Noted: 2021-09-09 | Resolved: 2023-05-16

## 2023-05-16 NOTE — ASSESSMENT & PLAN NOTE
Psychological condition is stable.  Continue current treatment regimen.  Regular aerobic exercise.  Psychological condition  will be reassessed in 3 months.  Follow up as scheduled with psychiatry.

## 2023-05-17 ENCOUNTER — DOCUMENTATION (OUTPATIENT)
Dept: FAMILY MEDICINE CLINIC | Facility: CLINIC | Age: 38
End: 2023-05-17
Payer: MEDICAID

## 2023-05-17 ENCOUNTER — TELEPHONE (OUTPATIENT)
Dept: FAMILY MEDICINE CLINIC | Facility: CLINIC | Age: 38
End: 2023-05-17
Payer: MEDICAID

## 2023-05-17 DIAGNOSIS — N32.81 OVERACTIVE BLADDER: Primary | ICD-10-CM

## 2023-05-17 RX ORDER — TOLTERODINE TARTRATE 1 MG/1
1 TABLET, EXTENDED RELEASE ORAL 2 TIMES DAILY
Qty: 60 TABLET | Refills: 2 | Status: SHIPPED | OUTPATIENT
Start: 2023-05-17

## 2023-05-17 NOTE — PROGRESS NOTES
RALEIGH BRIONES Key: [id number] - PA Case ID: [id number] help? Call us at [phone]  Status  Sent to [facility]  Drug  Tolterodine Tartrate 2MG tablets  Form  [place] Medicaid Electronic PA Form

## 2023-05-17 NOTE — TELEPHONE ENCOUNTER
The patient's legal guardian called about the prescription that Grace sent in on 5/15/23. She said the medication was denied by insurance and the patient is needing another medication. She also said she is going to need what she called a DC order (discontinue order) for the medication that was denied because of the agency the patient is with. Please advise [phone].

## 2023-05-17 NOTE — TELEPHONE ENCOUNTER
Spoke with caretaker over the phone; pharmacy stated that the non-extended release Tolterodine would be covered; will send Rx for Tolterodine non-extended release to pharmacy as requested.

## 2023-05-22 ENCOUNTER — OFFICE VISIT (OUTPATIENT)
Dept: FAMILY MEDICINE CLINIC | Facility: CLINIC | Age: 38
End: 2023-05-22
Payer: MEDICAID

## 2023-05-22 VITALS
RESPIRATION RATE: 16 BRPM | SYSTOLIC BLOOD PRESSURE: 140 MMHG | OXYGEN SATURATION: 97 % | HEART RATE: 89 BPM | HEIGHT: 67 IN | DIASTOLIC BLOOD PRESSURE: 82 MMHG | TEMPERATURE: 98.4 F | WEIGHT: 178.8 LBS | BODY MASS INDEX: 28.06 KG/M2

## 2023-05-22 DIAGNOSIS — I10 PRIMARY HYPERTENSION: ICD-10-CM

## 2023-05-22 DIAGNOSIS — N32.81 OVERACTIVE BLADDER: Primary | ICD-10-CM

## 2023-05-22 DIAGNOSIS — F63.9 IMPULSE CONTROL DISORDER: ICD-10-CM

## 2023-05-22 NOTE — PROGRESS NOTES
"Chief Complaint  Behavior Problem (Hospital f/u and discuss medications.)    Subjective        Arthur Mccarthy presents to [facility]  History of Present Illness  Pt presents today with new caregiver and needs a follow up from his hospital stay.    Overactive bladder- med was not covered so tried the immediate release of tolteradine 1 mg bid  Impulse control- was in hospital for this as well.    Objective   Vital Signs:  /82 (BP Location: Left arm, Patient Position: Sitting, Cuff Size: Adult)   Pulse 89   Temp 98.4 °F (36.9 °C) (Temporal)   Resp 16   Ht 170.2 cm (67.01\")   Wt 81.1 kg (178 lb 12.8 oz)   SpO2 97%   BMI 28.00 kg/m²   Estimated body mass index is 28 kg/m² as calculated from the following:    Height as of this encounter: 170.2 cm (67.01\").    Weight as of this encounter: 81.1 kg (178 lb 12.8 oz).             Physical Exam  Vitals and nursing note reviewed.   Constitutional:       Appearance: Normal appearance. He is well-developed.   Cardiovascular:      Rate and Rhythm: Normal rate and regular rhythm.      Heart sounds: Normal heart sounds. No murmur heard.  Pulmonary:      Effort: Pulmonary effort is normal. No respiratory distress.      Breath sounds: Normal breath sounds. No stridor. No wheezing or rhonchi.   Neurological:      General: No focal deficit present.      Mental Status: He is alert and oriented to person, place, and time. He is not disoriented.   Psychiatric:         Mood and Affect: Mood normal.         Behavior: Behavior normal.        Result Review :                   Assessment and Plan   Diagnoses and all orders for this visit:    1. Overactive bladder (Primary)    2. Impulse control disorder    3. Primary hypertension             Follow Up   No follow-ups on file.  Patient was given instructions and counseling regarding his condition or for health maintenance advice. Please see specific information pulled into the AVS if appropriate. "     Continue current med and will get PA done.  Reviewed hospital records including all labs.  .    Given warning signs for stroke and MI.    Patient to monitor BP over next week and call me with readings at that time and we can make adjustments accordingly if needed.  Will resend med and do PA  Refills today.

## 2023-05-24 DIAGNOSIS — N32.81 OVERACTIVE BLADDER: Primary | ICD-10-CM

## 2023-05-24 RX ORDER — OXYBUTYNIN CHLORIDE 5 MG/1
5 TABLET, EXTENDED RELEASE ORAL DAILY
Qty: 30 TABLET | Refills: 1 | Status: SHIPPED | OUTPATIENT
Start: 2023-05-24

## 2023-06-05 DIAGNOSIS — I10 PRIMARY HYPERTENSION: ICD-10-CM

## 2023-06-05 DIAGNOSIS — N32.81 OVERACTIVE BLADDER: ICD-10-CM

## 2023-06-05 RX ORDER — OXYBUTYNIN CHLORIDE 5 MG/1
TABLET, EXTENDED RELEASE ORAL
Qty: 30 TABLET | Refills: 1 | OUTPATIENT
Start: 2023-06-05

## 2023-06-05 RX ORDER — HYDRALAZINE HYDROCHLORIDE 25 MG/1
TABLET, FILM COATED ORAL
Qty: 270 TABLET | Refills: 1 | Status: SHIPPED | OUTPATIENT
Start: 2023-06-05

## 2023-06-05 NOTE — TELEPHONE ENCOUNTER
LOV             5/22/23   NOV             8/15/23  Last RF         Protocal    Signed 1 week ago (5/24/2023):     Merari Mtz, STEFF/LMR

## 2023-06-08 ENCOUNTER — TELEPHONE (OUTPATIENT)
Dept: FAMILY MEDICINE CLINIC | Facility: CLINIC | Age: 38
End: 2023-06-08
Payer: MEDICAID

## 2023-06-08 RX ORDER — MONTELUKAST SODIUM 10 MG/1
10 TABLET ORAL
Qty: 30 TABLET | Refills: 11 | Status: SHIPPED | OUTPATIENT
Start: 2023-06-08

## 2023-06-08 RX ORDER — FUROSEMIDE 40 MG/1
40 TABLET ORAL 2 TIMES DAILY
Qty: 90 TABLET | Refills: 0 | Status: SHIPPED | OUTPATIENT
Start: 2023-06-08

## 2023-06-08 NOTE — TELEPHONE ENCOUNTER
Caller: [facility] [address] [address] - [phone] [facility] [phone] FX    Relationship: Pharmacy    Best call back number: [phone]    Requested Prescriptions:   Requested Prescriptions     Pending Prescriptions Disp Refills    montelukast (SINGULAIR) 10 MG tablet 30 tablet 11     Sig: Take 1 tablet by mouth every night at bedtime.    furosemide (LASIX) 40 MG tablet       Sig: Take 1 tablet by mouth 2 (Two) Times a Day.        Pharmacy where request should be sent: [facility] [address] - [phone] [facility] [phone] FX     Last office visit with prescribing clinician: 3/29/2023   Last telemedicine visit with prescribing clinician: Visit date not found   Next office visit with prescribing clinician: 8/15/2023     Additional details provided by patient: PHARMACY CALLING TO HAVE REFILLS SENT    Does the patient have less than a 3 day supply:  [] Yes  [] No    Would you like a call back once the refill request has been completed: [] Yes [] No    If the office needs to give you a call back, can they leave a voicemail: [] Yes [] No    Jean Friedman Rep   06/08/23 10:07 EDT

## 2023-07-28 DIAGNOSIS — I10 PRIMARY HYPERTENSION: ICD-10-CM

## 2023-07-28 RX ORDER — FUROSEMIDE 40 MG/1
TABLET ORAL
Qty: 90 TABLET | Refills: 0 | Status: SHIPPED | OUTPATIENT
Start: 2023-07-28

## 2023-07-31 DIAGNOSIS — N32.81 OVERACTIVE BLADDER: ICD-10-CM

## 2023-07-31 RX ORDER — HYDRALAZINE HYDROCHLORIDE 25 MG/1
TABLET, FILM COATED ORAL
Qty: 270 TABLET | Refills: 0 | Status: SHIPPED | OUTPATIENT
Start: 2023-07-31

## 2023-07-31 RX ORDER — MULTIVITAMIN,THERAPEUTIC
TABLET ORAL
Qty: 30 TABLET | Refills: 0 | Status: SHIPPED | OUTPATIENT
Start: 2023-07-31

## 2023-07-31 RX ORDER — PANTOPRAZOLE SODIUM 40 MG/1
TABLET, DELAYED RELEASE ORAL
Qty: 30 TABLET | Refills: 0 | Status: SHIPPED | OUTPATIENT
Start: 2023-07-31

## 2023-07-31 RX ORDER — NIFEDIPINE 30 MG/1
TABLET, EXTENDED RELEASE ORAL
Qty: 30 TABLET | Refills: 0 | Status: SHIPPED | OUTPATIENT
Start: 2023-07-31

## 2023-07-31 RX ORDER — OXYBUTYNIN CHLORIDE 5 MG/1
5 TABLET, EXTENDED RELEASE ORAL DAILY
Qty: 30 TABLET | Refills: 1 | Status: SHIPPED | OUTPATIENT
Start: 2023-07-31

## 2023-07-31 RX ORDER — LORATADINE 10 MG/1
TABLET ORAL
Qty: 30 TABLET | Refills: 0 | Status: SHIPPED | OUTPATIENT
Start: 2023-07-31

## 2023-07-31 RX ORDER — TAMSULOSIN HYDROCHLORIDE 0.4 MG/1
CAPSULE ORAL
Qty: 30 CAPSULE | Refills: 1 | Status: SHIPPED | OUTPATIENT
Start: 2023-07-31

## 2023-08-23 DIAGNOSIS — I10 PRIMARY HYPERTENSION: ICD-10-CM

## 2023-08-23 RX ORDER — FUROSEMIDE 40 MG/1
TABLET ORAL
Qty: 90 TABLET | Refills: 0 | Status: SHIPPED | OUTPATIENT
Start: 2023-08-23

## 2023-09-06 ENCOUNTER — OFFICE VISIT (OUTPATIENT)
Dept: FAMILY MEDICINE CLINIC | Facility: CLINIC | Age: 38
End: 2023-09-06
Payer: MEDICAID

## 2023-09-06 VITALS
HEART RATE: 76 BPM | SYSTOLIC BLOOD PRESSURE: 128 MMHG | OXYGEN SATURATION: 98 % | HEIGHT: 67 IN | WEIGHT: 179.6 LBS | DIASTOLIC BLOOD PRESSURE: 64 MMHG | BODY MASS INDEX: 28.19 KG/M2 | TEMPERATURE: 96.9 F

## 2023-09-06 DIAGNOSIS — N39.44 NOCTURNAL ENURESIS: ICD-10-CM

## 2023-09-06 DIAGNOSIS — I10 PRIMARY HYPERTENSION: Primary | ICD-10-CM

## 2023-09-06 PROCEDURE — 1159F MED LIST DOCD IN RCRD: CPT | Performed by: INTERNAL MEDICINE

## 2023-09-06 PROCEDURE — 3078F DIAST BP <80 MM HG: CPT | Performed by: INTERNAL MEDICINE

## 2023-09-06 PROCEDURE — 3074F SYST BP LT 130 MM HG: CPT | Performed by: INTERNAL MEDICINE

## 2023-09-06 PROCEDURE — 1160F RVW MEDS BY RX/DR IN RCRD: CPT | Performed by: INTERNAL MEDICINE

## 2023-09-06 PROCEDURE — 99213 OFFICE O/P EST LOW 20 MIN: CPT | Performed by: INTERNAL MEDICINE

## 2023-09-06 RX ORDER — AMMONIUM LACTATE 12 G/100G
LOTION TOPICAL
COMMUNITY

## 2023-09-06 RX ORDER — FUROSEMIDE 40 MG/1
TABLET ORAL
Qty: 60 TABLET | Refills: 6 | Status: SHIPPED | OUTPATIENT
Start: 2023-09-06

## 2023-09-06 RX ORDER — BIOTIN 10 MG
1 TABLET ORAL DAILY
COMMUNITY
Start: 2023-07-28

## 2023-09-06 NOTE — PROGRESS NOTES
Juanita Mccarthy is a 37 y.o. male.     Chief Complaint   Patient presents with    Hypertension     His caregiver would like to talk about moving his water pill to earlier in the day       History of Present Illness   Caregiver was present during the history-taking and subsequent discussion (and for part of the physical exam) with this patient.  Patient agrees to the presence of the individual during this visit.    Has been having nocturnal eneuresis with urinary incontinence at night.  Taking furosemide 7AM and 7 PM.     Follow-up for hypertension.  Currently has been feeling well and asymptomatic without any headaches,vision changes, cough, chest pain, shortness of breath, focal neurologic deficit/noted changes, or syncope.  Has been taking the medications regularly and adherent with the regimen of amlodipine 5 mg, clonidine 0.3 mg TID, furosemide 40 mg QD, carvedilol 25 mg BID and hydralazine 25 mg TID.  Denies medication side effects and no significant interval events.  Home blood pressure has been variable 89//79.  Swelling is in the morning then improves as the day progresses.     History of seizures.  Still no seizures for a long time.  Over 1 year seizure free and no problems with the medications.     History of hypo-thyroid.  Denies fatigue, weakness, constipation/diarrhea, hair/skin changes, weight gain/loss, depression/anxiety, rashes, palpitations, swelling, chest pain, shortness of breath or other issues.  Has been compliant with taking the medication with no recent changes. Denies any difficulty with the current medication of levothyroxine 75 mcg daily. Last labs 1/27/21.     History of stage 4 CKD and slowly progressive.  Is referred to vascular for evaluation for av fistula and access.    The following portions of the patient's history were reviewed and updated as appropriate: allergies, current medications, past family history, past medical history, past social history, past  surgical history and problem list.    Depression Screen:      1/22/2020     2:00 PM   PHQ-2/PHQ-9 Depression Screening   Retired Total Score 0       Past Medical History:   Diagnosis Date    Acquired intellectual disability     Allergic rhinitis     Asthma     Chronic kidney disease (CKD)     Chronic renal disease, stage 3, moderately decreased glomerular filtration rate between 30-59 mL/min/1.73 square meter     Constipation     Epilepsy, generalized, convulsive     Episode of altered consciousness     Essential hypertension     History of Essential Hypertension     GERD (gastroesophageal reflux disease)     Hematuria     Hyperkalemia     Hyperlipidemia     Hypertension     Hypothyroidism     Immunization due     Metabolic encephalopathy     Moderate intellectual disabilities     Mood disorder     Nocturia     Physical exam, annual     Seizure     Seizure disorder     Sepsis without acute organ dysfunction (present on admission) 9/9/2021       History reviewed. No pertinent surgical history.    Family History   Problem Relation Age of Onset    Down syndrome Brother     No Known Problems Other        Social History     Socioeconomic History    Marital status: Single   Tobacco Use    Smoking status: Never    Smokeless tobacco: Never   Vaping Use    Vaping Use: Never used   Substance and Sexual Activity    Alcohol use: No    Drug use: Defer    Sexual activity: Defer       Current Outpatient Medications   Medication Sig Dispense Refill    albuterol (PROVENTIL/VENTOLIN) nebulization syringe Take  by nebulization Every 4 (Four) Hours As Needed for Wheezing. Every 4-6 hrs prn wheezing      albuterol sulfate  (90 Base) MCG/ACT inhaler Inhale 2 puffs Every 6 (Six) Hours As Needed for Wheezing.      allopurinol (ZYLOPRIM) 100 MG tablet Take 1 tablet by mouth Daily.      carvedilol (COREG) 25 MG tablet TAKE ONE TABLET BY MOUTH TWICE DAILY 56 tablet 1    divalproex (DEPAKOTE ER) 500 MG 24 hr tablet TAKE ONE TABLET EVERY  MORNING and TAKE TWO TABLETS AT BEDTIME 90 tablet 6    FeroSul 325 (65 Fe) MG tablet TAKE ONE TABLET BY MOUTH TWICE A DAY MORNING AND EVENING 60 tablet 11    fluticasone (FLONASE) 50 MCG/ACT nasal spray USE TWO SPRAYS IN EACH NOSTRIL DAILY 16 g 11    furosemide (LASIX) 40 MG tablet Take one tab at 7AM and one tab at Noon 60 tablet 6    hydrALAZINE (APRESOLINE) 25 MG tablet TAKE THREE TABLETS BY MOUTH THREE TIMES DAILY (MORNING, NOON, AND BEDTIME) 270 tablet 0    levothyroxine (SYNTHROID, LEVOTHROID) 75 MCG tablet TAKE ONE TABLET BY MOUTH DAILY 28 tablet 6    loratadine (CLARITIN) 10 MG tablet TAKE ONE TABLET BY MOUTH DAILY 30 tablet 0    montelukast (SINGULAIR) 10 MG tablet Take 1 tablet by mouth every night at bedtime. 30 tablet 11    multivitamin (Thera) tablet tablet TAKE ONE TABLET BY MOUTH DAILY 30 tablet 0    NIFEdipine XL (PROCARDIA XL) 30 MG 24 hr tablet TAKE ONE TABLET BY MOUTH AT BEDTIME 30 tablet 0    O2 (OXYGEN) Inhale 2 L/min Every Night.      OLANZapine (zyPREXA) 20 MG tablet TAKE ONE TABLET AT BEDTIME 90 tablet 1    oxybutynin XL (DITROPAN-XL) 5 MG 24 hr tablet Take 1 tablet by mouth Daily. 30 tablet 1    pantoprazole (PROTONIX) 40 MG EC tablet TAKE ONE TABLET BY MOUTH EVERY MORNING BEFORE breakfast 30 tablet 0    PEG 3350 17 GM/SCOOP powder MIX 17 GRAMS (ONE CAPFUL) IN LIQUID AND DRINK DAILY 510 g 11    tamsulosin (FLOMAX) 0.4 MG capsule 24 hr capsule TAKE ONE CAPSULE BY MOUTH AT BEDTIME 30 capsule 1    Therapeutic-M tablet Take 1 tablet by mouth Daily.      ammonium lactate (LAC-HYDRIN) 12 % lotion Apply  topically to the appropriate area as directed.       No current facility-administered medications for this visit.       Review of Systems   Constitutional:  Negative for activity change, appetite change, fatigue, fever, unexpected weight gain and unexpected weight loss.   HENT:  Negative for nosebleeds, rhinorrhea, trouble swallowing and voice change.    Eyes:  Negative for visual disturbance.  "  Respiratory:  Negative for cough, chest tightness, shortness of breath and wheezing.    Cardiovascular:  Negative for chest pain, palpitations and leg swelling.   Gastrointestinal:  Negative for abdominal pain, blood in stool, constipation, diarrhea, nausea, vomiting, GERD and indigestion.   Genitourinary:  Positive for urinary incontinence. Negative for dysuria and frequency.   Musculoskeletal:  Negative for myalgias.   Skin:  Negative for rash and wound.   Neurological:  Negative for tremors and numbness.   Hematological:  Does not bruise/bleed easily.   Psychiatric/Behavioral:  Negative for sleep disturbance.      Objective   /64 (BP Location: Left arm, Patient Position: Sitting, Cuff Size: Adult)   Pulse 76   Temp 96.9 °F (36.1 °C) (Temporal)   Ht 170.2 cm (67.01\")   Wt 81.5 kg (179 lb 9.6 oz)   SpO2 98%   BMI 28.12 kg/m²     Physical Exam  Vitals and nursing note reviewed.   Constitutional:       General: He is not in acute distress.     Appearance: He is well-developed. He is not diaphoretic.   HENT:      Head: Normocephalic and atraumatic.      Right Ear: External ear normal.      Left Ear: External ear normal.      Nose: Nose normal.   Eyes:      Conjunctiva/sclera: Conjunctivae normal.      Pupils: Pupils are equal, round, and reactive to light.      Comments: Right eye esotropia   Neck:      Thyroid: No thyromegaly.      Trachea: No tracheal deviation.   Cardiovascular:      Rate and Rhythm: Normal rate and regular rhythm.      Heart sounds: Normal heart sounds. No murmur heard.    No friction rub. No gallop.      Comments: Right arm vascular shunt in place with good thrill  Pulmonary:      Effort: Pulmonary effort is normal. No respiratory distress.      Breath sounds: Normal breath sounds.   Abdominal:      General: Bowel sounds are normal.      Palpations: Abdomen is soft. There is no mass.      Tenderness: There is no abdominal tenderness. There is no guarding.   Musculoskeletal:         " General: Normal range of motion.      Cervical back: Normal range of motion and neck supple.   Lymphadenopathy:      Cervical: No cervical adenopathy.   Skin:     General: Skin is warm and dry.      Capillary Refill: Capillary refill takes less than 2 seconds.      Findings: No rash.   Neurological:      Mental Status: He is alert and oriented to person, place, and time.      Motor: No abnormal muscle tone.      Deep Tendon Reflexes: Reflexes normal.   Psychiatric:         Behavior: Behavior normal.         Thought Content: Thought content normal.         Judgment: Judgment normal.       No results found for this or any previous visit (from the past 2016 hour(s)).  Assessment & Plan   Diagnoses and all orders for this visit:    1. Primary hypertension (Primary)  -     furosemide (LASIX) 40 MG tablet; Take one tab at 7AM and one tab at Noon  Dispense: 60 tablet; Refill: 6    2. Nocturnal enuresis    Change the time for the furosemide to 7AM and Noon to try to avoid nocturnal enuresis issues due to the excessive diuretic in the evening.  No other changes.  Follow-up if he has persisting problems.  Continue to follow-up with nephrology.           COVID-19 Precautions - Patient was compliant in wearing a mask. When I saw the patient, I used appropriate personal protective equipment (PPE) including mask and eye shield (standard procedure).  Additionally, I used gown and gloves if indicated.  Hand hygiene was completed before and after seeing the patient.  Dictated utilizing Dragon Dictation

## 2023-09-19 DIAGNOSIS — G40.309 GENERALIZED IDIOPATHIC EPILEPSY AND EPILEPTIC SYNDROMES, NOT INTRACTABLE, WITHOUT STATUS EPILEPTICUS: ICD-10-CM

## 2023-09-19 RX ORDER — DIVALPROEX SODIUM 500 MG/1
TABLET, EXTENDED RELEASE ORAL
Qty: 90 TABLET | Refills: 6 | Status: SHIPPED | OUTPATIENT
Start: 2023-09-19

## 2023-09-20 ENCOUNTER — TELEPHONE (OUTPATIENT)
Dept: FAMILY MEDICINE CLINIC | Facility: CLINIC | Age: 38
End: 2023-09-20

## 2023-09-20 NOTE — TELEPHONE ENCOUNTER
Caller: TOSHA SANDERS    Relationship: Caregiver (non-relative)    Best call back number: [phone]     What form or medical record are you requesting: AFTER VISIT SUMMARY 09-    How would you like to receive the form or medical records (pick-up, mail, fax):     If pick-up, provide patient with address and location details    Timeframe paperwork needed: ASAP    Additional notes: PATIENTS CAREGIVER IS REQUESTING TO  A COPY OF THE PATIENTS AFTER VISIT SUMMARY FROM 09-.    PLEASE INFORM WHEN THIS IS READY FOR .

## 2023-09-21 NOTE — TELEPHONE ENCOUNTER
I called and spoke with Ambrosio. I let him know that I had printed off the papers. He is going to come  the LOV notes from the [facility] location.

## 2023-09-21 NOTE — TELEPHONE ENCOUNTER
OKAY FOR HUB TO RELAY    I tried calling the caregiver. I could not leave a message because the mailbox was filled. I have printed off the last office notes and was trying to make sure she wanted to pick them up at the [facility] office. If she calls back please let me know if she wants to pick them up at the [facility] office and I will set them up front. If I do not hear anything I will call back later.

## 2023-09-22 DIAGNOSIS — I10 PRIMARY HYPERTENSION: ICD-10-CM

## 2023-09-23 RX ORDER — HYDRALAZINE HYDROCHLORIDE 25 MG/1
25 TABLET, FILM COATED ORAL 3 TIMES DAILY
Qty: 270 TABLET | Refills: 0 | Status: SHIPPED | OUTPATIENT
Start: 2023-09-23

## 2023-09-23 RX ORDER — PANTOPRAZOLE SODIUM 40 MG/1
40 TABLET, DELAYED RELEASE ORAL
Qty: 90 TABLET | Refills: 0 | Status: SHIPPED | OUTPATIENT
Start: 2023-09-23

## 2023-09-23 RX ORDER — NIFEDIPINE 30 MG/1
30 TABLET, EXTENDED RELEASE ORAL
Qty: 90 TABLET | Refills: 1 | Status: SHIPPED | OUTPATIENT
Start: 2023-09-23

## 2023-09-23 RX ORDER — LORATADINE 10 MG/1
10 TABLET ORAL DAILY
Qty: 90 TABLET | Refills: 1 | Status: SHIPPED | OUTPATIENT
Start: 2023-09-23

## 2023-09-23 RX ORDER — BIOTIN 10 MG
1 TABLET ORAL DAILY
Qty: 90 TABLET | Refills: 0 | Status: SHIPPED | OUTPATIENT
Start: 2023-09-23

## 2023-09-28 DIAGNOSIS — N32.81 OVERACTIVE BLADDER: ICD-10-CM

## 2023-09-28 RX ORDER — OXYBUTYNIN CHLORIDE 5 MG/1
5 TABLET, EXTENDED RELEASE ORAL DAILY
Qty: 30 TABLET | Refills: 11 | Status: SHIPPED | OUTPATIENT
Start: 2023-09-28

## 2023-10-17 RX ORDER — FERROUS SULFATE 325(65) MG
TABLET ORAL
Qty: 60 TABLET | Refills: 11 | Status: SHIPPED | OUTPATIENT
Start: 2023-10-17

## 2023-10-18 RX ORDER — TAMSULOSIN HYDROCHLORIDE 0.4 MG/1
1 CAPSULE ORAL
Qty: 30 CAPSULE | Refills: 11 | Status: SHIPPED | OUTPATIENT
Start: 2023-10-18

## 2023-10-23 RX ORDER — LEVOTHYROXINE SODIUM 0.07 MG/1
75 TABLET ORAL DAILY
Qty: 28 TABLET | Refills: 2 | Status: SHIPPED | OUTPATIENT
Start: 2023-10-23

## 2023-10-23 RX ORDER — CARVEDILOL 25 MG/1
25 TABLET ORAL 2 TIMES DAILY
Qty: 56 TABLET | Refills: 2 | Status: SHIPPED | OUTPATIENT
Start: 2023-10-23

## 2023-12-07 DIAGNOSIS — I10 PRIMARY HYPERTENSION: ICD-10-CM

## 2023-12-07 RX ORDER — HYDRALAZINE HYDROCHLORIDE 25 MG/1
TABLET, FILM COATED ORAL
Qty: 180 TABLET | Refills: 1 | Status: SHIPPED | OUTPATIENT
Start: 2023-12-07

## 2023-12-07 RX ORDER — PANTOPRAZOLE SODIUM 40 MG/1
40 TABLET, DELAYED RELEASE ORAL EVERY MORNING
Qty: 30 TABLET | Refills: 1 | Status: SHIPPED | OUTPATIENT
Start: 2023-12-07

## 2023-12-07 RX ORDER — BIOTIN 10 MG
1 TABLET ORAL DAILY
Qty: 30 TABLET | Refills: 1 | Status: SHIPPED | OUTPATIENT
Start: 2023-12-07

## 2023-12-07 NOTE — TELEPHONE ENCOUNTER
Rx Refill Note  Requested Prescriptions     Pending Prescriptions Disp Refills    pantoprazole (PROTONIX) 40 MG EC tablet [Pharmacy Med Name: pantoprazole 40 mg tablet,delayed release] 90 tablet 0     Sig: TAKE ONE TABLET BY MOUTH EVERY MORNING    hydrALAZINE (APRESOLINE) 25 MG tablet [Pharmacy Med Name: hydralazine 25 mg tablet] 270 tablet 0     Sig: TAKE ONE TABLET BY MOUTH THREE TIMES DAILY (MORNING, NOON, AND BEDTIME)    Therapeutic-M tablet [Pharmacy Med Name: Therapeutic-M 19 mg iron-400 mcg tablet] 90 tablet 0     Sig: TAKE ONE TABLET BY MOUTH DAILY      Last office visit with prescribing clinician: Visit date not found   Last telemedicine visit with prescribing clinician: Visit date not found   Next office visit with prescribing clinician: Visit date not found                         Would you like a call back once the refill request has been completed: [] Yes [] No    If the office needs to give you a call back, can they leave a voicemail: [] Yes [] No    Reyna Ackerman MA  12/07/23, 16:15 EST

## 2024-01-11 RX ORDER — LEVOTHYROXINE SODIUM 0.07 MG/1
75 TABLET ORAL DAILY
Qty: 28 TABLET | Refills: 2 | Status: SHIPPED | OUTPATIENT
Start: 2024-01-11

## 2024-01-11 RX ORDER — OLANZAPINE 20 MG/1
TABLET ORAL
Qty: 90 TABLET | Refills: 1 | Status: SHIPPED | OUTPATIENT
Start: 2024-01-11

## 2024-01-11 RX ORDER — CARVEDILOL 25 MG/1
25 TABLET ORAL 2 TIMES DAILY
Qty: 56 TABLET | Refills: 2 | Status: SHIPPED | OUTPATIENT
Start: 2024-01-11

## 2024-01-11 NOTE — TELEPHONE ENCOUNTER
Rx Refill Note  Requested Prescriptions     Pending Prescriptions Disp Refills    levothyroxine (SYNTHROID, LEVOTHROID) 75 MCG tablet [Pharmacy Med Name: levothyroxine 75 mcg tablet] 28 tablet 2     Sig: TAKE ONE TABLET BY MOUTH DAILY    carvedilol (COREG) 25 MG tablet [Pharmacy Med Name: carvedilol 25 mg tablet] 56 tablet 2     Sig: TAKE ONE TABLET BY MOUTH TWICE DAILY      Last office visit with prescribing clinician: Visit date not found   Last telemedicine visit with prescribing clinician: Visit date not found   Next office visit with prescribing clinician: Visit date not found                         Would you like a call back once the refill request has been completed: [] Yes [] No    If the office needs to give you a call back, can they leave a voicemail: [] Yes [] No    Reyna Ackerman MA  01/11/24, 15:13 EST

## 2024-01-12 RX ORDER — PANTOPRAZOLE SODIUM 40 MG/1
40 TABLET, DELAYED RELEASE ORAL EVERY MORNING
Qty: 90 TABLET | Refills: 0 | Status: SHIPPED | OUTPATIENT
Start: 2024-01-12

## 2024-01-12 NOTE — TELEPHONE ENCOUNTER
LOV 9/6/23  NOV None  Last fill 12/7/23    Does not meet protocol  Please advise    [facility]

## 2024-01-30 ENCOUNTER — TELEPHONE (OUTPATIENT)
Dept: FAMILY MEDICINE CLINIC | Facility: CLINIC | Age: 39
End: 2024-01-30
Payer: MEDICAID

## 2024-01-30 RX ORDER — ACETAMINOPHEN 325 MG/1
650 TABLET ORAL EVERY 6 HOURS PRN
Qty: 50 TABLET | Refills: 1 | Status: SHIPPED | OUTPATIENT
Start: 2024-01-30

## 2024-01-30 NOTE — TELEPHONE ENCOUNTER
Caller: TOSHA    Relationship: Other CARE GIVER/[employer]    PHONE: [phone]     What medication are you requesting: ASPIRIN    What are your current symptoms: FEVER    How long have you been experiencing symptoms: 4-5 HOURS    If a prescription is needed, what is your preferred pharmacy and phone number: [facility] - [place], [place] - [address] [phone] [facility] [phone] FX     Additional notes:  PATIENT WAS SENT HOME FROM DAY CARE PROGRAM WITH FEVER AND THEY WANT TO KNOW IF THEY CAN GIVE ASPIRIN TO PATIENT (OR ANOTHER FEVER REDUCER) AND HAVE RX SENT TO PHARMACY.    VERBAL IS NEEDED AS WELL TO GIVE TO PATIENT . PLEASE CALL AND ADVISE.

## 2024-01-31 NOTE — TELEPHONE ENCOUNTER
I called and spoke with Ambrosio and let him know what Dr Vasquez recommended. He verbalized understanding of this. Per Dr Vasquez I asked about current contact information. He advised that what was on the contact information was incorrect and was from his old caregiver who he has not been with in over a year. I went ahead and got his new address and preferred phone number and have switched this in the system. He will call back if he needs anything else.

## 2024-02-06 ENCOUNTER — TELEPHONE (OUTPATIENT)
Dept: FAMILY MEDICINE CLINIC | Facility: CLINIC | Age: 39
End: 2024-02-06
Payer: MEDICAID

## 2024-02-06 NOTE — TELEPHONE ENCOUNTER
"Caller: TOSHA WITH [employer]    Best call back number: [phone]    What form or medical record are you requesting: \"PROTOCOLS ON CURRENT MEDICATIONS\"    Who is requesting this form or medical record from you: [employer]     How would you like to receive the form or medical records (pick-up, mail, fax): FAX  If fax, what is the fax number: [phone]    PLEASE CALL WHEN SENT  "

## 2024-02-06 NOTE — TELEPHONE ENCOUNTER
Spoke with Ambrosio and he needs a med reconciliation. He would like doctor to clarify which medications patient is actually to be taking and how. Can you please advise?    With special attention to albuterol, ventolin and hydralazine.      It will then need to be faxed.    [facility] JOSE

## 2024-02-08 RX ORDER — BIOTIN 10 MG
1 TABLET ORAL DAILY
Qty: 30 TABLET | Refills: 1 | Status: SHIPPED | OUTPATIENT
Start: 2024-02-08

## 2024-02-08 NOTE — TELEPHONE ENCOUNTER
Rx Refill Note  Requested Prescriptions     Pending Prescriptions Disp Refills    Therapeutic-M tablet [Pharmacy Med Name: Therapeutic-M 19 mg iron-400 mcg tablet] 30 tablet 1     Sig: TAKE ONE TABLET BY MOUTH DAILY      Last office visit with prescribing clinician: Visit date not found   Last telemedicine visit with prescribing clinician: Visit date not found   Next office visit with prescribing clinician: Visit date not found                         Would you like a call back once the refill request has been completed: [] Yes [] No    If the office needs to give you a call back, can they leave a voicemail: [] Yes [] No    Reyna Ackerman MA  02/08/24, 08:25 EST

## 2024-02-15 DIAGNOSIS — I10 PRIMARY HYPERTENSION: ICD-10-CM

## 2024-02-15 RX ORDER — NIFEDIPINE 30 MG/1
30 TABLET, EXTENDED RELEASE ORAL
Qty: 90 TABLET | Refills: 1 | Status: SHIPPED | OUTPATIENT
Start: 2024-02-15

## 2024-02-15 RX ORDER — LORATADINE 10 MG/1
10 TABLET ORAL DAILY
Qty: 90 TABLET | Refills: 1 | Status: SHIPPED | OUTPATIENT
Start: 2024-02-15

## 2024-02-15 RX ORDER — FUROSEMIDE 40 MG/1
TABLET ORAL
Qty: 60 TABLET | Refills: 6 | Status: SHIPPED | OUTPATIENT
Start: 2024-02-15

## 2024-03-12 DIAGNOSIS — G40.309 GENERALIZED IDIOPATHIC EPILEPSY AND EPILEPTIC SYNDROMES, NOT INTRACTABLE, WITHOUT STATUS EPILEPTICUS: ICD-10-CM

## 2024-03-12 DIAGNOSIS — I10 PRIMARY HYPERTENSION: ICD-10-CM

## 2024-03-12 RX ORDER — CARVEDILOL 25 MG/1
25 TABLET ORAL 2 TIMES DAILY
Qty: 180 TABLET | Refills: 0 | Status: SHIPPED | OUTPATIENT
Start: 2024-03-12

## 2024-03-12 RX ORDER — HYDRALAZINE HYDROCHLORIDE 25 MG/1
TABLET, FILM COATED ORAL
Qty: 270 TABLET | Refills: 0 | Status: SHIPPED | OUTPATIENT
Start: 2024-03-12

## 2024-03-12 RX ORDER — DIVALPROEX SODIUM 500 MG/1
TABLET, EXTENDED RELEASE ORAL
Qty: 270 TABLET | Refills: 0 | Status: SHIPPED | OUTPATIENT
Start: 2024-03-12

## 2024-03-12 RX ORDER — LEVOTHYROXINE SODIUM 0.07 MG/1
75 TABLET ORAL DAILY
Qty: 90 TABLET | Refills: 0 | Status: SHIPPED | OUTPATIENT
Start: 2024-03-12

## 2024-03-22 RX ORDER — BIOTIN 10 MG
1 TABLET ORAL DAILY
Qty: 30 TABLET | Refills: 1 | Status: SHIPPED | OUTPATIENT
Start: 2024-03-22

## 2024-03-22 NOTE — TELEPHONE ENCOUNTER
Rx Refill Note  Requested Prescriptions     Pending Prescriptions Disp Refills    Therapeutic-M tablet [Pharmacy Med Name: Therapeutic-M 19 mg iron-400 mcg tablet] 30 tablet 1     Sig: TAKE ONE TABLET BY MOUTH DAILY      Last office visit with prescribing clinician: 9/6/2023   Last telemedicine visit with prescribing clinician: Visit date not found   Next office visit with prescribing clinician: Visit date not found                         Would you like a call back once the refill request has been completed: [] Yes [] No    If the office needs to give you a call back, can they leave a voicemail: [] Yes [] No    Reyna Ackerman MA  03/22/24, 09:34 EDT

## 2024-04-07 RX ORDER — MONTELUKAST SODIUM 10 MG/1
10 TABLET ORAL
Qty: 30 TABLET | Refills: 11 | Status: SHIPPED | OUTPATIENT
Start: 2024-04-07

## 2024-04-08 ENCOUNTER — TELEPHONE (OUTPATIENT)
Dept: FAMILY MEDICINE CLINIC | Facility: CLINIC | Age: 39
End: 2024-04-08
Payer: MEDICAID

## 2024-04-08 NOTE — TELEPHONE ENCOUNTER
Hub staff attempted to follow warm transfer process and was unsuccessful     Caller: TOSHA SANDERS    Relationship to patient: Emergency Contact    Best call back number: [phone] PLEASE FAX PRTOCOL [phone]    Patient is needing: PLEASE CALL CARE GIVER SO YOU CAN WRITE A HYPERTENSION AND SEIZURE PROTOCOL UPDATED. PLEASE CALL

## 2024-04-09 NOTE — TELEPHONE ENCOUNTER
I called and spoke with Ambrosio and let him know that Dr Vasquez had typed a letter for [facility] protocol. I asked him were this was being faxed to and he said [facility] [employer]. I also asked him if he needed a copy and he said he did. I verified the address and let him know I would send it to him. He verbalized understanding and will call back if needed.

## 2024-04-15 RX ORDER — FLUTICASONE PROPIONATE 50 MCG
SPRAY, SUSPENSION (ML) NASAL
Qty: 16 G | Refills: 5 | Status: SHIPPED | OUTPATIENT
Start: 2024-04-15

## 2024-04-26 ENCOUNTER — TELEPHONE (OUTPATIENT)
Dept: FAMILY MEDICINE CLINIC | Facility: CLINIC | Age: 39
End: 2024-04-26
Payer: MEDICAID

## 2024-04-26 RX ORDER — PANTOPRAZOLE SODIUM 40 MG/1
40 TABLET, DELAYED RELEASE ORAL EVERY MORNING
Qty: 90 TABLET | Refills: 0 | Status: SHIPPED | OUTPATIENT
Start: 2024-04-26

## 2024-04-26 NOTE — TELEPHONE ENCOUNTER
Ambrosio (caregiver for patient) called regarding the B/P numbers for patient, today his B/P is 150/112 & this is the 2nd or 3rd day this week with a number in this range. Patient has also been drinking more soda than normal. Patient has appointment on 5/14/24 / was offered earlier appointment & due to patient's schedule nothing was available before 5/14/24 patient also prefers to see Dr Vasquez.    Please advise if anything needs to be done prior to the appt.

## 2024-05-01 RX ORDER — BIOTIN 10 MG
1 TABLET ORAL DAILY
Qty: 30 TABLET | Refills: 1 | Status: SHIPPED | OUTPATIENT
Start: 2024-05-01

## 2024-06-21 DIAGNOSIS — G40.309 GENERALIZED IDIOPATHIC EPILEPSY AND EPILEPTIC SYNDROMES, NOT INTRACTABLE, WITHOUT STATUS EPILEPTICUS: ICD-10-CM

## 2024-06-21 RX ORDER — DIVALPROEX SODIUM 500 MG/1
TABLET, EXTENDED RELEASE ORAL
Qty: 270 TABLET | Refills: 0 | OUTPATIENT
Start: 2024-06-21

## 2024-06-21 RX ORDER — CARVEDILOL 25 MG/1
25 TABLET ORAL 2 TIMES DAILY
Qty: 180 TABLET | Refills: 0 | OUTPATIENT
Start: 2024-06-21

## 2024-06-21 RX ORDER — OLANZAPINE 20 MG/1
TABLET ORAL
Qty: 90 TABLET | Refills: 1 | OUTPATIENT
Start: 2024-06-21

## 2024-06-21 RX ORDER — LEVOTHYROXINE SODIUM 0.07 MG/1
75 TABLET ORAL DAILY
Qty: 90 TABLET | Refills: 0 | OUTPATIENT
Start: 2024-06-21

## 2024-06-26 ENCOUNTER — TELEPHONE (OUTPATIENT)
Dept: FAMILY MEDICINE CLINIC | Facility: CLINIC | Age: 39
End: 2024-06-26
Payer: MEDICAID

## 2024-06-26 NOTE — TELEPHONE ENCOUNTER
I called and spoke with Ambrosio and asked her if he had been using oxygen because we had received a paper through fax. He stated he had not used oxygen for over a year now and we can just disregard that fax. I also let him know that it looked like Arthur was probably due for a follow up appointment. He verbalized understanding and I scheduled him in August as this was most convenient for him. He did mention scheduling with someone else for availability reasons but then decided he would rather wait and have him see Dr Vasquez. He will call back if he needs anything in the meantime.

## 2024-06-28 DIAGNOSIS — G40.309 GENERALIZED IDIOPATHIC EPILEPSY AND EPILEPTIC SYNDROMES, NOT INTRACTABLE, WITHOUT STATUS EPILEPTICUS: ICD-10-CM

## 2024-06-28 RX ORDER — CARVEDILOL 25 MG/1
25 TABLET ORAL 2 TIMES DAILY
Qty: 180 TABLET | Refills: 0 | Status: SHIPPED | OUTPATIENT
Start: 2024-06-28

## 2024-06-28 RX ORDER — OLANZAPINE 20 MG/1
TABLET ORAL
Qty: 90 TABLET | Refills: 0 | Status: SHIPPED | OUTPATIENT
Start: 2024-06-28

## 2024-06-28 RX ORDER — LEVOTHYROXINE SODIUM 0.07 MG/1
75 TABLET ORAL DAILY
Qty: 90 TABLET | Refills: 0 | Status: SHIPPED | OUTPATIENT
Start: 2024-06-28

## 2024-06-28 RX ORDER — GLUCOSA SU 2KCL/CHONDROITIN SU 500-400 MG
1 CAPSULE ORAL DAILY
Qty: 30 TABLET | Refills: 0 | Status: SHIPPED | OUTPATIENT
Start: 2024-06-28

## 2024-06-28 RX ORDER — DIVALPROEX SODIUM 500 MG/1
TABLET, EXTENDED RELEASE ORAL
Qty: 270 TABLET | Refills: 0 | Status: SHIPPED | OUTPATIENT
Start: 2024-06-28

## 2024-06-28 NOTE — TELEPHONE ENCOUNTER
LOV                  9/6/2023  NOV                  8/7/2024  LRF                   5/1/24  Therapeutic-M                            1/11/24 Olanzapine  PROTOCOL      Not met

## 2024-06-28 NOTE — TELEPHONE ENCOUNTER
LOV                   9/6/23  NOV                   8/7/24  LRF                   all on 3/12/24  PROTOCOL      Not met    Refused 1 week ago (6/21/2024): needs appt

## 2024-07-23 RX ORDER — PANTOPRAZOLE SODIUM 40 MG/1
40 TABLET, DELAYED RELEASE ORAL EVERY MORNING
Qty: 90 TABLET | Refills: 0 | OUTPATIENT
Start: 2024-07-23

## 2024-07-30 RX ORDER — PANTOPRAZOLE SODIUM 40 MG/1
40 TABLET, DELAYED RELEASE ORAL EVERY MORNING
Qty: 90 TABLET | Refills: 0 | Status: SHIPPED | OUTPATIENT
Start: 2024-07-30

## 2024-08-20 RX ORDER — GLUCOSA SU 2KCL/CHONDROITIN SU 500-400 MG
1 CAPSULE ORAL DAILY
Qty: 30 TABLET | Refills: 0 | OUTPATIENT
Start: 2024-08-20

## 2024-08-20 RX ORDER — LORATADINE 10 MG/1
10 TABLET ORAL DAILY
Qty: 90 TABLET | Refills: 3 | Status: SHIPPED | OUTPATIENT
Start: 2024-08-20

## 2024-08-23 RX ORDER — GLUCOSA SU 2KCL/CHONDROITIN SU 500-400 MG
1 CAPSULE ORAL DAILY
Qty: 30 TABLET | Refills: 0 | OUTPATIENT
Start: 2024-08-23

## 2024-08-27 RX ORDER — GLUCOSA SU 2KCL/CHONDROITIN SU 500-400 MG
1 CAPSULE ORAL DAILY
Qty: 30 TABLET | Refills: 0 | OUTPATIENT
Start: 2024-08-27

## 2024-09-18 DIAGNOSIS — I10 PRIMARY HYPERTENSION: ICD-10-CM

## 2024-09-18 DIAGNOSIS — N32.81 OVERACTIVE BLADDER: ICD-10-CM

## 2024-09-18 DIAGNOSIS — G40.309 GENERALIZED IDIOPATHIC EPILEPSY AND EPILEPTIC SYNDROMES, NOT INTRACTABLE, WITHOUT STATUS EPILEPTICUS: ICD-10-CM

## 2024-09-19 ENCOUNTER — TELEPHONE (OUTPATIENT)
Dept: FAMILY MEDICINE CLINIC | Facility: CLINIC | Age: 39
End: 2024-09-19
Payer: MEDICAID

## 2024-09-19 RX ORDER — CARVEDILOL 25 MG/1
25 TABLET ORAL 2 TIMES DAILY
Qty: 180 TABLET | Refills: 0 | Status: SHIPPED | OUTPATIENT
Start: 2024-09-19

## 2024-09-19 RX ORDER — OLANZAPINE 20 MG/1
TABLET ORAL
Qty: 90 TABLET | Refills: 0 | Status: SHIPPED | OUTPATIENT
Start: 2024-09-19

## 2024-09-19 RX ORDER — DIVALPROEX SODIUM 500 MG/1
TABLET, EXTENDED RELEASE ORAL
Qty: 270 TABLET | Refills: 0 | Status: SHIPPED | OUTPATIENT
Start: 2024-09-19

## 2024-09-19 RX ORDER — LEVOTHYROXINE SODIUM 75 UG/1
75 TABLET ORAL DAILY
Qty: 90 TABLET | Refills: 0 | Status: SHIPPED | OUTPATIENT
Start: 2024-09-19

## 2024-09-19 RX ORDER — FUROSEMIDE 40 MG
TABLET ORAL
Qty: 60 TABLET | Refills: 0 | Status: SHIPPED | OUTPATIENT
Start: 2024-09-19

## 2024-09-19 RX ORDER — OXYBUTYNIN CHLORIDE 5 MG/1
5 TABLET, EXTENDED RELEASE ORAL DAILY
Qty: 30 TABLET | Refills: 0 | Status: SHIPPED | OUTPATIENT
Start: 2024-09-19

## 2024-09-19 RX ORDER — GLUCOSA SU 2KCL/CHONDROITIN SU 500-400 MG
1 CAPSULE ORAL DAILY
Qty: 30 TABLET | Refills: 0 | Status: SHIPPED | OUTPATIENT
Start: 2024-09-19

## 2024-09-19 RX ORDER — FERROUS SULFATE 325(65) MG
TABLET ORAL
Qty: 60 TABLET | Refills: 0 | Status: SHIPPED | OUTPATIENT
Start: 2024-09-19

## 2024-09-19 RX ORDER — TAMSULOSIN HYDROCHLORIDE 0.4 MG/1
1 CAPSULE ORAL
Qty: 30 CAPSULE | Refills: 0 | Status: SHIPPED | OUTPATIENT
Start: 2024-09-19

## 2024-09-24 RX ORDER — FLUTICASONE PROPIONATE 50 UG/1
SPRAY, METERED NASAL
Qty: 16 G | Refills: 5 | Status: SHIPPED | OUTPATIENT
Start: 2024-09-24

## 2024-09-30 ENCOUNTER — OFFICE VISIT (OUTPATIENT)
Dept: FAMILY MEDICINE CLINIC | Facility: CLINIC | Age: 39
End: 2024-09-30
Payer: MEDICAID

## 2024-09-30 VITALS
SYSTOLIC BLOOD PRESSURE: 122 MMHG | OXYGEN SATURATION: 98 % | BODY MASS INDEX: 28.59 KG/M2 | WEIGHT: 182.6 LBS | TEMPERATURE: 97.1 F | HEART RATE: 77 BPM | DIASTOLIC BLOOD PRESSURE: 88 MMHG

## 2024-09-30 DIAGNOSIS — Z00.00 ANNUAL PHYSICAL EXAM: Primary | ICD-10-CM

## 2024-09-30 DIAGNOSIS — N32.81 OVERACTIVE BLADDER: ICD-10-CM

## 2024-09-30 DIAGNOSIS — G40.309 GENERALIZED IDIOPATHIC EPILEPSY AND EPILEPTIC SYNDROMES, NOT INTRACTABLE, WITHOUT STATUS EPILEPTICUS: ICD-10-CM

## 2024-09-30 DIAGNOSIS — I10 PRIMARY HYPERTENSION: ICD-10-CM

## 2024-09-30 DIAGNOSIS — F79 INTELLECTUAL DISABILITY: ICD-10-CM

## 2024-09-30 PROCEDURE — 1160F RVW MEDS BY RX/DR IN RCRD: CPT | Performed by: NURSE PRACTITIONER

## 2024-09-30 PROCEDURE — 2014F MENTAL STATUS ASSESS: CPT | Performed by: NURSE PRACTITIONER

## 2024-09-30 PROCEDURE — 1126F AMNT PAIN NOTED NONE PRSNT: CPT | Performed by: NURSE PRACTITIONER

## 2024-09-30 PROCEDURE — 1159F MED LIST DOCD IN RCRD: CPT | Performed by: NURSE PRACTITIONER

## 2024-09-30 PROCEDURE — 3079F DIAST BP 80-89 MM HG: CPT | Performed by: NURSE PRACTITIONER

## 2024-09-30 PROCEDURE — 3074F SYST BP LT 130 MM HG: CPT | Performed by: NURSE PRACTITIONER

## 2024-09-30 PROCEDURE — 99395 PREV VISIT EST AGE 18-39: CPT | Performed by: NURSE PRACTITIONER

## 2024-09-30 RX ORDER — HYDRALAZINE HYDROCHLORIDE 25 MG/1
25 TABLET, FILM COATED ORAL 3 TIMES DAILY
Qty: 270 TABLET | Refills: 1 | Status: SHIPPED | OUTPATIENT
Start: 2024-09-30

## 2024-09-30 RX ORDER — OXYBUTYNIN CHLORIDE 5 MG/1
5 TABLET, EXTENDED RELEASE ORAL DAILY
Qty: 90 TABLET | Refills: 1 | Status: SHIPPED | OUTPATIENT
Start: 2024-09-30

## 2024-09-30 RX ORDER — FUROSEMIDE 40 MG
TABLET ORAL
Qty: 180 TABLET | Refills: 1 | Status: SHIPPED | OUTPATIENT
Start: 2024-09-30

## 2024-09-30 RX ORDER — CARVEDILOL 25 MG/1
25 TABLET ORAL 2 TIMES DAILY
Qty: 180 TABLET | Refills: 0 | Status: SHIPPED | OUTPATIENT
Start: 2024-09-30

## 2024-09-30 RX ORDER — TAMSULOSIN HYDROCHLORIDE 0.4 MG/1
1 CAPSULE ORAL
Qty: 90 CAPSULE | Refills: 1 | Status: SHIPPED | OUTPATIENT
Start: 2024-09-30

## 2024-09-30 RX ORDER — PANTOPRAZOLE SODIUM 40 MG/1
40 TABLET, DELAYED RELEASE ORAL EVERY MORNING
Qty: 90 TABLET | Refills: 1 | Status: SHIPPED | OUTPATIENT
Start: 2024-09-30

## 2024-09-30 RX ORDER — OLANZAPINE 20 MG/1
20 TABLET ORAL
Qty: 90 TABLET | Refills: 1 | Status: SHIPPED | OUTPATIENT
Start: 2024-09-30

## 2024-09-30 RX ORDER — NIFEDIPINE 30 MG/1
30 TABLET, EXTENDED RELEASE ORAL
Qty: 90 TABLET | Refills: 1 | Status: SHIPPED | OUTPATIENT
Start: 2024-09-30

## 2024-09-30 RX ORDER — LEVOTHYROXINE SODIUM 75 UG/1
75 TABLET ORAL DAILY
Qty: 90 TABLET | Refills: 1 | Status: SHIPPED | OUTPATIENT
Start: 2024-09-30

## 2024-09-30 RX ORDER — DIVALPROEX SODIUM 500 MG/1
TABLET, FILM COATED, EXTENDED RELEASE ORAL
Qty: 270 TABLET | Refills: 1 | Status: SHIPPED | OUTPATIENT
Start: 2024-09-30

## 2024-09-30 RX ORDER — GLUCOSA SU 2KCL/CHONDROITIN SU 500-400 MG
1 CAPSULE ORAL DAILY
Qty: 90 EACH | Refills: 1 | Status: SHIPPED | OUTPATIENT
Start: 2024-09-30

## 2024-09-30 RX ORDER — FERROUS SULFATE 325(65) MG
1 TABLET ORAL 2 TIMES DAILY
Qty: 180 TABLET | Refills: 1 | Status: SHIPPED | OUTPATIENT
Start: 2024-09-30

## 2024-09-30 NOTE — PROGRESS NOTES
"Chief Complaint  Annual Exam    Juanita Mccarthy presents to [facility]  History of Present Illness  Patient and guardian here to get yearly exam. No concerns per patient or guardian.  He does see nephrology routinely for his blood pressure and kidney function. He has a fistula for dialysis. He gets blood work with nephrology every 3 months.   Objective   Vital Signs:  /88 (BP Location: Left arm, Patient Position: Sitting, Cuff Size: Adult)   Pulse 77   Temp 97.1 °F (36.2 °C) (Temporal)   Wt 82.8 kg (182 lb 9.6 oz)   SpO2 98%   BMI 28.59 kg/m²   Estimated body mass index is 28.59 kg/m² as calculated from the following:    Height as of 9/6/23: 170.2 cm (67.01\").    Weight as of this encounter: 82.8 kg (182 lb 9.6 oz).    BMI is >= 25 and <30. (Overweight) The following options were offered after discussion;: weight loss educational material (shared in after visit summary) and Information on healthy weight added to patient's after visit summary.      Physical Exam  Constitutional:       Appearance: Normal appearance.   HENT:      Head: Normocephalic.   Eyes:      Extraocular Movements: Extraocular movements intact.      Pupils: Pupils are equal, round, and reactive to light.   Cardiovascular:      Rate and Rhythm: Normal rate and regular rhythm.      Heart sounds: Normal heart sounds.   Pulmonary:      Effort: Pulmonary effort is normal.      Breath sounds: Normal breath sounds.   Musculoskeletal:         General: Normal range of motion.      Cervical back: Normal range of motion.   Skin:     General: Skin is warm and dry.   Neurological:      General: No focal deficit present.      Mental Status: He is alert and oriented to person, place, and time.   Psychiatric:         Mood and Affect: Affect is blunt.         Speech: Speech is slurred.         Behavior: Behavior is cooperative.         Cognition and Memory: Cognition is impaired.        Result Review " :                Assessment and Plan   Diagnoses and all orders for this visit:    1. Annual physical exam (Primary)    2. Primary hypertension  -     furosemide (LASIX) 40 MG tablet; Take 1 tablet by mouth Every Morning Before Breakfast AND 1 tablet Daily With Lunch. TAKE ONE TABLET BY MOUTH TWICE DAILY MORNING AND NOON  Dispense: 180 tablet; Refill: 1  -     hydrALAZINE (APRESOLINE) 25 MG tablet; Take 1 tablet by mouth 3 (Three) Times a Day.  Dispense: 270 tablet; Refill: 1    3. Intellectual disability    4. Generalized idiopathic epilepsy and epileptic syndromes, not intractable, without status epilepticus  -     divalproex (DEPAKOTE ER) 500 MG 24 hr tablet; Take 1 tablet by mouth Every Morning Before Breakfast AND 2 tablets every night at bedtime.  Dispense: 270 tablet; Refill: 1    5. Overactive bladder  -     oxybutynin XL (DITROPAN-XL) 5 MG 24 hr tablet; Take 1 tablet by mouth Daily.  Dispense: 90 tablet; Refill: 1    Other orders  -     carvedilol (COREG) 25 MG tablet; Take 1 tablet by mouth 2 (Two) Times a Day.  Dispense: 180 tablet; Refill: 0  -     ferrous sulfate (FeroSul) 325 (65 FE) MG tablet; Take 1 tablet by mouth 2 (Two) Times a Day.  Dispense: 180 tablet; Refill: 1  -     levothyroxine (SYNTHROID, LEVOTHROID) 75 MCG tablet; Take 1 tablet by mouth Daily.  Dispense: 90 tablet; Refill: 1  -     NIFEdipine XL (PROCARDIA XL) 30 MG 24 hr tablet; Take 1 tablet by mouth every night at bedtime.  Dispense: 90 tablet; Refill: 1  -     OLANZapine (zyPREXA) 20 MG tablet; Take 1 tablet by mouth every night at bedtime.  Dispense: 90 tablet; Refill: 1  -     pantoprazole (PROTONIX) 40 MG EC tablet; Take 1 tablet by mouth Every Morning.  Dispense: 90 tablet; Refill: 1  -     tamsulosin (FLOMAX) 0.4 MG capsule 24 hr capsule; Take 1 capsule by mouth every night at bedtime.  Dispense: 90 capsule; Refill: 1  -     multivitamin with minerals (Therapeutic-M) tablet tablet; Take 1 tablet by mouth Daily.  Dispense: 90  each; Refill: 1             Follow Up   Return in about 1 year (around 9/30/2025) for Annual physical.  Patient was given instructions and counseling regarding his condition or for health maintenance advice. Please see specific information pulled into the AVS if appropriate.

## 2024-10-21 RX ORDER — ACETAMINOPHEN 325 MG/1
TABLET ORAL
Qty: 50 TABLET | Refills: 1 | Status: SHIPPED | OUTPATIENT
Start: 2024-10-21

## 2025-03-10 RX ORDER — CARVEDILOL 25 MG/1
25 TABLET ORAL 2 TIMES DAILY
Qty: 180 TABLET | Refills: 0 | Status: SHIPPED | OUTPATIENT
Start: 2025-03-10

## 2025-03-11 RX ORDER — FLUTICASONE PROPIONATE 50 MCG
2 SPRAY, SUSPENSION (ML) NASAL DAILY
Qty: 16 G | Refills: 5 | Status: SHIPPED | OUTPATIENT
Start: 2025-03-11

## 2025-04-02 ENCOUNTER — TELEPHONE (OUTPATIENT)
Dept: FAMILY MEDICINE CLINIC | Facility: CLINIC | Age: 40
End: 2025-04-02
Payer: MEDICAID

## 2025-04-02 DIAGNOSIS — I10 PRIMARY HYPERTENSION: ICD-10-CM

## 2025-04-02 DIAGNOSIS — N32.81 OVERACTIVE BLADDER: ICD-10-CM

## 2025-04-02 RX ORDER — PANTOPRAZOLE SODIUM 40 MG/1
40 TABLET, DELAYED RELEASE ORAL EVERY MORNING
Qty: 90 TABLET | Refills: 1 | Status: SHIPPED | OUTPATIENT
Start: 2025-04-02

## 2025-04-02 RX ORDER — TAMSULOSIN HYDROCHLORIDE 0.4 MG/1
1 CAPSULE ORAL
Qty: 90 CAPSULE | Refills: 1 | Status: SHIPPED | OUTPATIENT
Start: 2025-04-02

## 2025-04-02 RX ORDER — FUROSEMIDE 40 MG/1
TABLET ORAL
Qty: 180 TABLET | Refills: 1 | Status: SHIPPED | OUTPATIENT
Start: 2025-04-02

## 2025-04-02 RX ORDER — FERROUS SULFATE 325(65) MG
1 TABLET ORAL 2 TIMES DAILY
Qty: 180 TABLET | Refills: 1 | Status: SHIPPED | OUTPATIENT
Start: 2025-04-02

## 2025-04-02 RX ORDER — MONTELUKAST SODIUM 10 MG/1
10 TABLET ORAL
Qty: 30 TABLET | Refills: 11 | Status: SHIPPED | OUTPATIENT
Start: 2025-04-02

## 2025-04-02 RX ORDER — GLUCOSA SU 2KCL/CHONDROITIN SU 500-400 MG
1 CAPSULE ORAL DAILY
Qty: 90 EACH | Refills: 1 | Status: SHIPPED | OUTPATIENT
Start: 2025-04-02 | End: 2025-04-07 | Stop reason: SDUPTHER

## 2025-04-02 RX ORDER — OXYBUTYNIN CHLORIDE 5 MG/1
5 TABLET, EXTENDED RELEASE ORAL DAILY
Qty: 90 TABLET | Refills: 1 | Status: SHIPPED | OUTPATIENT
Start: 2025-04-02

## 2025-04-02 NOTE — TELEPHONE ENCOUNTER
LOV                   9/30/24  NOV                   4/22/2025  Last refill             4/7/24  Protocol              met

## 2025-04-03 ENCOUNTER — TELEPHONE (OUTPATIENT)
Dept: FAMILY MEDICINE CLINIC | Facility: CLINIC | Age: 40
End: 2025-04-03
Payer: MEDICAID

## 2025-04-03 NOTE — TELEPHONE ENCOUNTER
Spoke with Michela and she is going to fax it to us because we do not have the Form.  I told her I would let Dr Vasquez know about this and call her on Monday when he returns

## 2025-04-03 NOTE — TELEPHONE ENCOUNTER
Caller: FRANCE PUGH- EDMOND    Relationship:     Best call back number: [phone]     What orders are you requesting (i.e. lab or imaging): DC ORDER FOR ALBUTEROL         Additional notes: THERE WAS A FORM DROPPED OFF AT THE OFFICE ON 3.31.25 FOR THIS. JUST CHECKING STATUS?    PLEASE CALL

## 2025-04-07 RX ORDER — NIFEDIPINE 30 MG/1
30 TABLET, EXTENDED RELEASE ORAL
Qty: 90 TABLET | Refills: 1 | Status: SHIPPED | OUTPATIENT
Start: 2025-04-07

## 2025-04-07 RX ORDER — GLUCOSA SU 2KCL/CHONDROITIN SU 500-400 MG
1 CAPSULE ORAL DAILY
Qty: 90 EACH | Refills: 1 | Status: SHIPPED | OUTPATIENT
Start: 2025-04-07

## 2025-04-11 ENCOUNTER — TELEPHONE (OUTPATIENT)
Dept: FAMILY MEDICINE CLINIC | Facility: CLINIC | Age: 40
End: 2025-04-11
Payer: MEDICAID

## 2025-04-11 NOTE — TELEPHONE ENCOUNTER
Patient's Caretaker dropped off [employer] Protocol forms for patient's nebulizer solution for Dr. Vasquez to fill out. These have been scanned in the patient's chart.

## 2025-04-14 ENCOUNTER — TELEPHONE (OUTPATIENT)
Dept: FAMILY MEDICINE CLINIC | Facility: CLINIC | Age: 40
End: 2025-04-14

## 2025-04-14 NOTE — TELEPHONE ENCOUNTER
Caller: CHANTELLE    Relationship: Other      Best call back number: [phone]     What was the call regarding: THE SENT A PRN PROTOCOL FOR PCP AND WANT TO MAKE SURE IT WAS RECEIVED     PLEASE FAX -[phone]

## 2025-04-14 NOTE — TELEPHONE ENCOUNTER
Spoke with Saud and informed that we did get this and it is on Dr Chung desk and I would fax as soon as he gets them filed out

## 2025-04-15 ENCOUNTER — HOSPITAL ENCOUNTER (INPATIENT)
Facility: HOSPITAL | Age: 40
LOS: 2 days | Discharge: HOME | DRG: 682 | End: 2025-04-17
Attending: EMERGENCY MEDICINE | Admitting: INTERNAL MEDICINE
Payer: MEDICAID

## 2025-04-15 DIAGNOSIS — N17.9 ACUTE RENAL FAILURE SUPERIMPOSED ON STAGE 4 CHRONIC KIDNEY DISEASE, UNSPECIFIED ACUTE RENAL FAILURE TYPE: Primary | ICD-10-CM

## 2025-04-15 DIAGNOSIS — N39.0 ACUTE UTI (URINARY TRACT INFECTION): ICD-10-CM

## 2025-04-15 DIAGNOSIS — N18.4 ACUTE RENAL FAILURE SUPERIMPOSED ON STAGE 4 CHRONIC KIDNEY DISEASE, UNSPECIFIED ACUTE RENAL FAILURE TYPE: Primary | ICD-10-CM

## 2025-04-15 DIAGNOSIS — G93.41 METABOLIC ENCEPHALOPATHY: ICD-10-CM

## 2025-04-15 PROBLEM — R41.82 ALTERED MENTAL STATUS: Status: ACTIVE | Noted: 2025-04-15

## 2025-04-15 PROBLEM — N18.9 ACUTE ON CHRONIC RENAL FAILURE: Status: ACTIVE | Noted: 2025-04-15

## 2025-04-15 LAB
ALBUMIN SERPL-MCNC: 4.2 G/DL (ref 3.5–5.2)
ALBUMIN/GLOB SERPL: 1.2 G/DL
ALP SERPL-CCNC: 178 U/L (ref 39–117)
ALT SERPL W P-5'-P-CCNC: 20 U/L (ref 1–41)
ANION GAP SERPL CALCULATED.3IONS-SCNC: 16 MMOL/L (ref 5–15)
AST SERPL-CCNC: 24 U/L (ref 1–40)
BACTERIA UR QL AUTO: ABNORMAL /HPF
BASOPHILS # BLD AUTO: 0.02 10*3/MM3 (ref 0–0.2)
BASOPHILS NFR BLD AUTO: 0.3 % (ref 0–1.5)
BILIRUB SERPL-MCNC: 0.2 MG/DL (ref 0–1.2)
BILIRUB UR QL STRIP: NEGATIVE
BUN SERPL-MCNC: 77 MG/DL (ref 6–20)
BUN/CREAT SERPL: 11.1 (ref 7–25)
CALCIUM SPEC-SCNC: 9.8 MG/DL (ref 8.6–10.5)
CHLORIDE SERPL-SCNC: 97 MMOL/L (ref 98–107)
CLARITY UR: ABNORMAL
CO2 SERPL-SCNC: 23 MMOL/L (ref 22–29)
COLOR UR: YELLOW
CREAT SERPL-MCNC: 6.92 MG/DL (ref 0.76–1.27)
D-LACTATE SERPL-SCNC: 1.1 MMOL/L (ref 0.5–2)
DEPRECATED RDW RBC AUTO: 44.3 FL (ref 37–54)
EGFRCR SERPLBLD CKD-EPI 2021: 9.8 ML/MIN/1.73
EOSINOPHIL # BLD AUTO: 0.01 10*3/MM3 (ref 0–0.4)
EOSINOPHIL NFR BLD AUTO: 0.2 % (ref 0.3–6.2)
ERYTHROCYTE [DISTWIDTH] IN BLOOD BY AUTOMATED COUNT: 15.8 % (ref 12.3–15.4)
FERRITIN SERPL-MCNC: 2177 NG/ML (ref 30–400)
GLOBULIN UR ELPH-MCNC: 3.6 GM/DL
GLUCOSE SERPL-MCNC: 82 MG/DL (ref 65–99)
GLUCOSE UR STRIP-MCNC: NEGATIVE MG/DL
HBV SURFACE AG SERPL QL IA: NORMAL
HCT VFR BLD AUTO: 33.8 % (ref 37.5–51)
HGB BLD-MCNC: 10.8 G/DL (ref 13–17.7)
HGB UR QL STRIP.AUTO: ABNORMAL
HOLD SPECIMEN: NORMAL
HOLD SPECIMEN: NORMAL
HYALINE CASTS UR QL AUTO: ABNORMAL /LPF
IMM GRANULOCYTES # BLD AUTO: 0.03 10*3/MM3 (ref 0–0.05)
IMM GRANULOCYTES NFR BLD AUTO: 0.5 % (ref 0–0.5)
IRON 24H UR-MRATE: 168 MCG/DL (ref 59–158)
IRON SATN MFR SERPL: 46 % (ref 20–50)
KETONES UR QL STRIP: NEGATIVE
LEUKOCYTE ESTERASE UR QL STRIP.AUTO: ABNORMAL
LYMPHOCYTES # BLD AUTO: 2.72 10*3/MM3 (ref 0.7–3.1)
LYMPHOCYTES NFR BLD AUTO: 46.3 % (ref 19.6–45.3)
MCH RBC QN AUTO: 25.1 PG (ref 26.6–33)
MCHC RBC AUTO-ENTMCNC: 32 G/DL (ref 31.5–35.7)
MCV RBC AUTO: 78.6 FL (ref 79–97)
MONOCYTES # BLD AUTO: 0.53 10*3/MM3 (ref 0.1–0.9)
MONOCYTES NFR BLD AUTO: 9 % (ref 5–12)
NEUTROPHILS NFR BLD AUTO: 2.56 10*3/MM3 (ref 1.7–7)
NEUTROPHILS NFR BLD AUTO: 43.7 % (ref 42.7–76)
NITRITE UR QL STRIP: POSITIVE
NRBC BLD AUTO-RTO: 0 /100 WBC (ref 0–0.2)
PH UR STRIP.AUTO: 6 [PH] (ref 5–8)
PHOSPHATE SERPL-MCNC: 5.4 MG/DL (ref 2.5–4.5)
PLATELET # BLD AUTO: 219 10*3/MM3 (ref 140–450)
PMV BLD AUTO: 11.9 FL (ref 6–12)
POTASSIUM SERPL-SCNC: 3.9 MMOL/L (ref 3.5–5.2)
PROT SERPL-MCNC: 7.8 G/DL (ref 6–8.5)
PROT UR QL STRIP: ABNORMAL
RBC # BLD AUTO: 4.3 10*6/MM3 (ref 4.14–5.8)
RBC # UR STRIP: ABNORMAL /HPF
REF LAB TEST METHOD: ABNORMAL
SODIUM SERPL-SCNC: 136 MMOL/L (ref 136–145)
SP GR UR STRIP: 1.01 (ref 1–1.03)
SQUAMOUS #/AREA URNS HPF: ABNORMAL /HPF
TIBC SERPL-MCNC: 362 MCG/DL (ref 298–536)
TRANSFERRIN SERPL-MCNC: 243 MG/DL (ref 200–360)
UROBILINOGEN UR QL STRIP: ABNORMAL
WBC # UR STRIP: ABNORMAL /HPF
WBC NRBC COR # BLD AUTO: 5.87 10*3/MM3 (ref 3.4–10.8)
WHOLE BLOOD HOLD SPECIMEN: NORMAL

## 2025-04-15 PROCEDURE — 83605 ASSAY OF LACTIC ACID: CPT | Performed by: PHYSICIAN ASSISTANT

## 2025-04-15 PROCEDURE — 82728 ASSAY OF FERRITIN: CPT | Performed by: INTERNAL MEDICINE

## 2025-04-15 PROCEDURE — 83540 ASSAY OF IRON: CPT | Performed by: INTERNAL MEDICINE

## 2025-04-15 PROCEDURE — 84466 ASSAY OF TRANSFERRIN: CPT | Performed by: INTERNAL MEDICINE

## 2025-04-15 PROCEDURE — 85025 COMPLETE CBC W/AUTO DIFF WBC: CPT | Performed by: PHYSICIAN ASSISTANT

## 2025-04-15 PROCEDURE — 87086 URINE CULTURE/COLONY COUNT: CPT | Performed by: INTERNAL MEDICINE

## 2025-04-15 PROCEDURE — 36415 COLL VENOUS BLD VENIPUNCTURE: CPT

## 2025-04-15 PROCEDURE — 99285 EMERGENCY DEPT VISIT HI MDM: CPT

## 2025-04-15 PROCEDURE — 87088 URINE BACTERIA CULTURE: CPT | Performed by: INTERNAL MEDICINE

## 2025-04-15 PROCEDURE — 36415 COLL VENOUS BLD VENIPUNCTURE: CPT | Performed by: PHYSICIAN ASSISTANT

## 2025-04-15 PROCEDURE — 87040 BLOOD CULTURE FOR BACTERIA: CPT | Performed by: PHYSICIAN ASSISTANT

## 2025-04-15 PROCEDURE — 25010000002 CEFTRIAXONE PER 250 MG: Performed by: INTERNAL MEDICINE

## 2025-04-15 PROCEDURE — 81001 URINALYSIS AUTO W/SCOPE: CPT | Performed by: PHYSICIAN ASSISTANT

## 2025-04-15 PROCEDURE — 87340 HEPATITIS B SURFACE AG IA: CPT | Performed by: INTERNAL MEDICINE

## 2025-04-15 PROCEDURE — 84100 ASSAY OF PHOSPHORUS: CPT | Performed by: INTERNAL MEDICINE

## 2025-04-15 PROCEDURE — 87186 SC STD MICRODIL/AGAR DIL: CPT | Performed by: INTERNAL MEDICINE

## 2025-04-15 PROCEDURE — 80053 COMPREHEN METABOLIC PANEL: CPT | Performed by: PHYSICIAN ASSISTANT

## 2025-04-15 RX ORDER — CARVEDILOL 25 MG/1
25 TABLET ORAL 2 TIMES DAILY WITH MEALS
Status: DISCONTINUED | OUTPATIENT
Start: 2025-04-15 | End: 2025-04-15

## 2025-04-15 RX ORDER — DIPHENOXYLATE HYDROCHLORIDE AND ATROPINE SULFATE 2.5; .025 MG/1; MG/1
1 TABLET ORAL DAILY
Status: DISCONTINUED | OUTPATIENT
Start: 2025-04-15 | End: 2025-04-16

## 2025-04-15 RX ORDER — SODIUM CHLORIDE 9 MG/ML
40 INJECTION, SOLUTION INTRAVENOUS AS NEEDED
Status: DISCONTINUED | OUTPATIENT
Start: 2025-04-15 | End: 2025-04-17 | Stop reason: HOSPADM

## 2025-04-15 RX ORDER — FERROUS SULFATE 325(65) MG
325 TABLET ORAL 2 TIMES DAILY
Status: DISCONTINUED | OUTPATIENT
Start: 2025-04-15 | End: 2025-04-17 | Stop reason: HOSPADM

## 2025-04-15 RX ORDER — SODIUM CHLORIDE 9 MG/ML
100 INJECTION, SOLUTION INTRAVENOUS CONTINUOUS
Status: DISCONTINUED | OUTPATIENT
Start: 2025-04-15 | End: 2025-04-15

## 2025-04-15 RX ORDER — CETIRIZINE HYDROCHLORIDE 10 MG/1
10 TABLET ORAL DAILY
Status: DISCONTINUED | OUTPATIENT
Start: 2025-04-15 | End: 2025-04-17 | Stop reason: HOSPADM

## 2025-04-15 RX ORDER — DIVALPROEX SODIUM 500 MG/1
1000 TABLET, FILM COATED, EXTENDED RELEASE ORAL NIGHTLY
Status: DISCONTINUED | OUTPATIENT
Start: 2025-04-15 | End: 2025-04-17 | Stop reason: HOSPADM

## 2025-04-15 RX ORDER — AMMONIUM LACTATE 12 G/100G
CREAM TOPICAL AS NEEDED
Status: DISCONTINUED | OUTPATIENT
Start: 2025-04-15 | End: 2025-04-17 | Stop reason: HOSPADM

## 2025-04-15 RX ORDER — CARVEDILOL 25 MG/1
25 TABLET ORAL 2 TIMES DAILY WITH MEALS
Status: DISCONTINUED | OUTPATIENT
Start: 2025-04-15 | End: 2025-04-17 | Stop reason: HOSPADM

## 2025-04-15 RX ORDER — SODIUM CHLORIDE 0.9 % (FLUSH) 0.9 %
10 SYRINGE (ML) INJECTION EVERY 12 HOURS SCHEDULED
Status: DISCONTINUED | OUTPATIENT
Start: 2025-04-15 | End: 2025-04-17 | Stop reason: HOSPADM

## 2025-04-15 RX ORDER — HYDRALAZINE HYDROCHLORIDE 25 MG/1
25 TABLET, FILM COATED ORAL EVERY 8 HOURS SCHEDULED
Status: DISCONTINUED | OUTPATIENT
Start: 2025-04-15 | End: 2025-04-16

## 2025-04-15 RX ORDER — FUROSEMIDE 40 MG/1
40 TABLET ORAL
Status: DISCONTINUED | OUTPATIENT
Start: 2025-04-16 | End: 2025-04-16

## 2025-04-15 RX ORDER — NITROGLYCERIN 0.4 MG/1
0.4 TABLET SUBLINGUAL
Status: DISCONTINUED | OUTPATIENT
Start: 2025-04-15 | End: 2025-04-17 | Stop reason: HOSPADM

## 2025-04-15 RX ORDER — SODIUM CHLORIDE 0.9 % (FLUSH) 0.9 %
10 SYRINGE (ML) INJECTION AS NEEDED
Status: DISCONTINUED | OUTPATIENT
Start: 2025-04-15 | End: 2025-04-17 | Stop reason: HOSPADM

## 2025-04-15 RX ORDER — TAMSULOSIN HYDROCHLORIDE 0.4 MG/1
0.4 CAPSULE ORAL DAILY
Status: DISCONTINUED | OUTPATIENT
Start: 2025-04-15 | End: 2025-04-17 | Stop reason: HOSPADM

## 2025-04-15 RX ORDER — LEVOTHYROXINE SODIUM 75 UG/1
75 TABLET ORAL DAILY
Status: DISCONTINUED | OUTPATIENT
Start: 2025-04-15 | End: 2025-04-17 | Stop reason: HOSPADM

## 2025-04-15 RX ORDER — ALLOPURINOL 100 MG/1
100 TABLET ORAL DAILY
Status: DISCONTINUED | OUTPATIENT
Start: 2025-04-15 | End: 2025-04-17 | Stop reason: HOSPADM

## 2025-04-15 RX ORDER — OXYBUTYNIN CHLORIDE 5 MG/1
5 TABLET, EXTENDED RELEASE ORAL DAILY
Status: DISCONTINUED | OUTPATIENT
Start: 2025-04-15 | End: 2025-04-17 | Stop reason: HOSPADM

## 2025-04-15 RX ORDER — HYDRALAZINE HYDROCHLORIDE 25 MG/1
25 TABLET, FILM COATED ORAL EVERY 8 HOURS SCHEDULED
Status: DISCONTINUED | OUTPATIENT
Start: 2025-04-15 | End: 2025-04-15

## 2025-04-15 RX ORDER — DIVALPROEX SODIUM 500 MG/1
500 TABLET, FILM COATED, EXTENDED RELEASE ORAL
Status: DISCONTINUED | OUTPATIENT
Start: 2025-04-16 | End: 2025-04-17 | Stop reason: HOSPADM

## 2025-04-15 RX ORDER — MONTELUKAST SODIUM 10 MG/1
10 TABLET ORAL NIGHTLY
Status: DISCONTINUED | OUTPATIENT
Start: 2025-04-15 | End: 2025-04-17 | Stop reason: HOSPADM

## 2025-04-15 RX ORDER — MULTIPLE VITAMINS W/ MINERALS TAB 9MG-400MCG
1 TAB ORAL DAILY
Status: DISCONTINUED | OUTPATIENT
Start: 2025-04-15 | End: 2025-04-17 | Stop reason: HOSPADM

## 2025-04-15 RX ORDER — ONDANSETRON 4 MG/1
4 TABLET, ORALLY DISINTEGRATING ORAL EVERY 6 HOURS PRN
Status: DISCONTINUED | OUTPATIENT
Start: 2025-04-15 | End: 2025-04-17 | Stop reason: HOSPADM

## 2025-04-15 RX ORDER — ALBUTEROL SULFATE 0.83 MG/ML
2.5 SOLUTION RESPIRATORY (INHALATION) EVERY 4 HOURS PRN
Status: DISCONTINUED | OUTPATIENT
Start: 2025-04-15 | End: 2025-04-17 | Stop reason: HOSPADM

## 2025-04-15 RX ORDER — ONDANSETRON 2 MG/ML
4 INJECTION INTRAMUSCULAR; INTRAVENOUS EVERY 6 HOURS PRN
Status: DISCONTINUED | OUTPATIENT
Start: 2025-04-15 | End: 2025-04-17 | Stop reason: HOSPADM

## 2025-04-15 RX ORDER — FLUTICASONE PROPIONATE 50 MCG
2 SPRAY, SUSPENSION (ML) NASAL DAILY
Status: DISCONTINUED | OUTPATIENT
Start: 2025-04-15 | End: 2025-04-17 | Stop reason: HOSPADM

## 2025-04-15 RX ORDER — CARVEDILOL 25 MG/1
25 TABLET ORAL 2 TIMES DAILY WITH MEALS
Status: DISCONTINUED | OUTPATIENT
Start: 2025-04-16 | End: 2025-04-15

## 2025-04-15 RX ORDER — ACETAMINOPHEN 325 MG/1
650 TABLET ORAL EVERY 6 HOURS PRN
Status: DISCONTINUED | OUTPATIENT
Start: 2025-04-15 | End: 2025-04-17 | Stop reason: HOSPADM

## 2025-04-15 RX ORDER — SODIUM CHLORIDE 9 MG/ML
100 INJECTION, SOLUTION INTRAVENOUS CONTINUOUS
Status: DISCONTINUED | OUTPATIENT
Start: 2025-04-15 | End: 2025-04-16

## 2025-04-15 RX ORDER — NIFEDIPINE 30 MG/1
30 TABLET, EXTENDED RELEASE ORAL
Status: DISCONTINUED | OUTPATIENT
Start: 2025-04-15 | End: 2025-04-16

## 2025-04-15 RX ORDER — PANTOPRAZOLE SODIUM 40 MG/1
40 TABLET, DELAYED RELEASE ORAL EVERY MORNING
Status: DISCONTINUED | OUTPATIENT
Start: 2025-04-16 | End: 2025-04-17 | Stop reason: HOSPADM

## 2025-04-15 RX ADMIN — Medication 1 TABLET: at 19:35

## 2025-04-15 RX ADMIN — DIVALPROEX SODIUM 1000 MG: 500 TABLET, EXTENDED RELEASE ORAL at 20:44

## 2025-04-15 RX ADMIN — NIFEDIPINE 30 MG: 30 TABLET, EXTENDED RELEASE ORAL at 19:34

## 2025-04-15 RX ADMIN — LEVOTHYROXINE SODIUM 75 MCG: 0.07 TABLET ORAL at 19:35

## 2025-04-15 RX ADMIN — TAMSULOSIN HYDROCHLORIDE 0.4 MG: 0.4 CAPSULE ORAL at 19:35

## 2025-04-15 RX ADMIN — OXYBUTYNIN CHLORIDE 5 MG: 5 TABLET, EXTENDED RELEASE ORAL at 19:35

## 2025-04-15 RX ADMIN — CARVEDILOL 25 MG: 25 TABLET, FILM COATED ORAL at 19:35

## 2025-04-15 RX ADMIN — Medication 10 ML: at 20:44

## 2025-04-15 RX ADMIN — THERA TABS 1 TABLET: TAB at 19:35

## 2025-04-15 RX ADMIN — FERROUS SULFATE TAB 325 MG (65 MG ELEMENTAL FE) 325 MG: 325 (65 FE) TAB at 20:44

## 2025-04-15 RX ADMIN — CETIRIZINE HYDROCHLORIDE 10 MG: 10 TABLET ORAL at 19:35

## 2025-04-15 RX ADMIN — ALLOPURINOL 100 MG: 100 TABLET ORAL at 19:35

## 2025-04-15 RX ADMIN — CEFTRIAXONE SODIUM 1000 MG: 1 INJECTION, POWDER, FOR SOLUTION INTRAMUSCULAR; INTRAVENOUS at 15:40

## 2025-04-15 RX ADMIN — MONTELUKAST 10 MG: 10 TABLET, FILM COATED ORAL at 20:44

## 2025-04-15 RX ADMIN — SODIUM CHLORIDE 100 ML/HR: 9 INJECTION, SOLUTION INTRAVENOUS at 16:00

## 2025-04-15 RX ADMIN — HYDRALAZINE HYDROCHLORIDE 25 MG: 25 TABLET ORAL at 20:44

## 2025-04-15 RX ADMIN — OLANZAPINE 20 MG: 7.5 TABLET, FILM COATED ORAL at 20:44

## 2025-04-15 NOTE — ED PROVIDER NOTES
EMERGENCY DEPARTMENT ENCOUNTER      Room Number:  06/06  PCP: Mikael Vasquez MD  Independent Historians: Caregiver  Patient Care Team:  Mikael Vasquez MD as PCP - General (Internal Medicine)  Arthur Phillips MD as Consulting Physician (Nephrology)       HPI:    Chief Complaint: Abnormal labs    A complete HPI/ROS/PMH/PSH/SH/FH are unobtainable due to: Poor historian    Chronic or social conditions impacting patient care (Social Determinants of Health): None      Context: Arthur Mccarthy is a 39 y.o. male with a PMH significant for intellectual disability, chronic kidney disease, GERD, asthma, impulse control disorder who presents to the ED c/o acute abnormal labs.  The patient is unable to provide any history but the caregiver at the bedside states that he has been somewhat confused and fatigued over the past couple of days and had a recent evaluation with nephrology which revealed worsening kidney function and possible urinary tract infection.  There has been no fever, chills.  The patient has seemed somewhat combative to the caregiver at times recently as well.  No falls or injuries to the head or neck.      Upon review of prior external notes (non-ED) -and- Review of prior external test results outside of this encounter it appears the patient was evaluated in the office with nephrology for chronic kidney disease on March 24, 2025.  The patient had a normal uric acid on 4/11/25 and a normal vitamin D at that time.      PAST MEDICAL HISTORY  Active Ambulatory Problems     Diagnosis Date Noted    Intellectual disability 10/15/2019    Seasonal allergies 10/15/2019    Hypertension 10/15/2019    Seizure disorder 10/15/2019    Stage 3b chronic kidney disease 10/15/2019    Hyperlipidemia 10/15/2019    Annual physical exam 01/22/2020    Hypothyroidism 01/22/2020    GERD (gastroesophageal reflux disease) 01/22/2020    Asthma 01/22/2020    Constipation 01/24/2020    Hypertriglyceridemia 01/24/2020     Acute urinary tract infection 09/05/2021    JORDEN (acute kidney injury) 09/05/2021    Urine retention 09/08/2021    Hospital discharge follow-up 09/29/2021    Secondary hyperparathyroidism 11/03/2021    Vitamin D deficiency 11/03/2021    Impulse control disorder 05/20/2015    Chronic kidney disease, stage 4 (severe) 07/13/2022    Renal insufficiency 10/18/2022    Generalized edema 10/18/2022    Acute on chronic kidney failure 03/11/2023    S/P arteriovenous (AV) fistula creation 11/11/2022    Acute infective cystitis 03/11/2023    Severe sepsis 03/11/2023    Overactive bladder 05/16/2023     Resolved Ambulatory Problems     Diagnosis Date Noted    Sepsis without acute organ dysfunction (present on admission) 09/09/2021     Past Medical History:   Diagnosis Date    Acquired intellectual disability     Allergic rhinitis     Chronic kidney disease (CKD)     Chronic renal disease, stage 3, moderately decreased glomerular filtration rate between 30-59 mL/min/1.73 square meter     Epilepsy, generalized, convulsive     Episode of altered consciousness     Essential hypertension     Hematuria     Hyperkalemia     Immunization due     Metabolic encephalopathy     Moderate intellectual disabilities     Mood disorder     Nocturia     Physical exam, annual     Seizure          PAST SURGICAL HISTORY  No past surgical history on file.      FAMILY HISTORY  Family History   Problem Relation Age of Onset    Down syndrome Brother     No Known Problems Other          SOCIAL HISTORY  Social History     Socioeconomic History    Marital status: Single   Tobacco Use    Smoking status: Never    Smokeless tobacco: Never   Vaping Use    Vaping status: Never Used   Substance and Sexual Activity    Alcohol use: No    Drug use: Defer    Sexual activity: Defer         ALLERGIES  Doxycycline      REVIEW OF SYSTEMS  Included in HPI  All systems reviewed and negative except for those discussed in HPI.      PHYSICAL EXAM    I have reviewed the  triage vital signs and nursing notes.    ED Triage Vitals [04/15/25 1342]   Temp Heart Rate Resp BP SpO2   97.5 °F (36.4 °C) 83 16 (!) 149/107 100 %      Temp src Heart Rate Source Patient Position BP Location FiO2 (%)   Tympanic Monitor Sitting -- --       Physical Exam  Constitutional:       General: He is not in acute distress.     Appearance: He is well-developed.   HENT:      Head: Normocephalic and atraumatic.   Eyes:      General: No scleral icterus.     Conjunctiva/sclera: Conjunctivae normal.   Neck:      Trachea: No tracheal deviation.   Cardiovascular:      Rate and Rhythm: Normal rate and regular rhythm.      Comments: Fistula to the right upper extremity with faint thrill  Pulmonary:      Effort: Pulmonary effort is normal.      Breath sounds: Normal breath sounds.   Abdominal:      Palpations: Abdomen is soft.      Tenderness: There is no abdominal tenderness. There is no guarding.   Musculoskeletal:         General: No deformity.      Cervical back: Normal range of motion.   Lymphadenopathy:      Cervical: No cervical adenopathy.   Skin:     General: Skin is warm and dry.   Neurological:      Mental Status: He is alert. Mental status is at baseline.   Psychiatric:         Behavior: Behavior normal.         Cognition and Memory: Cognition is impaired. Memory is impaired.         Vital signs and nursing notes reviewed.      PPE: I wore a surgical mask throughout my encounters with the pt. I performed hand hygiene on entry into the pt room and upon exit.     LAB RESULTS  Recent Results (from the past 24 hours)   Green Top (Gel)    Collection Time: 04/15/25  1:58 PM   Result Value Ref Range    Extra Tube Hold for add-ons.    Lavender Top    Collection Time: 04/15/25  1:58 PM   Result Value Ref Range    Extra Tube hold for add-on    Gold Top - SST    Collection Time: 04/15/25  1:58 PM   Result Value Ref Range    Extra Tube Hold for add-ons.    Comprehensive Metabolic Panel    Collection Time: 04/15/25   1:58 PM    Specimen: Blood   Result Value Ref Range    Glucose 82 65 - 99 mg/dL    BUN 77 (H) 6 - 20 mg/dL    Creatinine 6.92 (H) 0.76 - 1.27 mg/dL    Sodium 136 136 - 145 mmol/L    Potassium 3.9 3.5 - 5.2 mmol/L    Chloride 97 (L) 98 - 107 mmol/L    CO2 23.0 22.0 - 29.0 mmol/L    Calcium 9.8 8.6 - 10.5 mg/dL    Total Protein 7.8 6.0 - 8.5 g/dL    Albumin 4.2 3.5 - 5.2 g/dL    ALT (SGPT) 20 1 - 41 U/L    AST (SGOT) 24 1 - 40 U/L    Alkaline Phosphatase 178 (H) 39 - 117 U/L    Total Bilirubin 0.2 0.0 - 1.2 mg/dL    Globulin 3.6 gm/dL    A/G Ratio 1.2 g/dL    BUN/Creatinine Ratio 11.1 7.0 - 25.0    Anion Gap 16.0 (H) 5.0 - 15.0 mmol/L    eGFR 9.8 (L) >60.0 mL/min/1.73   CBC Auto Differential    Collection Time: 04/15/25  1:58 PM    Specimen: Blood   Result Value Ref Range    WBC 5.87 3.40 - 10.80 10*3/mm3    RBC 4.30 4.14 - 5.80 10*6/mm3    Hemoglobin 10.8 (L) 13.0 - 17.7 g/dL    Hematocrit 33.8 (L) 37.5 - 51.0 %    MCV 78.6 (L) 79.0 - 97.0 fL    MCH 25.1 (L) 26.6 - 33.0 pg    MCHC 32.0 31.5 - 35.7 g/dL    RDW 15.8 (H) 12.3 - 15.4 %    RDW-SD 44.3 37.0 - 54.0 fl    MPV 11.9 6.0 - 12.0 fL    Platelets 219 140 - 450 10*3/mm3    Neutrophil % 43.7 42.7 - 76.0 %    Lymphocyte % 46.3 (H) 19.6 - 45.3 %    Monocyte % 9.0 5.0 - 12.0 %    Eosinophil % 0.2 (L) 0.3 - 6.2 %    Basophil % 0.3 0.0 - 1.5 %    Immature Grans % 0.5 0.0 - 0.5 %    Neutrophils, Absolute 2.56 1.70 - 7.00 10*3/mm3    Lymphocytes, Absolute 2.72 0.70 - 3.10 10*3/mm3    Monocytes, Absolute 0.53 0.10 - 0.90 10*3/mm3    Eosinophils, Absolute 0.01 0.00 - 0.40 10*3/mm3    Basophils, Absolute 0.02 0.00 - 0.20 10*3/mm3    Immature Grans, Absolute 0.03 0.00 - 0.05 10*3/mm3    nRBC 0.0 0.0 - 0.2 /100 WBC   Hepatitis B Surface Antigen    Collection Time: 04/15/25  1:58 PM    Specimen: Blood   Result Value Ref Range    Hepatitis B Surface Ag Non-Reactive Non-Reactive   Iron Profile    Collection Time: 04/15/25  1:58 PM    Specimen: Blood   Result Value Ref Range     Iron 168 (H) 59 - 158 mcg/dL    Iron Saturation (TSAT) 46 20 - 50 %    Transferrin 243 200 - 360 mg/dL    TIBC 362 298 - 536 mcg/dL   Phosphorus    Collection Time: 04/15/25  1:58 PM    Specimen: Blood   Result Value Ref Range    Phosphorus 5.4 (H) 2.5 - 4.5 mg/dL   Lactic Acid, Plasma    Collection Time: 04/15/25  2:41 PM    Specimen: Arm, Left; Blood   Result Value Ref Range    Lactate 1.1 0.5 - 2.0 mmol/L   Urinalysis With Microscopic If Indicated (No Culture) - Urine, Clean Catch    Collection Time: 04/15/25  2:56 PM    Specimen: Urine, Clean Catch   Result Value Ref Range    Color, UA Yellow Yellow, Straw    Appearance, UA Cloudy (A) Clear    pH, UA 6.0 5.0 - 8.0    Specific Gravity, UA 1.008 1.005 - 1.030    Glucose, UA Negative Negative    Ketones, UA Negative Negative    Bilirubin, UA Negative Negative    Blood, UA Trace (A) Negative    Protein,  mg/dL (2+) (A) Negative    Leuk Esterase, UA Large (3+) (A) Negative    Nitrite, UA Positive (A) Negative    Urobilinogen, UA 0.2 E.U./dL 0.2 - 1.0 E.U./dL   Urinalysis, Microscopic Only - Urine, Clean Catch    Collection Time: 04/15/25  2:56 PM    Specimen: Urine, Clean Catch   Result Value Ref Range    RBC, UA 0-2 None Seen, 0-2 /HPF    WBC, UA Too Numerous to Count (A) None Seen, 0-2 /HPF    Bacteria, UA 4+ (A) None Seen /HPF    Squamous Epithelial Cells, UA 0-2 None Seen, 0-2 /HPF    Hyaline Casts, UA None Seen None Seen /LPF    Methodology Automated Microscopy          RADIOLOGY  No Radiology Exams Resulted Within Past 24 Hours      MEDICATIONS GIVEN IN ER  Medications   sodium chloride 0.9 % flush 10 mL (has no administration in time range)   sodium chloride 0.9 % flush 10 mL (has no administration in time range)   carvedilol (COREG) tablet 25 mg (has no administration in time range)   hydrALAZINE (APRESOLINE) tablet 25 mg (has no administration in time range)   NIFEdipine XL (PROCARDIA XL) 24 hr tablet 30 mg (has no administration in time range)    cefTRIAXone (ROCEPHIN) 1,000 mg in sodium chloride 0.9 % 100 mL MBP (1,000 mg Intravenous New Bag 4/15/25 1540)         ORDERS PLACED DURING THIS VISIT:  Orders Placed This Encounter   Procedures    Blood Culture - Blood,    Blood Culture - Blood,    Urine Culture - Urine, Urine, Clean Catch    Urine Culture - Urine,    [facility] Draw    Comprehensive Metabolic Panel    Urinalysis With Microscopic If Indicated (No Culture) - Urine, Clean Catch    Lactic Acid, Plasma    CBC Auto Differential    Urinalysis, Microscopic Only - Urine, Clean Catch    Renal Function Panel    Hepatitis B Surface Antigen    Iron Profile    Ferritin    Phosphorus    Diet: Regular/House; Fluid Consistency: Thin (IDDSI 0)    Daily Weights    Strict Intake & Output    Stand to weigh daily.  Start today .  Nursing Communication    Nephrology (on -call MD unless specified)    [facility] (on-call MD unless specified) Details    Insert peripheral IV    Insert Peripheral IV    Inpatient Admission    Green Top (Gel)    Lavender Top    Gold Top - SST    Light Blue Top    CBC & Differential         OUTPATIENT MEDICATION MANAGEMENT:  Current Facility-Administered Medications Ordered in Epic   Medication Dose Route Frequency Provider Last Rate Last Admin    carvedilol (COREG) tablet 25 mg  25 mg Oral BID With Meals Fernanda Verma MD        cefTRIAXone (ROCEPHIN) 1,000 mg in sodium chloride 0.9 % 100 mL MBP  1,000 mg Intravenous Q24H Fernanda Verma  mL/hr at 04/15/25 1540 1,000 mg at 04/15/25 1540    hydrALAZINE (APRESOLINE) tablet 25 mg  25 mg Oral Q8H Fernanda Verma MD        NIFEdipine XL (PROCARDIA XL) 24 hr tablet 30 mg  30 mg Oral Q24H Fernanda Verma MD        sodium chloride 0.9 % flush 10 mL  10 mL Intravenous PRN Drake French MD        sodium chloride 0.9 % flush 10 mL  10 mL Intravenous PRN Donn Wilson PA         Current Outpatient Medications Ordered in Epic   Medication Sig Dispense Refill     acetaminophen (TYLENOL) 325 MG tablet TAKE TWO TABLETS BY MOUTH EVERY 6 HOURS AS NEEDED FEVER 50 tablet 1    albuterol (PROVENTIL/VENTOLIN) nebulization syringe Take  by nebulization Every 4 (Four) Hours As Needed for Wheezing. Every 4-6 hrs prn wheezing      albuterol sulfate  (90 Base) MCG/ACT inhaler Inhale 2 puffs Every 6 (Six) Hours As Needed for Wheezing.      allopurinol (ZYLOPRIM) 100 MG tablet Take 1 tablet by mouth Daily.      ammonium lactate (LAC-HYDRIN) 12 % lotion Apply  topically to the appropriate area as directed.      carvedilol (COREG) 25 MG tablet Take 1 tablet by mouth 2 (Two) Times a Day. 180 tablet 0    divalproex (DEPAKOTE ER) 500 MG 24 hr tablet Take 1 tablet by mouth Every Morning Before Breakfast AND 2 tablets every night at bedtime. 270 tablet 1    ferrous sulfate (FeroSul) 325 (65 FE) MG tablet Take 1 tablet by mouth 2 (Two) Times a Day. 180 tablet 1    fluticasone (FLONASE) 50 MCG/ACT nasal spray USE TWO SPRAYS IN EACH NOSTRIL DAILY 16 g 5    furosemide (LASIX) 40 MG tablet Take 1 tablet by mouth Every Morning Before Breakfast AND 1 tablet Daily With Lunch. TAKE ONE TABLET BY MOUTH TWICE DAILY MORNING AND NOON 180 tablet 1    hydrALAZINE (APRESOLINE) 25 MG tablet Take 1 tablet by mouth 3 (Three) Times a Day. 270 tablet 1    levothyroxine (SYNTHROID, LEVOTHROID) 75 MCG tablet Take 1 tablet by mouth Daily. 90 tablet 1    loratadine (CLARITIN) 10 MG tablet TAKE ONE TABLET BY MOUTH DAILY 90 tablet 3    montelukast (SINGULAIR) 10 MG tablet TAKE ONE TABLET BY MOUTH AT BEDTIME 30 tablet 11    multivitamin (Thera) tablet tablet TAKE ONE TABLET BY MOUTH DAILY 30 tablet 0    multivitamin with minerals (Therapeutic-M) tablet tablet Take 1 tablet by mouth Daily. 90 each 1    NIFEdipine XL (PROCARDIA XL) 30 MG 24 hr tablet Take 1 tablet by mouth every night at bedtime. 90 tablet 1    O2 (OXYGEN) Inhale 2 L/min Every Night. (Patient not taking: Reported on 9/30/2024)      OLANZapine (zyPREXA)  20 MG tablet Take 1 tablet by mouth every night at bedtime. 90 tablet 1    oxybutynin XL (DITROPAN-XL) 5 MG 24 hr tablet Take 1 tablet by mouth Daily. 90 tablet 1    pantoprazole (PROTONIX) 40 MG EC tablet Take 1 tablet by mouth Every Morning. 90 tablet 1    tamsulosin (FLOMAX) 0.4 MG capsule 24 hr capsule Take 1 capsule by mouth every night at bedtime. 90 capsule 1              PROGRESS, DATA ANALYSIS, CONSULTS, AND MEDICAL DECISION MAKING  All labs have been independently interpreted by me.  All radiology studies have been reviewed by me. All EKG's have been independently viewed and interpreted by me.  Discussion below represents my analysis of pertinent findings related to patient's condition, differential diagnosis, treatment plan and final disposition.    Patient presentation and evaluation most consistent with acute on chronic renal failure requiring admission for nephrology consultation.    DIFFERENTIAL DIAGNOSIS INCLUDE BUT NOT LIMITED TO:     Renal failure, UTI, sepsis    Clinical Scores: N/A      ED Course as of 04/15/25 1549   Tue Apr 15, 2025   1424 On results review, the patient had a white blood cell count of 13.7 and creatinine of 6.9 four days ago. [DC]   1451 Creatinine(!): 6.92  Creatinine at 6.92 today.  This is up from 5.7 on 10/22, 5.56 on 3/24, and 6.9 on 4/11. [DC]   1459 WBC: 5.87 [DC]   1516 I discussed the case with MD Bayron with Nephrology at this time regarding the patient.  I discussed work-up, results, concerns.  I discussed the consulting provider's desire for the hospitalist service and agrees to consult.   [DC]   1548 I discussed the case with MD sylvain with [facility] at this time regarding the patient.  I discussed work-up, results, concerns.  I discussed the consulting provider's desire for med surg admit.    [DC]      ED Course User Index  [DC] Donn Wilson, PA            1517 I rechecked the patient.  I discussed the patient's labs, radiology findings (including all incidental  findings), diagnosis, and plan for admission. The patient understands and agrees with the plan.      AS OF 15:49 EDT VITALS:    BP - (!) 148/105  HR - 73  TEMP - 97.5 °F (36.4 °C) (Tympanic)  O2 SATS - 96%    COMPLEXITY OF CARE  The patient requires admission.      DIAGNOSIS  Final diagnoses:   Acute renal failure superimposed on stage 4 chronic kidney disease, unspecified acute renal failure type   Metabolic encephalopathy   Acute UTI (urinary tract infection)         DISPOSITION  ED Disposition       ED Disposition   Decision to Admit    Condition   --    Comment   Level of Care: Med/Surg [1]   Diagnosis: Acute on chronic renal failure [817965]   Admitting Physician: ETTA ESTEBAN [[id number]]   Attending Physician: ETTA ESTEBAN [[id number]]   Certification: I Certify That Inpatient Hospital Services Are Medically Necessary For Greater Than 2 Midnights                  Please note that portions of this document were completed with a voice recognition program.    Note Disclaimer: At [facility], we believe that sharing information builds trust and better relationships. You are receiving this note because you recently visited [facility]. It is possible you will see health information before a provider has talked with you about it. This kind of information can be easy to misunderstand. To help you fully understand what it means for your health, we urge you to discuss this note with your provider.         Donn Wilson PA  04/15/25 1540

## 2025-04-15 NOTE — CASE MANAGEMENT/SOCIAL WORK
Discharge Planning Assessment  [facility]     Patient Name: Arthur Mccarthy  MRN: [id number]  Today's Date: 4/15/2025    Admit Date: 4/15/2025        Discharge Needs Assessment    No documentation.                  Discharge Plan       Row Name 04/15/25 1354       Plan    Plan Comments Patient with legal guardian on file and [place] listed [place] a [place]. Paperwork has been scanned into Epic. [facility] is present upon chart review. Registration to obtain consent to treat from guardian. Disposition from ER pending. GERALD Hassan RN                       Demographic Summary    No documentation.                  Functional Status    No documentation.                  Psychosocial    No documentation.                  Abuse/Neglect    No documentation.                  Legal    No documentation.                  Substance Abuse    No documentation.                  Patient Forms    No documentation.                     Lenard Wu, RN

## 2025-04-15 NOTE — H&P
Internal medicine history and physical  INTERNAL MEDICINE   [facility]       Patient Identification:  Name: Arthur Mccarthy  Age: 39 y.o.  Sex: male  :  1985  MRN: [id number]                   Primary Care Physician: Mikael Vasquez MD                               Date of admission:4/15/2025    Chief Complaint: Sent from nephrologist office for further management of worsening renal function and concern for urinary tract infection.    History of Present Illness:   Patient is a 39-year-old male with intellectual disability, hypertension, hypothyroidism, recurrent urinary tract infection, history of seizure disorder and chronic kidney disease who follows up with his nephrologist on routine basis.  Patient had lab work performed prior to his nephrologist visit and was noted to have worsening renal function resulting in him being directed to the emergency room for further care.  Patient was noted to have abnormal urinalysis concerning for UTI.  It is reported in the last few days patient has been acting differently in terms of being more confused and combative.  Patient is unable to give details of his symptoms but does answer simple questions appropriately and claims that he is does not any fever and chills and his appetite is good.  He denies any pain anywhere at this time.      Past Medical History:  Past Medical History:   Diagnosis Date    Acquired intellectual disability     Allergic rhinitis     Asthma     Chronic kidney disease (CKD)     Chronic renal disease, stage 3, moderately decreased glomerular filtration rate between 30-59 mL/min/1.73 square meter     Constipation     Epilepsy, generalized, convulsive     Episode of altered consciousness     Essential hypertension     History of Essential Hypertension     GERD (gastroesophageal reflux disease)     Hematuria     Hyperkalemia     Hyperlipidemia     Hypertension     Hypothyroidism     Immunization due     Metabolic  encephalopathy     Moderate intellectual disabilities     Mood disorder     Nocturia     Physical exam, annual     Seizure     Seizure disorder     Sepsis without acute organ dysfunction (present on admission) 9/9/2021     Past Surgical History:  No past surgical history on file.   Home Meds:  Medications Prior to Admission   Medication Sig Dispense Refill Last Dose/Taking    acetaminophen (TYLENOL) 325 MG tablet TAKE TWO TABLETS BY MOUTH EVERY 6 HOURS AS NEEDED FEVER 50 tablet 1     albuterol (PROVENTIL/VENTOLIN) nebulization syringe Take  by nebulization Every 4 (Four) Hours As Needed for Wheezing. Every 4-6 hrs prn wheezing       albuterol sulfate  (90 Base) MCG/ACT inhaler Inhale 2 puffs Every 6 (Six) Hours As Needed for Wheezing.       allopurinol (ZYLOPRIM) 100 MG tablet Take 1 tablet by mouth Daily.       ammonium lactate (LAC-HYDRIN) 12 % lotion Apply  topically to the appropriate area as directed.       carvedilol (COREG) 25 MG tablet Take 1 tablet by mouth 2 (Two) Times a Day. 180 tablet 0     divalproex (DEPAKOTE ER) 500 MG 24 hr tablet Take 1 tablet by mouth Every Morning Before Breakfast AND 2 tablets every night at bedtime. 270 tablet 1     ferrous sulfate (FeroSul) 325 (65 FE) MG tablet Take 1 tablet by mouth 2 (Two) Times a Day. 180 tablet 1     fluticasone (FLONASE) 50 MCG/ACT nasal spray USE TWO SPRAYS IN EACH NOSTRIL DAILY 16 g 5     furosemide (LASIX) 40 MG tablet Take 1 tablet by mouth Every Morning Before Breakfast AND 1 tablet Daily With Lunch. TAKE ONE TABLET BY MOUTH TWICE DAILY MORNING AND NOON 180 tablet 1     hydrALAZINE (APRESOLINE) 25 MG tablet Take 1 tablet by mouth 3 (Three) Times a Day. 270 tablet 1     levothyroxine (SYNTHROID, LEVOTHROID) 75 MCG tablet Take 1 tablet by mouth Daily. 90 tablet 1     loratadine (CLARITIN) 10 MG tablet TAKE ONE TABLET BY MOUTH DAILY 90 tablet 3     montelukast (SINGULAIR) 10 MG tablet TAKE ONE TABLET BY MOUTH AT BEDTIME 30 tablet 11      multivitamin (Thera) tablet tablet TAKE ONE TABLET BY MOUTH DAILY 30 tablet 0     multivitamin with minerals (Therapeutic-M) tablet tablet Take 1 tablet by mouth Daily. 90 each 1     NIFEdipine XL (PROCARDIA XL) 30 MG 24 hr tablet Take 1 tablet by mouth every night at bedtime. 90 tablet 1     O2 (OXYGEN) Inhale 2 L/min Every Night. (Patient not taking: Reported on 9/30/2024)       OLANZapine (zyPREXA) 20 MG tablet Take 1 tablet by mouth every night at bedtime. 90 tablet 1     oxybutynin XL (DITROPAN-XL) 5 MG 24 hr tablet Take 1 tablet by mouth Daily. 90 tablet 1     pantoprazole (PROTONIX) 40 MG EC tablet Take 1 tablet by mouth Every Morning. 90 tablet 1     tamsulosin (FLOMAX) 0.4 MG capsule 24 hr capsule Take 1 capsule by mouth every night at bedtime. 90 capsule 1      Current Meds:     Current Facility-Administered Medications:     carvedilol (COREG) tablet 25 mg, 25 mg, Oral, BID With Meals, Fernanda Verma MD    cefTRIAXone (ROCEPHIN) 1,000 mg in sodium chloride 0.9 % 100 mL MBP, 1,000 mg, Intravenous, Q24H, Fernanda Verma MD, Stopped at 04/15/25 1658    hydrALAZINE (APRESOLINE) tablet 25 mg, 25 mg, Oral, Q8H, Fernanda Verma MD    NIFEdipine XL (PROCARDIA XL) 24 hr tablet 30 mg, 30 mg, Oral, Q24H, Fernanda Verma MD    sodium chloride 0.9 % flush 10 mL, 10 mL, Intravenous, PRN, Drake French MD    [COMPLETED] Insert Peripheral IV, , , Once **AND** sodium chloride 0.9 % flush 10 mL, 10 mL, Intravenous, PRN, Donn Wilson PA    sodium chloride 0.9 % infusion, 100 mL/hr, Intravenous, Continuous, Fernanda Verma MD, Last Rate: 100 mL/hr at 04/15/25 1600, 100 mL/hr at 04/15/25 1600  Allergies:  Allergies   Allergen Reactions    Doxycycline Anaphylaxis     Social History:   Social History     Tobacco Use    Smoking status: Never    Smokeless tobacco: Never   Substance Use Topics    Alcohol use: No      Family History:  Family History   Problem Relation Age of Onset    Down  syndrome Brother     No Known Problems Other           Review of Systems  See history of present illness and past medical history.  Answer simple questions but detailed review of system is limited because of intellectual disability.      Vitals:   BP (!) 148/103 (BP Location: Left arm, Patient Position: Lying)   Pulse 82   Temp 96.1 °F (35.6 °C) (Axillary)   Resp 16   SpO2 100%   I/O:   Intake/Output Summary (Last 24 hours) at 4/15/2025 1812  Last data filed at 4/15/2025 1658  Gross per 24 hour   Intake 100 ml   Output --   Net 100 ml     Exam:  Patient is examined using the personal protective equipment as per guidelines from infection control for this particular patient as enacted.  Hand washing was performed before and after patient interaction.  General Appearance:  Awake cooperative pleasant male who does not appear to be toxic or in any acute distress.   Head:  No acute lesions noted.   Eyes:    PERRL, conjunctiva/corneas clear, EOM's intact, both eyes   Ears:    Normal external ear canals, both ears   Nose:   Nares normal, septum midline, mucosa normal, no drainage    or sinus tenderness   Throat:   Lips, tongue, gums normal; oral mucosa pink and moist   Neck:   Supple   Back:     Symmetric, no curvature, ROM normal, no CVA tenderness   Lungs:     Clear to auscultation bilaterally, respirations unlabored   Chest Wall:    No tenderness or deformity    Heart:  S1-S2 regular   Abdomen:   Soft nontender no suprapubic tenderness noted   Extremities: Trace edema,Left fore arm AV fistula functional   Pulses:   Pulses palpable in all extremities; symmetric all extremities   Skin: No rash noted   Neurologic: Knows his name and answer simple questions spontaneously moving upper and lower extremities.       Data Review:      I reviewed the patient's new clinical results.  Results from last 7 days   Lab Units 04/15/25  1358   WBC 10*3/mm3 5.87   HEMOGLOBIN g/dL 10.8*   PLATELETS 10*3/mm3 219     Results from last 7  days   Lab Units 04/15/25  1358   SODIUM mmol/L 136   POTASSIUM mmol/L 3.9   CHLORIDE mmol/L 97*   CO2 mmol/L 23.0   BUN mg/dL 77*   CREATININE mg/dL 6.92*   CALCIUM mg/dL 9.8   GLUCOSE mg/dL 82     No radiology results for the last 30 days.  Brief Urine Lab Results  (Last result in the past 365 days)        Color   Clarity   Blood   Leuk Est   Nitrite   Protein   CREAT   Urine HCG        04/15/25 1456 Yellow   Cloudy   Trace   Large (3+)   Positive   100 mg/dL (2+)                   Assessment:  Active Hospital Problems    Diagnosis  POA    **Acute on chronic renal failure [N17.9, N18.9]  Yes    Altered mental status [R41.82]  Unknown    Acute infective cystitis [N30.00]  Yes    S/P arteriovenous (AV) fistula creation [Z98.890]  Not Applicable    GERD (gastroesophageal reflux disease) [K21.9]  Yes    Hypothyroidism [E03.9]  Yes    Hypertension [I10]  Yes    Intellectual disability [F79]  Yes    Seizure disorder [G40.909]  Yes       Medical decision making/care plan: See admitting orders  Admit the patient   Telemetry monitoring   continue with IV fluid  Nephrology consultation  Follow-up on urine culture and blood cultures  Continue with IV Rocephin  Continue with home regimen, seizure precautions      Zhang Han MD   4/15/2025  18:12 EDT    Parts of this note may be an electronic transcription/translation of spoken language to printed text using the Dragon dictation system.

## 2025-04-15 NOTE — ED NOTES
Pt was sent by nephrologist office. States that his BUN/Cr are elevated and that he may have an infection.

## 2025-04-15 NOTE — ED NOTES
Nursing report ED to floor  Arthur Mccarthy  39 y.o.  male    HPI :  HPI  Stated Reason for Visit: elevated BUN/CR    Chief Complaint  Chief Complaint   Patient presents with    Abnormal Lab       Admitting doctor:   Zhang Han MD    Admitting diagnosis:   The primary encounter diagnosis was Acute renal failure superimposed on stage 4 chronic kidney disease, unspecified acute renal failure type. Diagnoses of Metabolic encephalopathy and Acute UTI (urinary tract infection) were also pertinent to this visit.    Code status:   Current Code Status       Date Active Code Status Order ID Comments User Context       Prior            Allergies:   Doxycycline    Isolation:   No active isolations    Intake and Output  No intake or output data in the 24 hours ending 04/15/25 1559    Weight:   There were no vitals filed for this visit.    Most recent vitals:   Vitals:    04/15/25 1342 04/15/25 1426 04/15/25 1456   BP: (!) 149/107 (!) 137/106 (!) 148/105   Patient Position: Sitting  Lying   Pulse: 83 72 73   Resp: 16     Temp: 97.5 °F (36.4 °C)     TempSrc: Tympanic     SpO2: 100% 99% 96%       Active LDAs/IV Access:   Lines, Drains & Airways       Active LDAs       Name Placement date Placement time Site Days    Peripheral IV 04/15/25 1358 20 G Anterior;Left Forearm 04/15/25  1358  Forearm  less than 1                    Labs (abnormal labs have a star):   Labs Reviewed   COMPREHENSIVE METABOLIC PANEL - Abnormal; Notable for the following components:       Result Value    BUN 77 (*)     Creatinine 6.92 (*)     Chloride 97 (*)     Alkaline Phosphatase 178 (*)     Anion Gap 16.0 (*)     eGFR 9.8 (*)     All other components within normal limits    Narrative:     GFR Categories in Chronic Kidney Disease (CKD)      GFR Category          GFR (mL/min/1.73)    Interpretation  G1                     90 or greater         Normal or high (1)  G2                      60-89                Mild decrease (1)  G3a                   45-59                 Mild to moderate decrease  G3b                   30-44                Moderate to severe decrease  G4                    15-29                Severe decrease  G5                    14 or less           Kidney failure          (1)In the absence of evidence of kidney disease, neither GFR category G1 or G2 fulfill the criteria for CKD.    eGFR calculation 2021 CKD-EPI creatinine equation, which does not include race as a factor   URINALYSIS W/ MICROSCOPIC IF INDICATED (NO CULTURE) - Abnormal; Notable for the following components:    Appearance, UA Cloudy (*)     Blood, UA Trace (*)     Protein,  mg/dL (2+) (*)     Leuk Esterase, UA Large (3+) (*)     Nitrite, UA Positive (*)     All other components within normal limits   CBC WITH AUTO DIFFERENTIAL - Abnormal; Notable for the following components:    Hemoglobin 10.8 (*)     Hematocrit 33.8 (*)     MCV 78.6 (*)     MCH 25.1 (*)     RDW 15.8 (*)     Lymphocyte % 46.3 (*)     Eosinophil % 0.2 (*)     All other components within normal limits   URINALYSIS, MICROSCOPIC ONLY - Abnormal; Notable for the following components:    WBC, UA Too Numerous to Count (*)     Bacteria, UA 4+ (*)     All other components within normal limits   IRON PROFILE - Abnormal; Notable for the following components:    Iron 168 (*)     All other components within normal limits   PHOSPHORUS - Abnormal; Notable for the following components:    Phosphorus 5.4 (*)     All other components within normal limits   LACTIC ACID, PLASMA - Normal   HEPATITIS B SURFACE ANTIGEN - Normal   BLOOD CULTURE   BLOOD CULTURE   URINE CULTURE   URINE CULTURE   RAINBOW DRAW    Narrative:     The following orders were created for panel order [place] Draw.  Procedure                               Abnormality         Status                     ---------                               -----------         ------                     Green Top (Gel)[[id number]]                                  Final result                Lavender Top[[id number]]                                     Final result               Gold Top - SST[[id number]]                                   Final result               Light Blue Top[[id number]]                                                                Please view results for these tests on the individual orders.   FERRITIN   GREEN TOP   LAVENDER TOP   GOLD TOP - SST   CBC AND DIFFERENTIAL    Narrative:     The following orders were created for panel order CBC & Differential.  Procedure                               Abnormality         Status                     ---------                               -----------         ------                     CBC Auto Differential[[id number]]        Abnormal            Final result                 Please view results for these tests on the individual orders.   LIGHT BLUE TOP       EKG:   No orders to display       Meds given in ED:   Medications   sodium chloride 0.9 % flush 10 mL (has no administration in time range)   sodium chloride 0.9 % flush 10 mL (has no administration in time range)   hydrALAZINE (APRESOLINE) tablet 25 mg (has no administration in time range)   NIFEdipine XL (PROCARDIA XL) 24 hr tablet 30 mg (has no administration in time range)   cefTRIAXone (ROCEPHIN) 1,000 mg in sodium chloride 0.9 % 100 mL MBP (1,000 mg Intravenous New Bag 4/15/25 1540)   sodium chloride 0.9 % infusion (has no administration in time range)   carvedilol (COREG) tablet 25 mg (has no administration in time range)       Imaging results:  No radiology results for the last day    Ambulatory status:   - stand by assist    Social issues:   Social History     Socioeconomic History    Marital status: Single   Tobacco Use    Smoking status: Never    Smokeless tobacco: Never   Vaping Use    Vaping status: Never Used   Substance and Sexual Activity    Alcohol use: No    Drug use: Defer    Sexual activity: Defer       Peripheral Neurovascular  Peripheral Neurovascular  (Adult)  Peripheral Neurovascular WDL: WDL    Neuro Cognitive  Neuro Cognitive (Adult)  Cognitive/Neuro/Behavioral WDL: .WDL except, orientation  Orientation: time    Learning  Learning Assessment  Learning Readiness and Ability: cognitive limitation noted  Education Provided  Person Taught: patient    Respiratory  Respiratory WDL  Respiratory WDL: WDL    Abdominal Pain       Pain Assessments  Pain (Adult)  (0-10) Pain Rating: Rest: 0  (0-10) Pain Rating: Activity: 0    NIH Stroke Scale       Jacque Villavicencio RN  04/15/25 15:59 EDT

## 2025-04-15 NOTE — CONSULTS
Referring Provider: Dr. French  Reason for Consultation: Acute kidney injury on CKD 5.    Subjective     Chief complaint   Chief Complaint   Patient presents with    Abnormal Lab       History of present illness: 39-year-old -American male with intellectual disability, a history of CKD 5 due to hypertensive nephrosclerosis followed by Dr. Jim Phillips in our office.  Creatinine 3/24/2025 5.5.  Does have a functioning AV fistula.  Also with history of hypertension and chronic kidney disease, hypothyroidism, intellectual disability, seizure disorder.  He had labs drawn 4/11/2025 with a renal function panel demonstrating creatinine up to 6.9.  In the emergency room the caregiver relayed that the patient has had some confusion and worsening mental status lately.  ER BUN and creatinine 77/ 6.9.  The urea has gone up.  Also had some leukocytosis of 13 .7 with those labs.  Today the white count is 5.87.  Blood pressure on arrival 149/107.  Caregiver at the  Bedside provides history.  Patient is able to answer yes and no questions.  He has had behavioral issues the past week with increasing combativeness, slapping and hitting at his day program and at home.  Had not been eating or drinking well for the past week.  Very fatigued and wanting to sleep all the time.  ROS:Patient denies pain currently.  Says he is urinating.  Denies pain with urination.  Bowels moving.  Feels hungry and thirsty today.  Not short of breath.  No fever.  Past Medical History:   Diagnosis Date    Acquired intellectual disability     Allergic rhinitis     Asthma     Chronic kidney disease (CKD)     Chronic renal disease, stage 3, moderately decreased glomerular filtration rate between 30-59 mL/min/1.73 square meter     Constipation     Epilepsy, generalized, convulsive     Episode of altered consciousness     Essential hypertension     History of Essential Hypertension     GERD (gastroesophageal reflux disease)     Hematuria      Hyperkalemia     Hyperlipidemia     Hypertension     Hypothyroidism     Immunization due     Metabolic encephalopathy     Moderate intellectual disabilities     Mood disorder     Nocturia     Physical exam, annual     Seizure     Seizure disorder     Sepsis without acute organ dysfunction (present on admission) 9/9/2021     No past surgical history on file.  Family History   Problem Relation Age of Onset    Down syndrome Brother     No Known Problems Other      Lives with caregivers.  Social History     Tobacco Use    Smoking status: Never    Smokeless tobacco: Never   Vaping Use    Vaping status: Never Used   Substance Use Topics    Alcohol use: No    Drug use: Defer     (Not in a hospital admission)    Allergies:  Doxycycline    Review of Systems  14 points review of system was performed and it was negative other than what noted above in the HPI    Objective     Vital Signs  Temp:  [97.5 °F (36.4 °C)] 97.5 °F (36.4 °C)  Heart Rate:  [72-83] 73  Resp:  [16] 16  BP: (137-149)/(105-107) 148/105         No intake/output data recorded.  No intake/output data recorded.  No intake or output data in the 24 hours ending 04/15/25 1519    Physical Exam:  General Appearance: alert, tongue protuberant.  Answers questions yes and no.  Speech dysarthric.  Skin: Skin very dry on his lower extremities.  HEENT: pupils round and reactive to light, oral mucosa dry., nonicteric sclera  Neck: supple, no JVD, trachea midline  Lungs: Clear to auscultation bilaterally.  Unlabored on room air.  Heart: RRR, normal S1 and S2, no S3, no rub  Abdomen: soft, nontender, normoactive bowels.  Not distended.  : no palpable bladder  Extremities: no edema.  Right upper extremity tortuous AV fistula with thrill and bruit  Neuro disarticulate speech.  Moves all 4 extremities.  Follows commands.  Answers yes and no questions.    Results Review:  Results for orders placed or performed during the hospital encounter of 04/15/25   Comprehensive Metabolic  Panel    Collection Time: 04/15/25  1:58 PM    Specimen: Blood   Result Value Ref Range    Glucose 82 65 - 99 mg/dL    BUN 77 (H) 6 - 20 mg/dL    Creatinine 6.92 (H) 0.76 - 1.27 mg/dL    Sodium 136 136 - 145 mmol/L    Potassium 3.9 3.5 - 5.2 mmol/L    Chloride 97 (L) 98 - 107 mmol/L    CO2 23.0 22.0 - 29.0 mmol/L    Calcium 9.8 8.6 - 10.5 mg/dL    Total Protein 7.8 6.0 - 8.5 g/dL    Albumin 4.2 3.5 - 5.2 g/dL    ALT (SGPT) 20 1 - 41 U/L    AST (SGOT) 24 1 - 40 U/L    Alkaline Phosphatase 178 (H) 39 - 117 U/L    Total Bilirubin 0.2 0.0 - 1.2 mg/dL    Globulin 3.6 gm/dL    A/G Ratio 1.2 g/dL    BUN/Creatinine Ratio 11.1 7.0 - 25.0    Anion Gap 16.0 (H) 5.0 - 15.0 mmol/L    eGFR 9.8 (L) >60.0 mL/min/1.73   CBC Auto Differential    Collection Time: 04/15/25  1:58 PM    Specimen: Blood   Result Value Ref Range    WBC 5.87 3.40 - 10.80 10*3/mm3    RBC 4.30 4.14 - 5.80 10*6/mm3    Hemoglobin 10.8 (L) 13.0 - 17.7 g/dL    Hematocrit 33.8 (L) 37.5 - 51.0 %    MCV 78.6 (L) 79.0 - 97.0 fL    MCH 25.1 (L) 26.6 - 33.0 pg    MCHC 32.0 31.5 - 35.7 g/dL    RDW 15.8 (H) 12.3 - 15.4 %    RDW-SD 44.3 37.0 - 54.0 fl    MPV 11.9 6.0 - 12.0 fL    Platelets 219 140 - 450 10*3/mm3    Neutrophil % 43.7 42.7 - 76.0 %    Lymphocyte % 46.3 (H) 19.6 - 45.3 %    Monocyte % 9.0 5.0 - 12.0 %    Eosinophil % 0.2 (L) 0.3 - 6.2 %    Basophil % 0.3 0.0 - 1.5 %    Immature Grans % 0.5 0.0 - 0.5 %    Neutrophils, Absolute 2.56 1.70 - 7.00 10*3/mm3    Lymphocytes, Absolute 2.72 0.70 - 3.10 10*3/mm3    Monocytes, Absolute 0.53 0.10 - 0.90 10*3/mm3    Eosinophils, Absolute 0.01 0.00 - 0.40 10*3/mm3    Basophils, Absolute 0.02 0.00 - 0.20 10*3/mm3    Immature Grans, Absolute 0.03 0.00 - 0.05 10*3/mm3    nRBC 0.0 0.0 - 0.2 /100 WBC   Green Top (Gel)    Collection Time: 04/15/25  1:58 PM   Result Value Ref Range    Extra Tube Hold for add-ons.    Lavender Top    Collection Time: 04/15/25  1:58 PM   Result Value Ref Range    Extra Tube hold for add-on    Gold  Top - SST    Collection Time: 04/15/25  1:58 PM   Result Value Ref Range    Extra Tube Hold for add-ons.    Lactic Acid, Plasma    Collection Time: 04/15/25  2:41 PM    Specimen: Arm, Left; Blood   Result Value Ref Range    Lactate 1.1 0.5 - 2.0 mmol/L   Urinalysis With Microscopic If Indicated (No Culture) - Urine, Clean Catch    Collection Time: 04/15/25  2:56 PM    Specimen: Urine, Clean Catch   Result Value Ref Range    Color, UA Yellow Yellow, Straw    Appearance, UA Cloudy (A) Clear    pH, UA 6.0 5.0 - 8.0    Specific Gravity, UA 1.008 1.005 - 1.030    Glucose, UA Negative Negative    Ketones, UA Negative Negative    Bilirubin, UA Negative Negative    Blood, UA Trace (A) Negative    Protein,  mg/dL (2+) (A) Negative    Leuk Esterase, UA Large (3+) (A) Negative    Nitrite, UA Positive (A) Negative    Urobilinogen, UA 0.2 E.U./dL 0.2 - 1.0 E.U./dL     Imaging Results (Last 72 Hours)       ** No results found for the last 72 hours. **              carvedilol, 25 mg, Oral, BID With Meals  hydrALAZINE, 25 mg, Oral, Q8H  NIFEdipine XL, 30 mg, Oral, Q24H           Assessment & Plan   Acute kidney injury on CKD 5.  Hypertensive nephrosclerosis.  Creatinine has risen from 5.5 a month ago to 6.9 today.  Caregiver describes some uremic symptoms.  Volume status by exam: Dry.  Suspect that he is hypovolemic due to poor p.o. intake and diuretic.  Urinary tract infection likely also contributing.  No indication for acute dialysis today but he does have a mature AV fistula in his right upper extremity.  Will follow the trend of his renal function with IV fluids and control of his blood pressure.  Hypertension CKD.  Not well-controlled.  Resume home hydralazine, nifedipine XL, and Coreg.  Adjust medications as needed.  Intellectual disability with cognitive dysfunction.  Caregiver at bedside.  Leukocytosis.  White blood cell count 13 on 4/11/2025 but down to 5.8 today.  UA with nitrite positive and large leukocyte  Esterase.  Microscopic with too numerous to count white cells.  Start empiric ceftriaxone.  Anemia of chronic kidney disease.  Microcytic.  Check iron stores.  6.  Hyperparathyroidism.  Vitamin D normal on 4/11.  .  7.  Seizure disorder continue home meds.    Plan:  Hepatitis B surface antigen in case dialysis warranted tomorrow.  Check iron stores  Check urine culture on the specimen in the lab  Empiric ceftriaxone  Start antihypertensives now.  Adjust as needed  IV fluids at 100 cc/h.    I discussed the patient's findings and my recommendations with the caregiver.    Fernanda Verma MD  04/15/25  15:19 EDT    Please note that portions of this note were completed with a voice recognition program.

## 2025-04-16 ENCOUNTER — TELEPHONE (OUTPATIENT)
Dept: FAMILY MEDICINE CLINIC | Facility: CLINIC | Age: 40
End: 2025-04-16

## 2025-04-16 PROBLEM — N18.5 CKD (CHRONIC KIDNEY DISEASE) STAGE 5, GFR LESS THAN 15 ML/MIN: Status: ACTIVE | Noted: 2025-04-16

## 2025-04-16 LAB
ALBUMIN SERPL-MCNC: 3.1 G/DL (ref 3.5–5.2)
ALBUMIN/GLOB SERPL: 0.9 G/DL
ALP SERPL-CCNC: 147 U/L (ref 39–117)
ALT SERPL W P-5'-P-CCNC: 15 U/L (ref 1–41)
ANION GAP SERPL CALCULATED.3IONS-SCNC: 14.2 MMOL/L (ref 5–15)
AST SERPL-CCNC: 19 U/L (ref 1–40)
BASOPHILS # BLD AUTO: 0.02 10*3/MM3 (ref 0–0.2)
BASOPHILS NFR BLD AUTO: 0.3 % (ref 0–1.5)
BILIRUB SERPL-MCNC: <0.2 MG/DL (ref 0–1.2)
BUN SERPL-MCNC: 78 MG/DL (ref 6–20)
BUN/CREAT SERPL: 12.7 (ref 7–25)
CALCIUM SPEC-SCNC: 9.3 MG/DL (ref 8.6–10.5)
CHLORIDE SERPL-SCNC: 103 MMOL/L (ref 98–107)
CO2 SERPL-SCNC: 23.8 MMOL/L (ref 22–29)
CREAT SERPL-MCNC: 6.14 MG/DL (ref 0.76–1.27)
DEPRECATED RDW RBC AUTO: 40.9 FL (ref 37–54)
EGFRCR SERPLBLD CKD-EPI 2021: 11.3 ML/MIN/1.73
EOSINOPHIL # BLD AUTO: 0.01 10*3/MM3 (ref 0–0.4)
EOSINOPHIL NFR BLD AUTO: 0.2 % (ref 0.3–6.2)
ERYTHROCYTE [DISTWIDTH] IN BLOOD BY AUTOMATED COUNT: 15 % (ref 12.3–15.4)
GLOBULIN UR ELPH-MCNC: 3.4 GM/DL
GLUCOSE SERPL-MCNC: 97 MG/DL (ref 65–99)
HCT VFR BLD AUTO: 30.3 % (ref 37.5–51)
HGB BLD-MCNC: 10.1 G/DL (ref 13–17.7)
IMM GRANULOCYTES # BLD AUTO: 0.05 10*3/MM3 (ref 0–0.05)
IMM GRANULOCYTES NFR BLD AUTO: 0.8 % (ref 0–0.5)
LYMPHOCYTES # BLD AUTO: 3.1 10*3/MM3 (ref 0.7–3.1)
LYMPHOCYTES NFR BLD AUTO: 49.6 % (ref 19.6–45.3)
MCH RBC QN AUTO: 25.7 PG (ref 26.6–33)
MCHC RBC AUTO-ENTMCNC: 33.3 G/DL (ref 31.5–35.7)
MCV RBC AUTO: 77.1 FL (ref 79–97)
MONOCYTES # BLD AUTO: 0.76 10*3/MM3 (ref 0.1–0.9)
MONOCYTES NFR BLD AUTO: 12.2 % (ref 5–12)
NEUTROPHILS NFR BLD AUTO: 2.31 10*3/MM3 (ref 1.7–7)
NEUTROPHILS NFR BLD AUTO: 36.9 % (ref 42.7–76)
NRBC BLD AUTO-RTO: 0 /100 WBC (ref 0–0.2)
PHOSPHATE SERPL-MCNC: 5.8 MG/DL (ref 2.5–4.5)
PLATELET # BLD AUTO: 182 10*3/MM3 (ref 140–450)
PMV BLD AUTO: 11.6 FL (ref 6–12)
POTASSIUM SERPL-SCNC: 3.8 MMOL/L (ref 3.5–5.2)
PROT SERPL-MCNC: 6.5 G/DL (ref 6–8.5)
RBC # BLD AUTO: 3.93 10*6/MM3 (ref 4.14–5.8)
SODIUM SERPL-SCNC: 141 MMOL/L (ref 136–145)
WBC NRBC COR # BLD AUTO: 6.25 10*3/MM3 (ref 3.4–10.8)

## 2025-04-16 PROCEDURE — 84100 ASSAY OF PHOSPHORUS: CPT | Performed by: INTERNAL MEDICINE

## 2025-04-16 PROCEDURE — 25010000002 CEFTRIAXONE PER 250 MG: Performed by: INTERNAL MEDICINE

## 2025-04-16 PROCEDURE — 80053 COMPREHEN METABOLIC PANEL: CPT | Performed by: INTERNAL MEDICINE

## 2025-04-16 PROCEDURE — 85025 COMPLETE CBC W/AUTO DIFF WBC: CPT | Performed by: INTERNAL MEDICINE

## 2025-04-16 RX ORDER — NIFEDIPINE 30 MG/1
30 TABLET, EXTENDED RELEASE ORAL
Status: DISCONTINUED | OUTPATIENT
Start: 2025-04-16 | End: 2025-04-17 | Stop reason: HOSPADM

## 2025-04-16 RX ORDER — ASPIRIN 81 MG
1 TABLET, DELAYED RELEASE (ENTERIC COATED) ORAL DAILY
COMMUNITY

## 2025-04-16 RX ORDER — HYDRALAZINE HYDROCHLORIDE 50 MG/1
50 TABLET, FILM COATED ORAL EVERY 8 HOURS SCHEDULED
Status: DISCONTINUED | OUTPATIENT
Start: 2025-04-16 | End: 2025-04-17 | Stop reason: HOSPADM

## 2025-04-16 RX ORDER — HYDRALAZINE HYDROCHLORIDE 50 MG/1
50 TABLET, FILM COATED ORAL 3 TIMES DAILY
COMMUNITY

## 2025-04-16 RX ADMIN — SODIUM CHLORIDE 100 ML/HR: 9 INJECTION, SOLUTION INTRAVENOUS at 03:00

## 2025-04-16 RX ADMIN — FERROUS SULFATE TAB 325 MG (65 MG ELEMENTAL FE) 325 MG: 325 (65 FE) TAB at 21:01

## 2025-04-16 RX ADMIN — CEFTRIAXONE SODIUM 1000 MG: 1 INJECTION, POWDER, FOR SOLUTION INTRAMUSCULAR; INTRAVENOUS at 15:07

## 2025-04-16 RX ADMIN — HYDRALAZINE HYDROCHLORIDE 50 MG: 50 TABLET ORAL at 15:06

## 2025-04-16 RX ADMIN — Medication 1 TABLET: at 11:37

## 2025-04-16 RX ADMIN — HYDRALAZINE HYDROCHLORIDE 50 MG: 50 TABLET ORAL at 21:02

## 2025-04-16 RX ADMIN — CARVEDILOL 25 MG: 25 TABLET, FILM COATED ORAL at 18:20

## 2025-04-16 RX ADMIN — NIFEDIPINE 30 MG: 30 TABLET, EXTENDED RELEASE ORAL at 12:31

## 2025-04-16 RX ADMIN — MONTELUKAST 10 MG: 10 TABLET, FILM COATED ORAL at 21:02

## 2025-04-16 RX ADMIN — DIVALPROEX SODIUM 1000 MG: 500 TABLET, EXTENDED RELEASE ORAL at 21:01

## 2025-04-16 RX ADMIN — FLUTICASONE PROPIONATE 2 SPRAY: 50 SPRAY, METERED NASAL at 12:32

## 2025-04-16 RX ADMIN — FERROUS SULFATE TAB 325 MG (65 MG ELEMENTAL FE) 325 MG: 325 (65 FE) TAB at 11:37

## 2025-04-16 RX ADMIN — CARVEDILOL 25 MG: 25 TABLET, FILM COATED ORAL at 11:37

## 2025-04-16 RX ADMIN — OLANZAPINE 20 MG: 7.5 TABLET, FILM COATED ORAL at 21:02

## 2025-04-16 RX ADMIN — HYDRALAZINE HYDROCHLORIDE 25 MG: 25 TABLET ORAL at 06:32

## 2025-04-16 RX ADMIN — Medication 10 ML: at 21:02

## 2025-04-16 RX ADMIN — FUROSEMIDE 40 MG: 40 TABLET ORAL at 06:32

## 2025-04-16 RX ADMIN — TAMSULOSIN HYDROCHLORIDE 0.4 MG: 0.4 CAPSULE ORAL at 11:37

## 2025-04-16 RX ADMIN — LEVOTHYROXINE SODIUM 75 MCG: 0.07 TABLET ORAL at 11:37

## 2025-04-16 RX ADMIN — Medication 10 ML: at 11:41

## 2025-04-16 RX ADMIN — PANTOPRAZOLE SODIUM 40 MG: 40 TABLET, DELAYED RELEASE ORAL at 06:32

## 2025-04-16 RX ADMIN — DIVALPROEX SODIUM 500 MG: 500 TABLET, EXTENDED RELEASE ORAL at 06:32

## 2025-04-16 RX ADMIN — CETIRIZINE HYDROCHLORIDE 10 MG: 10 TABLET ORAL at 11:37

## 2025-04-16 RX ADMIN — OXYBUTYNIN CHLORIDE 5 MG: 5 TABLET, EXTENDED RELEASE ORAL at 11:37

## 2025-04-16 RX ADMIN — ALLOPURINOL 100 MG: 100 TABLET ORAL at 11:37

## 2025-04-16 NOTE — PROGRESS NOTES
[facility] Progress Note      Patient Name: Arthur Mccarthy  : 1985  MRN: [id number]  Primary Care Physician:  Mikael Vasquez MD  Date of admission: 4/15/2025    Subjective     Interval History:   Follow-up acute kidney injury on CKD 5.  Urine output not all recorded.  13 40N.  Taking p.o.  Blood pressure initially very high.  Better this morning but still above goal.    Review of Systems:   As noted above    Objective     Vitals:   Temp:  [96.1 °F (35.6 °C)-98.2 °F (36.8 °C)] 97.5 °F (36.4 °C)  Heart Rate:  [72-84] 76  Resp:  [16-18] 18  BP: (127-161)/() 127/88    Intake/Output Summary (Last 24 hours) at 2025 1031  Last data filed at 2025 0300  Gross per 24 hour   Intake 1340 ml   Output 400 ml   Net 940 ml       Physical Exam:    General Appearance: alert, cognitively delayed.  No acute distress   Skin: warm and dry  HEENT: oral mucosa moist.  Tongue protuberant., nonicteric sclera  Neck: supple, no JVD  Lungs: Clear to auscultation bilaterally.  Unlabored on room air.  Heart: RRR, no S3 or rub.  Abdomen: soft, nontender, nondistended. +bs  : no palpable bladder  Extremities: no edema.  Right upper extremity AV fistula patent  Neuro: Cognitively delayed.    Scheduled Meds:     allopurinol, 100 mg, Oral, Daily  carvedilol, 25 mg, Oral, BID With Meals  cefTRIAXone, 1,000 mg, Intravenous, Q24H  cetirizine, 10 mg, Oral, Daily  divalproex, 500 mg, Oral, QAM AC   And  divalproex, 1,000 mg, Oral, Nightly  ferrous sulfate, 325 mg, Oral, BID  fluticasone, 2 spray, Each Nare, Daily  furosemide, 40 mg, Oral, QAM AC   And  furosemide, 40 mg, Oral, Daily With Lunch  hydrALAZINE, 50 mg, Oral, Q8H  levothyroxine, 75 mcg, Oral, Daily  montelukast, 10 mg, Oral, Nightly  multivitamin with minerals, 1 tablet, Oral, Daily  O2, 2 L/min, Inhalation, Nightly  OLANZapine, 20 mg, Oral, Nightly  oxybutynin XL, 5 mg, Oral, Daily  pantoprazole, 40 mg, Oral, QAM  sodium chloride, 10  mL, Intravenous, Q12H  tamsulosin, 0.4 mg, Oral, Daily      IV Meds:   sodium chloride, 100 mL/hr, Last Rate: 100 mL/hr (04/16/25 0300)        Results Reviewed:   I have personally reviewed the results from the time of this admission to 4/16/2025 10:31 EDT     Results from last 7 days   Lab Units 04/16/25  0357 04/15/25  1358   SODIUM mmol/L 141 136   POTASSIUM mmol/L 3.8 3.9   CHLORIDE mmol/L 103 97*   CO2 mmol/L 23.8 23.0   BUN mg/dL 78* 77*   CREATININE mg/dL 6.14* 6.92*   CALCIUM mg/dL 9.3 9.8   BILIRUBIN mg/dL <0.2 0.2   ALK PHOS U/L 147* 178*   ALT (SGPT) U/L 15 20   AST (SGOT) U/L 19 24   GLUCOSE mg/dL 97 82       Estimated Creatinine Clearance: 16.7 mL/min (A) (by C-G formula based on SCr of 6.14 mg/dL (H)).    Results from last 7 days   Lab Units 04/16/25  0357 04/15/25  1358   PHOSPHORUS mg/dL 5.8* 5.4*       Results from last 7 days   Lab Units 04/11/25  1605   URIC ACID mg/dL 5.9       Results from last 7 days   Lab Units 04/16/25  0357 04/15/25  1358   WBC 10*3/mm3 6.25 5.87   HEMOGLOBIN g/dL 10.1* 10.8*   PLATELETS 10*3/mm3 182 219             Assessment / Plan     ASSESSMENT:  Acute kidney injury on CKD 5.  Creatinine has come down some with IV fluids.  Volume status improved by exam.  Mental status seems better.  Good appetite.  I do not think there is an acute indication to start dialysis today.  Will recheck his chemistries in the morning.  If they are unchanged, will likely need to initiate dialysis because his clearance a month ago was 13 cc/min.  Hypertension CKD.  Better controlled this morning on  Coreg and hydralazine but not optimal.  Add back home nifedipine XL dose.  Recheck now.  3.  Pyuria.  Culture pending.  On empiric ceftriaxone.  4.  Anemia of CKD.  Iron replete.  No ARIANA until blood pressure better controlled.  5.  Cognitive delay with recent behavioral disturbance.  Improved.  PLAN:  Discontinue IV fluids  Encourage p.o. fluids.  Up to chair today.  Recheck renal function in the  morning and decide on dialysis initiation.  5.  Resume nifedipine XL 30 mg daily.  6.  Discussed with caregiver and patient.  Thank you for involving us in the care of Arthur Mccarthy.  Please feel free to call with any questions.    Fernanda Verma MD  04/16/25  10:31 EDT    [facility]  [phone]    Please note that portions of this note were completed with a voice recognition program.

## 2025-04-16 NOTE — CASE MANAGEMENT/SOCIAL WORK
Continued Stay Note  [facility]     Patient Name: Arthur Mccarthy  MRN: [id number]  Today's Date: 4/16/2025    Admit Date: 4/15/2025    Plan: Return to Group home with outpt HD setup   Discharge Plan       Row Name 04/16/25 1321       Plan    Plan Return to Group home with outpt HD setup    Patient/Family in Agreement with Plan yes    Plan Comments CCP s/w Maria E pts Guardian, who is agreeable to plans for pt to return to his group home and outpt HD setup. Dori YOON/CCP      Row Name 04/16/25 1208       Plan    Plan Return to Group home, Outpt HD setup pending.    Patient/Family in Agreement with Plan yes    Plan Comments CCP met with pt and his caregiver Samira at the bedside, introduced self and role of CCP. Face sheet information and pharmacy verified. Pt lives with Daniel, her  and 3 children in her 2-story home with 2STE. Pt's twin brother also resides there. Pt does not drive; he is able to bathe and dress himself and pt uses no DME. Pt does not have a living will. Pt declines meds to beds and denies trouble affording or managing medications. Pt denies HH or SNF history. DC plan is to return to [facility], she will provide transport. A bit later CCP s/w Nephrologist who anticipates new HD setup for pt. MD asked that CCP initiate outpt HD setup paperwork. CCP s/w Jeni, pts , who confirmed above info. CCP discussed possible need for HD and arranging outpt HD close to [facility]. Jeni agreeable to outpt HD setup near [facility] and that pt will return to [facility] at [place]. LVM for Maria E pts Guardian, to update and confirm DC plan. LVM for CCP Manager, notifying of Guardian on file. Initiate [facility] referral faxed and Alissa/[facility] notified. Dori YOON/CCP                   Discharge Codes    No documentation.                 Expected Discharge Date and Time       Expected Discharge Date Expected Discharge Time    Apr 19, 2025               Lydia Vargas RN

## 2025-04-16 NOTE — PROGRESS NOTES
Clinical Pharmacy Services: Medication History    Arthur Mccarthy is a 39 y.o. male presenting to [facility] for Metabolic encephalopathy [G93.41]  Acute on chronic renal failure [N17.9, N18.9]  Acute UTI (urinary tract infection) [N39.0]  Acute renal failure superimposed on stage 4 chronic kidney disease, unspecified acute renal failure type [N17.9, N18.4]    He  has a past medical history of Acquired intellectual disability, Allergic rhinitis, Asthma, Chronic kidney disease (CKD), Chronic renal disease, stage 3, moderately decreased glomerular filtration rate between 30-59 mL/min/1.73 square meter, Constipation, Epilepsy, generalized, convulsive, Episode of altered consciousness, Essential hypertension, GERD (gastroesophageal reflux disease), Hematuria, Hyperkalemia, Hyperlipidemia, Hypertension, Hypothyroidism, Immunization due, Metabolic encephalopathy, Moderate intellectual disabilities, Mood disorder, Nocturia, Physical exam, annual, Seizure, Seizure disorder, and Sepsis without acute organ dysfunction (present on admission) (9/9/2021).    Allergies as of 04/15/2025 - Reviewed 04/15/2025   Allergen Reaction Noted    Doxycycline Anaphylaxis 09/08/2021       Medication information was obtained from: [facility], [facility] (family home provider)    Pharmacy and Phone Number:   [facility] - [place], [place] - [address] [address] - [phone] [facility] [phone] FX       Prior to Admission Medications       Prescriptions Last Dose Informant Patient Reported? Taking?    acetaminophen (TYLENOL) 325 MG tablet Past Month  No Yes    TAKE TWO TABLETS BY MOUTH EVERY 6 HOURS AS NEEDED FEVER    albuterol (PROVENTIL/VENTOLIN) nebulization syringe 4/14/2025  Yes Yes    Take  by nebulization Every 4 (Four) Hours As Needed for Wheezing. Every 4-6 hrs prn wheezing    albuterol sulfate  (90 Base) MCG/ACT inhaler 4/14/2025  Yes Yes    Inhale 2 puffs  Every 6 (Six) Hours As Needed for Wheezing.    allopurinol (ZYLOPRIM) 100 MG tablet 4/14/2025  Yes Yes    Take 1 tablet by mouth Daily.    ammonium lactate (LAC-HYDRIN) 12 % lotion 4/14/2025  Yes Yes    Apply  topically to the appropriate area as directed.    carvedilol (COREG) 25 MG tablet 4/15/2025  No Yes    Take 1 tablet by mouth 2 (Two) Times a Day.    divalproex (DEPAKOTE ER) 500 MG 24 hr tablet 4/15/2025  No Yes    Take 1 tablet by mouth Every Morning Before Breakfast AND 2 tablets every night at bedtime.    ferrous sulfate (FeroSul) 325 (65 FE) MG tablet 4/15/2025  No Yes    Take 1 tablet by mouth 2 (Two) Times a Day.    fluticasone (FLONASE) 50 MCG/ACT nasal spray 4/15/2025  No Yes    USE TWO SPRAYS IN EACH NOSTRIL DAILY    furosemide (LASIX) 40 MG tablet 4/15/2025  No Yes    Take 1 tablet by mouth Every Morning Before Breakfast AND 1 tablet Daily With Lunch. TAKE ONE TABLET BY MOUTH TWICE DAILY MORNING AND NOON    hydrALAZINE (APRESOLINE) 50 MG tablet 4/15/2025  Yes Yes    Take 1 tablet by mouth 3 (Three) Times a Day.    levothyroxine (SYNTHROID, LEVOTHROID) 75 MCG tablet 4/14/2025  No Yes    Take 1 tablet by mouth Daily.    loratadine (CLARITIN) 10 MG tablet 4/14/2025  No Yes    TAKE ONE TABLET BY MOUTH DAILY    montelukast (SINGULAIR) 10 MG tablet 4/14/2025  No Yes    TAKE ONE TABLET BY MOUTH AT BEDTIME    multivitamin (Thera) tablet tablet 4/14/2025  No Yes    TAKE ONE TABLET BY MOUTH DAILY    NIFEdipine XL (PROCARDIA XL) 30 MG 24 hr tablet Not Taking  No No    Take 1 tablet by mouth every night at bedtime.    Patient not taking:  Reported on 4/16/2025    O2 (OXYGEN)   Yes No    Inhale 2 L/min Every Night.    Patient not taking:  Reported on 9/30/2024    OLANZapine (zyPREXA) 20 MG tablet Unknown  No No    Take 1 tablet by mouth every night at bedtime.    oxybutynin XL (DITROPAN-XL) 5 MG 24 hr tablet 4/14/2025  No Yes    Take 1 tablet by mouth Daily.    pantoprazole (PROTONIX) 40 MG EC tablet 4/14/2025   No Yes    Take 1 tablet by mouth Every Morning.    tamsulosin (FLOMAX) 0.4 MG capsule 24 hr capsule 4/14/2025  No Yes    Take 1 capsule by mouth every night at bedtime.              Medication notes: has an active Rx for Nifedipine, but on hold per packaging pharmacy med list     This medication list is complete to the best of my knowledge as of 4/16/2025    Please call if questions.    Margret Macdonald, PharmD, BCPS  4/16/2025 09:24 EDT

## 2025-04-16 NOTE — TELEPHONE ENCOUNTER
Caller: JUANJOSE    Relationship: Other    Best call back number: [phone]    What is the best time to reach you: ANY    Who are you requesting to speak with (clinical staff, provider,  specific staff member): CLINICAL    Do you know the name of the person who called: JUANJOSE    What was the call regarding: JUANJOSE REQUESTING THE      albuterol sulfate  (90 Base) MCG/ACT inhaler   albuterol (PROVENTIL/VENTOLIN) nebulization syringe   TO BE DISCONTINUED.  Is it okay if the provider responds through MyChart: NO

## 2025-04-16 NOTE — CASE MANAGEMENT/SOCIAL WORK
Discharge Planning Assessment  [facility]     Patient Name: Arthur Mccarthy  MRN: [id number]  Today's Date: 4/16/2025    Admit Date: 4/15/2025    Plan: Return to Group home, Outpt HD setup pending.   Discharge Needs Assessment       Row Name 04/16/25 1207       Living Environment    People in Home other (see comments)    Current Living Arrangements home    Able to Return to Prior Arrangements yes       Transition Planning    Patient/Family Anticipates Transition to other (see comments);home    Patient/Family Anticipated Services at Transition ;other (see comments)    Transportation Anticipated family or friend will provide       Discharge Needs Assessment    Current Outpatient/Agency/Support Group outpatient hemodialysis    Equipment Currently Used at Home none    Concerns to be Addressed discharge planning    Outpatient/Agency/Support Group Needs outpatient hemodialysis                   Discharge Plan       Row Name 04/16/25 1204       Plan    Plan Return to Group home, Outpt HD setup pending.    Patient/Family in Agreement with Plan yes    Plan Comments CCP met with pt and his caregiver Samira at the bedside, introduced self and role of CCP. Face sheet information and pharmacy verified. Pt lives with Daniel, her  and 3 children in her 2-story home with [place]UDILIOTE. Pt's twin brother also resides there. Pt does not drive; he is able to bathe and dress himself and pt uses no DME. Pt does not have a living will. Pt declines meds to beds and denies trouble affording or managing medications. Pt denies HH or SNF history. DC plan is to return to [facility] home, she will provide transport. A bit later CCP s/w Nephrologist who anticipates new HD setup for pt. MD asked that CCP initiate outpt HD setup paperwork. CCP s/w Jeni, pts , who confirmed above info. CCP discussed possible need for HD and [facility]. Jeni agreeable to outpt HD setup near [facility]  [place] and that pt will return to [facility] at [place]. LVM for Maria E, pts Guardian, to update and confirm DC plan. LVM for CCP Manager, notifying of Guardian on file. Initiate [facility] referral faxed and [facility] notified. Dori YOON/CCP                       Demographic Summary       Row Name 04/16/25 1208       General Information    Admission Type inpatient    Arrived From home    Referral Source admission list;case finding    Reason for Consult discharge planning    Preferred Language English       Contact Information    Permission Granted to Share Info With ;guardian;family/designee                   Functional Status       Row Name 04/16/25 1206       Functional Status    Usual Activity Tolerance moderate    Current Activity Tolerance moderate       Assessment of Health Literacy    How often do you have someone help you read hospital materials? Often    How often do you have problems learning about your medical condition because of difficulty understanding written information? Often    How often do you have a problem understanding what is told to you about your medical condition? Often    How confident are you filling out medical forms by yourself? A little bit    Health Literacy Fair       Functional Status, IADL    Medications assistive person    Meal Preparation assistive person    Housekeeping assistive person    Laundry assistive person    Shopping assistive person                               Lydia Vargas, CAR

## 2025-04-16 NOTE — PROGRESS NOTES
Name: Arthur Mccarthy ADMIT: 4/15/2025   : 1985  PCP: Mikael Vasquez MD    MRN: [id number] LOS: 1 days   AGE/SEX: 39 y.o. male  ROOM: [facility]/     Subjective   Subjective   Feeling better today. Caregiver at bedside confirms pt seems to be back to his baseline. Tolerating diet. Voiding well but UOP not recorded.       Objective   Objective   Vital Signs  Temp:  [96.1 °F (35.6 °C)-98.2 °F (36.8 °C)] 97.5 °F (36.4 °C)  Heart Rate:  [72-84] 76  Resp:  [16-18] 18  BP: (127-161)/() 127/88  SpO2:  [96 %-100 %] 99 %  on   ;   Device (Oxygen Therapy): room air  Body mass index is 27.72 kg/m².  Physical Exam  Vitals and nursing note reviewed. Exam conducted with a chaperone present (Caregiver).   Constitutional:       General: He is not in acute distress.     Appearance: He is not ill-appearing, toxic-appearing or diaphoretic.   HENT:      Head: Normocephalic.      Nose: Nose normal.      Mouth/Throat:      Mouth: Mucous membranes are moist.      Pharynx: Oropharynx is clear.   Eyes:      General: No scleral icterus.        Right eye: No discharge.         Left eye: No discharge.      Conjunctiva/sclera: Conjunctivae normal.      Comments: Strabismus   Cardiovascular:      Rate and Rhythm: Normal rate and regular rhythm.      Pulses: Normal pulses.      Comments: AVF RUE  Pulmonary:      Effort: Pulmonary effort is normal. No respiratory distress.      Breath sounds: Normal breath sounds. No wheezing or rales.   Abdominal:      General: Bowel sounds are normal. There is no distension.      Palpations: Abdomen is soft.      Tenderness: There is no abdominal tenderness.   Musculoskeletal:         General: No swelling.      Cervical back: Neck supple.   Skin:     General: Skin is warm and dry.      Capillary Refill: Capillary refill takes less than 2 seconds.      Coloration: Skin is not jaundiced.   Neurological:      Mental Status: He is alert. Mental status is at baseline.   Psychiatric:         Mood and  "Affect: Mood normal.       Results Review     I reviewed the patient's new clinical results.  Results from last 7 days   Lab Units 04/16/25  0357 04/15/25  1358   WBC 10*3/mm3 6.25 5.87   HEMOGLOBIN g/dL 10.1* 10.8*   PLATELETS 10*3/mm3 182 219     Results from last 7 days   Lab Units 04/16/25  0357 04/15/25  1358   SODIUM mmol/L 141 136   POTASSIUM mmol/L 3.8 3.9   CHLORIDE mmol/L 103 97*   CO2 mmol/L 23.8 23.0   BUN mg/dL 78* 77*   CREATININE mg/dL 6.14* 6.92*   GLUCOSE mg/dL 97 82   EGFR mL/min/1.73 11.3* 9.8*     Results from last 7 days   Lab Units 04/16/25  0357 04/15/25  1358   ALBUMIN g/dL 3.1* 4.2   BILIRUBIN mg/dL <0.2 0.2   ALK PHOS U/L 147* 178*   AST (SGOT) U/L 19 24   ALT (SGPT) U/L 15 20     Results from last 7 days   Lab Units 04/16/25  0357 04/15/25  1358   CALCIUM mg/dL 9.3 9.8   ALBUMIN g/dL 3.1* 4.2   PHOSPHORUS mg/dL 5.8* 5.4*     Results from last 7 days   Lab Units 04/15/25  1441   LACTATE mmol/L 1.1     No results found for: \"HGBA1C\", \"POCGLU\"    No radiology results for the last day    I have personally reviewed all medications:  Scheduled Medications  allopurinol, 100 mg, Oral, Daily  carvedilol, 25 mg, Oral, BID With Meals  cefTRIAXone, 1,000 mg, Intravenous, Q24H  cetirizine, 10 mg, Oral, Daily  divalproex, 500 mg, Oral, QAM AC   And  divalproex, 1,000 mg, Oral, Nightly  ferrous sulfate, 325 mg, Oral, BID  fluticasone, 2 spray, Each Nare, Daily  hydrALAZINE, 50 mg, Oral, Q8H  levothyroxine, 75 mcg, Oral, Daily  montelukast, 10 mg, Oral, Nightly  multivitamin with minerals, 1 tablet, Oral, Daily  NIFEdipine XL, 30 mg, Oral, Q24H  O2, 2 L/min, Inhalation, Nightly  OLANZapine, 20 mg, Oral, Nightly  oxybutynin XL, 5 mg, Oral, Daily  pantoprazole, 40 mg, Oral, QAM  sodium chloride, 10 mL, Intravenous, Q12H  tamsulosin, 0.4 mg, Oral, Daily    Infusions   Diet  Diet: Regular/House; Fluid Consistency: Thin (IDDSI 0)    I have personally reviewed:  [x]  Laboratory   [x]  Microbiology   []  " Radiology   [x]  EKG/Telemetry  []  Cardiology/Vascular   []  Pathology    [x]  Records       Assessment/Plan     Active Hospital Problems    Diagnosis  POA    **Acute on chronic renal failure [N17.9, N18.9]  Yes    CKD (chronic kidney disease) stage 5, GFR less than 15 ml/min [N18.5]  Yes    Altered mental status [R41.82]  Yes    Acute infective cystitis [N30.00]  Yes    S/P arteriovenous (AV) fistula creation [Z98.890]  Not Applicable    GERD (gastroesophageal reflux disease) [K21.9]  Yes    Hypothyroidism [E03.9]  Yes    Hypertension [I10]  Yes    Intellectual disability [F79]  Yes    Seizure disorder [G40.909]  Yes      Resolved Hospital Problems   No resolved problems to display.       38yo gentleman with CDK5, HTN, seizure d/o, intellectual disability, hypoT4, GERD, and HLD, who presented to ER at the direction of his nephrologist for worsening renal function and behavioral issues. He was also diagnosed with acute UTI in ER.    JORDEN/CKD5  Appreciate Renal attention to pt  Cr slowly falling  Renal considering whether or not to start HD here    Acute UTI  Continue Rocephin  Blood and urine cultures NGTD    Intellectual disability  Encephalopathy NOS  Suspect confusion and combativeness on presentation due to metabolic encephalopathy--from UTI and possibly uremia  Caregiver confirms pt at baseline now    HTN  BPs much improved this AM  Continue Coreg, Hydralazine, and Nifedipine    Anemia of CKD  Hgb stable at baseline  Iron panel c/w chronic disease    Seizure d/o  No seizure activity here  Continue Depakote    HypoT4  Continue L-T4  Check TSH in AM    GERD  Continue PPI      SCDs for DVT prophylaxis.  Full code.  Discussed with patient and caregiver.  Anticipate discharge  TBD    Expected discharge date/ time has not been documented.      Phoenix Gao MD  [facility]  04/16/25  10:59 EDT

## 2025-04-16 NOTE — TELEPHONE ENCOUNTER
Caller: CHANTELLE    Relationship to patient: Other    Best call back number:     [phone]       Patient is needing: CALLING TO CHECK STATUS OF PRN FORMS. ADVISED HE SENT AN UPDATED PRN FORM 4/15/25 THAT INCLUDED SEIZURE PROTOCOLS. PLEASE SIGN AND FAX BACK

## 2025-04-17 ENCOUNTER — READMISSION MANAGEMENT (OUTPATIENT)
Dept: CALL CENTER | Facility: HOSPITAL | Age: 40
End: 2025-04-17
Payer: MEDICAID

## 2025-04-17 VITALS
SYSTOLIC BLOOD PRESSURE: 139 MMHG | RESPIRATION RATE: 18 BRPM | TEMPERATURE: 98.6 F | OXYGEN SATURATION: 97 % | HEART RATE: 83 BPM | DIASTOLIC BLOOD PRESSURE: 87 MMHG | HEIGHT: 67 IN | BODY MASS INDEX: 28.44 KG/M2 | WEIGHT: 181.22 LBS

## 2025-04-17 LAB
ALBUMIN SERPL-MCNC: 3.5 G/DL (ref 3.5–5.2)
ALP SERPL-CCNC: 144 U/L (ref 39–117)
ALT SERPL W P-5'-P-CCNC: 13 U/L (ref 1–41)
ANION GAP SERPL CALCULATED.3IONS-SCNC: 14.2 MMOL/L (ref 5–15)
AST SERPL-CCNC: 15 U/L (ref 1–40)
BACTERIA SPEC AEROBE CULT: ABNORMAL
BASOPHILS # BLD AUTO: 0.03 10*3/MM3 (ref 0–0.2)
BASOPHILS NFR BLD AUTO: 0.4 % (ref 0–1.5)
BILIRUB CONJ SERPL-MCNC: <0.1 MG/DL (ref 0–0.3)
BILIRUB INDIRECT SERPL-MCNC: ABNORMAL MG/DL
BILIRUB SERPL-MCNC: <0.2 MG/DL (ref 0–1.2)
BUN SERPL-MCNC: 71 MG/DL (ref 6–20)
BUN/CREAT SERPL: 11.5 (ref 7–25)
CALCIUM SPEC-SCNC: 9.1 MG/DL (ref 8.6–10.5)
CHLORIDE SERPL-SCNC: 102 MMOL/L (ref 98–107)
CO2 SERPL-SCNC: 24.8 MMOL/L (ref 22–29)
CREAT SERPL-MCNC: 6.2 MG/DL (ref 0.76–1.27)
DEPRECATED RDW RBC AUTO: 44.2 FL (ref 37–54)
EGFRCR SERPLBLD CKD-EPI 2021: 11.1 ML/MIN/1.73
EOSINOPHIL # BLD AUTO: 0.04 10*3/MM3 (ref 0–0.4)
EOSINOPHIL NFR BLD AUTO: 0.6 % (ref 0.3–6.2)
ERYTHROCYTE [DISTWIDTH] IN BLOOD BY AUTOMATED COUNT: 15.7 % (ref 12.3–15.4)
GLUCOSE SERPL-MCNC: 101 MG/DL (ref 65–99)
HBA1C MFR BLD: 5.6 % (ref 4.8–5.6)
HCT VFR BLD AUTO: 28.6 % (ref 37.5–51)
HGB BLD-MCNC: 9.4 G/DL (ref 13–17.7)
IMM GRANULOCYTES # BLD AUTO: 0.04 10*3/MM3 (ref 0–0.05)
IMM GRANULOCYTES NFR BLD AUTO: 0.6 % (ref 0–0.5)
LYMPHOCYTES # BLD AUTO: 3.48 10*3/MM3 (ref 0.7–3.1)
LYMPHOCYTES NFR BLD AUTO: 51.6 % (ref 19.6–45.3)
MAGNESIUM SERPL-MCNC: 1.9 MG/DL (ref 1.6–2.6)
MCH RBC QN AUTO: 25.6 PG (ref 26.6–33)
MCHC RBC AUTO-ENTMCNC: 32.9 G/DL (ref 31.5–35.7)
MCV RBC AUTO: 77.9 FL (ref 79–97)
MONOCYTES # BLD AUTO: 0.66 10*3/MM3 (ref 0.1–0.9)
MONOCYTES NFR BLD AUTO: 9.8 % (ref 5–12)
NEUTROPHILS NFR BLD AUTO: 2.5 10*3/MM3 (ref 1.7–7)
NEUTROPHILS NFR BLD AUTO: 37 % (ref 42.7–76)
NRBC BLD AUTO-RTO: 0 /100 WBC (ref 0–0.2)
PHOSPHATE SERPL-MCNC: 5.4 MG/DL (ref 2.5–4.5)
PLATELET # BLD AUTO: 154 10*3/MM3 (ref 140–450)
PMV BLD AUTO: 10.5 FL (ref 6–12)
POTASSIUM SERPL-SCNC: 3.6 MMOL/L (ref 3.5–5.2)
PROT SERPL-MCNC: 6.1 G/DL (ref 6–8.5)
RBC # BLD AUTO: 3.67 10*6/MM3 (ref 4.14–5.8)
SODIUM SERPL-SCNC: 141 MMOL/L (ref 136–145)
TSH SERPL DL<=0.05 MIU/L-ACNC: 2.52 UIU/ML (ref 0.27–4.2)
VALPROATE SERPL-MCNC: 91 MCG/ML (ref 50–125)
WBC NRBC COR # BLD AUTO: 6.75 10*3/MM3 (ref 3.4–10.8)

## 2025-04-17 PROCEDURE — 80048 BASIC METABOLIC PNL TOTAL CA: CPT | Performed by: INTERNAL MEDICINE

## 2025-04-17 PROCEDURE — 85025 COMPLETE CBC W/AUTO DIFF WBC: CPT | Performed by: HOSPITALIST

## 2025-04-17 PROCEDURE — 83735 ASSAY OF MAGNESIUM: CPT | Performed by: HOSPITALIST

## 2025-04-17 PROCEDURE — 80164 ASSAY DIPROPYLACETIC ACD TOT: CPT | Performed by: HOSPITALIST

## 2025-04-17 PROCEDURE — 84100 ASSAY OF PHOSPHORUS: CPT | Performed by: HOSPITALIST

## 2025-04-17 PROCEDURE — 83036 HEMOGLOBIN GLYCOSYLATED A1C: CPT | Performed by: HOSPITALIST

## 2025-04-17 PROCEDURE — 80076 HEPATIC FUNCTION PANEL: CPT | Performed by: HOSPITALIST

## 2025-04-17 PROCEDURE — 84443 ASSAY THYROID STIM HORMONE: CPT | Performed by: HOSPITALIST

## 2025-04-17 RX ORDER — LIDOCAINE 40 MG/G
1 CREAM TOPICAL 3 TIMES WEEKLY
Status: CANCELLED | OUTPATIENT
Start: 2025-04-17

## 2025-04-17 RX ORDER — FUROSEMIDE 40 MG/1
40 TABLET ORAL
Status: DISCONTINUED | OUTPATIENT
Start: 2025-04-17 | End: 2025-04-17 | Stop reason: HOSPADM

## 2025-04-17 RX ORDER — CEFDINIR 300 MG/1
300 CAPSULE ORAL DAILY
Qty: 5 CAPSULE | Refills: 0 | Status: SHIPPED | OUTPATIENT
Start: 2025-04-17 | End: 2025-04-22

## 2025-04-17 RX ADMIN — ALLOPURINOL 100 MG: 100 TABLET ORAL at 08:24

## 2025-04-17 RX ADMIN — NIFEDIPINE 30 MG: 30 TABLET, EXTENDED RELEASE ORAL at 08:24

## 2025-04-17 RX ADMIN — FLUTICASONE PROPIONATE 2 SPRAY: 50 SPRAY, METERED NASAL at 08:24

## 2025-04-17 RX ADMIN — CARVEDILOL 25 MG: 25 TABLET, FILM COATED ORAL at 08:24

## 2025-04-17 RX ADMIN — DIVALPROEX SODIUM 500 MG: 500 TABLET, EXTENDED RELEASE ORAL at 06:30

## 2025-04-17 RX ADMIN — TAMSULOSIN HYDROCHLORIDE 0.4 MG: 0.4 CAPSULE ORAL at 08:24

## 2025-04-17 RX ADMIN — FUROSEMIDE 40 MG: 40 TABLET ORAL at 11:42

## 2025-04-17 RX ADMIN — HYDRALAZINE HYDROCHLORIDE 50 MG: 50 TABLET ORAL at 06:27

## 2025-04-17 RX ADMIN — Medication 1 TABLET: at 08:24

## 2025-04-17 RX ADMIN — CETIRIZINE HYDROCHLORIDE 10 MG: 10 TABLET ORAL at 08:24

## 2025-04-17 RX ADMIN — LEVOTHYROXINE SODIUM 75 MCG: 0.07 TABLET ORAL at 08:24

## 2025-04-17 RX ADMIN — OXYBUTYNIN CHLORIDE 5 MG: 5 TABLET, EXTENDED RELEASE ORAL at 08:24

## 2025-04-17 RX ADMIN — PANTOPRAZOLE SODIUM 40 MG: 40 TABLET, DELAYED RELEASE ORAL at 06:27

## 2025-04-17 RX ADMIN — FERROUS SULFATE TAB 325 MG (65 MG ELEMENTAL FE) 325 MG: 325 (65 FE) TAB at 08:24

## 2025-04-17 RX ADMIN — Medication 10 ML: at 08:24

## 2025-04-17 NOTE — PLAN OF CARE
Problem: Adult Inpatient Plan of Care  Goal: Absence of Hospital-Acquired Illness or Injury  Intervention: Identify and Manage Fall Risk  Recent Flowsheet Documentation  Taken 4/16/2025 1602 by Ayana Nguyen RN  Safety Promotion/Fall Prevention:   nonskid shoes/slippers when out of bed   activity supervised   assistive device/personal items within reach   room organization consistent   safety round/check completed  Taken 4/16/2025 1440 by Ayana Nguyen RN  Safety Promotion/Fall Prevention:   activity supervised   assistive device/personal items within reach   room organization consistent   safety round/check completed  Taken 4/16/2025 1200 by Ayana Nguyen RN  Safety Promotion/Fall Prevention: activity supervised  Taken 4/16/2025 1144 by Ayana Nguyen RN  Safety Promotion/Fall Prevention:   safety round/check completed   room organization consistent   assistive device/personal items within reach   activity supervised     Problem: Adult Inpatient Plan of Care  Goal: Absence of Hospital-Acquired Illness or Injury  Intervention: Prevent Skin Injury  Recent Flowsheet Documentation  Taken 4/16/2025 1602 by Ayana Nguyen RN  Body Position:   turned   supine   lower extremity elevated  Taken 4/16/2025 1440 by Ayana Nguyen RN  Body Position:   turned   position changed independently  Taken 4/16/2025 1200 by Ayana Nguyen RN  Body Position:   turned   right   position changed independently   lower extremity elevated   tilted  Taken 4/16/2025 1144 by Ayana Nguyen RN  Body Position: lower extremity elevated   Goal Outcome Evaluation:  Plan of Care Reviewed With: patient        Progress: no change  Outcome Evaluation: Patient with mental retardation, able to respond simple question, take medication whole with thin water without dificukty. admitted due to change of mental status found have UTI now treated with IV ABT poor appetite in breakfast and lunch improved fair in dinner time place purewick external catheter to measurement the  amount of urine per Nephrologist request , able to a,bulate to the toilet assistant x1 ,have bowel movement dark brow with blood caregiver state patient have hemorroids.will continue monitor.

## 2025-04-17 NOTE — CASE MANAGEMENT/SOCIAL WORK
Case Management Discharge Note      Final Note: Return to Group Home via caregiver, no outpt HD setup needed at this time.         Selected Continued Care - Discharged on 4/17/2025 Admission date: 4/15/2025 - Discharge disposition: Home or Self Care      Destination    No services have been selected for the patient.                Durable Medical Equipment    No services have been selected for the patient.                Dialysis/Infusion    No services have been selected for the patient.                Home Medical Care    No services have been selected for the patient.                Therapy    No services have been selected for the patient.                Community Resources    No services have been selected for the patient.                Community & DME    No services have been selected for the patient.                    Transportation Services  Private: Car    Final Discharge Disposition Code: 01 - home or self-care

## 2025-04-17 NOTE — DISCHARGE SUMMARY
Patient Name: Arthur Mccarthy  : 1985  MRN: [id number]    Date of Admission: 4/15/2025  Date of Discharge:  2025  Primary Care Physician: Mikael Vasquez MD      Chief Complaint:   Abnormal Lab      Discharge Diagnoses     Active Hospital Problems    Diagnosis  POA    **Acute on chronic renal failure [N17.9, N18.9]  Yes    CKD (chronic kidney disease) stage 5, GFR less than 15 ml/min [N18.5]  Yes    Altered mental status [R41.82]  Yes    Acute infective cystitis [N30.00]  Yes    S/P arteriovenous (AV) fistula creation [Z98.890]  Not Applicable    GERD (gastroesophageal reflux disease) [K21.9]  Yes    Hypothyroidism [E03.9]  Yes    Hypertension [I10]  Yes    Intellectual disability [F79]  Yes    Seizure disorder [G40.909]  Yes      Resolved Hospital Problems   No resolved problems to display.        Hospital Course     Very pleasant 38yo gentleman with CDK5, HTN, seizure d/o, intellectual disability, hypoT4, GERD, and HLD, who presented to ER at the direction of his nephrologist for worsening renal function and behavioral issues. He was also diagnosed with acute UTI in ER. Please see below for details of admission by problem:      JORDEN/CKD5  Appreciate Renal attention to pt  Cr improved since admission, now stable around baseline  Renal does not feel HD indicated at present and are okay with his dc home today     Acute UTI  Treated with Rocephin  Urine culture grew E.coli sensitive to Rocephin  Change to oral Cefdinir at dc to complete 7d course of abx at home  Blood cultures NGTD     Intellectual disability  Encephalopathy NOS  Suspect confusion and combativeness on presentation due to metabolic encephalopathy--from UTI and possibly uremia  Caregiver confirms pt at baseline now     HTN  BPs much improved now  Continued Coreg and Hydralazine  Restarted his home Nifedipine that apparently was on hold prior to admission     Anemia of CKD  Hgb stable at baseline  Iron panel c/w chronic disease      Seizure d/o  No seizure activity here  Continued Depakote     HypoT4  Continued L-T4  TSH wnl     GERD  Continued PPI        SCDs for DVT prophylaxis while here.  Full code confirmed.  Discussed with patient and caregiver. D/w Dr. Verma (Renal).  Discharge home today.    Day of Discharge     Subjective:  Feeling better today. Caregiver at bedside confirms pt seems to be back to his baseline. Tolerating diet. Voiding well. Eager to go home.    Physical Exam:  Temp:  [98.1 °F (36.7 °C)-98.6 °F (37 °C)] 98.6 °F (37 °C)  Heart Rate:  [73-93] 78  Resp:  [18] 18  BP: (130-162)/() 139/87  Body mass index is 28.38 kg/m².  Physical Exam  Vitals and nursing note reviewed. Exam conducted with a chaperone present (Caregiver).   Constitutional:       General: He is not in acute distress.     Appearance: He is not ill-appearing, toxic-appearing or diaphoretic.   HENT:      Head: Normocephalic.      Nose: Nose normal.      Mouth/Throat:      Mouth: Mucous membranes are moist.      Pharynx: Oropharynx is clear.   Eyes:      General: No scleral icterus.        Right eye: No discharge.         Left eye: No discharge.      Conjunctiva/sclera: Conjunctivae normal.      Comments: Strabismus   Cardiovascular:      Rate and Rhythm: Normal rate and regular rhythm.      Pulses: Normal pulses.      Comments: AVF RUE  Pulmonary:      Effort: Pulmonary effort is normal. No respiratory distress.      Breath sounds: Normal breath sounds. No wheezing or rales.   Abdominal:      General: Bowel sounds are normal. There is no distension.      Palpations: Abdomen is soft.      Tenderness: There is no abdominal tenderness.   Musculoskeletal:         General: No swelling.      Cervical back: Neck supple.   Skin:     General: Skin is warm and dry.      Capillary Refill: Capillary refill takes less than 2 seconds.      Coloration: Skin is not jaundiced.   Neurological:      Mental Status: He is alert. Mental status is at baseline.   Psychiatric:  "        Mood and Affect: Mood normal.        Consultants     Consult Orders (all) (From admission, onward)       Start     Ordered    04/15/25 1517  [facility] (on-call MD unless specified) Details  Once        Specialty:  Hospitalist  Provider:  Zhang Han MD    04/15/25 1516    04/15/25 1501  Nephrology (on -call MD unless specified)  Once        Specialty:  Nephrology  Provider:  Arthur Phillips MD    04/15/25 1504                  Procedures     * Surgery not found *    Imaging Results (All)       None              Pertinent Labs     Results from last 7 days   Lab Units 04/17/25  0347 04/16/25  0357 04/15/25  1358   WBC 10*3/mm3 6.75 6.25 5.87   HEMOGLOBIN g/dL 9.4* 10.1* 10.8*   PLATELETS 10*3/mm3 154 182 219     Results from last 7 days   Lab Units 04/17/25  0347 04/16/25  0357 04/15/25  1358   SODIUM mmol/L 141 141 136   POTASSIUM mmol/L 3.6 3.8 3.9   CHLORIDE mmol/L 102 103 97*   CO2 mmol/L 24.8 23.8 23.0   BUN mg/dL 71* 78* 77*   CREATININE mg/dL 6.20* 6.14* 6.92*   GLUCOSE mg/dL 101* 97 82   EGFR mL/min/1.73 11.1* 11.3* 9.8*     Results from last 7 days   Lab Units 04/17/25  0347 04/16/25  0357 04/15/25  1358   ALBUMIN g/dL 3.5 3.1* 4.2   BILIRUBIN mg/dL <0.2 <0.2 0.2   ALK PHOS U/L 144* 147* 178*   AST (SGOT) U/L 15 19 24   ALT (SGPT) U/L 13 15 20     Results from last 7 days   Lab Units 04/17/25  0347 04/16/25  0357 04/15/25  1358   CALCIUM mg/dL 9.1 9.3 9.8   ALBUMIN g/dL 3.5 3.1* 4.2   MAGNESIUM mg/dL 1.9  --   --    PHOSPHORUS mg/dL 5.4* 5.8* 5.4*         Results from last 7 days   Lab Units 04/11/25  1605   URIC ACID mg/dL 5.9   PROT/CREAT RATIO UR mg/g creat 1,217*         Invalid input(s): \"LDLCALC\"  Results from last 7 days   Lab Units 04/15/25  1456 04/15/25  1449 04/15/25  1441   BLOODCX   --  No growth at 24 hours No growth at 24 hours   URINECX  >100,000 CFU/mL Escherichia coli*  --   --          Test Results Pending at Discharge     Pending Results       Procedure [Order ID] " Specimen - Date/Time    Urine Culture - Urine, Urine, Clean Catch [[id number]]     Specimen: Urine, Clean Catch               Discharge Details        Discharge Medications        New Medications        Instructions Start Date   cefdinir 300 MG capsule  Commonly known as: OMNICEF   300 mg, Oral, Daily      NIFEdipine XL 30 MG 24 hr tablet  Commonly known as: PROCARDIA XL   30 mg, Oral, Every Night at Bedtime             Continue These Medications        Instructions Start Date   acetaminophen 325 MG tablet  Commonly known as: TYLENOL   TAKE TWO TABLETS BY MOUTH EVERY 6 HOURS AS NEEDED FEVER      albuterol nebulization syringe  Commonly known as: PROVENTIL/VENTOLIN   Every 4 Hours PRN      albuterol sulfate  (90 Base) MCG/ACT inhaler  Commonly known as: PROVENTIL HFA;VENTOLIN HFA;PROAIR HFA   2 puffs, Every 6 Hours PRN      allopurinol 100 MG tablet  Commonly known as: ZYLOPRIM   1 tablet, Daily      ammonium lactate 12 % lotion  Commonly known as: LAC-HYDRIN   Apply  topically to the appropriate area as directed.      carvedilol 25 MG tablet  Commonly known as: COREG   25 mg, Oral, 2 Times Daily      divalproex 500 MG 24 hr tablet  Commonly known as: DEPAKOTE ER   Take 1 tablet by mouth Every Morning Before Breakfast AND 2 tablets every night at bedtime.      ferrous sulfate 325 (65 FE) MG tablet  Commonly known as: FeroSul   325 mg, Oral, 2 Times Daily      fluticasone 50 MCG/ACT nasal spray  Commonly known as: FLONASE   2 sprays, Each Nare, Daily      furosemide 40 MG tablet  Commonly known as: LASIX   Take 1 tablet by mouth Every Morning Before Breakfast AND 1 tablet Daily With Lunch. TAKE ONE TABLET BY MOUTH TWICE DAILY MORNING AND NOON      hydrALAZINE 50 MG tablet  Commonly known as: APRESOLINE   50 mg, 3 Times Daily      levothyroxine 75 MCG tablet  Commonly known as: SYNTHROID, LEVOTHROID   75 mcg, Oral, Daily      loratadine 10 MG tablet  Commonly known as: CLARITIN   10 mg, Oral, Daily       montelukast 10 MG tablet  Commonly known as: SINGULAIR   10 mg, Oral, Every Night at Bedtime      multivitamin-minerals tablet tablet  Generic drug: multivitamin with minerals   1 tablet, Daily      OLANZapine 20 MG tablet  Commonly known as: zyPREXA   20 mg, Oral, Every Night at Bedtime      oxybutynin XL 5 MG 24 hr tablet  Commonly known as: DITROPAN-XL   5 mg, Oral, Daily      pantoprazole 40 MG EC tablet  Commonly known as: PROTONIX   40 mg, Oral, Every Morning      tamsulosin 0.4 MG capsule 24 hr capsule  Commonly known as: FLOMAX   0.4 mg, Oral, Every Night at Bedtime             Stop These Medications      O2  Commonly known as: OXYGEN              Allergies   Allergen Reactions    Doxycycline Anaphylaxis       Discharge Disposition:  Home or Self Care      Discharge Diet:  Diet Order   Procedures    Diet: Regular/House; Fluid Consistency: Thin (IDDSI 0)       Discharge Activity:   As tolerated    CODE STATUS:    Code Status and Medical Interventions: CPR (Attempt to Resuscitate); Full Support   Ordered at: 04/15/25 1815     Code Status (Patient has no pulse and is not breathing):    CPR (Attempt to Resuscitate)     Medical Interventions (Patient has pulse or is breathing):    Full Support       Future Appointments   Date Time Provider Department Center   4/22/2025 11:00 AM Mikael Vasquez MD [id number]     Additional Instructions for the Follow-ups that You Need to Schedule       Discharge Follow-up with PCP   As directed       Currently Documented PCP:    Mikael Vasquez MD    PCP Phone Number:    [phone]     Follow Up Details: Dr. Vasquez (PCP) at next available appt        Discharge Follow-up with Specified Provider: Karon LIZ (Renal) on April 21st at 1315   As directed      To: Karon LIZ (Renal) on April 21st at 1315               Follow-up Information       Bianca Brantley, APRN. Go on 4/21/2025.    Specialty: Nurse Practitioner  Why: Has follow-up appointment with Bianca  [facility] Monday, April 21 at 1 PM.  Contact information:  [address]  [address]  [facility] [id number] [phone]               Mikael Vasquez MD .    Specialty: Internal Medicine  Why: Dr. Vasquez (PCP) at next available appt  Contact information:  [address] [facility]  [address]  [facility] [id number] [phone]                             Additional Instructions for the Follow-ups that You Need to Schedule       Discharge Follow-up with PCP   As directed       Currently Documented PCP:    Mikael Vasquez MD    PCP Phone Number:    [phone]     Follow Up Details: Dr. Vasquez (PCP) at next available appt        Discharge Follow-up with Specified Provider: Karon LIZ (Renal) on April 21st at 1315   As directed      To: Karon LIZ (Renal) on April 21st at 1315            Time Spent on Discharge:  Greater than 30 minutes      Phoenix Gao MD  [facility]  04/17/25  11:35 EDT

## 2025-04-17 NOTE — OUTREACH NOTE
Prep Survey      Flowsheet Row Responses   [facility] patient discharged from? [facility]   Is LACE score < 7 ? No   Eligibility [facility]   Date of Admission 04/15/25   Date of Discharge 04/17/25   Discharge Disposition Home or Self Care   Discharge diagnosis Acute on chronic renal failure, Acute UTI   Does the patient have one of the following disease processes/diagnoses(primary or secondary)? Other   Does the patient have Home health ordered? No   Is there a DME ordered? No   General alerts for this patient Intellectual disability, return to group home   Prep survey completed? Yes            Cyn URBANO - Registered Nurse

## 2025-04-17 NOTE — PAYOR COMM NOTE
"Raleigh Briones (39 y.o. Male)    PLEASE SEE ATTACHED FOR INPT AUTH  REF#[id number]    THANK YOU   VICKIE MONET RN/ DEPT   [facility]   PH: [phone]   FAX:  DEPT [phone]     Date of Birth   1985    Social Security Number       Address   [address] [place], [place] [address]    Home Phone       MRN   [id number]       Restoration   Jew    Marital Status   Single                            Admission Date   4/15/2025    Admission Type   Emergency    Admitting Provider   Zhang Han MD    Attending Provider   Phoenix Gao MD    Department, Room/Bed   [facility] 4 [facility], S414/1       Discharge Date       Discharge Disposition       Discharge Destination                                 Attending Provider: Phoenix Gao MD    Allergies: Doxycycline    Isolation: None   Infection: None   Code Status: CPR    Ht: 170.2 cm (67\")   Wt: 82.2 kg (181 lb 3.5 oz)    Admission Cmt: None   Principal Problem: Acute on chronic renal failure [N17.9,N18.9]                   Active Insurance as of 4/15/2025       Primary Coverage       Payor Plan Insurance Group Employer/Plan Group    [place]        Payor Plan Address Payor Plan Phone Number Payor Plan Fax Number Effective Dates    [address] [phone]  10/15/2019 - None Entered    [facility] [id number]         Subscriber Name Subscriber Birth Date Member ID       RALEIGH BRIONES 1985 [id number]                     Emergency Contacts        (Rel.) Home Phone Work Phone Mobile Phone    Samira Gunter (Care Giver) -- -- [phone]    DANIELA GOMES (Legal Guardian) [phone] -- [phone]    Jeni Castillo () -- [phone]    RadhaBarbie duffyhours -- [phone] --              [place]: [facility] [id number]  Tax ID [id number]     History & Physical        Zhang Han MD at 04/15/25 1812          Internal medicine history " and physical  INTERNAL MEDICINE   [facility]       Patient Identification:  Name: Arthur Mccarthy  Age: 39 y.o.  Sex: male  :  1985  MRN: [id number]                   Primary Care Physician: Mikael Vasquez MD                               Date of admission:4/15/2025    Chief Complaint: Sent from nephrologist office for further management of worsening renal function and concern for urinary tract infection.    History of Present Illness:   Patient is a 39-year-old male with intellectual disability, hypertension, hypothyroidism, recurrent urinary tract infection, history of seizure disorder and chronic kidney disease who follows up with his nephrologist on routine basis.  Patient had lab work performed prior to his nephrologist visit and was noted to have worsening renal function resulting in him being directed to the emergency room for further care.  Patient was noted to have abnormal urinalysis concerning for UTI.  It is reported in the last few days patient has been acting differently in terms of being more confused and combative.  Patient is unable to give details of his symptoms but does answer simple questions appropriately and claims that he is does not any fever and chills and his appetite is good.  He denies any pain anywhere at this time.      Past Medical History:  Past Medical History:   Diagnosis Date    Acquired intellectual disability     Allergic rhinitis     Asthma     Chronic kidney disease (CKD)     Chronic renal disease, stage 3, moderately decreased glomerular filtration rate between 30-59 mL/min/1.73 square meter     Constipation     Epilepsy, generalized, convulsive     Episode of altered consciousness     Essential hypertension     History of Essential Hypertension     GERD (gastroesophageal reflux disease)     Hematuria     Hyperkalemia     Hyperlipidemia     Hypertension     Hypothyroidism     Immunization due     Metabolic encephalopathy     Moderate  intellectual disabilities     Mood disorder     Nocturia     Physical exam, annual     Seizure     Seizure disorder     Sepsis without acute organ dysfunction (present on admission) 9/9/2021     Past Surgical History:  No past surgical history on file.   Home Meds:  Medications Prior to Admission   Medication Sig Dispense Refill Last Dose/Taking    acetaminophen (TYLENOL) 325 MG tablet TAKE TWO TABLETS BY MOUTH EVERY 6 HOURS AS NEEDED FEVER 50 tablet 1     albuterol (PROVENTIL/VENTOLIN) nebulization syringe Take  by nebulization Every 4 (Four) Hours As Needed for Wheezing. Every 4-6 hrs prn wheezing       albuterol sulfate  (90 Base) MCG/ACT inhaler Inhale 2 puffs Every 6 (Six) Hours As Needed for Wheezing.       allopurinol (ZYLOPRIM) 100 MG tablet Take 1 tablet by mouth Daily.       ammonium lactate (LAC-HYDRIN) 12 % lotion Apply  topically to the appropriate area as directed.       carvedilol (COREG) 25 MG tablet Take 1 tablet by mouth 2 (Two) Times a Day. 180 tablet 0     divalproex (DEPAKOTE ER) 500 MG 24 hr tablet Take 1 tablet by mouth Every Morning Before Breakfast AND 2 tablets every night at bedtime. 270 tablet 1     ferrous sulfate (FeroSul) 325 (65 FE) MG tablet Take 1 tablet by mouth 2 (Two) Times a Day. 180 tablet 1     fluticasone (FLONASE) 50 MCG/ACT nasal spray USE TWO SPRAYS IN EACH NOSTRIL DAILY 16 g 5     furosemide (LASIX) 40 MG tablet Take 1 tablet by mouth Every Morning Before Breakfast AND 1 tablet Daily With Lunch. TAKE ONE TABLET BY MOUTH TWICE DAILY MORNING AND NOON 180 tablet 1     hydrALAZINE (APRESOLINE) 25 MG tablet Take 1 tablet by mouth 3 (Three) Times a Day. 270 tablet 1     levothyroxine (SYNTHROID, LEVOTHROID) 75 MCG tablet Take 1 tablet by mouth Daily. 90 tablet 1     loratadine (CLARITIN) 10 MG tablet TAKE ONE TABLET BY MOUTH DAILY 90 tablet 3     montelukast (SINGULAIR) 10 MG tablet TAKE ONE TABLET BY MOUTH AT BEDTIME 30 tablet 11     multivitamin (Thera) tablet tablet  TAKE ONE TABLET BY MOUTH DAILY 30 tablet 0     multivitamin with minerals (Therapeutic-M) tablet tablet Take 1 tablet by mouth Daily. 90 each 1     NIFEdipine XL (PROCARDIA XL) 30 MG 24 hr tablet Take 1 tablet by mouth every night at bedtime. 90 tablet 1     O2 (OXYGEN) Inhale 2 L/min Every Night. (Patient not taking: Reported on 9/30/2024)       OLANZapine (zyPREXA) 20 MG tablet Take 1 tablet by mouth every night at bedtime. 90 tablet 1     oxybutynin XL (DITROPAN-XL) 5 MG 24 hr tablet Take 1 tablet by mouth Daily. 90 tablet 1     pantoprazole (PROTONIX) 40 MG EC tablet Take 1 tablet by mouth Every Morning. 90 tablet 1     tamsulosin (FLOMAX) 0.4 MG capsule 24 hr capsule Take 1 capsule by mouth every night at bedtime. 90 capsule 1      Current Meds:     Current Facility-Administered Medications:     carvedilol (COREG) tablet 25 mg, 25 mg, Oral, BID With Meals, Fernanda Verma MD    cefTRIAXone (ROCEPHIN) 1,000 mg in sodium chloride 0.9 % 100 mL MBP, 1,000 mg, Intravenous, Q24H, Fernanda Verma MD, Stopped at 04/15/25 1658    hydrALAZINE (APRESOLINE) tablet 25 mg, 25 mg, Oral, Q8H, Fernanda Verma MD    NIFEdipine XL (PROCARDIA XL) 24 hr tablet 30 mg, 30 mg, Oral, Q24H, Fernanda Verma MD    sodium chloride 0.9 % flush 10 mL, 10 mL, Intravenous, PRN, Drake French MD    [COMPLETED] Insert Peripheral IV, , , Once **AND** sodium chloride 0.9 % flush 10 mL, 10 mL, Intravenous, PRN, Donn Wlison PA    sodium chloride 0.9 % infusion, 100 mL/hr, Intravenous, Continuous, Fernanda Verma MD, Last Rate: 100 mL/hr at 04/15/25 1600, 100 mL/hr at 04/15/25 1600  Allergies:  Allergies   Allergen Reactions    Doxycycline Anaphylaxis     Social History:   Social History     Tobacco Use    Smoking status: Never    Smokeless tobacco: Never   Substance Use Topics    Alcohol use: No      Family History:  Family History   Problem Relation Age of Onset    Down syndrome Brother     No Known Problems  Other           Review of Systems  See history of present illness and past medical history.  Answer simple questions but detailed review of system is limited because of intellectual disability.      Vitals:   BP (!) 148/103 (BP Location: Left arm, Patient Position: Lying)   Pulse 82   Temp 96.1 °F (35.6 °C) (Axillary)   Resp 16   SpO2 100%   I/O:   Intake/Output Summary (Last 24 hours) at 4/15/2025 1812  Last data filed at 4/15/2025 1658  Gross per 24 hour   Intake 100 ml   Output --   Net 100 ml     Exam:  Patient is examined using the personal protective equipment as per guidelines from infection control for this particular patient as enacted.  Hand washing was performed before and after patient interaction.  General Appearance:  Awake cooperative pleasant male who does not appear to be toxic or in any acute distress.   Head:  No acute lesions noted.   Eyes:    PERRL, conjunctiva/corneas clear, EOM's intact, both eyes   Ears:    Normal external ear canals, both ears   Nose:   Nares normal, septum midline, mucosa normal, no drainage    or sinus tenderness   Throat:   Lips, tongue, gums normal; oral mucosa pink and moist   Neck:   Supple   Back:     Symmetric, no curvature, ROM normal, no CVA tenderness   Lungs:     Clear to auscultation bilaterally, respirations unlabored   Chest Wall:    No tenderness or deformity    Heart:  S1-S2 regular   Abdomen:   Soft nontender no suprapubic tenderness noted   Extremities: Trace edema,Left fore arm AV fistula functional   Pulses:   Pulses palpable in all extremities; symmetric all extremities   Skin: No rash noted   Neurologic: Knows his name and answer simple questions spontaneously moving upper and lower extremities.       Data Review:      I reviewed the patient's new clinical results.  Results from last 7 days   Lab Units 04/15/25  1358   WBC 10*3/mm3 5.87   HEMOGLOBIN g/dL 10.8*   PLATELETS 10*3/mm3 219     Results from last 7 days   Lab Units 04/15/25  1358   SODIUM  mmol/L 136   POTASSIUM mmol/L 3.9   CHLORIDE mmol/L 97*   CO2 mmol/L 23.0   BUN mg/dL 77*   CREATININE mg/dL 6.92*   CALCIUM mg/dL 9.8   GLUCOSE mg/dL 82     No radiology results for the last 30 days.  Brief Urine Lab Results  (Last result in the past 365 days)        Color   Clarity   Blood   Leuk Est   Nitrite   Protein   CREAT   Urine HCG        04/15/25 1456 Yellow   Cloudy   Trace   Large (3+)   Positive   100 mg/dL (2+)                   Assessment:  Active Hospital Problems    Diagnosis  POA    **Acute on chronic renal failure [N17.9, N18.9]  Yes    Altered mental status [R41.82]  Unknown    Acute infective cystitis [N30.00]  Yes    S/P arteriovenous (AV) fistula creation [Z98.890]  Not Applicable    GERD (gastroesophageal reflux disease) [K21.9]  Yes    Hypothyroidism [E03.9]  Yes    Hypertension [I10]  Yes    Intellectual disability [F79]  Yes    Seizure disorder [G40.909]  Yes       Medical decision making/care plan: See admitting orders  Admit the patient   Telemetry monitoring   continue with IV fluid  Nephrology consultation  Follow-up on urine culture and blood cultures  Continue with IV Rocephin  Continue with home regimen, seizure precautions      Zhang Han MD   4/15/2025  18:12 EDT    Parts of this note may be an electronic transcription/translation of spoken language to printed text using the Dragon dictation system.      Electronically signed by Zhang Han MD at 04/16/25 5935          Emergency Department Notes        Jacque Villavicencio RN at 04/15/25 4796          Nursing report ED to floor  Arthur Mccarthy  39 y.o.  male    HPI :  HPI  Stated Reason for Visit: elevated BUN/CR    Chief Complaint  Chief Complaint   Patient presents with    Abnormal Lab       Admitting doctor:   Zhang Han MD    Admitting diagnosis:   The primary encounter diagnosis was Acute renal failure superimposed on stage 4 chronic kidney disease, unspecified acute renal failure type. Diagnoses of Metabolic  encephalopathy and Acute UTI (urinary tract infection) were also pertinent to this visit.    Code status:   Current Code Status       Date Active Code Status Order ID Comments User Context       Prior            Allergies:   Doxycycline    Isolation:   No active isolations    Intake and Output  No intake or output data in the 24 hours ending 04/15/25 1559    Weight:   There were no vitals filed for this visit.    Most recent vitals:   Vitals:    04/15/25 1342 04/15/25 1426 04/15/25 1456   BP: (!) 149/107 (!) 137/106 (!) 148/105   Patient Position: Sitting  Lying   Pulse: 83 72 73   Resp: 16     Temp: 97.5 °F (36.4 °C)     TempSrc: Tympanic     SpO2: 100% 99% 96%       Active LDAs/IV Access:   Lines, Drains & Airways       Active LDAs       Name Placement date Placement time Site Days    Peripheral IV 04/15/25 1358 20 G Anterior;Left Forearm 04/15/25  1358  Forearm  less than 1                    Labs (abnormal labs have a star):   Labs Reviewed   COMPREHENSIVE METABOLIC PANEL - Abnormal; Notable for the following components:       Result Value    BUN 77 (*)     Creatinine 6.92 (*)     Chloride 97 (*)     Alkaline Phosphatase 178 (*)     Anion Gap 16.0 (*)     eGFR 9.8 (*)     All other components within normal limits    Narrative:     GFR Categories in Chronic Kidney Disease (CKD)      GFR Category          GFR (mL/min/1.73)    Interpretation  G1                     90 or greater         Normal or high (1)  G2                      60-89                Mild decrease (1)  G3a                   45-59                Mild to moderate decrease  G3b                   30-44                Moderate to severe decrease  G4                    15-29                Severe decrease  G5                    14 or less           Kidney failure          (1)In the absence of evidence of kidney disease, neither GFR category G1 or G2 fulfill the criteria for CKD.    eGFR calculation 2021 CKD-EPI creatinine equation, which does not include  race as a factor   URINALYSIS W/ MICROSCOPIC IF INDICATED (NO CULTURE) - Abnormal; Notable for the following components:    Appearance, UA Cloudy (*)     Blood, UA Trace (*)     Protein,  mg/dL (2+) (*)     Leuk Esterase, UA Large (3+) (*)     Nitrite, UA Positive (*)     All other components within normal limits   CBC WITH AUTO DIFFERENTIAL - Abnormal; Notable for the following components:    Hemoglobin 10.8 (*)     Hematocrit 33.8 (*)     MCV 78.6 (*)     MCH 25.1 (*)     RDW 15.8 (*)     Lymphocyte % 46.3 (*)     Eosinophil % 0.2 (*)     All other components within normal limits   URINALYSIS, MICROSCOPIC ONLY - Abnormal; Notable for the following components:    WBC, UA Too Numerous to Count (*)     Bacteria, UA 4+ (*)     All other components within normal limits   IRON PROFILE - Abnormal; Notable for the following components:    Iron 168 (*)     All other components within normal limits   PHOSPHORUS - Abnormal; Notable for the following components:    Phosphorus 5.4 (*)     All other components within normal limits   LACTIC ACID, PLASMA - Normal   HEPATITIS B SURFACE ANTIGEN - Normal   BLOOD CULTURE   BLOOD CULTURE   URINE CULTURE   URINE CULTURE   RAINBOW DRAW    Narrative:     The following orders were created for panel order Vantage Draw.  Procedure                               Abnormality         Status                     ---------                               -----------         ------                     Green Top (Gel)[[id number]]                                  Final result               Lavender Top[[id number]]                                     Final result               Gold Top - SST[[id number]]                                   Final result               Light Blue Top[[id number]]                                                                Please view results for these tests on the individual orders.   FERRITIN   GREEN TOP   LAVENDER TOP   GOLD TOP - SST   CBC AND DIFFERENTIAL     Narrative:     The following orders were created for panel order CBC & Differential.  Procedure                               Abnormality         Status                     ---------                               -----------         ------                     CBC Auto Differential[[id number]]        Abnormal            Final result                 Please view results for these tests on the individual orders.   LIGHT BLUE TOP       EKG:   No orders to display       Meds given in ED:   Medications   sodium chloride 0.9 % flush 10 mL (has no administration in time range)   sodium chloride 0.9 % flush 10 mL (has no administration in time range)   hydrALAZINE (APRESOLINE) tablet 25 mg (has no administration in time range)   NIFEdipine XL (PROCARDIA XL) 24 hr tablet 30 mg (has no administration in time range)   cefTRIAXone (ROCEPHIN) 1,000 mg in sodium chloride 0.9 % 100 mL MBP (1,000 mg Intravenous New Bag 4/15/25 1540)   sodium chloride 0.9 % infusion (has no administration in time range)   carvedilol (COREG) tablet 25 mg (has no administration in time range)       Imaging results:  No radiology results for the last day    Ambulatory status:   - stand by assist    Social issues:   Social History     Socioeconomic History    Marital status: Single   Tobacco Use    Smoking status: Never    Smokeless tobacco: Never   Vaping Use    Vaping status: Never Used   Substance and Sexual Activity    Alcohol use: No    Drug use: Defer    Sexual activity: Defer       Peripheral Neurovascular  Peripheral Neurovascular (Adult)  Peripheral Neurovascular WDL: WDL    Neuro Cognitive  Neuro Cognitive (Adult)  Cognitive/Neuro/Behavioral WDL: .WDL except, orientation  Orientation: time    Learning  Learning Assessment  Learning Readiness and Ability: cognitive limitation noted  Education Provided  Person Taught: patient    Respiratory  Respiratory WDL  Respiratory WDL: WDL    Abdominal Pain       Pain Assessments  Pain (Adult)  (0-10) Pain  Rating: Rest: 0  (0-10) Pain Rating: Activity: 0    NIH Stroke Scale       Jacque Villavicencio RN  04/15/25 15:59 EDT      Electronically signed by Jacque Villavicencio RN at 04/15/25 1600       Donn Wilson PA at 04/15/25 1345       Attestation signed by Drake French MD at 04/15/25 1845    MD ATTESTATION NOTE    The RENETTA and I have discussed this patient's history, physical exam, MDM, and treatment plan.  I have reviewed the documentation and personally had a face to face interaction with the patient. I affirm the documentation and agree with the treatment and plan.  The attached note describes my personal findings.      I provided a substantive portion of the medical decision making.        Brief HPI: 39-year-old gentleman with history of intellectual disability, CKD, GERD, presents for further evaluation of abnormal labs.  Patient reportedly had some outpatient lab work suggesting worsening kidney function as well as possible urinary tract infection.  Patient denies any complaints currently.  He is accompanied by his caregiver.    PHYSICAL EXAM  ED Triage Vitals   Temp Heart Rate Resp BP SpO2   04/15/25 1342 04/15/25 1342 04/15/25 1342 04/15/25 1342 04/15/25 1342   97.5 °F (36.4 °C) 83 16 (!) 149/107 100 %      Temp src Heart Rate Source Patient Position BP Location FiO2 (%)   04/15/25 1342 04/15/25 1342 04/15/25 1342 04/15/25 1736 --   Tympanic Monitor Sitting Left arm          GENERAL: Awake and alert, no acute distress, patient is poor historian secondary to cognitive impairment.  HENT: nares patent  EYES: no scleral icterus, disconjugate gaze  CV: regular rhythm, normal rate  RESPIRATORY: normal effort  MUSCULOSKELETAL: no deformity  PSYCH:  calm, cooperative  SKIN: warm, dry    Vital signs and nursing notes reviewed.        Medical Decision Making:  ED Course as of 04/15/25 1844   Tue Apr 15, 2025   1424 On results review, the patient had a white blood cell count of 13.7 and creatinine of 6.9  four days ago. [DC]   1451 Creatinine(!): 6.92  Creatinine at 6.92 today.  This is up from 5.7 on 10/22, 5.56 on 3/24, and 6.9 on 4/11. [DC]   1459 WBC: 5.87 [DC]   1516 I discussed the case with MD Bayron with Nephrology at this time regarding the patient.  I discussed work-up, results, concerns.  I discussed the consulting provider's desire for the hospitalist service and agrees to consult.   [DC]   1548 I discussed the case with MD sylvain with A at this time regarding the patient.  I discussed work-up, results, concerns.  I discussed the consulting provider's desire for med surg admit.    [DC]      ED Course User Index  [DC] Donn Wilson PA               SHARED APC FACE TO FACE: I performed a substantive part of the MDM during the patient's E/M visit. I personally evaluated and examined the patient. I personally made or approved the documented management plan and acknowledge its risk of complications.   Drake French MD 4/15/2025 18:44 EDT                          EMERGENCY DEPARTMENT ENCOUNTER      Room Number:  06/06  PCP: Mikael Vasquez MD  Independent Historians: Caregiver  Patient Care Team:  Mikael Vasquez MD as PCP - General (Internal Medicine)  Arthur Phillips MD as Consulting Physician (Nephrology)       HPI:    Chief Complaint: Abnormal labs    A complete HPI/ROS/PMH/PSH/SH/FH are unobtainable due to: Poor historian    Chronic or social conditions impacting patient care (Social Determinants of Health): None      Context: Arthur Mccarthy is a 39 y.o. male with a PMH significant for intellectual disability, chronic kidney disease, GERD, asthma, impulse control disorder who presents to the ED c/o acute abnormal labs.  The patient is unable to provide any history but the caregiver at the bedside states that he has been somewhat confused and fatigued over the past couple of days and had a recent evaluation with nephrology which revealed worsening kidney function and  possible urinary tract infection.  There has been no fever, chills.  The patient has seemed somewhat combative to the caregiver at times recently as well.  No falls or injuries to the head or neck.      Upon review of prior external notes (non-ED) -and- Review of prior external test results outside of this encounter it appears the patient was evaluated in the office with nephrology for chronic kidney disease on March 24, 2025.  The patient had a normal uric acid on 4/11/25 and a normal vitamin D at that time.      PAST MEDICAL HISTORY  Active Ambulatory Problems     Diagnosis Date Noted    Intellectual disability 10/15/2019    Seasonal allergies 10/15/2019    Hypertension 10/15/2019    Seizure disorder 10/15/2019    Stage 3b chronic kidney disease 10/15/2019    Hyperlipidemia 10/15/2019    Annual physical exam 01/22/2020    Hypothyroidism 01/22/2020    GERD (gastroesophageal reflux disease) 01/22/2020    Asthma 01/22/2020    Constipation 01/24/2020    Hypertriglyceridemia 01/24/2020    Acute urinary tract infection 09/05/2021    JORDEN (acute kidney injury) 09/05/2021    Urine retention 09/08/2021    Hospital discharge follow-up 09/29/2021    Secondary hyperparathyroidism 11/03/2021    Vitamin D deficiency 11/03/2021    Impulse control disorder 05/20/2015    Chronic kidney disease, stage 4 (severe) 07/13/2022    Renal insufficiency 10/18/2022    Generalized edema 10/18/2022    Acute on chronic kidney failure 03/11/2023    S/P arteriovenous (AV) fistula creation 11/11/2022    Acute infective cystitis 03/11/2023    Severe sepsis 03/11/2023    Overactive bladder 05/16/2023     Resolved Ambulatory Problems     Diagnosis Date Noted    Sepsis without acute organ dysfunction (present on admission) 09/09/2021     Past Medical History:   Diagnosis Date    Acquired intellectual disability     Allergic rhinitis     Chronic kidney disease (CKD)     Chronic renal disease, stage 3, moderately decreased glomerular filtration rate  between 30-59 mL/min/1.73 square meter     Epilepsy, generalized, convulsive     Episode of altered consciousness     Essential hypertension     Hematuria     Hyperkalemia     Immunization due     Metabolic encephalopathy     Moderate intellectual disabilities     Mood disorder     Nocturia     Physical exam, annual     Seizure          PAST SURGICAL HISTORY  No past surgical history on file.      FAMILY HISTORY  Family History   Problem Relation Age of Onset    Down syndrome Brother     No Known Problems Other          SOCIAL HISTORY  Social History     Socioeconomic History    Marital status: Single   Tobacco Use    Smoking status: Never    Smokeless tobacco: Never   Vaping Use    Vaping status: Never Used   Substance and Sexual Activity    Alcohol use: No    Drug use: Defer    Sexual activity: Defer         ALLERGIES  Doxycycline      REVIEW OF SYSTEMS  Included in HPI  All systems reviewed and negative except for those discussed in HPI.      PHYSICAL EXAM    I have reviewed the triage vital signs and nursing notes.    ED Triage Vitals [04/15/25 1342]   Temp Heart Rate Resp BP SpO2   97.5 °F (36.4 °C) 83 16 (!) 149/107 100 %      Temp src Heart Rate Source Patient Position BP Location FiO2 (%)   Tympanic Monitor Sitting -- --       Physical Exam  Constitutional:       General: He is not in acute distress.     Appearance: He is well-developed.   HENT:      Head: Normocephalic and atraumatic.   Eyes:      General: No scleral icterus.     Conjunctiva/sclera: Conjunctivae normal.   Neck:      Trachea: No tracheal deviation.   Cardiovascular:      Rate and Rhythm: Normal rate and regular rhythm.      Comments: Fistula to the right upper extremity with faint thrill  Pulmonary:      Effort: Pulmonary effort is normal.      Breath sounds: Normal breath sounds.   Abdominal:      Palpations: Abdomen is soft.      Tenderness: There is no abdominal tenderness. There is no guarding.   Musculoskeletal:         General: No  deformity.      Cervical back: Normal range of motion.   Lymphadenopathy:      Cervical: No cervical adenopathy.   Skin:     General: Skin is warm and dry.   Neurological:      Mental Status: He is alert. Mental status is at baseline.   Psychiatric:         Behavior: Behavior normal.         Cognition and Memory: Cognition is impaired. Memory is impaired.         Vital signs and nursing notes reviewed.      PPE: I wore a surgical mask throughout my encounters with the pt. I performed hand hygiene on entry into the pt room and upon exit.     LAB RESULTS  Recent Results (from the past 24 hours)   Green Top (Gel)    Collection Time: 04/15/25  1:58 PM   Result Value Ref Range    Extra Tube Hold for add-ons.    Lavender Top    Collection Time: 04/15/25  1:58 PM   Result Value Ref Range    Extra Tube hold for add-on    Gold Top - SST    Collection Time: 04/15/25  1:58 PM   Result Value Ref Range    Extra Tube Hold for add-ons.    Comprehensive Metabolic Panel    Collection Time: 04/15/25  1:58 PM    Specimen: Blood   Result Value Ref Range    Glucose 82 65 - 99 mg/dL    BUN 77 (H) 6 - 20 mg/dL    Creatinine 6.92 (H) 0.76 - 1.27 mg/dL    Sodium 136 136 - 145 mmol/L    Potassium 3.9 3.5 - 5.2 mmol/L    Chloride 97 (L) 98 - 107 mmol/L    CO2 23.0 22.0 - 29.0 mmol/L    Calcium 9.8 8.6 - 10.5 mg/dL    Total Protein 7.8 6.0 - 8.5 g/dL    Albumin 4.2 3.5 - 5.2 g/dL    ALT (SGPT) 20 1 - 41 U/L    AST (SGOT) 24 1 - 40 U/L    Alkaline Phosphatase 178 (H) 39 - 117 U/L    Total Bilirubin 0.2 0.0 - 1.2 mg/dL    Globulin 3.6 gm/dL    A/G Ratio 1.2 g/dL    BUN/Creatinine Ratio 11.1 7.0 - 25.0    Anion Gap 16.0 (H) 5.0 - 15.0 mmol/L    eGFR 9.8 (L) >60.0 mL/min/1.73   CBC Auto Differential    Collection Time: 04/15/25  1:58 PM    Specimen: Blood   Result Value Ref Range    WBC 5.87 3.40 - 10.80 10*3/mm3    RBC 4.30 4.14 - 5.80 10*6/mm3    Hemoglobin 10.8 (L) 13.0 - 17.7 g/dL    Hematocrit 33.8 (L) 37.5 - 51.0 %    MCV 78.6 (L) 79.0 -  97.0 fL    MCH 25.1 (L) 26.6 - 33.0 pg    MCHC 32.0 31.5 - 35.7 g/dL    RDW 15.8 (H) 12.3 - 15.4 %    RDW-SD 44.3 37.0 - 54.0 fl    MPV 11.9 6.0 - 12.0 fL    Platelets 219 140 - 450 10*3/mm3    Neutrophil % 43.7 42.7 - 76.0 %    Lymphocyte % 46.3 (H) 19.6 - 45.3 %    Monocyte % 9.0 5.0 - 12.0 %    Eosinophil % 0.2 (L) 0.3 - 6.2 %    Basophil % 0.3 0.0 - 1.5 %    Immature Grans % 0.5 0.0 - 0.5 %    Neutrophils, Absolute 2.56 1.70 - 7.00 10*3/mm3    Lymphocytes, Absolute 2.72 0.70 - 3.10 10*3/mm3    Monocytes, Absolute 0.53 0.10 - 0.90 10*3/mm3    Eosinophils, Absolute 0.01 0.00 - 0.40 10*3/mm3    Basophils, Absolute 0.02 0.00 - 0.20 10*3/mm3    Immature Grans, Absolute 0.03 0.00 - 0.05 10*3/mm3    nRBC 0.0 0.0 - 0.2 /100 WBC   Hepatitis B Surface Antigen    Collection Time: 04/15/25  1:58 PM    Specimen: Blood   Result Value Ref Range    Hepatitis B Surface Ag Non-Reactive Non-Reactive   Iron Profile    Collection Time: 04/15/25  1:58 PM    Specimen: Blood   Result Value Ref Range    Iron 168 (H) 59 - 158 mcg/dL    Iron Saturation (TSAT) 46 20 - 50 %    Transferrin 243 200 - 360 mg/dL    TIBC 362 298 - 536 mcg/dL   Phosphorus    Collection Time: 04/15/25  1:58 PM    Specimen: Blood   Result Value Ref Range    Phosphorus 5.4 (H) 2.5 - 4.5 mg/dL   Lactic Acid, Plasma    Collection Time: 04/15/25  2:41 PM    Specimen: Arm, Left; Blood   Result Value Ref Range    Lactate 1.1 0.5 - 2.0 mmol/L   Urinalysis With Microscopic If Indicated (No Culture) - Urine, Clean Catch    Collection Time: 04/15/25  2:56 PM    Specimen: Urine, Clean Catch   Result Value Ref Range    Color, UA Yellow Yellow, Straw    Appearance, UA Cloudy (A) Clear    pH, UA 6.0 5.0 - 8.0    Specific Gravity, UA 1.008 1.005 - 1.030    Glucose, UA Negative Negative    Ketones, UA Negative Negative    Bilirubin, UA Negative Negative    Blood, UA Trace (A) Negative    Protein,  mg/dL (2+) (A) Negative    Leuk Esterase, UA Large (3+) (A) Negative     Nitrite, UA Positive (A) Negative    Urobilinogen, UA 0.2 E.U./dL 0.2 - 1.0 E.U./dL   Urinalysis, Microscopic Only - Urine, Clean Catch    Collection Time: 04/15/25  2:56 PM    Specimen: Urine, Clean Catch   Result Value Ref Range    RBC, UA 0-2 None Seen, 0-2 /HPF    WBC, UA Too Numerous to Count (A) None Seen, 0-2 /HPF    Bacteria, UA 4+ (A) None Seen /HPF    Squamous Epithelial Cells, UA 0-2 None Seen, 0-2 /HPF    Hyaline Casts, UA None Seen None Seen /LPF    Methodology Automated Microscopy          RADIOLOGY  No Radiology Exams Resulted Within Past 24 Hours      MEDICATIONS GIVEN IN ER  Medications   sodium chloride 0.9 % flush 10 mL (has no administration in time range)   sodium chloride 0.9 % flush 10 mL (has no administration in time range)   carvedilol (COREG) tablet 25 mg (has no administration in time range)   hydrALAZINE (APRESOLINE) tablet 25 mg (has no administration in time range)   NIFEdipine XL (PROCARDIA XL) 24 hr tablet 30 mg (has no administration in time range)   cefTRIAXone (ROCEPHIN) 1,000 mg in sodium chloride 0.9 % 100 mL MBP (1,000 mg Intravenous New Bag 4/15/25 1540)         ORDERS PLACED DURING THIS VISIT:  Orders Placed This Encounter   Procedures    Blood Culture - Blood,    Blood Culture - Blood,    Urine Culture - Urine, Urine, Clean Catch    Urine Culture - Urine,    [place] Draw    Comprehensive Metabolic Panel    Urinalysis With Microscopic If Indicated (No Culture) - Urine, Clean Catch    Lactic Acid, Plasma    CBC Auto Differential    Urinalysis, Microscopic Only - Urine, Clean Catch    Renal Function Panel    Hepatitis B Surface Antigen    Iron Profile    Ferritin    Phosphorus    Diet: Regular/House; Fluid Consistency: Thin (IDDSI 0)    Daily Weights    Strict Intake & Output    Stand to weigh daily.  Start today .  Nursing Communication    Nephrology (on -call MD unless specified)    [facility] (on-call MD unless specified) Details    Insert peripheral IV    Insert Peripheral IV     Inpatient Admission    Green Top (Gel)    Lavender Top    Gold Top - SST    Light Blue Top    CBC & Differential         OUTPATIENT MEDICATION MANAGEMENT:  Current Facility-Administered Medications Ordered in Epic   Medication Dose Route Frequency Provider Last Rate Last Admin    carvedilol (COREG) tablet 25 mg  25 mg Oral BID With Meals Fernanda Verma MD        cefTRIAXone (ROCEPHIN) 1,000 mg in sodium chloride 0.9 % 100 mL MBP  1,000 mg Intravenous Q24H Fernanda Verma  mL/hr at 04/15/25 1540 1,000 mg at 04/15/25 1540    hydrALAZINE (APRESOLINE) tablet 25 mg  25 mg Oral Q8H Fernanda Verma MD        NIFEdipine XL (PROCARDIA XL) 24 hr tablet 30 mg  30 mg Oral Q24H Fernanda Verma MD        sodium chloride 0.9 % flush 10 mL  10 mL Intravenous PRN Drake French MD        sodium chloride 0.9 % flush 10 mL  10 mL Intravenous PRN Donn Wilson PA         Current Outpatient Medications Ordered in Epic   Medication Sig Dispense Refill    acetaminophen (TYLENOL) 325 MG tablet TAKE TWO TABLETS BY MOUTH EVERY 6 HOURS AS NEEDED FEVER 50 tablet 1    albuterol (PROVENTIL/VENTOLIN) nebulization syringe Take  by nebulization Every 4 (Four) Hours As Needed for Wheezing. Every 4-6 hrs prn wheezing      albuterol sulfate  (90 Base) MCG/ACT inhaler Inhale 2 puffs Every 6 (Six) Hours As Needed for Wheezing.      allopurinol (ZYLOPRIM) 100 MG tablet Take 1 tablet by mouth Daily.      ammonium lactate (LAC-HYDRIN) 12 % lotion Apply  topically to the appropriate area as directed.      carvedilol (COREG) 25 MG tablet Take 1 tablet by mouth 2 (Two) Times a Day. 180 tablet 0    divalproex (DEPAKOTE ER) 500 MG 24 hr tablet Take 1 tablet by mouth Every Morning Before Breakfast AND 2 tablets every night at bedtime. 270 tablet 1    ferrous sulfate (FeroSul) 325 (65 FE) MG tablet Take 1 tablet by mouth 2 (Two) Times a Day. 180 tablet 1    fluticasone (FLONASE) 50 MCG/ACT nasal spray USE TWO SPRAYS  IN EACH NOSTRIL DAILY 16 g 5    furosemide (LASIX) 40 MG tablet Take 1 tablet by mouth Every Morning Before Breakfast AND 1 tablet Daily With Lunch. TAKE ONE TABLET BY MOUTH TWICE DAILY MORNING AND NOON 180 tablet 1    hydrALAZINE (APRESOLINE) 25 MG tablet Take 1 tablet by mouth 3 (Three) Times a Day. 270 tablet 1    levothyroxine (SYNTHROID, LEVOTHROID) 75 MCG tablet Take 1 tablet by mouth Daily. 90 tablet 1    loratadine (CLARITIN) 10 MG tablet TAKE ONE TABLET BY MOUTH DAILY 90 tablet 3    montelukast (SINGULAIR) 10 MG tablet TAKE ONE TABLET BY MOUTH AT BEDTIME 30 tablet 11    multivitamin (Thera) tablet tablet TAKE ONE TABLET BY MOUTH DAILY 30 tablet 0    multivitamin with minerals (Therapeutic-M) tablet tablet Take 1 tablet by mouth Daily. 90 each 1    NIFEdipine XL (PROCARDIA XL) 30 MG 24 hr tablet Take 1 tablet by mouth every night at bedtime. 90 tablet 1    O2 (OXYGEN) Inhale 2 L/min Every Night. (Patient not taking: Reported on 9/30/2024)      OLANZapine (zyPREXA) 20 MG tablet Take 1 tablet by mouth every night at bedtime. 90 tablet 1    oxybutynin XL (DITROPAN-XL) 5 MG 24 hr tablet Take 1 tablet by mouth Daily. 90 tablet 1    pantoprazole (PROTONIX) 40 MG EC tablet Take 1 tablet by mouth Every Morning. 90 tablet 1    tamsulosin (FLOMAX) 0.4 MG capsule 24 hr capsule Take 1 capsule by mouth every night at bedtime. 90 capsule 1              PROGRESS, DATA ANALYSIS, CONSULTS, AND MEDICAL DECISION MAKING  All labs have been independently interpreted by me.  All radiology studies have been reviewed by me. All EKG's have been independently viewed and interpreted by me.  Discussion below represents my analysis of pertinent findings related to patient's condition, differential diagnosis, treatment plan and final disposition.    Patient presentation and evaluation most consistent with acute on chronic renal failure requiring admission for nephrology consultation.    DIFFERENTIAL DIAGNOSIS INCLUDE BUT NOT LIMITED TO:      Renal failure, UTI, sepsis    Clinical Scores: N/A      ED Course as of 04/15/25 1549   Tue Apr 15, 2025   1424 On results review, the patient had a white blood cell count of 13.7 and creatinine of 6.9 four days ago. [DC]   1451 Creatinine(!): 6.92  Creatinine at 6.92 today.  This is up from 5.7 on 10/22, 5.56 on 3/24, and 6.9 on 4/11. [DC]   1459 WBC: 5.87 [DC]   1516 I discussed the case with MD Bayron with Nephrology at this time regarding the patient.  I discussed work-up, results, concerns.  I discussed the consulting provider's desire for the hospitalist service and agrees to consult.   [DC]   1548 I discussed the case with MD sylvain with [facility] at this time regarding the patient.  I discussed work-up, results, concerns.  I discussed the consulting provider's desire for med surg admit.    [DC]      ED Course User Index  [DC] Donn Wilson, PA            1517 I rechecked the patient.  I discussed the patient's labs, radiology findings (including all incidental findings), diagnosis, and plan for admission. The patient understands and agrees with the plan.      AS OF 15:49 EDT VITALS:    BP - (!) 148/105  HR - 73  TEMP - 97.5 °F (36.4 °C) (Tympanic)  O2 SATS - 96%    COMPLEXITY OF CARE  The patient requires admission.      DIAGNOSIS  Final diagnoses:   Acute renal failure superimposed on stage 4 chronic kidney disease, unspecified acute renal failure type   Metabolic encephalopathy   Acute UTI (urinary tract infection)         DISPOSITION  ED Disposition       ED Disposition   Decision to Admit    Condition   --    Comment   Level of Care: Med/Surg [1]   Diagnosis: Acute on chronic renal failure [286471]   Admitting Physician: ETTA ESTEBAN [[id number]]   Attending Physician: ETTA ESTEBAN [[id number]]   Certification: I Certify That Inpatient Hospital Services Are Medically Necessary For Greater Than 2 Midnights                  Please note that portions of this document were completed with a voice recognition  program.    Note Disclaimer: At [facility], we believe that sharing information builds trust and better relationships. You are receiving this note because you recently visited [facility]. It is possible you will see health information before a provider has talked with you about it. This kind of information can be easy to misunderstand. To help you fully understand what it means for your health, we urge you to discuss this note with your provider.         Donn Wilson PA  04/15/25 1549      Electronically signed by Drake French MD at 04/15/25 1845       Alissa Fang, RN at 04/15/25 1338          Pt was sent by nephrologist office. States that his BUN/Cr are elevated and that he may have an infection.     Electronically signed by Alissa Fang, RN at 04/15/25 1339       Medication Administration Report for Arthur Mccarthy as of 4/16/25 through 4/17/25     Legend:    Given Hold Not Given Due Canceled Entry Other Actions    Time Time (Time) Time Time-Action         Discontinued     Completed     Future     MAR Hold     Linked             Medications 04/16/25 04/17/25     acetaminophen (TYLENOL) tablet 650 mg  Dose: 650 mg  Freq: Every 6 Hours PRN Route: PO  PRN Reasons: Mild Pain,Fever  Start: 04/15/25 1814     Admin Instructions:   If given for fever, use fever parameter: fever greater than 100.4 °F  Based on patient request - if ordered for moderate or severe pain, provider allows for administration of a medication prescribed for a lower pain scale.    Do not exceed 4 grams of acetaminophen in a 24 hr period. Max dose of 2gm for AST/ALT greater than 120 units/L.    If given for pain, use the following pain scale:   Mild Pain = Pain Score of 1-3, CPOT 1-2  Moderate Pain = Pain Score of 4-6, CPOT 3-4  Severe Pain = Pain Score of 7-10, CPOT 5-8          albuterol (PROVENTIL) nebulizer solution 0.083% 2.5 mg/3mL  Dose: 2.5 mg  Freq: Every 4 Hours PRN Route: NEBULIZATION  PRN Reason:  Wheezing  Start: 04/15/25 1813          allopurinol (ZYLOPRIM) tablet 100 mg  Dose: 100 mg  Freq: Daily Route: PO  Start: 04/15/25 1915     Admin Instructions:   ([facility]) Take with food if GI upset occurs.      1137-Given            0900              ammonium lactate (AMLACTIN) 12 % cream  Freq: As Needed Route: TOP  PRN Reason: Dry Skin  Start: 04/15/25 1813          carvedilol (COREG) tablet 25 mg  Dose: 25 mg  Freq: 2 Times Daily With Meals Route: PO  Start: 04/15/25 1800     Admin Instructions:   Hold for SBP less than 100, DBP less than 60, or heart rate less than 50. If a dose is held, please contact the provider.  Give with food.      1137-Given     1820-Given           0800     1800             cefTRIAXone (ROCEPHIN) 1,000 mg in sodium chloride 0.9 % 100 mL MBP  Dose: 1,000 mg  Freq: Every 24 Hours Route: IV  Indications of Use: UNCOMPLICATED CYSTITIS  Start: 04/15/25 1600 End: 04/20/25 1559     Admin Instructions:   LR should be paused and flushing of the line with NS is recommended prior to and after completion of ceftriaxone infusion due to incompatibility. Do not co-adminster with calcium-containing solutions.  Caution: Look alike/sound alike drug alert      1507-New Bag     1603-Stopped           1600              cetirizine (zyrTEC) tablet 10 mg  Dose: 10 mg  Freq: Daily Route: PO  Start: 04/15/25 1915      1137-Given            0900               divalproex (DEPAKOTE ER) 24 hr tablet 500 mg  Dose: 500 mg  Freq: Every Morning Before Breakfast Route: PO  Start: 04/16/25 0730     Admin Instructions:   WARNING: Not for use in patients <10 years of age.    Do not crush or chew the capsules or tablets. The drug may not work as designed if the capsule or tablet is crushed or chewed. Swallow whole.  Group 2 (Pink) Hazardous Drug - Reproductive Risk Only - See Handling Guide      0632-Given            0630-Given              And   divalproex (DEPAKOTE ER) 24 hr tablet 1,000 mg  Dose: 1,000 mg  Freq: Nightly  Route: PO  Start: 04/15/25 2100     Admin Instructions:   WARNING: Not for use in patients <10 years of age.    Do not crush or chew the capsules or tablets. The drug may not work as designed if the capsule or tablet is crushed or chewed. Swallow whole.  Group 2 (Pink) Hazardous Drug - Reproductive Risk Only - See Handling Guide      2101-Given 2100              ferrous sulfate tablet 325 mg  Dose: 325 mg  Freq: 2 Times Daily Route: PO  Start: 04/15/25 2100     Admin Instructions:   Swallow whole. Do not crush, split, or chew. Take with food if GI upset occurs.      1137-Given     2101-Given           0900 2100             fluticasone (FLONASE) 50 MCG/ACT nasal spray 2 spray  Dose: 2 spray  Freq: Daily Route: EACH NARE  Start: 04/15/25 1915      1232-Given            0900              hydrALAZINE (APRESOLINE) tablet 50 mg  Dose: 50 mg  Freq: Every 8 Hours Scheduled Route: PO  Start: 04/16/25 1400     Admin Instructions:   Hold for SBP less than 100, DBP less than 60.  Caution: Look alike/sound alike drug alert      1506-Given     2102-Given           0627-Given     1400 2200            levothyroxine (SYNTHROID, LEVOTHROID) tablet 75 mcg  Dose: 75 mcg  Freq: Daily Route: PO  Start: 04/15/25 1915     Admin Instructions:   Take on empty stomach.      1137-Given            0900              montelukast (SINGULAIR) tablet 10 mg  Dose: 10 mg  Freq: Nightly Route: PO  Start: 04/15/25 2100      2102-Given            2100              multivitamin with minerals 1 tablet  Dose: 1 tablet  Freq: Daily Route: PO  Start: 04/15/25 1915     Admin Instructions:         1137-Given            0900              NIFEdipine XL (PROCARDIA XL) 24 hr tablet 30 mg  Dose: 30 mg  Freq: Every 24 Hours Scheduled Route: PO  Start: 04/16/25 1230     Admin Instructions:   Hold for SBP less than 100, DBP less than 60.  Caution: Look alike/sound alike drug alert. Avoid grapefruit juice. Swallow whole. Do not crush, split or chew.       1231-Given            0900              nitroglycerin (NITROSTAT) SL tablet 0.4 mg  Dose: 0.4 mg  Freq: Every 5 Minutes PRN Route: SL  PRN Reason: Chest Pain  Start: 04/15/25 1814     Admin Instructions:   If Pain Unrelieved After 3 Doses Notify MD  May administer up to 3 doses per episode. Hold if SBP less than 100.          O2 (OXYGEN)  Dose: 2 L/min  Freq: Nightly Route: IN  Start: 04/15/25 2100      2102-Given            2100              OLANZapine (zyPREXA) tablet 20 mg  Dose: 20 mg  Freq: Nightly Route: PO  Start: 04/15/25 2100     Admin Instructions:   Caution: Look alike/sound alike drug alert      2102-Given            2100               ondansetron ODT (ZOFRAN-ODT) disintegrating tablet 4 mg  Dose: 4 mg  Freq: Every 6 Hours PRN Route: PO  PRN Reasons: Nausea,Vomiting  Start: 04/15/25 1815     Admin Instructions:   If BOTH ondansetron (ZOFRAN) and promethazine (PHENERGAN) are ordered use ondansetron first and THEN promethazine IF ondansetron is ineffective.  Place on tongue and allow to dissolve.          Or   ondansetron (ZOFRAN) injection 4 mg  Dose: 4 mg  Freq: Every 6 Hours PRN Route: IV  PRN Reasons: Nausea,Vomiting  Start: 04/15/25 1815     Admin Instructions:   If BOTH ondansetron (ZOFRAN) and promethazine (PHENERGAN) are ordered use ondansetron first and THEN promethazine IF ondansetron is ineffective.          oxybutynin XL (DITROPAN-XL) 24 hr tablet 5 mg  Dose: 5 mg  Freq: Daily Route: PO  Start: 04/15/25 1915     Admin Instructions:   Do not crush or chew the capsules or tablets. The drug may not work as designed if the capsule or tablet is crushed or chewed. Swallow whole.      1137-Given            0900              pantoprazole (PROTONIX) EC tablet 40 mg  Dose: 40 mg  Freq: Every Morning Route: PO  Start: 04/16/25 0700     Admin Instructions:   Do not crush or chew the capsules or tablets. The drug may not work as designed if the capsule or tablet is crushed or chewed. Swallow  whole.  Swallow whole; do not crush, split, or chew.      0632-Given            0627-Given              sodium chloride 0.9 % flush 10 mL  Dose: 10 mL  Freq: As Needed Route: IV  PRN Reason: Line Care  Start: 04/15/25 1814          sodium chloride 0.9 % flush 10 mL  Dose: 10 mL  Freq: Every 12 Hours Scheduled Route: IV  Start: 04/15/25 2100      1141-Given     2102-Given           0900     2100              sodium chloride 0.9 % flush 10 mL  Dose: 10 mL  Freq: As Needed Route: IV  PRN Reason: Line Care  Start: 04/15/25 1424          sodium chloride 0.9 % flush 10 mL  Dose: 10 mL  Freq: As Needed Route: IV  PRN Reason: Line Care  Start: 04/15/25 1352          sodium chloride 0.9 % infusion 40 mL  Dose: 40 mL  Freq: As Needed Route: IV  PRN Reason: Line Care  Start: 04/15/25 1814     Admin Instructions:   Following administration of an IV intermittent medication, flush line with 40mL NS at 100mL/hr.          tamsulosin (FLOMAX) 24 hr capsule 0.4 mg  Dose: 0.4 mg  Freq: Daily Route: PO  Start: 04/15/25 1915     Admin Instructions:   Do not crush or chew the capsules or tablets. The drug may not work as designed if the capsule or tablet is crushed or chewed. Swallow whole. If patient unable to swallow whole, contact pharmacy for alternative.      1137-Given            0900              Discontinued Medications  Medications 04/16/25 04/17/25      carvedilol (COREG) tablet 25 mg  Dose: 25 mg  Freq: 2 Times Daily With Meals Route: PO  Start: 04/16/25 1900 End: 04/15/25 1701     Admin Instructions:   Hold for SBP less than 100, DBP less than 60, or heart rate less than 50. If a dose is held, please contact the provider.  Give with food.          carvedilol (COREG) tablet 25 mg  Dose: 25 mg  Freq: 2 Times Daily With Meals Route: PO  Start: 04/15/25 1534 End: 04/15/25 1554     Admin Instructions:   Hold for SBP less than 100, DBP less than 60, or heart rate less than 50. If a dose is held, please contact the provider.  Give  with food.          cefTRIAXone (ROCEPHIN) 1,000 mg in sodium chloride 0.9 % 100 mL MBP  Dose: 1,000 mg  Freq: Once Route: IV  Indications of Use: PYELONEPHRITIS  Start: 04/15/25 1538 End: 04/15/25 1524     Admin Instructions:   LR should be paused and flushing of the line with NS is recommended prior to and after completion of ceftriaxone infusion due to incompatibility. Do not co-adminster with calcium-containing solutions.  Caution: Look alike/sound alike drug alert           furosemide (LASIX) tablet 40 mg  Dose: 40 mg  Freq: Every Morning Before Breakfast Route: PO  Start: 04/16/25 0730 End: 04/16/25 1047      0632-Given               And   furosemide (LASIX) tablet 40 mg  Dose: 40 mg  Freq: Daily With Lunch Route: PO  Start: 04/16/25 1200 End: 04/16/25 1047          hydrALAZINE (APRESOLINE) tablet 25 mg  Dose: 25 mg  Freq: Every 8 Hours Scheduled Route: PO  Start: 04/15/25 2200 End: 04/16/25 0931     Admin Instructions:   Hold for SBP less than 100, DBP less than 60.  Caution: Look alike/sound alike drug alert      0632-Given               hydrALAZINE (APRESOLINE) tablet 25 mg  Dose: 25 mg  Freq: Every 8 Hours Scheduled Route: PO  Start: 04/15/25 1534 End: 04/15/25 1701     Admin Instructions:   Hold for SBP less than 100, DBP less than 60.  Caution: Look alike/sound alike drug alert          multivitamin (THERAGRAN) tablet 1 tablet  Dose: 1 tablet  Freq: Daily Route: PO  Start: 04/15/25 1915 End: 04/16/25 0931     Admin Instructions:         0900-Canceled Entry               NIFEdipine XL (PROCARDIA XL) 24 hr tablet 30 mg  Dose: 30 mg  Freq: Every 24 Hours Scheduled Route: PO  Start: 04/15/25 1535 End: 04/16/25 0931     Admin Instructions:   Hold for SBP less than 100, DBP less than 60.  Caution: Look alike/sound alike drug alert. Avoid grapefruit juice. Swallow whole. Do not crush, split or chew.      0900-Canceled Entry               sodium chloride 0.9 % infusion  Rate: 100 mL/hr Dose: 100 mL/hr  Freq:  Continuous Route: IV  Start: 04/15/25 1915 End: 25 1047      0300-New Bag     1142-Stopped              sodium chloride 0.9 % infusion  Rate: 100 mL/hr Dose: 100 mL/hr  Freq: Continuous Route: IV  Start: 04/15/25 1610 End: 04/15/25 1815                         Physician Progress Notes (last 48 hours)        Phoenix Gao MD at 25 1059              Name: Arthur Mccarthy ADMIT: 4/15/2025   : 1985  PCP: Mikael Vasquez MD    MRN: [id number] LOS: 1 days   AGE/SEX: 39 y.o. male  ROOM: [facility]     Subjective   Subjective   Feeling better today. Caregiver at bedside confirms pt seems to be back to his baseline. Tolerating diet. Voiding well but UOP not recorded.      Objective   Objective   Vital Signs  Temp:  [96.1 °F (35.6 °C)-98.2 °F (36.8 °C)] 97.5 °F (36.4 °C)  Heart Rate:  [72-84] 76  Resp:  [16-18] 18  BP: (127-161)/() 127/88  SpO2:  [96 %-100 %] 99 %  on   ;   Device (Oxygen Therapy): room air  Body mass index is 27.72 kg/m².  Physical Exam  Vitals and nursing note reviewed. Exam conducted with a chaperone present (Caregiver).   Constitutional:       General: He is not in acute distress.     Appearance: He is not ill-appearing, toxic-appearing or diaphoretic.   HENT:      Head: Normocephalic.      Nose: Nose normal.      Mouth/Throat:      Mouth: Mucous membranes are moist.      Pharynx: Oropharynx is clear.   Eyes:      General: No scleral icterus.        Right eye: No discharge.         Left eye: No discharge.      Conjunctiva/sclera: Conjunctivae normal.      Comments: Strabismus   Cardiovascular:      Rate and Rhythm: Normal rate and regular rhythm.      Pulses: Normal pulses.      Comments: AVF RUE  Pulmonary:      Effort: Pulmonary effort is normal. No respiratory distress.      Breath sounds: Normal breath sounds. No wheezing or rales.   Abdominal:      General: Bowel sounds are normal. There is no distension.      Palpations: Abdomen is soft.      Tenderness: There is no  "abdominal tenderness.   Musculoskeletal:         General: No swelling.      Cervical back: Neck supple.   Skin:     General: Skin is warm and dry.      Capillary Refill: Capillary refill takes less than 2 seconds.      Coloration: Skin is not jaundiced.   Neurological:      Mental Status: He is alert. Mental status is at baseline.   Psychiatric:         Mood and Affect: Mood normal.       Results Review     I reviewed the patient's new clinical results.  Results from last 7 days   Lab Units 04/16/25  0357 04/15/25  1358   WBC 10*3/mm3 6.25 5.87   HEMOGLOBIN g/dL 10.1* 10.8*   PLATELETS 10*3/mm3 182 219     Results from last 7 days   Lab Units 04/16/25  0357 04/15/25  1358   SODIUM mmol/L 141 136   POTASSIUM mmol/L 3.8 3.9   CHLORIDE mmol/L 103 97*   CO2 mmol/L 23.8 23.0   BUN mg/dL 78* 77*   CREATININE mg/dL 6.14* 6.92*   GLUCOSE mg/dL 97 82   EGFR mL/min/1.73 11.3* 9.8*     Results from last 7 days   Lab Units 04/16/25  0357 04/15/25  1358   ALBUMIN g/dL 3.1* 4.2   BILIRUBIN mg/dL <0.2 0.2   ALK PHOS U/L 147* 178*   AST (SGOT) U/L 19 24   ALT (SGPT) U/L 15 20     Results from last 7 days   Lab Units 04/16/25  0357 04/15/25  1358   CALCIUM mg/dL 9.3 9.8   ALBUMIN g/dL 3.1* 4.2   PHOSPHORUS mg/dL 5.8* 5.4*     Results from last 7 days   Lab Units 04/15/25  1441   LACTATE mmol/L 1.1     No results found for: \"HGBA1C\", \"POCGLU\"    No radiology results for the last day    I have personally reviewed all medications:  Scheduled Medications  allopurinol, 100 mg, Oral, Daily  carvedilol, 25 mg, Oral, BID With Meals  cefTRIAXone, 1,000 mg, Intravenous, Q24H  cetirizine, 10 mg, Oral, Daily  divalproex, 500 mg, Oral, QAM AC   And  divalproex, 1,000 mg, Oral, Nightly  ferrous sulfate, 325 mg, Oral, BID  fluticasone, 2 spray, Each Nare, Daily  hydrALAZINE, 50 mg, Oral, Q8H  levothyroxine, 75 mcg, Oral, Daily  montelukast, 10 mg, Oral, Nightly  multivitamin with minerals, 1 tablet, Oral, Daily  NIFEdipine XL, 30 mg, Oral, " Q24H  O2, 2 L/min, Inhalation, Nightly  OLANZapine, 20 mg, Oral, Nightly  oxybutynin XL, 5 mg, Oral, Daily  pantoprazole, 40 mg, Oral, QAM  sodium chloride, 10 mL, Intravenous, Q12H  tamsulosin, 0.4 mg, Oral, Daily    Infusions   Diet  Diet: Regular/House; Fluid Consistency: Thin (IDDSI 0)    I have personally reviewed:  [x]  Laboratory   [x]  Microbiology   []  Radiology   [x]  EKG/Telemetry  []  Cardiology/Vascular   []  Pathology    [x]  Records      Assessment/Plan     Active Hospital Problems    Diagnosis  POA    **Acute on chronic renal failure [N17.9, N18.9]  Yes    CKD (chronic kidney disease) stage 5, GFR less than 15 ml/min [N18.5]  Yes    Altered mental status [R41.82]  Yes    Acute infective cystitis [N30.00]  Yes    S/P arteriovenous (AV) fistula creation [Z98.890]  Not Applicable    GERD (gastroesophageal reflux disease) [K21.9]  Yes    Hypothyroidism [E03.9]  Yes    Hypertension [I10]  Yes    Intellectual disability [F79]  Yes    Seizure disorder [G40.909]  Yes      Resolved Hospital Problems   No resolved problems to display.       40yo gentleman with CDK5, HTN, seizure d/o, intellectual disability, hypoT4, GERD, and HLD, who presented to ER at the direction of his nephrologist for worsening renal function and behavioral issues. He was also diagnosed with acute UTI in ER.    JORDEN/CKD5  Appreciate Renal attention to pt  Cr slowly falling  Renal considering whether or not to start HD here    Acute UTI  Continue Rocephin  Blood and urine cultures NGTD    Intellectual disability  Encephalopathy NOS  Suspect confusion and combativeness on presentation due to metabolic encephalopathy--from UTI and possibly uremia  Caregiver confirms pt at baseline now    HTN  BPs much improved this AM  Continue Coreg, Hydralazine, and Nifedipine    Anemia of CKD  Hgb stable at baseline  Iron panel c/w chronic disease    Seizure d/o  No seizure activity here  Continue Depakote    HypoT4  Continue L-T4  Check TSH in  AM    GERD  Continue PPI      SCDs for DVT prophylaxis.  Full code.  Discussed with patient and caregiver.  Anticipate discharge  TBD    Expected discharge date/ time has not been documented.      Phoenix Gao MD  [facility]  25  10:59 EDT      Electronically signed by Phoenix Gao MD at 25 1116       Fernanda Verma MD at 25 1031              [facility] Progress Note      Patient Name: Arthur Mccarthy  : 1985  MRN: [id number]  Primary Care Physician:  Mikael Vasquez MD  Date of admission: 4/15/2025    Subjective     Interval History:   Follow-up acute kidney injury on CKD 5.  Urine output not all recorded.  13 40N.  Taking p.o.  Blood pressure initially very high.  Better this morning but still above goal.    Review of Systems:   As noted above    Objective     Vitals:   Temp:  [96.1 °F (35.6 °C)-98.2 °F (36.8 °C)] 97.5 °F (36.4 °C)  Heart Rate:  [72-84] 76  Resp:  [16-18] 18  BP: (127-161)/() 127/88    Intake/Output Summary (Last 24 hours) at 2025 1031  Last data filed at 2025 0300  Gross per 24 hour   Intake 1340 ml   Output 400 ml   Net 940 ml       Physical Exam:    General Appearance: alert, cognitively delayed.  No acute distress   Skin: warm and dry  HEENT: oral mucosa moist.  Tongue protuberant., nonicteric sclera  Neck: supple, no JVD  Lungs: Clear to auscultation bilaterally.  Unlabored on room air.  Heart: RRR, no S3 or rub.  Abdomen: soft, nontender, nondistended. +bs  : no palpable bladder  Extremities: no edema.  Right upper extremity AV fistula patent  Neuro: Cognitively delayed.    Scheduled Meds:     allopurinol, 100 mg, Oral, Daily  carvedilol, 25 mg, Oral, BID With Meals  cefTRIAXone, 1,000 mg, Intravenous, Q24H  cetirizine, 10 mg, Oral, Daily  divalproex, 500 mg, Oral, QAM AC   And  divalproex, 1,000 mg, Oral, Nightly  ferrous sulfate, 325 mg, Oral, BID  fluticasone, 2 spray, Each Nare,  Daily  furosemide, 40 mg, Oral, QAM AC   And  furosemide, 40 mg, Oral, Daily With Lunch  hydrALAZINE, 50 mg, Oral, Q8H  levothyroxine, 75 mcg, Oral, Daily  montelukast, 10 mg, Oral, Nightly  multivitamin with minerals, 1 tablet, Oral, Daily  O2, 2 L/min, Inhalation, Nightly  OLANZapine, 20 mg, Oral, Nightly  oxybutynin XL, 5 mg, Oral, Daily  pantoprazole, 40 mg, Oral, QAM  sodium chloride, 10 mL, Intravenous, Q12H  tamsulosin, 0.4 mg, Oral, Daily      IV Meds:   sodium chloride, 100 mL/hr, Last Rate: 100 mL/hr (04/16/25 0300)        Results Reviewed:   I have personally reviewed the results from the time of this admission to 4/16/2025 10:31 EDT     Results from last 7 days   Lab Units 04/16/25 0357 04/15/25  1358   SODIUM mmol/L 141 136   POTASSIUM mmol/L 3.8 3.9   CHLORIDE mmol/L 103 97*   CO2 mmol/L 23.8 23.0   BUN mg/dL 78* 77*   CREATININE mg/dL 6.14* 6.92*   CALCIUM mg/dL 9.3 9.8   BILIRUBIN mg/dL <0.2 0.2   ALK PHOS U/L 147* 178*   ALT (SGPT) U/L 15 20   AST (SGOT) U/L 19 24   GLUCOSE mg/dL 97 82       Estimated Creatinine Clearance: 16.7 mL/min (A) (by C-G formula based on SCr of 6.14 mg/dL (H)).    Results from last 7 days   Lab Units 04/16/25  0357 04/15/25  1358   PHOSPHORUS mg/dL 5.8* 5.4*       Results from last 7 days   Lab Units 04/11/25  1605   URIC ACID mg/dL 5.9       Results from last 7 days   Lab Units 04/16/25  0357 04/15/25  1358   WBC 10*3/mm3 6.25 5.87   HEMOGLOBIN g/dL 10.1* 10.8*   PLATELETS 10*3/mm3 182 219             Assessment / Plan     ASSESSMENT:  Acute kidney injury on CKD 5.  Creatinine has come down some with IV fluids.  Volume status improved by exam.  Mental status seems better.  Good appetite.  I do not think there is an acute indication to start dialysis today.  Will recheck his chemistries in the morning.  If they are unchanged, will likely need to initiate dialysis because his clearance a month ago was 13 cc/min.  Hypertension CKD.  Better controlled this morning on  Coreg  and hydralazine but not optimal.  Add back home nifedipine XL dose.  Recheck now.  3.  Pyuria.  Culture pending.  On empiric ceftriaxone.  4.  Anemia of CKD.  Iron replete.  No ARIANA until blood pressure better controlled.  5.  Cognitive delay with recent behavioral disturbance.  Improved.  PLAN:  Discontinue IV fluids  Encourage p.o. fluids.  Up to chair today.  Recheck renal function in the morning and decide on dialysis initiation.  5.  Resume nifedipine XL 30 mg daily.  6.  Discussed with caregiver and patient.  Thank you for involving us in the care of Arthur Mccarthy.  Please feel free to call with any questions.    Fernanda Verma MD  04/16/25  10:31 EDT    [facility]  [phone]    Please note that portions of this note were completed with a voice recognition program.    Electronically signed by Fernanda Verma MD at 04/16/25 1051          Consult Notes (last 48 hours)        Fernanda Verma MD at 04/15/25 1518        Consult Orders    1. Nephrology (on -call MD unless specified) [[id number]] ordered by Donn Wilson PA at 04/15/25 1501                   Referring Provider: Dr. French  Reason for Consultation: Acute kidney injury on CKD 5.    Subjective     Chief complaint   Chief Complaint   Patient presents with    Abnormal Lab       History of present illness: 39-year-old -American male with intellectual disability, a history of CKD 5 due to hypertensive nephrosclerosis followed by Dr. Jim Phillips in our office.  Creatinine 3/24/2025 5.5.  Does have a functioning AV fistula.  Also with history of hypertension and chronic kidney disease, hypothyroidism, intellectual disability, seizure disorder.  He had labs drawn 4/11/2025 with a renal function panel demonstrating creatinine up to 6.9.  In the emergency room the caregiver relayed that the patient has had some confusion and worsening mental status lately.  ER BUN and creatinine 77/ 6.9.  The urea has  gone up.  Also had some leukocytosis of 13 .7 with those labs.  Today the white count is 5.87.  Blood pressure on arrival 149/107.  Caregiver at the  Bedside provides history.  Patient is able to answer yes and no questions.  He has had behavioral issues the past week with increasing combativeness, slapping and hitting at his day program and at home.  Had not been eating or drinking well for the past week.  Very fatigued and wanting to sleep all the time.  ROS:Patient denies pain currently.  Says he is urinating.  Denies pain with urination.  Bowels moving.  Feels hungry and thirsty today.  Not short of breath.  No fever.  Past Medical History:   Diagnosis Date    Acquired intellectual disability     Allergic rhinitis     Asthma     Chronic kidney disease (CKD)     Chronic renal disease, stage 3, moderately decreased glomerular filtration rate between 30-59 mL/min/1.73 square meter     Constipation     Epilepsy, generalized, convulsive     Episode of altered consciousness     Essential hypertension     History of Essential Hypertension     GERD (gastroesophageal reflux disease)     Hematuria     Hyperkalemia     Hyperlipidemia     Hypertension     Hypothyroidism     Immunization due     Metabolic encephalopathy     Moderate intellectual disabilities     Mood disorder     Nocturia     Physical exam, annual     Seizure     Seizure disorder     Sepsis without acute organ dysfunction (present on admission) 9/9/2021     No past surgical history on file.  Family History   Problem Relation Age of Onset    Down syndrome Brother     No Known Problems Other      Lives with caregivers.  Social History     Tobacco Use    Smoking status: Never    Smokeless tobacco: Never   Vaping Use    Vaping status: Never Used   Substance Use Topics    Alcohol use: No    Drug use: Defer     (Not in a hospital admission)    Allergies:  Doxycycline    Review of Systems  14 points review of system was performed and it was negative other than what  noted above in the HPI    Objective     Vital Signs  Temp:  [97.5 °F (36.4 °C)] 97.5 °F (36.4 °C)  Heart Rate:  [72-83] 73  Resp:  [16] 16  BP: (137-149)/(105-107) 148/105         No intake/output data recorded.  No intake/output data recorded.  No intake or output data in the 24 hours ending 04/15/25 1299    Physical Exam:  General Appearance: alert, tongue protuberant.  Answers questions yes and no.  Speech dysarthric.  Skin: Skin very dry on his lower extremities.  HEENT: pupils round and reactive to light, oral mucosa dry., nonicteric sclera  Neck: supple, no JVD, trachea midline  Lungs: Clear to auscultation bilaterally.  Unlabored on room air.  Heart: RRR, normal S1 and S2, no S3, no rub  Abdomen: soft, nontender, normoactive bowels.  Not distended.  : no palpable bladder  Extremities: no edema.  Right upper extremity tortuous AV fistula with thrill and bruit  Neuro disarticulate speech.  Moves all 4 extremities.  Follows commands.  Answers yes and no questions.    Results Review:  Results for orders placed or performed during the hospital encounter of 04/15/25   Comprehensive Metabolic Panel    Collection Time: 04/15/25  1:58 PM    Specimen: Blood   Result Value Ref Range    Glucose 82 65 - 99 mg/dL    BUN 77 (H) 6 - 20 mg/dL    Creatinine 6.92 (H) 0.76 - 1.27 mg/dL    Sodium 136 136 - 145 mmol/L    Potassium 3.9 3.5 - 5.2 mmol/L    Chloride 97 (L) 98 - 107 mmol/L    CO2 23.0 22.0 - 29.0 mmol/L    Calcium 9.8 8.6 - 10.5 mg/dL    Total Protein 7.8 6.0 - 8.5 g/dL    Albumin 4.2 3.5 - 5.2 g/dL    ALT (SGPT) 20 1 - 41 U/L    AST (SGOT) 24 1 - 40 U/L    Alkaline Phosphatase 178 (H) 39 - 117 U/L    Total Bilirubin 0.2 0.0 - 1.2 mg/dL    Globulin 3.6 gm/dL    A/G Ratio 1.2 g/dL    BUN/Creatinine Ratio 11.1 7.0 - 25.0    Anion Gap 16.0 (H) 5.0 - 15.0 mmol/L    eGFR 9.8 (L) >60.0 mL/min/1.73   CBC Auto Differential    Collection Time: 04/15/25  1:58 PM    Specimen: Blood   Result Value Ref Range    WBC 5.87 3.40 -  10.80 10*3/mm3    RBC 4.30 4.14 - 5.80 10*6/mm3    Hemoglobin 10.8 (L) 13.0 - 17.7 g/dL    Hematocrit 33.8 (L) 37.5 - 51.0 %    MCV 78.6 (L) 79.0 - 97.0 fL    MCH 25.1 (L) 26.6 - 33.0 pg    MCHC 32.0 31.5 - 35.7 g/dL    RDW 15.8 (H) 12.3 - 15.4 %    RDW-SD 44.3 37.0 - 54.0 fl    MPV 11.9 6.0 - 12.0 fL    Platelets 219 140 - 450 10*3/mm3    Neutrophil % 43.7 42.7 - 76.0 %    Lymphocyte % 46.3 (H) 19.6 - 45.3 %    Monocyte % 9.0 5.0 - 12.0 %    Eosinophil % 0.2 (L) 0.3 - 6.2 %    Basophil % 0.3 0.0 - 1.5 %    Immature Grans % 0.5 0.0 - 0.5 %    Neutrophils, Absolute 2.56 1.70 - 7.00 10*3/mm3    Lymphocytes, Absolute 2.72 0.70 - 3.10 10*3/mm3    Monocytes, Absolute 0.53 0.10 - 0.90 10*3/mm3    Eosinophils, Absolute 0.01 0.00 - 0.40 10*3/mm3    Basophils, Absolute 0.02 0.00 - 0.20 10*3/mm3    Immature Grans, Absolute 0.03 0.00 - 0.05 10*3/mm3    nRBC 0.0 0.0 - 0.2 /100 WBC   Green Top (Gel)    Collection Time: 04/15/25  1:58 PM   Result Value Ref Range    Extra Tube Hold for add-ons.    Lavender Top    Collection Time: 04/15/25  1:58 PM   Result Value Ref Range    Extra Tube hold for add-on    Gold Top - SST    Collection Time: 04/15/25  1:58 PM   Result Value Ref Range    Extra Tube Hold for add-ons.    Lactic Acid, Plasma    Collection Time: 04/15/25  2:41 PM    Specimen: Arm, Left; Blood   Result Value Ref Range    Lactate 1.1 0.5 - 2.0 mmol/L   Urinalysis With Microscopic If Indicated (No Culture) - Urine, Clean Catch    Collection Time: 04/15/25  2:56 PM    Specimen: Urine, Clean Catch   Result Value Ref Range    Color, UA Yellow Yellow, Straw    Appearance, UA Cloudy (A) Clear    pH, UA 6.0 5.0 - 8.0    Specific Gravity, UA 1.008 1.005 - 1.030    Glucose, UA Negative Negative    Ketones, UA Negative Negative    Bilirubin, UA Negative Negative    Blood, UA Trace (A) Negative    Protein,  mg/dL (2+) (A) Negative    Leuk Esterase, UA Large (3+) (A) Negative    Nitrite, UA Positive (A) Negative    Urobilinogen,  UA 0.2 E.U./dL 0.2 - 1.0 E.U./dL     Imaging Results (Last 72 Hours)       ** No results found for the last 72 hours. **              carvedilol, 25 mg, Oral, BID With Meals  hydrALAZINE, 25 mg, Oral, Q8H  NIFEdipine XL, 30 mg, Oral, Q24H           Assessment & Plan   Acute kidney injury on CKD 5.  Hypertensive nephrosclerosis.  Creatinine has risen from 5.5 a month ago to 6.9 today.  Caregiver describes some uremic symptoms.  Volume status by exam: Dry.  Suspect that he is hypovolemic due to poor p.o. intake and diuretic.  Urinary tract infection likely also contributing.  No indication for acute dialysis today but he does have a mature AV fistula in his right upper extremity.  Will follow the trend of his renal function with IV fluids and control of his blood pressure.  Hypertension CKD.  Not well-controlled.  Resume home hydralazine, nifedipine XL, and Coreg.  Adjust medications as needed.  Intellectual disability with cognitive dysfunction.  Caregiver at bedside.  Leukocytosis.  White blood cell count 13 on 4/11/2025 but down to 5.8 today.  UA with nitrite positive and large leukocyte Esterase.  Microscopic with too numerous to count white cells.  Start empiric ceftriaxone.  Anemia of chronic kidney disease.  Microcytic.  Check iron stores.  6.  Hyperparathyroidism.  Vitamin D normal on 4/11.  .  7.  Seizure disorder continue home meds.    Plan:  Hepatitis B surface antigen in case dialysis warranted tomorrow.  Check iron stores  Check urine culture on the specimen in the lab  Empiric ceftriaxone  Start antihypertensives now.  Adjust as needed  IV fluids at 100 cc/h.    I discussed the patient's findings and my recommendations with the caregiver.    Fernanda Verma MD  04/15/25  15:19 EDT    Please note that portions of this note were completed with a voice recognition program.        Electronically signed by Fernanda Verma MD at 04/15/25 9818

## 2025-04-17 NOTE — PROGRESS NOTES
[facility] Progress Note      Patient Name: Arthur Mccarthy  : 1985  MRN: [id number]  Primary Care Physician:  Mikael Vasquez MD  Date of admission: 4/15/2025    Subjective     Interval History:   Follow-up acute kidney injury on CKD 5.  Urine output 1200 cc.  Bowels moving.  Sitting up eating.  Appetite good.  Denies pain.  Caregiver not at bedside this morning but patient awake and alert and able to answer some yes/no questions.    Review of Systems:   As noted above    Objective     Vitals:   Temp:  [98.1 °F (36.7 °C)-98.6 °F (37 °C)] 98.6 °F (37 °C)  Heart Rate:  [73-93] 78  Resp:  [18] 18  BP: (130-162)/() 139/87    Intake/Output Summary (Last 24 hours) at 2025 0947  Last data filed at 2025 0834  Gross per 24 hour   Intake 940 ml   Output 1500 ml   Net -560 ml       Physical Exam:    General Appearance: alert, cognitively delayed.  No acute distress .  Sitting up in bed eating breakfast.  Responds to yes/no questions.  Skin: warm and dry  HEENT: oral mucosa moist.  Tongue protuberant., nonicteric sclera  Neck: supple, no JVD  Lungs: Clear to auscultation bilaterally.  Unlabored on room air.  Heart: RRR, no S3 or rub.  Abdomen: soft, nontender, nondistended. +bs  : no palpable bladder  Extremities: no edema.  Right upper extremity AV fistula patent  Neuro: Cognitively delayed.    Scheduled Meds:     allopurinol, 100 mg, Oral, Daily  carvedilol, 25 mg, Oral, BID With Meals  cefTRIAXone, 1,000 mg, Intravenous, Q24H  cetirizine, 10 mg, Oral, Daily  divalproex, 500 mg, Oral, QAM AC   And  divalproex, 1,000 mg, Oral, Nightly  ferrous sulfate, 325 mg, Oral, BID  fluticasone, 2 spray, Each Nare, Daily  hydrALAZINE, 50 mg, Oral, Q8H  levothyroxine, 75 mcg, Oral, Daily  montelukast, 10 mg, Oral, Nightly  multivitamin with minerals, 1 tablet, Oral, Daily  NIFEdipine XL, 30 mg, Oral, Q24H  O2, 2 L/min, Inhalation, Nightly  OLANZapine, 20 mg, Oral,  Nightly  oxybutynin XL, 5 mg, Oral, Daily  pantoprazole, 40 mg, Oral, QAM  sodium chloride, 10 mL, Intravenous, Q12H  tamsulosin, 0.4 mg, Oral, Daily      IV Meds:          Results Reviewed:   I have personally reviewed the results from the time of this admission to 4/17/2025 09:47 EDT     Results from last 7 days   Lab Units 04/17/25  0347 04/16/25  0357 04/15/25  1358   SODIUM mmol/L 141 141 136   POTASSIUM mmol/L 3.6 3.8 3.9   CHLORIDE mmol/L 102 103 97*   CO2 mmol/L 24.8 23.8 23.0   BUN mg/dL 71* 78* 77*   CREATININE mg/dL 6.20* 6.14* 6.92*   CALCIUM mg/dL 9.1 9.3 9.8   BILIRUBIN mg/dL <0.2 <0.2 0.2   ALK PHOS U/L 144* 147* 178*   ALT (SGPT) U/L 13 15 20   AST (SGOT) U/L 15 19 24   GLUCOSE mg/dL 101* 97 82       Estimated Creatinine Clearance: 16.7 mL/min (A) (by C-G formula based on SCr of 6.2 mg/dL (H)).    Results from last 7 days   Lab Units 04/17/25  0347 04/16/25  0357 04/15/25  1358   MAGNESIUM mg/dL 1.9  --   --    PHOSPHORUS mg/dL 5.4* 5.8* 5.4*       Results from last 7 days   Lab Units 04/11/25  1605   URIC ACID mg/dL 5.9       Results from last 7 days   Lab Units 04/17/25  0347 04/16/25  0357 04/15/25  1358   WBC 10*3/mm3 6.75 6.25 5.87   HEMOGLOBIN g/dL 9.4* 10.1* 10.8*   PLATELETS 10*3/mm3 154 182 219             Assessment / Plan     ASSESSMENT:  Acute kidney injury on CKD 5.  Creatinine stable today at 6.2.  Down from peak of 6.9.  Volume status improved by exam.  Mental status improved.  Good appetite.  I do not think there is an  indication to start dialysis today.  His potassium is normal, his serum bicarbonate is normal.  Appetite and mental status are back to his baseline.  I think that we can wait to start dialysis as an outpatient.  He has a follow-up appointment Monday with [facility] Monday, April 21 at 115.  Hypertension CKD.  Better controlled this morning on  Coreg and hydralazine and addition of nifedipine XL home dose yesterday.  Add back his Lasix  today.  3.  Pyuria.  Culture with E. coli.  On empiric ceftriaxone.  Could change to oral for discharge.  On empiric ceftriaxone.  4.  Anemia of CKD.  Iron replete.  No ARIANA until blood pressure better controlled.  5.  Cognitive delay with recent behavioral disturbance.  Improved.  PLAN:  Lasix 40 mg p.o. twice daily  Okay with renal for discharge  No dialysis today  Has follow-up with [facility] Bianca Brantley 4/21 at 1:15 PM.  Thank you for involving us in the care of Arthur Mccarthy.  Please feel free to call with any questions.    Fernanda Verma MD  04/17/25  09:47 EDT    [facility]  [phone]    Please note that portions of this note were completed with a voice recognition program.

## 2025-04-18 ENCOUNTER — TRANSITIONAL CARE MANAGEMENT TELEPHONE ENCOUNTER (OUTPATIENT)
Dept: CALL CENTER | Facility: HOSPITAL | Age: 40
End: 2025-04-18
Payer: MEDICAID

## 2025-04-18 ENCOUNTER — TELEPHONE (OUTPATIENT)
Dept: FAMILY MEDICINE CLINIC | Facility: CLINIC | Age: 40
End: 2025-04-18
Payer: MEDICAID

## 2025-04-18 NOTE — TELEPHONE ENCOUNTER
OKAY FOR [facility] TO RELAY    [facility]-Tried calling them back to inform them that Per Dr Vasquez the Medication is to be discontinued. Orders in chart for any albuterol has been also discontinued already.     They called back and stated that they needed an order stating that it is discontinued faxed to them at [phone].

## 2025-04-18 NOTE — OUTREACH NOTE
Call Center TCM Note      Flowsheet Row Responses   [facility] [place] patient discharged from? [place]   Does the patient have one of the following disease processes/diagnoses(primary or secondary)? Other   TCM attempt successful? Yes  [vr for Sara Metz and Rosemarie Rocha-VR from 2022, these names not on contact list now.  Hi, Maria E and Praveena, active with POC during admission]   Call start time 0826   Call end time 0833   General alerts for this patient Intellectual disability, return to group home   Discharge diagnosis Acute on chronic renal failure, Acute UTI   Person spoke with today (if not patient) and relationship GLENN Sanchez   Meds reviewed with patient/caregiver? Yes   Is the patient having any side effects they believe may be caused by any medication additions or changes? No   Does the patient have all medications ordered at discharge? Yes   Is the patient taking all medications as directed (includes completed medication regime)? Yes   Comments Hospital f/u 4/29/25@0900am, CG aware of need for f/u with nephrology   Does the patient have an appointment with their PCP within 7-14 days of discharge? Yes   Nursing Interventions Assisted patient with making appointment per protocol   Psychosocial issues? No   Did the patient receive a copy of their discharge instructions? Yes   Nursing interventions Reviewed instructions with patient   What is the patient's perception of their health status since discharge? Improving  [spoke with CG who reports pt was staying with someone else for a brief respite but will be with her this weekend. Reports picked up meds from pharmacy and was doing well last evening.  Encouraged to monitor for any changes in conditon.]   Is the patient/caregiver able to teach back signs and symptoms related to disease process for when to call PCP? Yes   Is the patient/caregiver able to teach back signs and symptoms related to disease process for when to call 911? Yes   TCM call  completed? Yes   Call end time [id number]            YANETH MACHUCA - Registered Nurse    4/18/2025, 08:37 EDT

## 2025-04-20 LAB
BACTERIA SPEC AEROBE CULT: NORMAL
BACTERIA SPEC AEROBE CULT: NORMAL

## 2025-04-24 ENCOUNTER — TELEPHONE (OUTPATIENT)
Dept: FAMILY MEDICINE CLINIC | Facility: CLINIC | Age: 40
End: 2025-04-24
Payer: MEDICAID

## 2025-04-24 NOTE — TELEPHONE ENCOUNTER
[facility] RELAY    [facility] The [facility] needs to reschedule his 4/29/25 appointment for 9:00. Dr. Vasquez will be in a meeting. Dr. Vasquez would like the patient to come in at 11:30 on the same day. Please reschedule. May transfer call to [facility].

## 2025-04-29 ENCOUNTER — OFFICE VISIT (OUTPATIENT)
Dept: FAMILY MEDICINE CLINIC | Facility: CLINIC | Age: 40
End: 2025-04-29
Payer: MEDICAID

## 2025-04-29 ENCOUNTER — READMISSION MANAGEMENT (OUTPATIENT)
Dept: CALL CENTER | Facility: HOSPITAL | Age: 40
End: 2025-04-29
Payer: MEDICAID

## 2025-04-29 VITALS
HEART RATE: 85 BPM | DIASTOLIC BLOOD PRESSURE: 78 MMHG | HEIGHT: 67 IN | TEMPERATURE: 97.6 F | SYSTOLIC BLOOD PRESSURE: 112 MMHG | WEIGHT: 177.4 LBS | OXYGEN SATURATION: 96 % | BODY MASS INDEX: 27.84 KG/M2

## 2025-04-29 DIAGNOSIS — N30.00 ACUTE INFECTIVE CYSTITIS: ICD-10-CM

## 2025-04-29 DIAGNOSIS — N18.9 HISTORY OF ANEMIA DUE TO CHRONIC KIDNEY DISEASE: ICD-10-CM

## 2025-04-29 DIAGNOSIS — Z86.2 HISTORY OF ANEMIA DUE TO CHRONIC KIDNEY DISEASE: ICD-10-CM

## 2025-04-29 DIAGNOSIS — Z09 HOSPITAL DISCHARGE FOLLOW-UP: Primary | ICD-10-CM

## 2025-04-29 DIAGNOSIS — N18.5 CKD (CHRONIC KIDNEY DISEASE) STAGE 5, GFR LESS THAN 15 ML/MIN: ICD-10-CM

## 2025-04-29 PROBLEM — R41.82 ALTERED MENTAL STATUS: Status: RESOLVED | Noted: 2025-04-15 | Resolved: 2025-04-29

## 2025-04-29 PROBLEM — N17.9 AKI (ACUTE KIDNEY INJURY): Status: RESOLVED | Noted: 2021-09-05 | Resolved: 2025-04-29

## 2025-04-29 PROBLEM — N28.9 RENAL INSUFFICIENCY: Status: RESOLVED | Noted: 2022-10-18 | Resolved: 2025-04-29

## 2025-04-29 PROBLEM — A41.9 SEVERE SEPSIS: Status: RESOLVED | Noted: 2023-03-11 | Resolved: 2025-04-29

## 2025-04-29 PROBLEM — N18.32 STAGE 3B CHRONIC KIDNEY DISEASE: Status: RESOLVED | Noted: 2019-10-15 | Resolved: 2025-04-29

## 2025-04-29 PROBLEM — R65.20 SEVERE SEPSIS: Status: RESOLVED | Noted: 2023-03-11 | Resolved: 2025-04-29

## 2025-04-29 PROBLEM — E79.0 HYPERURICEMIA: Status: ACTIVE | Noted: 2024-08-15

## 2025-04-29 PROBLEM — N18.4 CHRONIC KIDNEY DISEASE, STAGE 4 (SEVERE): Status: RESOLVED | Noted: 2022-07-13 | Resolved: 2025-04-29

## 2025-04-29 PROBLEM — N17.9 ACUTE ON CHRONIC RENAL FAILURE: Status: RESOLVED | Noted: 2025-04-15 | Resolved: 2025-04-29

## 2025-04-29 NOTE — PROGRESS NOTES
Subjective   Arthur Mccarthy is a 39 y.o. male.     Chief Complaint   Patient presents with    Hospital Follow Up Visit       History of Present Illness   Within 48 business hours after discharge our office contacted him via telephone to coordinate his care and needs.      9/12/2021     4:42 PM 4/17/2025     5:46 PM   Date of TCM Phone Call   [facility]   Date of Admission 9/5/2021 4/15/2025   Date of Discharge 9/12/2021 4/17/2025   Discharge Disposition [facility] [place] [facility] [place] Care     Risk for Readmission (LACE) Score: 9 (4/17/2025  6:00 AM)  Caregiver, Ne Babcock-respite, was present during the history-taking and subsequent discussion (and for part of the physical exam) with this patient.  Patient agrees to the presence of the individual during this visit.      Patient was admitted to [facility]  ALL records were obtained and reviewed and /or discussed with admitting physician  Date of admission 4/15/2025  Date of discharge 4/17/2025  Diagnosis Acute on chronic renal failure stage V chronic kidney disease, UTI/cystitis, status post AV fistula creation with chronic intellectual disability.  Hospital Course 39-year-old male with history of chronic stage V kidney disease, hypertension, seizure disorder, hypothyroid, GERD, hyperlipidemia, intellectual disability presented emergency room secondary to worsening renal function and behavioral issues.  Diagnosed with an acute UTI in the emergency room and admitted.  Creatinine was noted to be low but improved in the hospital not a candidate for dialysis yet but has AV fistula in place.  Rocephin was used to treat the UTI and culture was positive for E. coli sensitive to the Rocephin changes cefdinir at [place] for a 7-day course.  Blood cultures were negative.  Believe the confusion and combativeness was likely secondary to UTI induced metabolic encephalopathy and improved back to baseline  before discharge.  No other changes at that time.  Discharge labs did demonstrate hemoglobin of 9.4, BUN and creatinine of 71 and 6.2 with a GFR of 11.1 phosphorus 5.4 but a potassium of 3.6.  Medications upon discharge Cefdinir 300 mg daily for 5 days post discharge and now completed, nifedipine XL 30 mg at night, Tylenol 325 2 tabs as needed, albuterol as needed, allopurinol 100 mg daily, carvedilol 25 mg twice a day, Depakote  mg in the morning and 1000 mg at night, ferrous sulfate 325 mg twice a day, Flonase, furosemide 40 mg in the morning and 40 mg at lunchtime, hydralazine 50 mg 3 times daily, levothyroxine 75 mcg daily, Claritin 10 daily, Singulair 10 mg daily, olanzapine 20 mg at night, oxybutynin XL 5 mg daily, pantoprazole 40 mg daily, tamsulosin 0.4 mg at night.  Disposition he was discharged home with caregiver.  Follow up primary care doctor and nephrology  Currently c/o no real difficulties other than he and his brother not getting along.  Condition stable    I reviewed and requested records labs and diagnostics from the hospital with the patient and family.  The patient is to follow-up with specialist as discussed and directed.  If any problems arise or further questions develop patient is to call or to contact us for any needs.    The following portions of the patient's history were reviewed and updated as appropriate: allergies, current medications, past family history, past medical history, past social history, past surgical history and problem list.    Past Medical History:   Diagnosis Date    Acquired intellectual disability     Allergic rhinitis     Asthma     Chronic kidney disease (CKD)     Chronic renal disease, stage 3, moderately decreased glomerular filtration rate between 30-59 mL/min/1.73 square meter     Constipation     Epilepsy, generalized, convulsive     Episode of altered consciousness     Essential hypertension     History of Essential Hypertension     GERD  (gastroesophageal reflux disease)     Hematuria     Hyperkalemia     Hyperlipidemia     Hypertension     Hypothyroidism     Immunization due     Metabolic encephalopathy     Moderate intellectual disabilities     Mood disorder     Nocturia     Physical exam, annual     Seizure     Seizure disorder     Sepsis without acute organ dysfunction (present on admission) 9/9/2021       History reviewed. No pertinent surgical history.    Family History   Problem Relation Age of Onset    Down syndrome Brother     No Known Problems Other        Social History     Socioeconomic History    Marital status: Single   Tobacco Use    Smoking status: Never     Passive exposure: Never    Smokeless tobacco: Never   Vaping Use    Vaping status: Never Used   Substance and Sexual Activity    Alcohol use: No    Drug use: Defer    Sexual activity: Defer       Current Outpatient Medications   Medication Sig Dispense Refill    acetaminophen (TYLENOL) 325 MG tablet TAKE TWO TABLETS BY MOUTH EVERY 6 HOURS AS NEEDED FEVER 50 tablet 1    allopurinol (ZYLOPRIM) 100 MG tablet Take 1 tablet by mouth Daily.      ammonium lactate (LAC-HYDRIN) 12 % lotion Apply  topically to the appropriate area as directed.      carvedilol (COREG) 25 MG tablet Take 1 tablet by mouth 2 (Two) Times a Day. 180 tablet 0    divalproex (DEPAKOTE ER) 500 MG 24 hr tablet Take 1 tablet by mouth Every Morning Before Breakfast AND 2 tablets every night at bedtime. 270 tablet 1    ferrous sulfate (FeroSul) 325 (65 FE) MG tablet Take 1 tablet by mouth 2 (Two) Times a Day. 180 tablet 1    fluticasone (FLONASE) 50 MCG/ACT nasal spray USE TWO SPRAYS IN EACH NOSTRIL DAILY 16 g 5    furosemide (LASIX) 40 MG tablet Take 1 tablet by mouth Every Morning Before Breakfast AND 1 tablet Daily With Lunch. TAKE ONE TABLET BY MOUTH TWICE DAILY MORNING AND NOON 180 tablet 1    hydrALAZINE (APRESOLINE) 50 MG tablet Take 1 tablet by mouth 3 (Three) Times a Day.      levothyroxine (SYNTHROID,  LEVOTHROID) 75 MCG tablet Take 1 tablet by mouth Daily. 90 tablet 1    loratadine (CLARITIN) 10 MG tablet TAKE ONE TABLET BY MOUTH DAILY 90 tablet 3    montelukast (SINGULAIR) 10 MG tablet TAKE ONE TABLET BY MOUTH AT BEDTIME 30 tablet 11    multivitamin with minerals (multivitamin-minerals) tablet tablet Take 1 tablet by mouth Daily.      NIFEdipine XL (PROCARDIA XL) 30 MG 24 hr tablet Take 1 tablet by mouth every night at bedtime. 90 tablet 1    OLANZapine (zyPREXA) 20 MG tablet Take 1 tablet by mouth every night at bedtime. 90 tablet 1    oxybutynin XL (DITROPAN-XL) 5 MG 24 hr tablet Take 1 tablet by mouth Daily. 90 tablet 1    pantoprazole (PROTONIX) 40 MG EC tablet Take 1 tablet by mouth Every Morning. 90 tablet 1    tamsulosin (FLOMAX) 0.4 MG capsule 24 hr capsule Take 1 capsule by mouth every night at bedtime. 90 capsule 1     No current facility-administered medications for this visit.       Review of Systems   Constitutional:  Negative for activity change, appetite change, fatigue, fever, unexpected weight gain and unexpected weight loss.   HENT:  Negative for nosebleeds, rhinorrhea, trouble swallowing and voice change.    Eyes:  Negative for visual disturbance.   Respiratory:  Negative for cough, chest tightness, shortness of breath and wheezing.    Cardiovascular:  Negative for chest pain, palpitations and leg swelling.   Gastrointestinal:  Negative for abdominal pain, blood in stool, constipation, diarrhea, nausea, vomiting, GERD and indigestion.   Genitourinary:  Negative for dysuria, frequency and hematuria.   Musculoskeletal:  Negative for arthralgias, back pain and myalgias.   Skin:  Negative for rash and wound.   Neurological:  Negative for dizziness, tremors, weakness, light-headedness, numbness, headache and memory problem.   Hematological:  Negative for adenopathy. Does not bruise/bleed easily.   Psychiatric/Behavioral:  Negative for sleep disturbance and depressed mood. The patient is not  nervous/anxious.        Objective   Vitals:    04/29/25 1142   BP: 112/78   Pulse: 85   Temp: 97.6 °F (36.4 °C)   SpO2: 96%     Body mass index is 27.78 kg/m².  Physical Exam  Constitutional:       Appearance: Normal appearance.   HENT:      Head: Normocephalic.   Cardiovascular:      Rate and Rhythm: Normal rate.      Heart sounds: Normal heart sounds. No murmur heard.  Pulmonary:      Effort: Pulmonary effort is normal.      Breath sounds: Normal breath sounds.   Musculoskeletal:         General: Normal range of motion.   Neurological:      Mental Status: He is alert.   Psychiatric:         Mood and Affect: Mood normal.         Behavior: Behavior normal.         Assessment & Plan   Diagnoses and all orders for this visit:    1. Hospital discharge follow-up (Primary)    2. CKD (chronic kidney disease) stage 5, GFR less than 15 ml/min    3. Acute infective cystitis    4. History of anemia due to chronic kidney disease    Hospital records reviewed.  Discussed with the patient's caregiver and some with the patient as well.  He is doing much better no sign of infection without any complaints or difficulties.  Continue his current care given information for follow-up with nephrology that is scheduled next month.  No change in current medications.  If he has any further problems his follow-up.                COVID-19 Precautions - Patient was compliant in wearing a mask. When I saw the patient, I used appropriate personal protective equipment (PPE) including mask and eye shield (standard procedure).  Additionally, I used gown and gloves if indicated.  Hand hygiene was completed before and after seeing the patient.  Dictated utilizing Dragon Dictation

## 2025-04-29 NOTE — OUTREACH NOTE
Medical Week 2 Survey      Flowsheet Row Responses   [facility] patient discharged from? [place]   Does the patient have one of the following disease processes/diagnoses(primary or secondary)? Other   Week 2 attempt successful? No   Unsuccessful attempts Attempt 1   oke Jamey Pond Registered Nurse

## 2025-05-14 ENCOUNTER — TELEPHONE (OUTPATIENT)
Dept: FAMILY MEDICINE CLINIC | Facility: CLINIC | Age: 40
End: 2025-05-14
Payer: MEDICAID

## 2025-05-14 NOTE — TELEPHONE ENCOUNTER
Ne with [employer] called to make appointment for patient but I could find no authorization for [employer], they will reach out to Arnold Lashay to call & make appt.

## 2025-05-14 NOTE — TELEPHONE ENCOUNTER
[facility] for Ne Babcock to call & make appointment for patient. Should ask for Deletta.    Spoke with LIANE WilliamS [employer] & verbal permission was given to speak with the following care team members, new HIPAA form will be completed at next appointment.    Maria E Stevens [place]  [employer] care team  Saud Angulo

## 2025-05-20 ENCOUNTER — TELEPHONE (OUTPATIENT)
Dept: FAMILY MEDICINE CLINIC | Facility: CLINIC | Age: 40
End: 2025-05-20

## 2025-05-20 NOTE — TELEPHONE ENCOUNTER
Ok for hub to relay    Called patient's care giver to make sure everything was ok and to see if the patient was still in their care due to a no show for his 5/20 appointment.

## 2025-06-02 RX ORDER — CARVEDILOL 25 MG/1
25 TABLET ORAL 2 TIMES DAILY
Qty: 180 TABLET | Refills: 1 | Status: SHIPPED | OUTPATIENT
Start: 2025-06-02

## 2025-06-03 DIAGNOSIS — G40.309 GENERALIZED IDIOPATHIC EPILEPSY AND EPILEPTIC SYNDROMES, NOT INTRACTABLE, WITHOUT STATUS EPILEPTICUS: ICD-10-CM

## 2025-06-03 RX ORDER — OLANZAPINE 20 MG/1
20 TABLET, FILM COATED ORAL
Qty: 90 TABLET | Refills: 1 | Status: SHIPPED | OUTPATIENT
Start: 2025-06-03

## 2025-06-03 RX ORDER — LEVOTHYROXINE SODIUM 75 UG/1
75 TABLET ORAL DAILY
Qty: 90 TABLET | Refills: 1 | Status: SHIPPED | OUTPATIENT
Start: 2025-06-03

## 2025-06-03 RX ORDER — DIVALPROEX SODIUM 500 MG/1
TABLET, FILM COATED, EXTENDED RELEASE ORAL
Qty: 270 TABLET | Refills: 1 | Status: SHIPPED | OUTPATIENT
Start: 2025-06-03

## 2025-06-25 ENCOUNTER — OFFICE VISIT (OUTPATIENT)
Dept: FAMILY MEDICINE CLINIC | Facility: CLINIC | Age: 40
End: 2025-06-25
Payer: MEDICAID

## 2025-06-25 VITALS
BODY MASS INDEX: 27.15 KG/M2 | TEMPERATURE: 98.4 F | DIASTOLIC BLOOD PRESSURE: 60 MMHG | SYSTOLIC BLOOD PRESSURE: 100 MMHG | HEIGHT: 67 IN | WEIGHT: 173 LBS | OXYGEN SATURATION: 95 % | HEART RATE: 86 BPM

## 2025-06-25 DIAGNOSIS — K59.00 CONSTIPATION, UNSPECIFIED CONSTIPATION TYPE: ICD-10-CM

## 2025-06-25 DIAGNOSIS — J45.20 MILD INTERMITTENT ASTHMA WITHOUT COMPLICATION: Primary | ICD-10-CM

## 2025-06-25 PROCEDURE — 99214 OFFICE O/P EST MOD 30 MIN: CPT | Performed by: INTERNAL MEDICINE

## 2025-06-25 PROCEDURE — 1126F AMNT PAIN NOTED NONE PRSNT: CPT | Performed by: INTERNAL MEDICINE

## 2025-06-25 PROCEDURE — 1159F MED LIST DOCD IN RCRD: CPT | Performed by: INTERNAL MEDICINE

## 2025-06-25 PROCEDURE — 3074F SYST BP LT 130 MM HG: CPT | Performed by: INTERNAL MEDICINE

## 2025-06-25 PROCEDURE — 1160F RVW MEDS BY RX/DR IN RCRD: CPT | Performed by: INTERNAL MEDICINE

## 2025-06-25 PROCEDURE — 3078F DIAST BP <80 MM HG: CPT | Performed by: INTERNAL MEDICINE

## 2025-06-25 RX ORDER — ALBUTEROL SULFATE 90 UG/1
2 INHALANT RESPIRATORY (INHALATION) EVERY 4 HOURS PRN
Qty: 18 G | Refills: 3 | Status: SHIPPED | OUTPATIENT
Start: 2025-06-25

## 2025-06-25 RX ORDER — POLYETHYLENE GLYCOL 3350 17 G/17G
17 POWDER, FOR SOLUTION ORAL DAILY
Qty: 30 PACKET | Refills: 11 | Status: SHIPPED | OUTPATIENT
Start: 2025-06-25

## 2025-06-25 NOTE — PROGRESS NOTES
Juanita Mccarthy is a 39 y.o. male.     Chief Complaint   Patient presents with    Fatigue    Constipation    Wheezing     X 3 weeks        History of Present Illness   39-year-old male with history of chronic stage V kidney disease status post AV fistula creation in dialysis, history of anemia of chronic disease, hypertension, seizure disorder, hypothyroid, GERD, hyperlipidemia, intellectual disability   Presents today with caregiver secondary to fatigue and constipation.    The following portions of the patient's history were reviewed and updated as appropriate: allergies, current medications, past family history, past medical history, past social history, past surgical history and problem list.    Depression Screen:      6/25/2025    11:16 AM   PHQ-2/PHQ-9 Depression Screening   Little interest or pleasure in doing things Several days   Feeling down, depressed, or hopeless Not at all   How difficult have these problems made it for you to do your work, take care of things at home, or get along with other people? Not difficult at all       Past Medical History:   Diagnosis Date    Acquired intellectual disability     Allergic rhinitis     Asthma     Chronic kidney disease (CKD)     Chronic renal disease, stage 3, moderately decreased glomerular filtration rate between 30-59 mL/min/1.73 square meter     Constipation     Epilepsy, generalized, convulsive     Episode of altered consciousness     Essential hypertension     History of Essential Hypertension     GERD (gastroesophageal reflux disease)     Hematuria     Hyperkalemia     Hyperlipidemia     Hypertension     Hypothyroidism     Immunization due     Metabolic encephalopathy     Moderate intellectual disabilities     Mood disorder     Nocturia     Physical exam, annual     Seizure     Seizure disorder     Sepsis without acute organ dysfunction (present on admission) 9/9/2021       History reviewed. No pertinent surgical history.    Family History    Problem Relation Age of Onset    Down syndrome Brother     No Known Problems Other        Social History     Socioeconomic History    Marital status: Single   Tobacco Use    Smoking status: Never     Passive exposure: Never    Smokeless tobacco: Never   Vaping Use    Vaping status: Never Used   Substance and Sexual Activity    Alcohol use: No    Drug use: Defer    Sexual activity: Defer       Current Outpatient Medications   Medication Sig Dispense Refill    acetaminophen (TYLENOL) 325 MG tablet TAKE TWO TABLETS BY MOUTH EVERY 6 HOURS AS NEEDED FEVER 50 tablet 1    allopurinol (ZYLOPRIM) 100 MG tablet Take 1 tablet by mouth Daily.      carvedilol (COREG) 25 MG tablet TAKE ONE TABLET BY MOUTH TWICE DAILY 180 tablet 1    divalproex (DEPAKOTE ER) 500 MG 24 hr tablet Take 1 tablet by mouth Every Morning Before Breakfast AND 2 tablets every night at bedtime. 270 tablet 1    ferrous sulfate (FeroSul) 325 (65 FE) MG tablet Take 1 tablet by mouth 2 (Two) Times a Day. 180 tablet 1    fluticasone (FLONASE) 50 MCG/ACT nasal spray USE TWO SPRAYS IN EACH NOSTRIL DAILY 16 g 5    furosemide (LASIX) 40 MG tablet Take 1 tablet by mouth Every Morning Before Breakfast AND 1 tablet Daily With Lunch. TAKE ONE TABLET BY MOUTH TWICE DAILY MORNING AND NOON 180 tablet 1    hydrALAZINE (APRESOLINE) 50 MG tablet Take 1 tablet by mouth 3 (Three) Times a Day.      levothyroxine (SYNTHROID, LEVOTHROID) 75 MCG tablet Take 1 tablet by mouth Daily. 90 tablet 1    loratadine (CLARITIN) 10 MG tablet TAKE ONE TABLET BY MOUTH DAILY 90 tablet 3    montelukast (SINGULAIR) 10 MG tablet TAKE ONE TABLET BY MOUTH AT BEDTIME 30 tablet 11    multivitamin with minerals (multivitamin-minerals) tablet tablet Take 1 tablet by mouth Daily.      NIFEdipine XL (PROCARDIA XL) 30 MG 24 hr tablet Take 1 tablet by mouth every night at bedtime. 90 tablet 1    OLANZapine (zyPREXA) 20 MG tablet Take 1 tablet by mouth every night at bedtime. 90 tablet 1    oxybutynin XL  (DITROPAN-XL) 5 MG 24 hr tablet Take 1 tablet by mouth Daily. 90 tablet 1    pantoprazole (PROTONIX) 40 MG EC tablet Take 1 tablet by mouth Every Morning. 90 tablet 1    tamsulosin (FLOMAX) 0.4 MG capsule 24 hr capsule Take 1 capsule by mouth every night at bedtime. 90 capsule 1    albuterol sulfate  (90 Base) MCG/ACT inhaler Inhale 2 puffs Every 4 (Four) Hours As Needed for Wheezing. 18 g 3    ammonium lactate (LAC-HYDRIN) 12 % lotion Apply  topically to the appropriate area as directed. (Patient not taking: Reported on 6/25/2025)      polyethylene glycol (MIRALAX) 17 g packet Take 17 g by mouth Daily. 30 packet 11     No current facility-administered medications for this visit.       Review of Systems   Constitutional:  Positive for fatigue. Negative for activity change, appetite change, fever, unexpected weight gain and unexpected weight loss.   HENT:  Negative for nosebleeds, rhinorrhea, trouble swallowing and voice change.    Eyes:  Negative for visual disturbance.   Respiratory:  Positive for wheezing. Negative for cough, chest tightness and shortness of breath.    Cardiovascular:  Negative for chest pain, palpitations and leg swelling.   Gastrointestinal:  Positive for constipation. Negative for abdominal pain, blood in stool, diarrhea, nausea, vomiting, GERD and indigestion.   Genitourinary:  Negative for dysuria, frequency and hematuria.   Musculoskeletal:  Negative for arthralgias, back pain and myalgias.   Skin:  Negative for rash and wound.   Neurological:  Negative for dizziness, tremors, weakness, light-headedness, numbness, headache and memory problem.   Hematological:  Negative for adenopathy. Does not bruise/bleed easily.   Psychiatric/Behavioral:  Negative for sleep disturbance and depressed mood. The patient is not nervous/anxious.        Objective   /60 (BP Location: Left arm, Patient Position: Sitting, Cuff Size: Large Adult)   Pulse 86   Temp 98.4 °F (36.9 °C) (Temporal)   Ht  "170.2 cm (67.01\")   Wt 78.5 kg (173 lb)   SpO2 95%   BMI 27.09 kg/m²     Physical Exam  Vitals and nursing note reviewed.   Constitutional:       General: He is not in acute distress.     Appearance: He is well-developed. He is not diaphoretic.   HENT:      Head: Normocephalic and atraumatic.      Right Ear: External ear normal.      Left Ear: External ear normal.      Nose: Nose normal.   Eyes:      Conjunctiva/sclera: Conjunctivae normal.      Pupils: Pupils are equal, round, and reactive to light.      Comments: Exotropia   Neck:      Thyroid: No thyromegaly.      Trachea: No tracheal deviation.   Cardiovascular:      Rate and Rhythm: Normal rate and regular rhythm.      Heart sounds: Normal heart sounds. No murmur heard.     No friction rub. No gallop.      Comments: Right chest with Port-A-Cath in place for dialysis.  Right arm AV fistula is nontender and nonerythematous but also with very little thrill felt.  Pulmonary:      Effort: Pulmonary effort is normal. No respiratory distress.      Breath sounds: Normal breath sounds.   Abdominal:      General: Bowel sounds are normal.      Palpations: Abdomen is soft. There is no mass.      Tenderness: There is no abdominal tenderness. There is no guarding.   Musculoskeletal:         General: Normal range of motion.      Cervical back: Normal range of motion and neck supple.   Lymphadenopathy:      Cervical: No cervical adenopathy.   Skin:     General: Skin is warm and dry.      Capillary Refill: Capillary refill takes less than 2 seconds.      Findings: No rash.   Neurological:      Mental Status: He is alert and oriented to person, place, and time.      Motor: No abnormal muscle tone.      Deep Tendon Reflexes: Reflexes normal.   Psychiatric:         Behavior: Behavior normal.         Thought Content: Thought content normal.         Judgment: Judgment normal.         Recent Results (from the past 12 weeks)   VITAMIN D,25-HYDROXY    Collection Time: 04/11/25  4:05 " PM    Specimen: Blood, Venous   Result Value Ref Range    25 Hydroxy, Vitamin D 34.5 30.0 - 100.0 ng/mL   Protein / Creatinine Ratio, Urine -    Collection Time: 04/11/25  4:05 PM    Specimen: Urine, Clean Catch   Result Value Ref Range    Creatinine Urine, Random 61.2 Not Estab. mg/dL    Total Protein, Urine 74.5 Not Estab. mg/dL    Protein/Creatinine Ratio 1,217 (H) 0 - 200 mg/g creat   URIC ACID    Collection Time: 04/11/25  4:05 PM    Specimen: Blood, Venous   Result Value Ref Range    Uric Acid 5.9 3.8 - 8.4 mg/dL   PTH, INTACT    Collection Time: 04/11/25  4:05 PM    Specimen: Blood, Venous   Result Value Ref Range    PTH, Intact 269 (H) 15 - 65 pg/mL   Green Top (Gel)    Collection Time: 04/15/25  1:58 PM   Result Value Ref Range    Extra Tube Hold for add-ons.    Lavender Top    Collection Time: 04/15/25  1:58 PM   Result Value Ref Range    Extra Tube hold for add-on    Gold Top - SST    Collection Time: 04/15/25  1:58 PM   Result Value Ref Range    Extra Tube Hold for add-ons.    Comprehensive Metabolic Panel    Collection Time: 04/15/25  1:58 PM    Specimen: Blood   Result Value Ref Range    Glucose 82 65 - 99 mg/dL    BUN 77 (H) 6 - 20 mg/dL    Creatinine 6.92 (H) 0.76 - 1.27 mg/dL    Sodium 136 136 - 145 mmol/L    Potassium 3.9 3.5 - 5.2 mmol/L    Chloride 97 (L) 98 - 107 mmol/L    CO2 23.0 22.0 - 29.0 mmol/L    Calcium 9.8 8.6 - 10.5 mg/dL    Total Protein 7.8 6.0 - 8.5 g/dL    Albumin 4.2 3.5 - 5.2 g/dL    ALT (SGPT) 20 1 - 41 U/L    AST (SGOT) 24 1 - 40 U/L    Alkaline Phosphatase 178 (H) 39 - 117 U/L    Total Bilirubin 0.2 0.0 - 1.2 mg/dL    Globulin 3.6 gm/dL    A/G Ratio 1.2 g/dL    BUN/Creatinine Ratio 11.1 7.0 - 25.0    Anion Gap 16.0 (H) 5.0 - 15.0 mmol/L    eGFR 9.8 (L) >60.0 mL/min/1.73   CBC Auto Differential    Collection Time: 04/15/25  1:58 PM    Specimen: Blood   Result Value Ref Range    WBC 5.87 3.40 - 10.80 10*3/mm3    RBC 4.30 4.14 - 5.80 10*6/mm3    Hemoglobin 10.8 (L) 13.0 - 17.7  g/dL    Hematocrit 33.8 (L) 37.5 - 51.0 %    MCV 78.6 (L) 79.0 - 97.0 fL    MCH 25.1 (L) 26.6 - 33.0 pg    MCHC 32.0 31.5 - 35.7 g/dL    RDW 15.8 (H) 12.3 - 15.4 %    RDW-SD 44.3 37.0 - 54.0 fl    MPV 11.9 6.0 - 12.0 fL    Platelets 219 140 - 450 10*3/mm3    Neutrophil % 43.7 42.7 - 76.0 %    Lymphocyte % 46.3 (H) 19.6 - 45.3 %    Monocyte % 9.0 5.0 - 12.0 %    Eosinophil % 0.2 (L) 0.3 - 6.2 %    Basophil % 0.3 0.0 - 1.5 %    Immature Grans % 0.5 0.0 - 0.5 %    Neutrophils, Absolute 2.56 1.70 - 7.00 10*3/mm3    Lymphocytes, Absolute 2.72 0.70 - 3.10 10*3/mm3    Monocytes, Absolute 0.53 0.10 - 0.90 10*3/mm3    Eosinophils, Absolute 0.01 0.00 - 0.40 10*3/mm3    Basophils, Absolute 0.02 0.00 - 0.20 10*3/mm3    Immature Grans, Absolute 0.03 0.00 - 0.05 10*3/mm3    nRBC 0.0 0.0 - 0.2 /100 WBC   Hepatitis B Surface Antigen    Collection Time: 04/15/25  1:58 PM    Specimen: Blood   Result Value Ref Range    Hepatitis B Surface Ag Non-Reactive Non-Reactive   Iron Profile    Collection Time: 04/15/25  1:58 PM    Specimen: Blood   Result Value Ref Range    Iron 168 (H) 59 - 158 mcg/dL    Iron Saturation (TSAT) 46 20 - 50 %    Transferrin 243 200 - 360 mg/dL    TIBC 362 298 - 536 mcg/dL   Ferritin    Collection Time: 04/15/25  1:58 PM    Specimen: Blood   Result Value Ref Range    Ferritin 2,177.00 (H) 30.00 - 400.00 ng/mL   Phosphorus    Collection Time: 04/15/25  1:58 PM    Specimen: Blood   Result Value Ref Range    Phosphorus 5.4 (H) 2.5 - 4.5 mg/dL   Blood Culture - Blood, Arm, Left    Collection Time: 04/15/25  2:41 PM    Specimen: Arm, Left; Blood   Result Value Ref Range    Blood Culture No growth at 5 days    Lactic Acid, Plasma    Collection Time: 04/15/25  2:41 PM    Specimen: Arm, Left; Blood   Result Value Ref Range    Lactate 1.1 0.5 - 2.0 mmol/L   Blood Culture - Blood, Arm, Left    Collection Time: 04/15/25  2:49 PM    Specimen: Arm, Left; Blood   Result Value Ref Range    Blood Culture No growth at 5 days     Urinalysis With Microscopic If Indicated (No Culture) - Urine, Clean Catch    Collection Time: 04/15/25  2:56 PM    Specimen: Urine, Clean Catch   Result Value Ref Range    Color, UA Yellow Yellow, Straw    Appearance, UA Cloudy (A) Clear    pH, UA 6.0 5.0 - 8.0    Specific Gravity, UA 1.008 1.005 - 1.030    Glucose, UA Negative Negative    Ketones, UA Negative Negative    Bilirubin, UA Negative Negative    Blood, UA Trace (A) Negative    Protein,  mg/dL (2+) (A) Negative    Leuk Esterase, UA Large (3+) (A) Negative    Nitrite, UA Positive (A) Negative    Urobilinogen, UA 0.2 E.U./dL 0.2 - 1.0 E.U./dL   Urinalysis, Microscopic Only - Urine, Clean Catch    Collection Time: 04/15/25  2:56 PM    Specimen: Urine, Clean Catch   Result Value Ref Range    RBC, UA 0-2 None Seen, 0-2 /HPF    WBC, UA Too Numerous to Count (A) None Seen, 0-2 /HPF    Bacteria, UA 4+ (A) None Seen /HPF    Squamous Epithelial Cells, UA 0-2 None Seen, 0-2 /HPF    Hyaline Casts, UA None Seen None Seen /LPF    Methodology Automated Microscopy    Urine Culture - Urine, Urine, Clean Catch    Collection Time: 04/15/25  2:56 PM    Specimen: Urine, Clean Catch   Result Value Ref Range    Urine Culture >100,000 CFU/mL Escherichia coli (A)        Susceptibility    Escherichia coli - TRACY     Amoxicillin + Clavulanate  Susceptible ug/ml     Ampicillin  Resistant ug/ml     Ampicillin + Sulbactam  Intermediate ug/ml     Cefazolin (Urine)  Susceptible ug/ml     Cefepime  Susceptible ug/ml     Ceftazidime  Susceptible ug/ml     Ceftriaxone  Susceptible ug/ml     Gentamicin  Susceptible ug/ml     Levofloxacin  Susceptible ug/ml     Nitrofurantoin  Susceptible ug/ml     Piperacillin + Tazobactam  Susceptible ug/ml     Trimethoprim + Sulfamethoxazole  Susceptible ug/ml   Comprehensive Metabolic Panel    Collection Time: 04/16/25  3:57 AM    Specimen: Blood   Result Value Ref Range    Glucose 97 65 - 99 mg/dL    BUN 78 (H) 6 - 20 mg/dL    Creatinine 6.14 (H)  0.76 - 1.27 mg/dL    Sodium 141 136 - 145 mmol/L    Potassium 3.8 3.5 - 5.2 mmol/L    Chloride 103 98 - 107 mmol/L    CO2 23.8 22.0 - 29.0 mmol/L    Calcium 9.3 8.6 - 10.5 mg/dL    Total Protein 6.5 6.0 - 8.5 g/dL    Albumin 3.1 (L) 3.5 - 5.2 g/dL    ALT (SGPT) 15 1 - 41 U/L    AST (SGOT) 19 1 - 40 U/L    Alkaline Phosphatase 147 (H) 39 - 117 U/L    Total Bilirubin <0.2 0.0 - 1.2 mg/dL    Globulin 3.4 gm/dL    A/G Ratio 0.9 g/dL    BUN/Creatinine Ratio 12.7 7.0 - 25.0    Anion Gap 14.2 5.0 - 15.0 mmol/L    eGFR 11.3 (L) >60.0 mL/min/1.73   CBC Auto Differential    Collection Time: 04/16/25  3:57 AM    Specimen: Blood   Result Value Ref Range    WBC 6.25 3.40 - 10.80 10*3/mm3    RBC 3.93 (L) 4.14 - 5.80 10*6/mm3    Hemoglobin 10.1 (L) 13.0 - 17.7 g/dL    Hematocrit 30.3 (L) 37.5 - 51.0 %    MCV 77.1 (L) 79.0 - 97.0 fL    MCH 25.7 (L) 26.6 - 33.0 pg    MCHC 33.3 31.5 - 35.7 g/dL    RDW 15.0 12.3 - 15.4 %    RDW-SD 40.9 37.0 - 54.0 fl    MPV 11.6 6.0 - 12.0 fL    Platelets 182 140 - 450 10*3/mm3    Neutrophil % 36.9 (L) 42.7 - 76.0 %    Lymphocyte % 49.6 (H) 19.6 - 45.3 %    Monocyte % 12.2 (H) 5.0 - 12.0 %    Eosinophil % 0.2 (L) 0.3 - 6.2 %    Basophil % 0.3 0.0 - 1.5 %    Immature Grans % 0.8 (H) 0.0 - 0.5 %    Neutrophils, Absolute 2.31 1.70 - 7.00 10*3/mm3    Lymphocytes, Absolute 3.10 0.70 - 3.10 10*3/mm3    Monocytes, Absolute 0.76 0.10 - 0.90 10*3/mm3    Eosinophils, Absolute 0.01 0.00 - 0.40 10*3/mm3    Basophils, Absolute 0.02 0.00 - 0.20 10*3/mm3    Immature Grans, Absolute 0.05 0.00 - 0.05 10*3/mm3    nRBC 0.0 0.0 - 0.2 /100 WBC   Phosphorus    Collection Time: 04/16/25  3:57 AM    Specimen: Blood   Result Value Ref Range    Phosphorus 5.8 (H) 2.5 - 4.5 mg/dL   Hepatic Function Panel    Collection Time: 04/17/25  3:47 AM    Specimen: Blood   Result Value Ref Range    Total Protein 6.1 6.0 - 8.5 g/dL    Albumin 3.5 3.5 - 5.2 g/dL    ALT (SGPT) 13 1 - 41 U/L    AST (SGOT) 15 1 - 40 U/L    Alkaline Phosphatase  144 (H) 39 - 117 U/L    Total Bilirubin <0.2 0.0 - 1.2 mg/dL    Bilirubin, Direct <0.1 0.0 - 0.3 mg/dL    Bilirubin, Indirect     Phosphorus    Collection Time: 04/17/25  3:47 AM    Specimen: Blood   Result Value Ref Range    Phosphorus 5.4 (H) 2.5 - 4.5 mg/dL   Magnesium    Collection Time: 04/17/25  3:47 AM    Specimen: Blood   Result Value Ref Range    Magnesium 1.9 1.6 - 2.6 mg/dL   Hemoglobin A1c    Collection Time: 04/17/25  3:47 AM    Specimen: Blood   Result Value Ref Range    Hemoglobin A1C 5.60 4.80 - 5.60 %   TSH Rfx On Abnormal To Free T4    Collection Time: 04/17/25  3:47 AM    Specimen: Blood   Result Value Ref Range    TSH 2.520 0.270 - 4.200 uIU/mL   Valproic Acid Level, Total    Collection Time: 04/17/25  3:47 AM    Specimen: Blood   Result Value Ref Range    Valproic Acid 91.0 50.0 - 125.0 mcg/mL   CBC Auto Differential    Collection Time: 04/17/25  3:47 AM    Specimen: Blood   Result Value Ref Range    WBC 6.75 3.40 - 10.80 10*3/mm3    RBC 3.67 (L) 4.14 - 5.80 10*6/mm3    Hemoglobin 9.4 (L) 13.0 - 17.7 g/dL    Hematocrit 28.6 (L) 37.5 - 51.0 %    MCV 77.9 (L) 79.0 - 97.0 fL    MCH 25.6 (L) 26.6 - 33.0 pg    MCHC 32.9 31.5 - 35.7 g/dL    RDW 15.7 (H) 12.3 - 15.4 %    RDW-SD 44.2 37.0 - 54.0 fl    MPV 10.5 6.0 - 12.0 fL    Platelets 154 140 - 450 10*3/mm3    Neutrophil % 37.0 (L) 42.7 - 76.0 %    Lymphocyte % 51.6 (H) 19.6 - 45.3 %    Monocyte % 9.8 5.0 - 12.0 %    Eosinophil % 0.6 0.3 - 6.2 %    Basophil % 0.4 0.0 - 1.5 %    Immature Grans % 0.6 (H) 0.0 - 0.5 %    Neutrophils, Absolute 2.50 1.70 - 7.00 10*3/mm3    Lymphocytes, Absolute 3.48 (H) 0.70 - 3.10 10*3/mm3    Monocytes, Absolute 0.66 0.10 - 0.90 10*3/mm3    Eosinophils, Absolute 0.04 0.00 - 0.40 10*3/mm3    Basophils, Absolute 0.03 0.00 - 0.20 10*3/mm3    Immature Grans, Absolute 0.04 0.00 - 0.05 10*3/mm3    nRBC 0.0 0.0 - 0.2 /100 WBC   Basic Metabolic Panel    Collection Time: 04/17/25  3:47 AM    Specimen: Blood   Result Value Ref Range     Glucose 101 (H) 65 - 99 mg/dL    BUN 71 (H) 6 - 20 mg/dL    Creatinine 6.20 (H) 0.76 - 1.27 mg/dL    Sodium 141 136 - 145 mmol/L    Potassium 3.6 3.5 - 5.2 mmol/L    Chloride 102 98 - 107 mmol/L    CO2 24.8 22.0 - 29.0 mmol/L    Calcium 9.1 8.6 - 10.5 mg/dL    BUN/Creatinine Ratio 11.5 7.0 - 25.0    Anion Gap 14.2 5.0 - 15.0 mmol/L    eGFR 11.1 (L) >60.0 mL/min/1.73   HEPATITIS B SURFACE ANTIGEN    Collection Time: 04/21/25  2:03 PM    Specimen: Blood    Specimen type and source: Blood specimen (specimen), Venous blood (substance)   Result Value Ref Range    Hepatitis B Surface Ag Negative Negative   PTH, INTACT    Collection Time: 05/16/25 12:40 PM    Specimen: Blood    Specimen type and source: Blood specimen (specimen), Venous blood (substance)   Result Value Ref Range    PTH, Intact 291 (H) 15 - 65 pg/mL   URIC ACID    Collection Time: 05/16/25 12:40 PM    Specimen: Blood    Specimen type and source: Blood specimen (specimen), Venous blood (substance)   Result Value Ref Range    Uric Acid 4.8 3.8 - 8.4 mg/dL   VITAMIN D,25-HYDROXY    Collection Time: 05/16/25 12:40 PM    Specimen: Blood    Specimen type and source: Blood specimen (specimen), Venous blood (substance)   Result Value Ref Range    25 Hydroxy, Vitamin D 30 30.0 - 100.0 ng/mL   PHOSPHORUS    Collection Time: 05/22/25 11:16 AM    Specimen: Blood    Specimen type and source: Blood specimen (specimen), Venous blood (substance)   Result Value Ref Range    Phosphorus 4.3 (H) 2.8 - 4.1 mg/dL   HEPATITIS B SURFACE ANTIGEN    Collection Time: 05/22/25 11:21 AM    Specimen: Blood    Specimen type and source: Blood, Venous blood (substance)   Result Value Ref Range    Hepatitis B Surface Ag Negative Negative   TRACE ELEMENTS    Collection Time: 06/05/25 12:00 AM   Result Value Ref Range    Aluminum <5 0 - 10 mcg/L   HD KINETICS    Collection Time: 06/05/25 12:00 AM   Result Value Ref Range    % Urea Reduction 79 65 - 80 %   POST CHEMISTRY    Collection Time:  06/05/25 12:00 AM   Result Value Ref Range    BUN 17 6 - 19 mg/dL   SPECIAL CHEMISTRY    Collection Time: 06/05/25 12:00 AM   Result Value Ref Range    Vitamin B-12 1,123 (H) 211 - 911 pg/mL    25 Hydroxy, Vitamin D 28.4 (L) 30.0 - 100.0 ng/mL    1,25-Dihydroxy, Vitamin D 14.6 (L) 19.9 - 79.3 pg/mL   Spectrae Chemistry    Collection Time: 06/05/25 12:00 AM   Result Value Ref Range    PTH, Intact 310 (H) 16 - 80 pg/mL   Spectrae Chemistry    Collection Time: 06/12/25 12:00 AM   Result Value Ref Range    Potassium 3.8 3.5 - 5.1 mEq/L   HEMATOLOGY    Collection Time: 06/12/25 12:00 AM   Result Value Ref Range    Hemoglobin 8.7 (L) 14.0 - 18.0 g/dL    Hemoglobin 26.1 (L) 42.0 - 54.0 %   IMMUNO CHEMISTRY    Collection Time: 06/19/25 12:00 AM   Result Value Ref Range    Hepatitis B Surface Ag Negative Negative     Assessment & Plan   Diagnoses and all orders for this visit:    1. Mild intermittent asthma without complication (Primary)  -     albuterol sulfate  (90 Base) MCG/ACT inhaler; Inhale 2 puffs Every 4 (Four) Hours As Needed for Wheezing.  Dispense: 18 g; Refill: 3    2. Constipation, unspecified constipation type  -     polyethylene glycol (MIRALAX) 17 g packet; Take 17 g by mouth Daily.  Dispense: 30 packet; Refill: 11    Mild asthma but does not have the inhaler which we have ordered.  Also with constipation which appears to be a chronic issue and likely related to his renal function.  We utilized the MiraLAX starting at 17 g once a day with fluids and observe if he has persisting problems then may increase.  We discussed his AV fistula which is not functioning at this time and is to follow-up with vascular and continue with his dialysis.           Dictated utilizing Dragon Dictation

## 2025-07-24 RX ORDER — LORATADINE 10 MG/1
10 TABLET ORAL DAILY
Qty: 90 TABLET | Refills: 3 | Status: SHIPPED | OUTPATIENT
Start: 2025-07-24

## 2025-08-13 ENCOUNTER — OFFICE VISIT (OUTPATIENT)
Dept: FAMILY MEDICINE CLINIC | Facility: CLINIC | Age: 40
End: 2025-08-13
Payer: MEDICAID

## 2025-08-13 VITALS
TEMPERATURE: 96.9 F | BODY MASS INDEX: 27.91 KG/M2 | HEIGHT: 67 IN | SYSTOLIC BLOOD PRESSURE: 104 MMHG | WEIGHT: 177.8 LBS | DIASTOLIC BLOOD PRESSURE: 70 MMHG | OXYGEN SATURATION: 95 % | HEART RATE: 89 BPM

## 2025-08-13 DIAGNOSIS — N18.6 END STAGE RENAL DISEASE: ICD-10-CM

## 2025-08-13 DIAGNOSIS — T82.9XXA COMPLICATION OF VASCULAR ACCESS FOR DIALYSIS, INITIAL ENCOUNTER: Primary | ICD-10-CM

## 2025-08-13 RX ORDER — LEVOTHYROXINE SODIUM 75 UG/1
75 TABLET ORAL DAILY
Qty: 90 TABLET | Refills: 3 | Status: SHIPPED | OUTPATIENT
Start: 2025-08-13

## 2025-08-13 RX ORDER — ACETAMINOPHEN 325 MG/1
650 TABLET ORAL EVERY 6 HOURS PRN
Qty: 50 TABLET | Refills: 11 | Status: SHIPPED | OUTPATIENT
Start: 2025-08-13

## 2025-08-13 RX ORDER — ALBUTEROL SULFATE 1.25 MG/3ML
1 SOLUTION RESPIRATORY (INHALATION) EVERY 4 HOURS PRN
Qty: 120 EACH | Refills: 3 | Status: SHIPPED | OUTPATIENT
Start: 2025-08-13

## 2025-08-13 RX ORDER — SEVELAMER HYDROCHLORIDE 800 MG/1
800 TABLET, FILM COATED ORAL
COMMUNITY

## 2025-08-26 RX ORDER — FLUTICASONE PROPIONATE 50 MCG
2 SPRAY, SUSPENSION (ML) NASAL DAILY
Qty: 16 G | Refills: 5 | Status: SHIPPED | OUTPATIENT
Start: 2025-08-26